# Patient Record
Sex: MALE | Race: WHITE | Employment: OTHER
[De-identification: names, ages, dates, MRNs, and addresses within clinical notes are randomized per-mention and may not be internally consistent; named-entity substitution may affect disease eponyms.]

---

## 2017-02-20 ENCOUNTER — OFFICE VISIT (OUTPATIENT)
Dept: NEUROLOGY | Facility: CLINIC | Age: 67
End: 2017-02-20

## 2017-02-20 VITALS
BODY MASS INDEX: 31.75 KG/M2 | SYSTOLIC BLOOD PRESSURE: 126 MMHG | HEIGHT: 72 IN | HEART RATE: 84 BPM | WEIGHT: 234.4 LBS | DIASTOLIC BLOOD PRESSURE: 84 MMHG

## 2017-02-20 DIAGNOSIS — G89.0 THALAMIC PAIN SYNDROME: Primary | ICD-10-CM

## 2017-02-20 PROCEDURE — 3017F COLORECTAL CA SCREEN DOC REV: CPT | Performed by: PSYCHIATRY & NEUROLOGY

## 2017-02-20 PROCEDURE — G8427 DOCREV CUR MEDS BY ELIG CLIN: HCPCS | Performed by: PSYCHIATRY & NEUROLOGY

## 2017-02-20 PROCEDURE — 4040F PNEUMOC VAC/ADMIN/RCVD: CPT | Performed by: PSYCHIATRY & NEUROLOGY

## 2017-02-20 PROCEDURE — 1123F ACP DISCUSS/DSCN MKR DOCD: CPT | Performed by: PSYCHIATRY & NEUROLOGY

## 2017-02-20 PROCEDURE — G8598 ASA/ANTIPLAT THER USED: HCPCS | Performed by: PSYCHIATRY & NEUROLOGY

## 2017-02-20 PROCEDURE — G8484 FLU IMMUNIZE NO ADMIN: HCPCS | Performed by: PSYCHIATRY & NEUROLOGY

## 2017-02-20 PROCEDURE — 1036F TOBACCO NON-USER: CPT | Performed by: PSYCHIATRY & NEUROLOGY

## 2017-02-20 PROCEDURE — 99214 OFFICE O/P EST MOD 30 MIN: CPT | Performed by: PSYCHIATRY & NEUROLOGY

## 2017-02-20 PROCEDURE — G8417 CALC BMI ABV UP PARAM F/U: HCPCS | Performed by: PSYCHIATRY & NEUROLOGY

## 2017-02-21 ENCOUNTER — OFFICE VISIT (OUTPATIENT)
Dept: INTERNAL MEDICINE | Facility: CLINIC | Age: 67
End: 2017-02-21

## 2017-02-21 VITALS
DIASTOLIC BLOOD PRESSURE: 84 MMHG | HEIGHT: 72 IN | WEIGHT: 230.4 LBS | RESPIRATION RATE: 12 BRPM | HEART RATE: 84 BPM | SYSTOLIC BLOOD PRESSURE: 128 MMHG | OXYGEN SATURATION: 96 % | BODY MASS INDEX: 31.21 KG/M2

## 2017-02-21 DIAGNOSIS — E78.00 HYPERCHOLESTEREMIA: ICD-10-CM

## 2017-02-21 DIAGNOSIS — I10 ESSENTIAL HYPERTENSION: ICD-10-CM

## 2017-02-21 DIAGNOSIS — E11.42 CONTROLLED TYPE 2 DIABETES MELLITUS WITH DIABETIC POLYNEUROPATHY, WITHOUT LONG-TERM CURRENT USE OF INSULIN (HCC): ICD-10-CM

## 2017-02-21 DIAGNOSIS — E11.42 DIABETIC POLYNEUROPATHY ASSOCIATED WITH TYPE 2 DIABETES MELLITUS (HCC): Primary | ICD-10-CM

## 2017-02-21 PROCEDURE — 1123F ACP DISCUSS/DSCN MKR DOCD: CPT | Performed by: INTERNAL MEDICINE

## 2017-02-21 PROCEDURE — 83036 HEMOGLOBIN GLYCOSYLATED A1C: CPT | Performed by: INTERNAL MEDICINE

## 2017-02-21 PROCEDURE — G8484 FLU IMMUNIZE NO ADMIN: HCPCS | Performed by: INTERNAL MEDICINE

## 2017-02-21 PROCEDURE — G8598 ASA/ANTIPLAT THER USED: HCPCS | Performed by: INTERNAL MEDICINE

## 2017-02-21 PROCEDURE — 4040F PNEUMOC VAC/ADMIN/RCVD: CPT | Performed by: INTERNAL MEDICINE

## 2017-02-21 PROCEDURE — 1036F TOBACCO NON-USER: CPT | Performed by: INTERNAL MEDICINE

## 2017-02-21 PROCEDURE — 99214 OFFICE O/P EST MOD 30 MIN: CPT | Performed by: INTERNAL MEDICINE

## 2017-02-21 PROCEDURE — G8427 DOCREV CUR MEDS BY ELIG CLIN: HCPCS | Performed by: INTERNAL MEDICINE

## 2017-02-21 PROCEDURE — 3044F HG A1C LEVEL LT 7.0%: CPT | Performed by: INTERNAL MEDICINE

## 2017-02-21 PROCEDURE — G8417 CALC BMI ABV UP PARAM F/U: HCPCS | Performed by: INTERNAL MEDICINE

## 2017-02-21 PROCEDURE — 3017F COLORECTAL CA SCREEN DOC REV: CPT | Performed by: INTERNAL MEDICINE

## 2017-02-21 RX ORDER — HYDROCODONE BITARTRATE AND ACETAMINOPHEN 5; 325 MG/1; MG/1
1 TABLET ORAL EVERY 8 HOURS
Qty: 90 TABLET | Refills: 0 | Status: SHIPPED | OUTPATIENT
Start: 2017-02-21 | End: 2017-05-23 | Stop reason: SDUPTHER

## 2017-02-21 RX ORDER — TADALAFIL 20 MG/1
20 TABLET ORAL DAILY
COMMUNITY
Start: 2016-08-19 | End: 2021-06-10 | Stop reason: CLARIF

## 2017-02-21 RX ORDER — HYDROCODONE BITARTRATE AND ACETAMINOPHEN 5; 325 MG/1; MG/1
5-325 TABLET ORAL EVERY 8 HOURS
COMMUNITY
Start: 2016-08-05 | End: 2017-02-21 | Stop reason: SDUPTHER

## 2017-02-21 ASSESSMENT — PATIENT HEALTH QUESTIONNAIRE - PHQ9
2. FEELING DOWN, DEPRESSED OR HOPELESS: 0
SUM OF ALL RESPONSES TO PHQ QUESTIONS 1-9: 0
1. LITTLE INTEREST OR PLEASURE IN DOING THINGS: 0
SUM OF ALL RESPONSES TO PHQ9 QUESTIONS 1 & 2: 0

## 2017-03-31 RX ORDER — LOSARTAN POTASSIUM 50 MG/1
TABLET ORAL
Qty: 90 TABLET | Refills: 3 | Status: SHIPPED | OUTPATIENT
Start: 2017-03-31 | End: 2018-01-16 | Stop reason: SDUPTHER

## 2017-03-31 RX ORDER — SIMVASTATIN 20 MG
TABLET ORAL
Qty: 90 TABLET | Refills: 3 | Status: SHIPPED | OUTPATIENT
Start: 2017-03-31 | End: 2018-01-16 | Stop reason: SDUPTHER

## 2017-04-11 RX ORDER — HYDROCODONE BITARTRATE AND ACETAMINOPHEN 5; 325 MG/1; MG/1
1 TABLET ORAL EVERY 8 HOURS PRN
Qty: 90 TABLET | Refills: 0 | Status: SHIPPED | OUTPATIENT
Start: 2017-04-11 | End: 2017-08-23 | Stop reason: SDUPTHER

## 2017-05-04 ENCOUNTER — HOSPITAL ENCOUNTER (OUTPATIENT)
Age: 67
Setting detail: SPECIMEN
Discharge: HOME | End: 2017-05-04
Payer: MEDICARE

## 2017-05-04 DIAGNOSIS — E11.42 DIABETIC POLYNEUROPATHY ASSOCIATED WITH TYPE 2 DIABETES MELLITUS (HCC): ICD-10-CM

## 2017-05-04 LAB
CHOLESTEROL/HDL RATIO: 4.2
CHOLESTEROL: 165 MG/DL
HDLC SERPL-MCNC: 39 MG/DL
LDL CHOLESTEROL: 94 MG/DL (ref 0–130)
TRIGL SERPL-MCNC: 162 MG/DL
VLDLC SERPL CALC-MCNC: ABNORMAL MG/DL (ref 1–30)

## 2017-05-15 RX ORDER — AMITRIPTYLINE HYDROCHLORIDE 75 MG/1
TABLET, FILM COATED ORAL
Qty: 90 TABLET | Refills: 3 | Status: SHIPPED | OUTPATIENT
Start: 2017-05-15 | End: 2018-05-14 | Stop reason: SDUPTHER

## 2017-05-23 ENCOUNTER — HOSPITAL ENCOUNTER (OUTPATIENT)
Age: 67
Setting detail: SPECIMEN
Discharge: HOME | End: 2017-05-23
Payer: MEDICARE

## 2017-05-23 ENCOUNTER — OFFICE VISIT (OUTPATIENT)
Dept: INTERNAL MEDICINE | Age: 67
End: 2017-05-23
Payer: MEDICARE

## 2017-05-23 VITALS
OXYGEN SATURATION: 97 % | SYSTOLIC BLOOD PRESSURE: 130 MMHG | WEIGHT: 230.8 LBS | HEIGHT: 72 IN | HEART RATE: 79 BPM | BODY MASS INDEX: 31.26 KG/M2 | DIASTOLIC BLOOD PRESSURE: 86 MMHG | RESPIRATION RATE: 12 BRPM

## 2017-05-23 DIAGNOSIS — M25.561 CHRONIC PAIN OF BOTH KNEES: ICD-10-CM

## 2017-05-23 DIAGNOSIS — I63.511 CEREBRAL INFARCTION DUE TO OCCLUSION OF RIGHT MIDDLE CEREBRAL ARTERY (HCC): ICD-10-CM

## 2017-05-23 DIAGNOSIS — E11.43 TYPE 2 DIABETES MELLITUS WITH DIABETIC AUTONOMIC NEUROPATHY, WITHOUT LONG-TERM CURRENT USE OF INSULIN (HCC): Primary | ICD-10-CM

## 2017-05-23 DIAGNOSIS — E11.43 TYPE 2 DIABETES MELLITUS WITH DIABETIC AUTONOMIC NEUROPATHY, WITHOUT LONG-TERM CURRENT USE OF INSULIN (HCC): ICD-10-CM

## 2017-05-23 DIAGNOSIS — G89.29 CHRONIC PAIN OF BOTH KNEES: ICD-10-CM

## 2017-05-23 DIAGNOSIS — M25.562 CHRONIC PAIN OF BOTH KNEES: ICD-10-CM

## 2017-05-23 DIAGNOSIS — I10 ESSENTIAL HYPERTENSION: ICD-10-CM

## 2017-05-23 LAB
CREATININE URINE: 75.4 MG/DL (ref 39–259)
MICROALBUMIN/CREAT 24H UR: <12 MG/L
MICROALBUMIN/CREAT UR-RTO: 16 MCG/MG CREAT

## 2017-05-23 PROCEDURE — 1036F TOBACCO NON-USER: CPT | Performed by: INTERNAL MEDICINE

## 2017-05-23 PROCEDURE — G8598 ASA/ANTIPLAT THER USED: HCPCS | Performed by: INTERNAL MEDICINE

## 2017-05-23 PROCEDURE — 1123F ACP DISCUSS/DSCN MKR DOCD: CPT | Performed by: INTERNAL MEDICINE

## 2017-05-23 PROCEDURE — 3017F COLORECTAL CA SCREEN DOC REV: CPT | Performed by: INTERNAL MEDICINE

## 2017-05-23 PROCEDURE — 3044F HG A1C LEVEL LT 7.0%: CPT | Performed by: INTERNAL MEDICINE

## 2017-05-23 PROCEDURE — G8427 DOCREV CUR MEDS BY ELIG CLIN: HCPCS | Performed by: INTERNAL MEDICINE

## 2017-05-23 PROCEDURE — G8417 CALC BMI ABV UP PARAM F/U: HCPCS | Performed by: INTERNAL MEDICINE

## 2017-05-23 PROCEDURE — 99214 OFFICE O/P EST MOD 30 MIN: CPT | Performed by: INTERNAL MEDICINE

## 2017-05-23 PROCEDURE — 4040F PNEUMOC VAC/ADMIN/RCVD: CPT | Performed by: INTERNAL MEDICINE

## 2017-05-23 PROCEDURE — 83036 HEMOGLOBIN GLYCOSYLATED A1C: CPT | Performed by: INTERNAL MEDICINE

## 2017-05-23 RX ORDER — HYDROCODONE BITARTRATE AND ACETAMINOPHEN 5; 325 MG/1; MG/1
1 TABLET ORAL EVERY 8 HOURS
Qty: 90 TABLET | Refills: 0 | Status: SHIPPED | OUTPATIENT
Start: 2017-05-23 | End: 2017-08-23 | Stop reason: SDUPTHER

## 2017-05-24 LAB — HBA1C MFR BLD: 7.1 %

## 2017-08-23 ENCOUNTER — OFFICE VISIT (OUTPATIENT)
Dept: INTERNAL MEDICINE | Age: 67
End: 2017-08-23
Payer: MEDICARE

## 2017-08-23 VITALS
DIASTOLIC BLOOD PRESSURE: 84 MMHG | WEIGHT: 243 LBS | BODY MASS INDEX: 32.96 KG/M2 | OXYGEN SATURATION: 93 % | HEART RATE: 89 BPM | SYSTOLIC BLOOD PRESSURE: 138 MMHG

## 2017-08-23 DIAGNOSIS — Z23 NEED FOR PROPHYLACTIC VACCINATION AGAINST STREPTOCOCCUS PNEUMONIAE (PNEUMOCOCCUS): ICD-10-CM

## 2017-08-23 DIAGNOSIS — I10 ESSENTIAL HYPERTENSION: ICD-10-CM

## 2017-08-23 DIAGNOSIS — E11.43 TYPE 2 DIABETES MELLITUS WITH DIABETIC AUTONOMIC NEUROPATHY, WITHOUT LONG-TERM CURRENT USE OF INSULIN (HCC): ICD-10-CM

## 2017-08-23 DIAGNOSIS — Z12.11 SCREENING FOR COLON CANCER: ICD-10-CM

## 2017-08-23 PROCEDURE — 1123F ACP DISCUSS/DSCN MKR DOCD: CPT | Performed by: INTERNAL MEDICINE

## 2017-08-23 PROCEDURE — 3046F HEMOGLOBIN A1C LEVEL >9.0%: CPT | Performed by: INTERNAL MEDICINE

## 2017-08-23 PROCEDURE — G8417 CALC BMI ABV UP PARAM F/U: HCPCS | Performed by: INTERNAL MEDICINE

## 2017-08-23 PROCEDURE — 90670 PCV13 VACCINE IM: CPT | Performed by: INTERNAL MEDICINE

## 2017-08-23 PROCEDURE — G8427 DOCREV CUR MEDS BY ELIG CLIN: HCPCS | Performed by: INTERNAL MEDICINE

## 2017-08-23 PROCEDURE — G8598 ASA/ANTIPLAT THER USED: HCPCS | Performed by: INTERNAL MEDICINE

## 2017-08-23 PROCEDURE — 4040F PNEUMOC VAC/ADMIN/RCVD: CPT | Performed by: INTERNAL MEDICINE

## 2017-08-23 PROCEDURE — G0009 ADMIN PNEUMOCOCCAL VACCINE: HCPCS | Performed by: INTERNAL MEDICINE

## 2017-08-23 PROCEDURE — 3017F COLORECTAL CA SCREEN DOC REV: CPT | Performed by: INTERNAL MEDICINE

## 2017-08-23 PROCEDURE — 1036F TOBACCO NON-USER: CPT | Performed by: INTERNAL MEDICINE

## 2017-08-23 PROCEDURE — 99214 OFFICE O/P EST MOD 30 MIN: CPT | Performed by: INTERNAL MEDICINE

## 2017-08-23 RX ORDER — HYDROCODONE BITARTRATE AND ACETAMINOPHEN 5; 325 MG/1; MG/1
1 TABLET ORAL EVERY 8 HOURS PRN
Qty: 90 TABLET | Refills: 0 | Status: SHIPPED | OUTPATIENT
Start: 2017-08-23 | End: 2017-10-23 | Stop reason: SDUPTHER

## 2017-08-23 ASSESSMENT — ENCOUNTER SYMPTOMS: GASTROINTESTINAL NEGATIVE: 1

## 2017-10-23 ENCOUNTER — OFFICE VISIT (OUTPATIENT)
Dept: INTERNAL MEDICINE | Age: 67
End: 2017-10-23
Payer: MEDICARE

## 2017-10-23 ENCOUNTER — HOSPITAL ENCOUNTER (OUTPATIENT)
Age: 67
Setting detail: SPECIMEN
Discharge: HOME | End: 2017-10-23
Payer: MEDICARE

## 2017-10-23 DIAGNOSIS — Z02.83 ENCOUNTER FOR DRUG SCREENING: Primary | ICD-10-CM

## 2017-10-23 DIAGNOSIS — Z02.83 ENCOUNTER FOR DRUG SCREENING: ICD-10-CM

## 2017-10-23 LAB
AMPHETAMINE SCREEN URINE: NEGATIVE
BARBITURATE SCREEN URINE: NEGATIVE
BENZODIAZEPINE SCREEN, URINE: NEGATIVE
BUPRENORPHINE URINE: NORMAL
CANNABINOID SCREEN URINE: NEGATIVE
COCAINE METABOLITE, URINE: NEGATIVE
MDMA URINE: NORMAL
METHADONE SCREEN, URINE: NEGATIVE
METHAMPHETAMINE, URINE: NORMAL
OPIATES, URINE: NEGATIVE
OXYCODONE SCREEN URINE: NEGATIVE
PHENCYCLIDINE, URINE: NEGATIVE
PROPOXYPHENE, URINE: NORMAL
TEST INFORMATION: NORMAL
TRICYCLIC ANTIDEPRESSANTS, UR: NORMAL

## 2017-10-23 PROCEDURE — 3017F COLORECTAL CA SCREEN DOC REV: CPT | Performed by: INTERNAL MEDICINE

## 2017-10-23 PROCEDURE — 4040F PNEUMOC VAC/ADMIN/RCVD: CPT | Performed by: INTERNAL MEDICINE

## 2017-10-23 PROCEDURE — G8417 CALC BMI ABV UP PARAM F/U: HCPCS | Performed by: INTERNAL MEDICINE

## 2017-10-23 PROCEDURE — G8428 CUR MEDS NOT DOCUMENT: HCPCS | Performed by: INTERNAL MEDICINE

## 2017-10-23 PROCEDURE — 99211 OFF/OP EST MAY X REQ PHY/QHP: CPT | Performed by: INTERNAL MEDICINE

## 2017-10-23 RX ORDER — HYDROCODONE BITARTRATE AND ACETAMINOPHEN 5; 325 MG/1; MG/1
1 TABLET ORAL EVERY 8 HOURS PRN
Qty: 90 TABLET | Refills: 0 | Status: SHIPPED | OUTPATIENT
Start: 2017-10-23 | End: 2017-12-04 | Stop reason: SDUPTHER

## 2017-12-04 ENCOUNTER — OFFICE VISIT (OUTPATIENT)
Dept: INTERNAL MEDICINE | Age: 67
End: 2017-12-04
Payer: MEDICARE

## 2017-12-04 VITALS
BODY MASS INDEX: 31.29 KG/M2 | DIASTOLIC BLOOD PRESSURE: 91 MMHG | SYSTOLIC BLOOD PRESSURE: 136 MMHG | HEIGHT: 72 IN | WEIGHT: 231 LBS | HEART RATE: 91 BPM | OXYGEN SATURATION: 94 %

## 2017-12-04 DIAGNOSIS — E11.43 TYPE 2 DIABETES MELLITUS WITH DIABETIC AUTONOMIC NEUROPATHY, WITHOUT LONG-TERM CURRENT USE OF INSULIN (HCC): Primary | ICD-10-CM

## 2017-12-04 DIAGNOSIS — I10 ESSENTIAL HYPERTENSION: ICD-10-CM

## 2017-12-04 DIAGNOSIS — E78.00 HYPERCHOLESTEREMIA: ICD-10-CM

## 2017-12-04 LAB — HBA1C MFR BLD: 7.3 %

## 2017-12-04 PROCEDURE — G8482 FLU IMMUNIZE ORDER/ADMIN: HCPCS | Performed by: INTERNAL MEDICINE

## 2017-12-04 PROCEDURE — 83036 HEMOGLOBIN GLYCOSYLATED A1C: CPT | Performed by: INTERNAL MEDICINE

## 2017-12-04 PROCEDURE — G8417 CALC BMI ABV UP PARAM F/U: HCPCS | Performed by: INTERNAL MEDICINE

## 2017-12-04 PROCEDURE — G8598 ASA/ANTIPLAT THER USED: HCPCS | Performed by: INTERNAL MEDICINE

## 2017-12-04 PROCEDURE — 99214 OFFICE O/P EST MOD 30 MIN: CPT | Performed by: INTERNAL MEDICINE

## 2017-12-04 PROCEDURE — 3017F COLORECTAL CA SCREEN DOC REV: CPT | Performed by: INTERNAL MEDICINE

## 2017-12-04 PROCEDURE — 1036F TOBACCO NON-USER: CPT | Performed by: INTERNAL MEDICINE

## 2017-12-04 PROCEDURE — G8427 DOCREV CUR MEDS BY ELIG CLIN: HCPCS | Performed by: INTERNAL MEDICINE

## 2017-12-04 PROCEDURE — 1123F ACP DISCUSS/DSCN MKR DOCD: CPT | Performed by: INTERNAL MEDICINE

## 2017-12-04 PROCEDURE — 4040F PNEUMOC VAC/ADMIN/RCVD: CPT | Performed by: INTERNAL MEDICINE

## 2017-12-04 PROCEDURE — 3045F PR MOST RECENT HEMOGLOBIN A1C LEVEL 7.0-9.0%: CPT | Performed by: INTERNAL MEDICINE

## 2017-12-04 RX ORDER — HYDROCODONE BITARTRATE AND ACETAMINOPHEN 5; 325 MG/1; MG/1
1 TABLET ORAL EVERY 8 HOURS PRN
Qty: 90 TABLET | Refills: 0 | Status: SHIPPED | OUTPATIENT
Start: 2017-12-04 | End: 2018-01-23 | Stop reason: SDUPTHER

## 2017-12-04 ASSESSMENT — ENCOUNTER SYMPTOMS
ALLERGIC/IMMUNOLOGIC NEGATIVE: 1
RESPIRATORY NEGATIVE: 1

## 2017-12-04 NOTE — PROGRESS NOTES
[facility] INTERNAL MEDICINE [place] [address] [address]  [address] [address]  Dept: [phone]  Dept Fax: [phone]    Tonja Zheng is a 75 y.o. male who presents today for   Chief Complaint   Patient presents with    3 Month Follow-Up     Patient will bring his FIT KIT at Next Visit    Diabetes     Patient does not check glucose levels at home    Hypertension    and follow up of chronic medical problems:   Patient Active Problem List   Diagnosis    Hypercholesteremia    Cerebral infarction ([address]    Neuropathic pain    Weakness of both legs    Essential hypertension    Type 2 diabetes mellitus with diabetic autonomic neuropathy, without long-term current use of insulin ([address]    Eustachian tube dysfunction    Branch retinal vein occlusion    Primary open angle glaucoma    Chronic pain of both knees    Cerebral infarction due to occlusion of right middle cerebral artery ([address]   . Past Medical History:   Diagnosis Date    Cataracts, bilateral     Headache(784.0)     Hearing loss     Hyperlipidemia     Kidney stones     Osteoarthritis     Tendonitis of shoulder, left     Tremor        Past Surgical History:   Procedure Laterality Date    CATARACT REMOVAL Right Aug 2015    CYSTOSCOPY      KIDNEY STONE SURGERY      LITHOTRIPSY      REFRACTIVE SURGERY Right 2014    THUMB AMPUTATION Left        Family History   Problem Relation Age of Onset    Heart Disease Mother     Cancer Father     Migraines Sister     Migraines Brother        Social History   Substance Use Topics    Smoking status: Never Smoker    Smokeless tobacco: Former User     Types: Chew      Comment: quit smokeless tobacco in late 80's    Alcohol use 0.0 oz/week      Comment: rarely      Current Outpatient Prescriptions   Medication Sig Dispense Refill    HYDROcodone-acetaminophen (NORCO) 5-325 MG per tablet Take 1 tablet by mouth every 8 hours as needed for Pain .  90 tablet 0    amitriptyline (ELAVIL) 75 MG tablet TAKE 1 TABLET EVERY NIGHT 90 tablet 3    simvastatin (ZOCOR) 20 MG tablet TAKE 1 TABLET EVERY DAY 90 tablet 3    losartan (COZAAR) 50 MG tablet TAKE 1 TABLET EVERY DAY 90 tablet 3    tadalafil (CIALIS) 20 MG tablet Take 20 mg by mouth daily      amLODIPine (NORVASC) 10 MG tablet TAKE 1 TABLET EVERY DAY 90 tablet 5    latanoprost (XALATAN) 0.005 % ophthalmic solution       aspirin 325 MG tablet Take 325 mg by mouth daily. No current facility-administered medications for this visit. No Known Allergies    Health Maintenance   Topic Date Due    Colon Cancer Screen FIT/FOBT  05/13/2017    Diabetic foot exam  05/23/2018 (Originally 2/19/2017)    Diabetic retinal exam  05/23/2018 (Originally 1/28/2017)    Zostavax vaccine  01/19/2019 (Originally 3/19/2009)    DTaP/Tdap/Td vaccine (1 - Tdap) 01/19/2019 (Originally [birth year]/19/1969)    Lipid screen  05/04/2018    Diabetic microalbuminuria test  05/23/2018    Diabetic hemoglobin A1C test  05/24/2018    Pneumococcal low/med risk (2 of 2 - PPSV23) 08/23/2018    Flu vaccine  Completed    Hepatitis C screen  Addressed     Visit Information    Have you changed or started any medications since your last visit including any over-the-counter medicines, vitamins, or herbal medicines? no   Are you having any side effects from any of your medications? -  no  Have you stopped taking any of your medications? Is so, why? -  no    Have you seen any other physician or provider since your last visit? Yes - Records Requested  Have you had any other diagnostic tests since your last visit? No  Have you been seen in the emergency room and/or had an admission to a hospital since we last saw you? No  Have you had your routine dental cleaning in the past 6 months? no    Have you activated your [employer]bl[employer] account? If not, what are your barriers?  Yes     Patient Care Team:  Varun Anderson MD as PCP - Claudeen Soulier, MD as PCP - S Cardiovascular: Negative. Allergic/Immunologic: Negative. Psychiatric/Behavioral: Negative. All other systems reviewed and are negative. Objective:     Physical Exam   Constitutional: He is oriented to person, place, and time. He appears well-developed and well-nourished. HENT:   Head: Normocephalic and atraumatic. Neck: Neck supple. Cardiovascular: Normal rate and regular rhythm. Pulmonary/Chest: Effort normal and breath sounds normal.   Abdominal: Soft. Musculoskeletal: He exhibits no edema. Neurological: He is alert and oriented to person, place, and time. Skin: Skin is warm and dry. Psychiatric: He has a normal mood and affect. His behavior is normal.   Vitals reviewed. BP (!) 136/91   Pulse 91   Ht 6' (1.829 m)   Wt 231 lb (104.8 kg)   SpO2 94%   BMI 31.33 kg/m²     Assessment:      1. Type 2 diabetes mellitus with diabetic autonomic neuropathy, without long-term current use of insulin ([facility])  POCT glycosylated hemoglobin (Hb A1C)   2. Essential hypertension     3. Hypercholesteremia         Plan:       1. Brenda Best received counseling on the following healthy behaviors: medication adherence    2. Prior labs and health maintenance reviewed. 3.  Discussed use, benefit, and side effects of prescribed medications. Barriers to medication compliance addressed. All his questions were answered. Pt voiced understanding. Brenda Best will continue current medications, diet and exercise. Orders Placed This Encounter   Medications    HYDROcodone-acetaminophen (NORCO) 5-325 MG per tablet     Sig: Take 1 tablet by mouth every 8 hours as needed for Pain . Dispense:  90 tablet     Refill:  0          Completed Refills               Requested Prescriptions     Signed Prescriptions Disp Refills    HYDROcodone-acetaminophen (NORCO) 5-325 MG per tablet 90 tablet 0     Sig: Take 1 tablet by mouth every 8 hours as needed for Pain . 4.  Patient given educational materials - see patient instructions    5. Was a self-tracking handout given in paper form or via [employer]? No  If yes, see orders or list here. Orders Placed This Encounter   Procedures    POCT glycosylated hemoglobin (Hb A1C)       Return in about 3 months (around 3/4/2018). Patient agreed with treatment plan.     Electronically signed by Jeanella Epley, MD on 12/4/2017 at 9:16 AM

## 2017-12-04 NOTE — PATIENT INSTRUCTIONS
take decongestants or anti-inflammatory medicine, such as ibuprofen. Some of these medicines can raise blood pressure. · Learn how to check your blood pressure at home. Lifestyle changes  · Stay at a healthy weight. This is especially important if you put on weight around the waist. Losing even 10 pounds can help you lower your blood pressure. · If your doctor recommends it, get more exercise. Walking is a good choice. Bit by bit, increase the amount you walk every day. Try for at least 30 minutes on most days of the week. You also may want to swim, bike, or do other activities. · Avoid or limit alcohol. Talk to your doctor about whether you can drink any alcohol. · Try to limit how much sodium you eat to less than 2,300 milligrams (mg) a day. Your doctor may ask you to try to eat less than 1,500 mg a day. · Eat plenty of fruits (such as bananas and oranges), vegetables, legumes, whole grains, and low-fat dairy products. · Lower the amount of saturated fat in your diet. Saturated fat is found in animal products such as milk, cheese, and meat. Limiting these foods may help you lose weight and also lower your risk for heart disease. · Do not smoke. Smoking increases your risk for heart attack and stroke. If you need help quitting, talk to your doctor about stop-smoking programs and medicines. These can increase your chances of quitting for good. When should you call for help? Call 911 anytime you think you may need emergency care. This may mean having symptoms that suggest that your blood pressure is causing a serious heart or blood vessel problem. Your blood pressure may be over 180/110. For example, call 911 if:  · You have symptoms of a heart attack. These may include:  ¨ Chest pain or pressure, or a strange feeling in the chest.  ¨ Sweating. ¨ Shortness of breath. ¨ Nausea or vomiting.   ¨ Pain, pressure, or a strange feeling in the back, neck, jaw, or upper belly or in one or both shoulders or arms.  ¨ Lightheadedness or sudden weakness. ¨ A fast or irregular heartbeat. · You have symptoms of a stroke. These may include:  ¨ Sudden numbness, tingling, weakness, or loss of movement in your face, arm, or leg, especially on only one side of your body. ¨ Sudden vision changes. ¨ Sudden trouble speaking. ¨ Sudden confusion or trouble understanding simple statements. ¨ Sudden problems with walking or balance. ¨ A sudden, severe headache that is different from past headaches. · You have severe back or belly pain. Do not wait until your blood pressure comes down on its own. Get help right away. Call your doctor now or seek immediate care if:  · Your blood pressure is much higher than normal (such as 180/110 or higher), but you don't have symptoms. · You think high blood pressure is causing symptoms, such as:  ¨ Severe headache. ¨ Blurry vision. Watch closely for changes in your health, and be sure to contact your doctor if:  · Your blood pressure measures 140/90 or higher at least 2 times. That means the top number is 140 or higher or the bottom number is 90 or higher, or both. · You think you may be having side effects from your blood pressure medicine. · Your blood pressure is usually normal, but it goes above normal at least 2 times. Where can you learn more? Go to https://Circalit.Draftster. org and sign in to your Pump! account. Enter S078 in the [facility] box to learn more about \"High Blood Pressure: Care Instructions. \"     If you do not have an account, please click on the \"Sign Up Now\" link. Current as of: August 8, 2016  Content Version: 11.3  © 1371-5455 [employer]. Care instructions adapted under license by [facility] ([facility]). If you have questions about a medical condition or this instruction, always ask your healthcare professional. [place] [address] any warranty or liability for your use of this information.

## 2018-01-16 RX ORDER — SIMVASTATIN 20 MG
TABLET ORAL
Qty: 90 TABLET | Refills: 3 | Status: SHIPPED | OUTPATIENT
Start: 2018-01-16 | End: 2019-01-25 | Stop reason: SDUPTHER

## 2018-01-16 RX ORDER — LOSARTAN POTASSIUM 50 MG/1
TABLET ORAL
Qty: 90 TABLET | Refills: 3 | Status: SHIPPED | OUTPATIENT
Start: 2018-01-16 | End: 2019-01-25 | Stop reason: SDUPTHER

## 2018-01-16 RX ORDER — AMLODIPINE BESYLATE 10 MG/1
TABLET ORAL
Qty: 90 TABLET | Refills: 5 | Status: SHIPPED | OUTPATIENT
Start: 2018-01-16 | End: 2019-01-25 | Stop reason: SDUPTHER

## 2018-01-23 DIAGNOSIS — M79.2 NEUROPATHIC PAIN: Primary | ICD-10-CM

## 2018-01-23 RX ORDER — HYDROCODONE BITARTRATE AND ACETAMINOPHEN 5; 325 MG/1; MG/1
1 TABLET ORAL EVERY 8 HOURS PRN
Qty: 90 TABLET | Refills: 0 | Status: SHIPPED | OUTPATIENT
Start: 2018-01-23 | End: 2018-06-14 | Stop reason: SDUPTHER

## 2018-03-08 ENCOUNTER — HOSPITAL ENCOUNTER (OUTPATIENT)
Age: 68
Discharge: HOME | End: 2018-03-08
Payer: MEDICARE

## 2018-03-08 ENCOUNTER — HOSPITAL ENCOUNTER (OUTPATIENT)
Dept: GENERAL RADIOLOGY | Age: 68
Discharge: HOME | End: 2018-03-10
Payer: MEDICARE

## 2018-03-08 ENCOUNTER — OFFICE VISIT (OUTPATIENT)
Dept: INTERNAL MEDICINE | Age: 68
End: 2018-03-08
Payer: MEDICARE

## 2018-03-08 ENCOUNTER — HOSPITAL ENCOUNTER (OUTPATIENT)
Age: 68
Discharge: HOME | End: 2018-03-10
Payer: MEDICARE

## 2018-03-08 VITALS
OXYGEN SATURATION: 95 % | DIASTOLIC BLOOD PRESSURE: 87 MMHG | HEIGHT: 72 IN | WEIGHT: 230.8 LBS | SYSTOLIC BLOOD PRESSURE: 128 MMHG | BODY MASS INDEX: 31.26 KG/M2 | HEART RATE: 92 BPM | TEMPERATURE: 98 F

## 2018-03-08 DIAGNOSIS — E11.43 DIABETIC AUTONOMIC NEUROPATHY ASSOCIATED WITH TYPE 2 DIABETES MELLITUS (HCC): ICD-10-CM

## 2018-03-08 DIAGNOSIS — J06.9 VIRAL UPPER RESPIRATORY TRACT INFECTION: ICD-10-CM

## 2018-03-08 DIAGNOSIS — I10 ESSENTIAL HYPERTENSION: Primary | ICD-10-CM

## 2018-03-08 DIAGNOSIS — Z00.00 WELL ADULT EXAM: ICD-10-CM

## 2018-03-08 PROBLEM — H53.40 GLAUCOMATOUS VISUAL FIELD DEFECT: Status: ACTIVE | Noted: 2017-06-27

## 2018-03-08 PROBLEM — H25.10 NUCLEAR SCLEROTIC CATARACT: Status: ACTIVE | Noted: 2017-06-27

## 2018-03-08 PROBLEM — H40.50X0 GLAUCOMATOUS VISUAL FIELD DEFECT: Status: ACTIVE | Noted: 2017-06-27

## 2018-03-08 LAB
ABSOLUTE EOS #: 0.25 K/UL (ref 0–0.44)
ABSOLUTE IMMATURE GRANULOCYTE: 0.03 K/UL (ref 0–0.3)
ABSOLUTE LYMPH #: 1.83 K/UL (ref 1.1–3.7)
ABSOLUTE MONO #: 0.63 K/UL (ref 0.1–1.2)
ANION GAP SERPL CALCULATED.3IONS-SCNC: 12 MMOL/L (ref 9–17)
BASOPHILS # BLD: 0 % (ref 0–2)
BASOPHILS ABSOLUTE: 0.03 K/UL (ref 0–0.2)
BUN BLDV-MCNC: 18 MG/DL (ref 8–23)
BUN/CREAT BLD: ABNORMAL (ref 9–20)
CALCIUM SERPL-MCNC: 9.2 MG/DL (ref 8.6–10.4)
CHLORIDE BLD-SCNC: 99 MMOL/L (ref 98–107)
CO2: 26 MMOL/L (ref 20–31)
CREAT SERPL-MCNC: 0.94 MG/DL (ref 0.7–1.2)
DIFFERENTIAL TYPE: ABNORMAL
EOSINOPHILS RELATIVE PERCENT: 3 % (ref 1–4)
GFR AFRICAN AMERICAN: >60 ML/MIN
GFR NON-AFRICAN AMERICAN: >60 ML/MIN
GFR SERPL CREATININE-BSD FRML MDRD: ABNORMAL ML/MIN/{1.73_M2}
GFR SERPL CREATININE-BSD FRML MDRD: ABNORMAL ML/MIN/{1.73_M2}
GLUCOSE BLD-MCNC: 181 MG/DL (ref 70–99)
HCT VFR BLD CALC: 43.7 % (ref 40.7–50.3)
HEMOGLOBIN: 14.2 G/DL (ref 13–17)
IMMATURE GRANULOCYTES: 0 %
LYMPHOCYTES # BLD: 22 % (ref 24–43)
MCH RBC QN AUTO: 30 PG (ref 25.2–33.5)
MCHC RBC AUTO-ENTMCNC: 32.5 G/DL (ref 28.4–34.8)
MCV RBC AUTO: 92.2 FL (ref 82.6–102.9)
MONOCYTES # BLD: 8 % (ref 3–12)
NRBC AUTOMATED: 0 PER 100 WBC
PDW BLD-RTO: 13.1 % (ref 11.8–14.4)
PLATELET # BLD: 237 K/UL (ref 138–453)
PLATELET ESTIMATE: ABNORMAL
PMV BLD AUTO: 10.1 FL (ref 8.1–13.5)
POTASSIUM SERPL-SCNC: 4.2 MMOL/L (ref 3.7–5.3)
RBC # BLD: 4.74 M/UL (ref 4.21–5.77)
RBC # BLD: ABNORMAL 10*6/UL
SEG NEUTROPHILS: 67 % (ref 36–65)
SEGMENTED NEUTROPHILS ABSOLUTE COUNT: 5.58 K/UL (ref 1.5–8.1)
SODIUM BLD-SCNC: 137 MMOL/L (ref 135–144)
WBC # BLD: 8.4 K/UL (ref 3.5–11.3)
WBC # BLD: ABNORMAL 10*3/UL

## 2018-03-08 PROCEDURE — 85025 COMPLETE CBC W/AUTO DIFF WBC: CPT

## 2018-03-08 PROCEDURE — 1123F ACP DISCUSS/DSCN MKR DOCD: CPT | Performed by: INTERNAL MEDICINE

## 2018-03-08 PROCEDURE — 4040F PNEUMOC VAC/ADMIN/RCVD: CPT | Performed by: INTERNAL MEDICINE

## 2018-03-08 PROCEDURE — 3017F COLORECTAL CA SCREEN DOC REV: CPT | Performed by: INTERNAL MEDICINE

## 2018-03-08 PROCEDURE — G8417 CALC BMI ABV UP PARAM F/U: HCPCS | Performed by: INTERNAL MEDICINE

## 2018-03-08 PROCEDURE — 80048 BASIC METABOLIC PNL TOTAL CA: CPT

## 2018-03-08 PROCEDURE — 36415 COLL VENOUS BLD VENIPUNCTURE: CPT

## 2018-03-08 PROCEDURE — 71046 X-RAY EXAM CHEST 2 VIEWS: CPT

## 2018-03-08 PROCEDURE — 3046F HEMOGLOBIN A1C LEVEL >9.0%: CPT | Performed by: INTERNAL MEDICINE

## 2018-03-08 PROCEDURE — 99214 OFFICE O/P EST MOD 30 MIN: CPT | Performed by: INTERNAL MEDICINE

## 2018-03-08 PROCEDURE — G8598 ASA/ANTIPLAT THER USED: HCPCS | Performed by: INTERNAL MEDICINE

## 2018-03-08 PROCEDURE — G8482 FLU IMMUNIZE ORDER/ADMIN: HCPCS | Performed by: INTERNAL MEDICINE

## 2018-03-08 PROCEDURE — G8427 DOCREV CUR MEDS BY ELIG CLIN: HCPCS | Performed by: INTERNAL MEDICINE

## 2018-03-08 PROCEDURE — 1036F TOBACCO NON-USER: CPT | Performed by: INTERNAL MEDICINE

## 2018-03-08 ASSESSMENT — ENCOUNTER SYMPTOMS
SORE THROAT: 1
EYES NEGATIVE: 1
SINUS PRESSURE: 1
GASTROINTESTINAL NEGATIVE: 1
ALLERGIC/IMMUNOLOGIC NEGATIVE: 1
SINUS PAIN: 1
COUGH: 1

## 2018-03-08 ASSESSMENT — PATIENT HEALTH QUESTIONNAIRE - PHQ9
2. FEELING DOWN, DEPRESSED OR HOPELESS: 0
SUM OF ALL RESPONSES TO PHQ9 QUESTIONS 1 & 2: 0
SUM OF ALL RESPONSES TO PHQ QUESTIONS 1-9: 0
1. LITTLE INTEREST OR PLEASURE IN DOING THINGS: 0

## 2018-03-08 NOTE — PROGRESS NOTES
05/04/2018    Diabetic microalbuminuria test  05/23/2018    Pneumococcal low/med risk (2 of 2 - PPSV23) 08/23/2018    A1C test (Diabetic or Prediabetic)  12/04/2018    Flu vaccine  Completed    Hepatitis C screen  Addressed       Controlled Substances Monitoring:      HPI:     Diabetes   He presents for his follow-up diabetic visit. He has type 2 diabetes mellitus. His disease course has been stable. There are no hypoglycemic associated symptoms. There are no diabetic associated symptoms. Diabetic complications include peripheral neuropathy. Risk factors for coronary artery disease include diabetes mellitus, dyslipidemia and hypertension. Current diabetic treatment includes diet. He is compliant with treatment all of the time. An ACE inhibitor/angiotensin II receptor blocker is being taken. Hypertension   This is a chronic problem. The current episode started more than 1 year ago. The problem is unchanged. The problem is controlled. There are no associated agents to hypertension. Risk factors for coronary artery disease include diabetes mellitus, dyslipidemia and male gender. Past treatments include calcium channel blockers and angiotensin blockers. The current treatment provides moderate (he has been checking his BP for this week-- running mildly elevated) improvement. There are no compliance problems. ROS:     Review of Systems   Constitutional: Negative. HENT: Positive for sinus pain, sinus pressure, sneezing and sore throat. Eyes: Negative. Respiratory: Positive for cough. Cardiovascular: Negative. Gastrointestinal: Negative. Endocrine: Negative. Genitourinary: Negative. Musculoskeletal: Negative. Skin: Negative. Allergic/Immunologic: Negative. Neurological: Negative. Hematological: Negative. Psychiatric/Behavioral: Positive for sleep disturbance. All other systems reviewed and are negative.       Objective:     Physical Exam   Constitutional: He is oriented

## 2018-05-14 RX ORDER — AMITRIPTYLINE HYDROCHLORIDE 75 MG/1
TABLET, FILM COATED ORAL
Qty: 90 TABLET | Refills: 3 | Status: SHIPPED | OUTPATIENT
Start: 2018-05-14 | End: 2019-01-25 | Stop reason: SDUPTHER

## 2018-05-30 DIAGNOSIS — I63.511 CEREBRAL INFARCTION DUE TO OCCLUSION OF RIGHT MIDDLE CEREBRAL ARTERY (HCC): ICD-10-CM

## 2018-05-30 DIAGNOSIS — E11.43 TYPE 2 DIABETES MELLITUS WITH DIABETIC AUTONOMIC NEUROPATHY, WITHOUT LONG-TERM CURRENT USE OF INSULIN (HCC): ICD-10-CM

## 2018-05-30 DIAGNOSIS — R29.898 WEAKNESS OF BOTH LEGS: ICD-10-CM

## 2018-06-14 ENCOUNTER — OFFICE VISIT (OUTPATIENT)
Dept: INTERNAL MEDICINE | Age: 68
End: 2018-06-14
Payer: MEDICARE

## 2018-06-14 VITALS
HEIGHT: 72 IN | DIASTOLIC BLOOD PRESSURE: 87 MMHG | WEIGHT: 234 LBS | OXYGEN SATURATION: 96 % | BODY MASS INDEX: 31.69 KG/M2 | SYSTOLIC BLOOD PRESSURE: 137 MMHG | HEART RATE: 90 BPM

## 2018-06-14 DIAGNOSIS — M79.2 NEUROPATHIC PAIN: ICD-10-CM

## 2018-06-14 DIAGNOSIS — Z12.11 SCREENING FOR COLON CANCER: ICD-10-CM

## 2018-06-14 DIAGNOSIS — Z13.220 SCREENING FOR HYPERLIPIDEMIA: ICD-10-CM

## 2018-06-14 DIAGNOSIS — E11.43 TYPE 2 DIABETES MELLITUS WITH DIABETIC AUTONOMIC NEUROPATHY, WITHOUT LONG-TERM CURRENT USE OF INSULIN (HCC): Primary | ICD-10-CM

## 2018-06-14 LAB — HBA1C MFR BLD: 8.2 %

## 2018-06-14 PROCEDURE — 1036F TOBACCO NON-USER: CPT | Performed by: INTERNAL MEDICINE

## 2018-06-14 PROCEDURE — 99214 OFFICE O/P EST MOD 30 MIN: CPT | Performed by: INTERNAL MEDICINE

## 2018-06-14 PROCEDURE — G8427 DOCREV CUR MEDS BY ELIG CLIN: HCPCS | Performed by: INTERNAL MEDICINE

## 2018-06-14 PROCEDURE — 4040F PNEUMOC VAC/ADMIN/RCVD: CPT | Performed by: INTERNAL MEDICINE

## 2018-06-14 PROCEDURE — 3045F PR MOST RECENT HEMOGLOBIN A1C LEVEL 7.0-9.0%: CPT | Performed by: INTERNAL MEDICINE

## 2018-06-14 PROCEDURE — G8598 ASA/ANTIPLAT THER USED: HCPCS | Performed by: INTERNAL MEDICINE

## 2018-06-14 PROCEDURE — G8417 CALC BMI ABV UP PARAM F/U: HCPCS | Performed by: INTERNAL MEDICINE

## 2018-06-14 PROCEDURE — 83036 HEMOGLOBIN GLYCOSYLATED A1C: CPT | Performed by: INTERNAL MEDICINE

## 2018-06-14 PROCEDURE — 1123F ACP DISCUSS/DSCN MKR DOCD: CPT | Performed by: INTERNAL MEDICINE

## 2018-06-14 PROCEDURE — 3017F COLORECTAL CA SCREEN DOC REV: CPT | Performed by: INTERNAL MEDICINE

## 2018-06-14 PROCEDURE — 2022F DILAT RTA XM EVC RTNOPTHY: CPT | Performed by: INTERNAL MEDICINE

## 2018-06-14 RX ORDER — HYDROCODONE BITARTRATE AND ACETAMINOPHEN 5; 325 MG/1; MG/1
1 TABLET ORAL EVERY 8 HOURS PRN
Qty: 90 TABLET | Refills: 0 | Status: SHIPPED | OUTPATIENT
Start: 2018-06-14 | End: 2018-07-23 | Stop reason: SDUPTHER

## 2018-06-14 ASSESSMENT — ENCOUNTER SYMPTOMS
GASTROINTESTINAL NEGATIVE: 1
RESPIRATORY NEGATIVE: 1

## 2018-07-23 DIAGNOSIS — M79.2 NEUROPATHIC PAIN: ICD-10-CM

## 2018-07-23 RX ORDER — HYDROCODONE BITARTRATE AND ACETAMINOPHEN 5; 325 MG/1; MG/1
1 TABLET ORAL EVERY 8 HOURS PRN
Qty: 90 TABLET | Refills: 0 | Status: SHIPPED | OUTPATIENT
Start: 2018-07-23 | End: 2018-09-14 | Stop reason: SDUPTHER

## 2018-09-14 ENCOUNTER — OFFICE VISIT (OUTPATIENT)
Dept: INTERNAL MEDICINE | Age: 68
End: 2018-09-14
Payer: MEDICARE

## 2018-09-14 ENCOUNTER — HOSPITAL ENCOUNTER (OUTPATIENT)
Age: 68
Setting detail: SPECIMEN
Discharge: HOME | End: 2018-09-14
Payer: MEDICARE

## 2018-09-14 VITALS
WEIGHT: 235 LBS | HEART RATE: 74 BPM | HEIGHT: 72 IN | RESPIRATION RATE: 12 BRPM | DIASTOLIC BLOOD PRESSURE: 83 MMHG | OXYGEN SATURATION: 98 % | BODY MASS INDEX: 31.83 KG/M2 | SYSTOLIC BLOOD PRESSURE: 135 MMHG

## 2018-09-14 DIAGNOSIS — Z13.220 SCREENING FOR HYPERLIPIDEMIA: ICD-10-CM

## 2018-09-14 DIAGNOSIS — R31.9 HEMATURIA, UNSPECIFIED TYPE: ICD-10-CM

## 2018-09-14 DIAGNOSIS — I10 ESSENTIAL HYPERTENSION: ICD-10-CM

## 2018-09-14 DIAGNOSIS — M79.2 NEUROPATHIC PAIN: ICD-10-CM

## 2018-09-14 DIAGNOSIS — N20.0 KIDNEY STONE: Primary | ICD-10-CM

## 2018-09-14 DIAGNOSIS — E11.43 TYPE 2 DIABETES MELLITUS WITH DIABETIC AUTONOMIC NEUROPATHY, WITHOUT LONG-TERM CURRENT USE OF INSULIN (HCC): ICD-10-CM

## 2018-09-14 DIAGNOSIS — Z12.11 SCREENING FOR COLON CANCER: ICD-10-CM

## 2018-09-14 DIAGNOSIS — E11.43 TYPE 2 DIABETES MELLITUS WITH DIABETIC AUTONOMIC NEUROPATHY, WITHOUT LONG-TERM CURRENT USE OF INSULIN (HCC): Primary | ICD-10-CM

## 2018-09-14 LAB
BILIRUBIN, POC: ABNORMAL
BLOOD URINE, POC: ABNORMAL
CLARITY, POC: CLEAR
COLOR, POC: YELLOW
CREATININE URINE: 100.6 MG/DL (ref 39–259)
GLUCOSE URINE, POC: 250
HBA1C MFR BLD: 8 %
KETONES, POC: ABNORMAL
LEUKOCYTE EST, POC: ABNORMAL
MICROALBUMIN/CREAT 24H UR: 20 MG/L
MICROALBUMIN/CREAT UR-RTO: 20 MCG/MG CREAT
NITRITE, POC: ABNORMAL
PH, POC: 6
PROTEIN, POC: ABNORMAL
SPECIFIC GRAVITY, POC: 1.01
UROBILINOGEN, POC: 0.2

## 2018-09-14 PROCEDURE — 4040F PNEUMOC VAC/ADMIN/RCVD: CPT | Performed by: INTERNAL MEDICINE

## 2018-09-14 PROCEDURE — G8427 DOCREV CUR MEDS BY ELIG CLIN: HCPCS | Performed by: INTERNAL MEDICINE

## 2018-09-14 PROCEDURE — G8598 ASA/ANTIPLAT THER USED: HCPCS | Performed by: INTERNAL MEDICINE

## 2018-09-14 PROCEDURE — 1101F PT FALLS ASSESS-DOCD LE1/YR: CPT | Performed by: INTERNAL MEDICINE

## 2018-09-14 PROCEDURE — 3045F PR MOST RECENT HEMOGLOBIN A1C LEVEL 7.0-9.0%: CPT | Performed by: INTERNAL MEDICINE

## 2018-09-14 PROCEDURE — 99214 OFFICE O/P EST MOD 30 MIN: CPT | Performed by: INTERNAL MEDICINE

## 2018-09-14 PROCEDURE — 1036F TOBACCO NON-USER: CPT | Performed by: INTERNAL MEDICINE

## 2018-09-14 PROCEDURE — 3017F COLORECTAL CA SCREEN DOC REV: CPT | Performed by: INTERNAL MEDICINE

## 2018-09-14 PROCEDURE — G8417 CALC BMI ABV UP PARAM F/U: HCPCS | Performed by: INTERNAL MEDICINE

## 2018-09-14 PROCEDURE — 83036 HEMOGLOBIN GLYCOSYLATED A1C: CPT | Performed by: INTERNAL MEDICINE

## 2018-09-14 PROCEDURE — 1123F ACP DISCUSS/DSCN MKR DOCD: CPT | Performed by: INTERNAL MEDICINE

## 2018-09-14 PROCEDURE — 81003 URINALYSIS AUTO W/O SCOPE: CPT | Performed by: INTERNAL MEDICINE

## 2018-09-14 PROCEDURE — 2022F DILAT RTA XM EVC RTNOPTHY: CPT | Performed by: INTERNAL MEDICINE

## 2018-09-14 RX ORDER — HYDROCODONE BITARTRATE AND ACETAMINOPHEN 5; 325 MG/1; MG/1
1 TABLET ORAL EVERY 8 HOURS PRN
Qty: 90 TABLET | Refills: 0 | Status: SHIPPED | OUTPATIENT
Start: 2018-09-14 | End: 2019-09-14

## 2018-09-14 ASSESSMENT — ENCOUNTER SYMPTOMS: BACK PAIN: 1

## 2018-09-14 NOTE — PROGRESS NOTES
and/or had an admission to a hospital since we last saw you? No  Have you had your routine dental cleaning in the past 6 months? no    Have you activated your [employer] account? If not, what are your barriers?  No:      Patient Care Team:  Vince Rankin MD as PCP - Kaur Gillespie MD as PCP - S Attributed Provider    Medical History Review  Past Medical, Family, and Social History reviewed and does not contribute to the patient presenting condition    Health Maintenance   Topic Date Due    Colon Cancer Screen FIT/FOBT  05/13/2017    Lipid screen  05/04/2018    Diabetic microalbuminuria test  05/23/2018    Diabetic retinal exam  12/14/2018 (Originally 1/28/2017)    DTaP/Tdap/Td vaccine (1 - Tdap) 01/19/2019 (Originally 3/19/1968)    Flu vaccine (1) 09/14/2019 (Originally 9/1/2018)    Pneumococcal low/med risk (2 of 2 - PPSV23) 09/14/2019 (Originally 8/23/2018)    Shingles Vaccine (1 of 2 - 2 Dose Series) 09/14/2019 (Originally 3/19/1999)    Potassium monitoring  03/08/2019    Creatinine monitoring  03/08/2019    Diabetic foot exam  06/14/2019    A1C test (Diabetic or Prediabetic)  06/14/2019    Hepatitis C screen  Addressed

## 2018-09-17 ENCOUNTER — HOSPITAL ENCOUNTER (OUTPATIENT)
Age: 68
Discharge: HOME | End: 2018-09-19
Payer: MEDICARE

## 2018-09-17 ENCOUNTER — HOSPITAL ENCOUNTER (OUTPATIENT)
Dept: GENERAL RADIOLOGY | Age: 68
Discharge: HOME | End: 2018-09-19
Payer: MEDICARE

## 2018-09-17 DIAGNOSIS — N20.0 KIDNEY STONE: ICD-10-CM

## 2018-09-17 PROCEDURE — 74019 RADEX ABDOMEN 2 VIEWS: CPT

## 2018-09-18 ENCOUNTER — HOSPITAL ENCOUNTER (OUTPATIENT)
Age: 68
Setting detail: SPECIMEN
Discharge: HOME | End: 2018-09-18
Payer: MEDICARE

## 2018-09-20 LAB — DERMATOLOGY PATHOLOGY REPORT: NORMAL

## 2018-10-23 ENCOUNTER — HOSPITAL ENCOUNTER (OUTPATIENT)
Age: 68
Setting detail: SPECIMEN
Discharge: HOME | End: 2018-10-23
Payer: MEDICARE

## 2018-10-25 LAB — DERMATOLOGY PATHOLOGY REPORT: NORMAL

## 2018-12-14 ENCOUNTER — OFFICE VISIT (OUTPATIENT)
Dept: PRIMARY CARE CLINIC | Age: 68
End: 2018-12-14
Payer: MEDICARE

## 2018-12-14 VITALS
DIASTOLIC BLOOD PRESSURE: 86 MMHG | BODY MASS INDEX: 29.8 KG/M2 | HEIGHT: 72 IN | SYSTOLIC BLOOD PRESSURE: 131 MMHG | HEART RATE: 88 BPM | WEIGHT: 220 LBS

## 2018-12-14 DIAGNOSIS — E78.00 HYPERCHOLESTEREMIA: ICD-10-CM

## 2018-12-14 DIAGNOSIS — Z89.012: ICD-10-CM

## 2018-12-14 DIAGNOSIS — I10 ESSENTIAL HYPERTENSION: ICD-10-CM

## 2018-12-14 DIAGNOSIS — E11.43 TYPE 2 DIABETES MELLITUS WITH DIABETIC AUTONOMIC NEUROPATHY, WITHOUT LONG-TERM CURRENT USE OF INSULIN (HCC): Primary | ICD-10-CM

## 2018-12-14 DIAGNOSIS — E66.3 OVERWEIGHT (BMI 25.0-29.9): ICD-10-CM

## 2018-12-14 LAB — HBA1C MFR BLD: 8.2 %

## 2018-12-14 PROCEDURE — 1123F ACP DISCUSS/DSCN MKR DOCD: CPT | Performed by: PHYSICIAN ASSISTANT

## 2018-12-14 PROCEDURE — G8598 ASA/ANTIPLAT THER USED: HCPCS | Performed by: PHYSICIAN ASSISTANT

## 2018-12-14 PROCEDURE — 4040F PNEUMOC VAC/ADMIN/RCVD: CPT | Performed by: PHYSICIAN ASSISTANT

## 2018-12-14 PROCEDURE — 3017F COLORECTAL CA SCREEN DOC REV: CPT | Performed by: PHYSICIAN ASSISTANT

## 2018-12-14 PROCEDURE — G8417 CALC BMI ABV UP PARAM F/U: HCPCS | Performed by: PHYSICIAN ASSISTANT

## 2018-12-14 PROCEDURE — 83036 HEMOGLOBIN GLYCOSYLATED A1C: CPT | Performed by: PHYSICIAN ASSISTANT

## 2018-12-14 PROCEDURE — 2022F DILAT RTA XM EVC RTNOPTHY: CPT | Performed by: PHYSICIAN ASSISTANT

## 2018-12-14 PROCEDURE — 1101F PT FALLS ASSESS-DOCD LE1/YR: CPT | Performed by: PHYSICIAN ASSISTANT

## 2018-12-14 PROCEDURE — G8427 DOCREV CUR MEDS BY ELIG CLIN: HCPCS | Performed by: PHYSICIAN ASSISTANT

## 2018-12-14 PROCEDURE — 99214 OFFICE O/P EST MOD 30 MIN: CPT | Performed by: PHYSICIAN ASSISTANT

## 2018-12-14 PROCEDURE — 1036F TOBACCO NON-USER: CPT | Performed by: PHYSICIAN ASSISTANT

## 2018-12-14 PROCEDURE — G8482 FLU IMMUNIZE ORDER/ADMIN: HCPCS | Performed by: PHYSICIAN ASSISTANT

## 2018-12-14 PROCEDURE — 3045F PR MOST RECENT HEMOGLOBIN A1C LEVEL 7.0-9.0%: CPT | Performed by: PHYSICIAN ASSISTANT

## 2018-12-14 RX ORDER — GLIPIZIDE 10 MG/1
10 TABLET ORAL
Qty: 90 TABLET | Refills: 0 | Status: SHIPPED | OUTPATIENT
Start: 2018-12-14 | End: 2019-01-25 | Stop reason: SDUPTHER

## 2018-12-14 RX ORDER — TIMOLOL MALEATE 5 MG/ML
SOLUTION/ DROPS OPHTHALMIC
Refills: 0 | COMMUNITY
Start: 2018-11-05

## 2018-12-14 NOTE — PROGRESS NOTES
DAY 90 tablet 5    tadalafil (CIALIS) 20 MG tablet Take 20 mg by mouth daily      latanoprost (XALATAN) 0.005 % ophthalmic solution       aspirin 325 MG tablet Take 325 mg by mouth daily. No current facility-administered medications for this visit. Social History   Substance Use Topics    Smoking status: Never Smoker    Smokeless tobacco: Former User     Types: Chew      Comment: quit smokeless tobacco in late [de-identified]    Alcohol use 0.0 oz/week      Comment: rarely       Family History   Problem Relation Age of Onset    Heart Disease Mother     Cancer Father    [facility] Migraines Sister     Migraines Brother         REVIEW OFSYSTEMS:  Review of Systems   Constitutional: Negative for chills and fever. HENT: Negative for congestion. Respiratory: Negative for cough and shortness of breath. Cardiovascular: Negative for chest pain. Gastrointestinal: Negative for constipation, diarrhea, nausea and vomiting. Genitourinary: Negative for dysuria, frequency and urgency. Musculoskeletal: Positive for myalgias. Negative for back pain and neck pain. Neurological: Positive for tingling and numbness. Negative for headaches. PHYSICAL EXAM:  Vitals:    12/14/18 0847   BP: 131/86   Site: Left Upper Arm   Position: Sitting   Cuff Size: Large Adult   Pulse: 88   Weight: 220 lb (99.8 kg)   Height: 6' (1.829 m)     BP Readings from Last 3 Encounters:   12/14/18 131/86   09/14/18 135/83   06/14/18 137/87        Physical Exam   Constitutional: He is oriented to person, place, and time. Vital signs are normal. He appears well-developed and well-nourished. He is cooperative. HENT:   Head: Normocephalic and atraumatic. Right Ear: External ear normal.   Left Ear: External ear normal.   Nose: Nose normal.   Eyes: Pupils are equal, round, and reactive to light. Conjunctivae and lids are normal.   Cardiovascular: Normal rate, regular rhythm and normal heart sounds.     Pulmonary/Chest: Effort normal and breath

## 2018-12-16 ASSESSMENT — ENCOUNTER SYMPTOMS
BACK PAIN: 0
SHORTNESS OF BREATH: 0
VOMITING: 0
NAUSEA: 0
COUGH: 0
CONSTIPATION: 0
DIARRHEA: 0

## 2019-01-23 ENCOUNTER — HOSPITAL ENCOUNTER (OUTPATIENT)
Age: 69
Setting detail: SPECIMEN
Discharge: HOME | End: 2019-01-23
Payer: MEDICARE

## 2019-01-23 DIAGNOSIS — I10 ESSENTIAL HYPERTENSION: ICD-10-CM

## 2019-01-23 DIAGNOSIS — E78.00 HYPERCHOLESTEREMIA: ICD-10-CM

## 2019-01-23 DIAGNOSIS — E11.43 TYPE 2 DIABETES MELLITUS WITH DIABETIC AUTONOMIC NEUROPATHY, WITHOUT LONG-TERM CURRENT USE OF INSULIN (HCC): ICD-10-CM

## 2019-01-23 LAB
ABSOLUTE EOS #: 0.34 K/UL (ref 0–0.44)
ABSOLUTE IMMATURE GRANULOCYTE: <0.03 K/UL (ref 0–0.3)
ABSOLUTE LYMPH #: 2.45 K/UL (ref 1.1–3.7)
ABSOLUTE MONO #: 0.69 K/UL (ref 0.1–1.2)
ALBUMIN SERPL-MCNC: 4.2 G/DL (ref 3.5–5.2)
ALBUMIN/GLOBULIN RATIO: 1.4 (ref 1–2.5)
ALP BLD-CCNC: 66 U/L (ref 40–129)
ALT SERPL-CCNC: 13 U/L (ref 5–41)
ANION GAP SERPL CALCULATED.3IONS-SCNC: 15 MMOL/L (ref 9–17)
AST SERPL-CCNC: 15 U/L
BASOPHILS # BLD: 0 % (ref 0–2)
BASOPHILS ABSOLUTE: <0.03 K/UL (ref 0–0.2)
BILIRUB SERPL-MCNC: 0.35 MG/DL (ref 0.3–1.2)
BUN BLDV-MCNC: 14 MG/DL (ref 8–23)
BUN/CREAT BLD: ABNORMAL (ref 9–20)
CALCIUM SERPL-MCNC: 9.1 MG/DL (ref 8.6–10.4)
CHLORIDE BLD-SCNC: 100 MMOL/L (ref 98–107)
CHOLESTEROL/HDL RATIO: 4.9
CHOLESTEROL: 181 MG/DL
CO2: 27 MMOL/L (ref 20–31)
CREAT SERPL-MCNC: 0.67 MG/DL (ref 0.7–1.2)
CREATININE URINE: 62 MG/DL (ref 39–259)
DIFFERENTIAL TYPE: NORMAL
EOSINOPHILS RELATIVE PERCENT: 4 % (ref 1–4)
GFR AFRICAN AMERICAN: >60 ML/MIN
GFR NON-AFRICAN AMERICAN: >60 ML/MIN
GFR SERPL CREATININE-BSD FRML MDRD: ABNORMAL ML/MIN/{1.73_M2}
GFR SERPL CREATININE-BSD FRML MDRD: ABNORMAL ML/MIN/{1.73_M2}
GLUCOSE BLD-MCNC: 130 MG/DL (ref 70–99)
HCT VFR BLD CALC: 45.6 % (ref 40.7–50.3)
HDLC SERPL-MCNC: 37 MG/DL
HEMOGLOBIN: 14.9 G/DL (ref 13–17)
IMMATURE GRANULOCYTES: 0 %
LDL CHOLESTEROL: 107 MG/DL (ref 0–130)
LYMPHOCYTES # BLD: 29 % (ref 24–43)
MCH RBC QN AUTO: 30.8 PG (ref 25.2–33.5)
MCHC RBC AUTO-ENTMCNC: 32.7 G/DL (ref 28.4–34.8)
MCV RBC AUTO: 94.4 FL (ref 82.6–102.9)
MICROALBUMIN/CREAT 24H UR: <12 MG/L
MICROALBUMIN/CREAT UR-RTO: NORMAL MCG/MG CREAT
MONOCYTES # BLD: 8 % (ref 3–12)
NRBC AUTOMATED: 0 PER 100 WBC
PDW BLD-RTO: 13.3 % (ref 11.8–14.4)
PLATELET # BLD: 224 K/UL (ref 138–453)
PLATELET ESTIMATE: NORMAL
PMV BLD AUTO: 10.4 FL (ref 8.1–13.5)
POTASSIUM SERPL-SCNC: 4.3 MMOL/L (ref 3.7–5.3)
RBC # BLD: 4.83 M/UL (ref 4.21–5.77)
RBC # BLD: NORMAL 10*6/UL
SEG NEUTROPHILS: 59 % (ref 36–65)
SEGMENTED NEUTROPHILS ABSOLUTE COUNT: 4.84 K/UL (ref 1.5–8.1)
SODIUM BLD-SCNC: 142 MMOL/L (ref 135–144)
TOTAL PROTEIN: 7.2 G/DL (ref 6.4–8.3)
TRIGL SERPL-MCNC: 183 MG/DL
VLDLC SERPL CALC-MCNC: ABNORMAL MG/DL (ref 1–30)
WBC # BLD: 8.4 K/UL (ref 3.5–11.3)
WBC # BLD: NORMAL 10*3/UL

## 2019-01-25 ENCOUNTER — OFFICE VISIT (OUTPATIENT)
Dept: PRIMARY CARE CLINIC | Age: 69
End: 2019-01-25
Payer: MEDICARE

## 2019-01-25 VITALS
HEART RATE: 85 BPM | RESPIRATION RATE: 20 BRPM | SYSTOLIC BLOOD PRESSURE: 128 MMHG | WEIGHT: 237 LBS | BODY MASS INDEX: 32.1 KG/M2 | HEIGHT: 72 IN | OXYGEN SATURATION: 98 % | DIASTOLIC BLOOD PRESSURE: 85 MMHG | TEMPERATURE: 97.9 F

## 2019-01-25 DIAGNOSIS — I10 ESSENTIAL HYPERTENSION: ICD-10-CM

## 2019-01-25 DIAGNOSIS — Z23 NEED FOR PNEUMOCOCCAL VACCINE: ICD-10-CM

## 2019-01-25 DIAGNOSIS — Z76.0 MEDICATION REFILL: ICD-10-CM

## 2019-01-25 DIAGNOSIS — E78.00 HYPERCHOLESTEREMIA: ICD-10-CM

## 2019-01-25 DIAGNOSIS — E11.43 TYPE 2 DIABETES MELLITUS WITH DIABETIC AUTONOMIC NEUROPATHY, WITHOUT LONG-TERM CURRENT USE OF INSULIN (HCC): Primary | ICD-10-CM

## 2019-01-25 DIAGNOSIS — E66.09 CLASS 1 OBESITY DUE TO EXCESS CALORIES WITHOUT SERIOUS COMORBIDITY WITH BODY MASS INDEX (BMI) OF 32.0 TO 32.9 IN ADULT: ICD-10-CM

## 2019-01-25 DIAGNOSIS — Z89.012 S/P AMPUTATION OF THUMB, LEFT: ICD-10-CM

## 2019-01-25 LAB — HBA1C MFR BLD: 7.5 %

## 2019-01-25 PROCEDURE — 1036F TOBACCO NON-USER: CPT | Performed by: PHYSICIAN ASSISTANT

## 2019-01-25 PROCEDURE — G0009 ADMIN PNEUMOCOCCAL VACCINE: HCPCS | Performed by: PHYSICIAN ASSISTANT

## 2019-01-25 PROCEDURE — 4040F PNEUMOC VAC/ADMIN/RCVD: CPT | Performed by: PHYSICIAN ASSISTANT

## 2019-01-25 PROCEDURE — G8598 ASA/ANTIPLAT THER USED: HCPCS | Performed by: PHYSICIAN ASSISTANT

## 2019-01-25 PROCEDURE — 2022F DILAT RTA XM EVC RTNOPTHY: CPT | Performed by: PHYSICIAN ASSISTANT

## 2019-01-25 PROCEDURE — 90732 PPSV23 VACC 2 YRS+ SUBQ/IM: CPT | Performed by: PHYSICIAN ASSISTANT

## 2019-01-25 PROCEDURE — 99214 OFFICE O/P EST MOD 30 MIN: CPT | Performed by: PHYSICIAN ASSISTANT

## 2019-01-25 PROCEDURE — G8427 DOCREV CUR MEDS BY ELIG CLIN: HCPCS | Performed by: PHYSICIAN ASSISTANT

## 2019-01-25 PROCEDURE — 83036 HEMOGLOBIN GLYCOSYLATED A1C: CPT | Performed by: PHYSICIAN ASSISTANT

## 2019-01-25 PROCEDURE — 3017F COLORECTAL CA SCREEN DOC REV: CPT | Performed by: PHYSICIAN ASSISTANT

## 2019-01-25 PROCEDURE — 3045F PR MOST RECENT HEMOGLOBIN A1C LEVEL 7.0-9.0%: CPT | Performed by: PHYSICIAN ASSISTANT

## 2019-01-25 PROCEDURE — G8417 CALC BMI ABV UP PARAM F/U: HCPCS | Performed by: PHYSICIAN ASSISTANT

## 2019-01-25 PROCEDURE — 1101F PT FALLS ASSESS-DOCD LE1/YR: CPT | Performed by: PHYSICIAN ASSISTANT

## 2019-01-25 PROCEDURE — G8482 FLU IMMUNIZE ORDER/ADMIN: HCPCS | Performed by: PHYSICIAN ASSISTANT

## 2019-01-25 PROCEDURE — 1123F ACP DISCUSS/DSCN MKR DOCD: CPT | Performed by: PHYSICIAN ASSISTANT

## 2019-01-25 RX ORDER — SIMVASTATIN 20 MG
20 TABLET ORAL NIGHTLY
Qty: 90 TABLET | Refills: 1 | Status: SHIPPED | OUTPATIENT
Start: 2019-01-25 | End: 2019-09-10 | Stop reason: SDUPTHER

## 2019-01-25 RX ORDER — GLIPIZIDE 10 MG/1
10 TABLET ORAL
Qty: 180 TABLET | Refills: 1 | Status: SHIPPED | OUTPATIENT
Start: 2019-01-25 | End: 2019-07-18

## 2019-01-25 RX ORDER — GLIPIZIDE 10 MG/1
10 TABLET ORAL
Qty: 20 TABLET | Refills: 0 | Status: SHIPPED | OUTPATIENT
Start: 2019-01-25 | End: 2019-09-10

## 2019-01-25 RX ORDER — AMITRIPTYLINE HYDROCHLORIDE 75 MG/1
75 TABLET, FILM COATED ORAL NIGHTLY
Qty: 90 TABLET | Refills: 1 | Status: SHIPPED | OUTPATIENT
Start: 2019-01-25 | End: 2019-07-22 | Stop reason: SDUPTHER

## 2019-01-25 RX ORDER — AMLODIPINE BESYLATE 10 MG/1
10 TABLET ORAL DAILY
Qty: 90 TABLET | Refills: 1 | Status: SHIPPED | OUTPATIENT
Start: 2019-01-25 | End: 2019-08-21 | Stop reason: SDUPTHER

## 2019-01-25 RX ORDER — GLUCOSAMINE HCL/CHONDROITIN SU 500-400 MG
CAPSULE ORAL
Qty: 100 STRIP | Refills: 5 | Status: SHIPPED | OUTPATIENT
Start: 2019-01-25 | End: 2019-02-05 | Stop reason: SDUPTHER

## 2019-01-25 RX ORDER — LOSARTAN POTASSIUM 50 MG/1
50 TABLET ORAL DAILY
Qty: 90 TABLET | Refills: 1 | Status: SHIPPED | OUTPATIENT
Start: 2019-01-25 | End: 2019-04-10 | Stop reason: SDUPTHER

## 2019-01-29 PROBLEM — E66.811 CLASS 1 OBESITY DUE TO EXCESS CALORIES WITHOUT SERIOUS COMORBIDITY WITH BODY MASS INDEX (BMI) OF 32.0 TO 32.9 IN ADULT: Status: ACTIVE | Noted: 2019-01-29

## 2019-01-29 PROBLEM — E66.09 CLASS 1 OBESITY DUE TO EXCESS CALORIES WITHOUT SERIOUS COMORBIDITY WITH BODY MASS INDEX (BMI) OF 32.0 TO 32.9 IN ADULT: Status: ACTIVE | Noted: 2019-01-29

## 2019-01-29 ASSESSMENT — ENCOUNTER SYMPTOMS
COUGH: 0
DIARRHEA: 0
CONSTIPATION: 0
WHEEZING: 0
NAUSEA: 0
BACK PAIN: 0
SHORTNESS OF BREATH: 0
VOMITING: 0

## 2019-02-05 DIAGNOSIS — E11.43 TYPE 2 DIABETES MELLITUS WITH DIABETIC AUTONOMIC NEUROPATHY, WITHOUT LONG-TERM CURRENT USE OF INSULIN (HCC): ICD-10-CM

## 2019-02-05 RX ORDER — GLUCOSAMINE HCL/CHONDROITIN SU 500-400 MG
CAPSULE ORAL
Qty: 100 STRIP | Refills: 5 | Status: SHIPPED | OUTPATIENT
Start: 2019-02-05 | End: 2020-08-04 | Stop reason: SDUPTHER

## 2019-04-10 DIAGNOSIS — E11.43 TYPE 2 DIABETES MELLITUS WITH DIABETIC AUTONOMIC NEUROPATHY, WITHOUT LONG-TERM CURRENT USE OF INSULIN (HCC): ICD-10-CM

## 2019-04-10 DIAGNOSIS — I10 ESSENTIAL HYPERTENSION: ICD-10-CM

## 2019-04-11 NOTE — TELEPHONE ENCOUNTER
Last visit: 01/25/2019  Last Med refill: 03/08/2019  Does patient have enough medication for 72 hours: Yes    Next Visit Date:  Future Appointments   Date Time Provider Lev Huerta   5/16/2019  8:40 AM Lexus Williamson PA-C [address] [facility] Maintenance   Topic Date Due    Colon Cancer Screen FIT/FOBT  05/13/2017    DTaP/Tdap/Td vaccine (1 - Tdap) 09/01/2019 (Originally 3/19/1968)    Shingles Vaccine (1 of 2) 09/01/2019 (Originally 3/19/1999)    Diabetic foot exam  06/14/2019    Diabetic microalbuminuria test  01/23/2020    Lipid screen  01/23/2020    Potassium monitoring  01/23/2020    Creatinine monitoring  01/23/2020    A1C test (Diabetic or Prediabetic)  01/25/2020    Diabetic retinal exam  01/29/2020    Flu vaccine  Completed    Pneumococcal 65+ years Vaccine  Completed    Hepatitis C screen  Addressed       Hemoglobin A1C (%)   Date Value   01/25/2019 7.5   12/14/2018 8.2   09/14/2018 8.0             ( goal A1C is < 7)   Microalb/Crt.  Ratio (mcg/mg creat)   Date Value   01/23/2019 CANNOT BE CALCULATED     LDL Cholesterol (mg/dL)   Date Value   01/23/2019 107   05/04/2017 94       (goal LDL is <100)   AST (U/L)   Date Value   01/23/2019 15     ALT (U/L)   Date Value   01/23/2019 13     BUN (mg/dL)   Date Value   01/23/2019 14     BP Readings from Last 3 Encounters:   01/25/19 128/85   12/14/18 131/86   09/14/18 135/83          (goal 120/80)    All Future Testing planned in [facility]  Lab Frequency Next Occurrence   Point-Of-Care-Test Occult Blood (Set of 3 cards to send home with patient) Once 78/89/8979   Basic Metabolic Panel Once 70/49/9170   POCT FECAL IMMUNOCHEMICAL TEST (FIT) Once 06/05/2019   Lipid, Fasting Once 02/22/2019               Patient Active Problem List:     Hypercholesteremia     Cerebral infarction ([address] [facility] [address]     Neuropathic pain     Weakness of both legs     Essential hypertension     Type 2 diabetes mellitus with diabetic autonomic neuropathy, without

## 2019-04-12 RX ORDER — LOSARTAN POTASSIUM 50 MG/1
50 TABLET ORAL DAILY
Qty: 90 TABLET | Refills: 0 | Status: SHIPPED | OUTPATIENT
Start: 2019-04-12 | End: 2019-07-16 | Stop reason: SDUPTHER

## 2019-07-16 DIAGNOSIS — I10 ESSENTIAL HYPERTENSION: ICD-10-CM

## 2019-07-16 DIAGNOSIS — E11.43 TYPE 2 DIABETES MELLITUS WITH DIABETIC AUTONOMIC NEUROPATHY, WITHOUT LONG-TERM CURRENT USE OF INSULIN (HCC): ICD-10-CM

## 2019-07-16 NOTE — TELEPHONE ENCOUNTER
Health Maintenance   Topic Date Due    Annual Wellness Visit (AWV)  03/19/2012    Colon Cancer Screen FIT/FOBT  05/13/2017    Diabetic foot exam  06/14/2019    DTaP/Tdap/Td vaccine (1 - Tdap) 09/01/2019 (Originally 3/19/1968)    Shingles Vaccine (1 of 2) 09/01/2019 (Originally 3/19/1999)    Flu vaccine (1) 09/01/2019    Diabetic microalbuminuria test  01/23/2020    Lipid screen  01/23/2020    Potassium monitoring  01/23/2020    Creatinine monitoring  01/23/2020    A1C test (Diabetic or Prediabetic)  01/25/2020    Diabetic retinal exam  01/29/2020    Pneumococcal 65+ years Vaccine  Completed    Hepatitis C screen  Addressed             (applicable per patient's age: Cancer Screenings, Depression Screening, Fall Risk Screening, Immunizations)    Hemoglobin A1C (%)   Date Value   01/25/2019 7.5   12/14/2018 8.2   09/14/2018 8.0     Microalb/Crt. Ratio (mcg/mg creat)   Date Value   01/23/2019 CANNOT BE CALCULATED     LDL Cholesterol (mg/dL)   Date Value   01/23/2019 107     AST (U/L)   Date Value   01/23/2019 15     ALT (U/L)   Date Value   01/23/2019 13     BUN (mg/dL)   Date Value   01/23/2019 14      (goal A1C is < 7)   (goal LDL is <100) need 30-50% reduction from baseline     BP Readings from Last 3 Encounters:   01/25/19 128/85   12/14/18 131/86   09/14/18 135/83    (goal /80)      All Future Testing planned in [facility]:  Lab Frequency Next Occurrence   Lipid, Fasting Once 02/22/2019       Next Visit Date:  No future appointments.          Patient Active Problem List:     Hypercholesteremia     Cerebral infarction ([facility] [address]     Neuropathic pain     Weakness of both legs     Essential hypertension     Type 2 diabetes mellitus with diabetic autonomic neuropathy, without long-term current use of insulin (HCC)     Eustachian tube dysfunction     Branch retinal vein occlusion     Primary open angle glaucoma     Chronic pain of both knees     Cerebral infarction due to occlusion of right middle cerebral artery (HCC)     Glaucomatous visual field defect     Nuclear sclerotic cataract     Class 1 obesity due to excess calories without serious comorbidity with body mass index (BMI) of 32.0 to 32.9 in adult

## 2019-07-18 RX ORDER — LOSARTAN POTASSIUM 50 MG/1
50 TABLET ORAL DAILY
Qty: 90 TABLET | Refills: 0 | Status: SHIPPED | OUTPATIENT
Start: 2019-07-18 | End: 2019-09-10 | Stop reason: SDUPTHER

## 2019-07-18 RX ORDER — GLIPIZIDE 10 MG/1
10 TABLET ORAL
Qty: 180 TABLET | Refills: 0 | Status: SHIPPED | OUTPATIENT
Start: 2019-07-18 | End: 2019-09-10 | Stop reason: SDUPTHER

## 2019-09-10 ENCOUNTER — OFFICE VISIT (OUTPATIENT)
Dept: PRIMARY CARE CLINIC | Age: 69
End: 2019-09-10
Payer: MEDICARE

## 2019-09-10 VITALS
DIASTOLIC BLOOD PRESSURE: 84 MMHG | SYSTOLIC BLOOD PRESSURE: 129 MMHG | BODY MASS INDEX: 32.69 KG/M2 | TEMPERATURE: 97.2 F | HEART RATE: 77 BPM | WEIGHT: 241 LBS

## 2019-09-10 DIAGNOSIS — E11.43 TYPE 2 DIABETES MELLITUS WITH DIABETIC AUTONOMIC NEUROPATHY, WITHOUT LONG-TERM CURRENT USE OF INSULIN (HCC): ICD-10-CM

## 2019-09-10 DIAGNOSIS — Z76.0 MEDICATION REFILL: ICD-10-CM

## 2019-09-10 DIAGNOSIS — E78.00 HYPERCHOLESTEREMIA: ICD-10-CM

## 2019-09-10 DIAGNOSIS — Z89.9 HISTORY OF AMPUTATION: ICD-10-CM

## 2019-09-10 DIAGNOSIS — E66.09 CLASS 1 OBESITY DUE TO EXCESS CALORIES WITHOUT SERIOUS COMORBIDITY WITH BODY MASS INDEX (BMI) OF 32.0 TO 32.9 IN ADULT: ICD-10-CM

## 2019-09-10 DIAGNOSIS — Z12.5 PROSTATE CANCER SCREENING: ICD-10-CM

## 2019-09-10 DIAGNOSIS — M25.562 CHRONIC PAIN OF LEFT KNEE: Primary | ICD-10-CM

## 2019-09-10 DIAGNOSIS — G89.29 CHRONIC PAIN OF LEFT KNEE: Primary | ICD-10-CM

## 2019-09-10 DIAGNOSIS — I10 ESSENTIAL HYPERTENSION: ICD-10-CM

## 2019-09-10 PROCEDURE — 3045F PR MOST RECENT HEMOGLOBIN A1C LEVEL 7.0-9.0%: CPT | Performed by: PHYSICIAN ASSISTANT

## 2019-09-10 PROCEDURE — 2022F DILAT RTA XM EVC RTNOPTHY: CPT | Performed by: PHYSICIAN ASSISTANT

## 2019-09-10 PROCEDURE — G8598 ASA/ANTIPLAT THER USED: HCPCS | Performed by: PHYSICIAN ASSISTANT

## 2019-09-10 PROCEDURE — G8417 CALC BMI ABV UP PARAM F/U: HCPCS | Performed by: PHYSICIAN ASSISTANT

## 2019-09-10 PROCEDURE — 3017F COLORECTAL CA SCREEN DOC REV: CPT | Performed by: PHYSICIAN ASSISTANT

## 2019-09-10 PROCEDURE — 99214 OFFICE O/P EST MOD 30 MIN: CPT | Performed by: PHYSICIAN ASSISTANT

## 2019-09-10 PROCEDURE — 1036F TOBACCO NON-USER: CPT | Performed by: PHYSICIAN ASSISTANT

## 2019-09-10 PROCEDURE — 1123F ACP DISCUSS/DSCN MKR DOCD: CPT | Performed by: PHYSICIAN ASSISTANT

## 2019-09-10 PROCEDURE — G8427 DOCREV CUR MEDS BY ELIG CLIN: HCPCS | Performed by: PHYSICIAN ASSISTANT

## 2019-09-10 PROCEDURE — 4040F PNEUMOC VAC/ADMIN/RCVD: CPT | Performed by: PHYSICIAN ASSISTANT

## 2019-09-10 RX ORDER — LOSARTAN POTASSIUM 50 MG/1
50 TABLET ORAL DAILY
Qty: 90 TABLET | Refills: 1 | Status: SHIPPED | OUTPATIENT
Start: 2019-09-10 | End: 2020-03-02 | Stop reason: SDUPTHER

## 2019-09-10 RX ORDER — AMITRIPTYLINE HYDROCHLORIDE 75 MG/1
75 TABLET, FILM COATED ORAL NIGHTLY
Qty: 90 TABLET | Refills: 1 | Status: SHIPPED | OUTPATIENT
Start: 2019-09-10 | End: 2020-03-02 | Stop reason: SDUPTHER

## 2019-09-10 RX ORDER — SIMVASTATIN 20 MG
20 TABLET ORAL NIGHTLY
Qty: 90 TABLET | Refills: 1 | Status: SHIPPED | OUTPATIENT
Start: 2019-09-10 | End: 2020-03-02 | Stop reason: SDUPTHER

## 2019-09-10 RX ORDER — GLIPIZIDE 10 MG/1
10 TABLET ORAL
Qty: 180 TABLET | Refills: 0 | Status: SHIPPED | OUTPATIENT
Start: 2019-09-10 | End: 2020-01-10 | Stop reason: SDUPTHER

## 2019-09-10 ASSESSMENT — PATIENT HEALTH QUESTIONNAIRE - PHQ9
SUM OF ALL RESPONSES TO PHQ9 QUESTIONS 1 & 2: 0
SUM OF ALL RESPONSES TO PHQ QUESTIONS 1-9: 0
SUM OF ALL RESPONSES TO PHQ QUESTIONS 1-9: 0
2. FEELING DOWN, DEPRESSED OR HOPELESS: 0
1. LITTLE INTEREST OR PLEASURE IN DOING THINGS: 0

## 2019-09-10 ASSESSMENT — ENCOUNTER SYMPTOMS: COUGH: 1

## 2019-10-03 ASSESSMENT — ENCOUNTER SYMPTOMS
VOMITING: 0
NAUSEA: 0
WHEEZING: 0
BACK PAIN: 0
SHORTNESS OF BREATH: 0
DIARRHEA: 0
CONSTIPATION: 0

## 2019-10-07 ENCOUNTER — HOSPITAL ENCOUNTER (OUTPATIENT)
Age: 69
Setting detail: SPECIMEN
Discharge: HOME | End: 2019-10-07
Payer: MEDICARE

## 2019-10-07 DIAGNOSIS — E78.00 HYPERCHOLESTEREMIA: ICD-10-CM

## 2019-10-07 DIAGNOSIS — E11.43 TYPE 2 DIABETES MELLITUS WITH DIABETIC AUTONOMIC NEUROPATHY, WITHOUT LONG-TERM CURRENT USE OF INSULIN (HCC): ICD-10-CM

## 2019-10-07 DIAGNOSIS — I10 ESSENTIAL HYPERTENSION: ICD-10-CM

## 2019-10-07 DIAGNOSIS — Z12.5 PROSTATE CANCER SCREENING: ICD-10-CM

## 2019-10-07 LAB
ALBUMIN SERPL-MCNC: 4.1 G/DL (ref 3.5–5.2)
ALBUMIN/GLOBULIN RATIO: 1.3 (ref 1–2.5)
ALP BLD-CCNC: 65 U/L (ref 40–129)
ALT SERPL-CCNC: 14 U/L (ref 5–41)
ANION GAP SERPL CALCULATED.3IONS-SCNC: 16 MMOL/L (ref 9–17)
AST SERPL-CCNC: 16 U/L
BILIRUB SERPL-MCNC: 0.33 MG/DL (ref 0.3–1.2)
BUN BLDV-MCNC: 11 MG/DL (ref 8–23)
BUN/CREAT BLD: ABNORMAL (ref 9–20)
CALCIUM SERPL-MCNC: 9.2 MG/DL (ref 8.6–10.4)
CHLORIDE BLD-SCNC: 103 MMOL/L (ref 98–107)
CHOLESTEROL/HDL RATIO: 4.6
CHOLESTEROL: 164 MG/DL
CO2: 26 MMOL/L (ref 20–31)
CREAT SERPL-MCNC: 0.55 MG/DL (ref 0.7–1.2)
ESTIMATED AVERAGE GLUCOSE: 151 MG/DL
GFR AFRICAN AMERICAN: >60 ML/MIN
GFR NON-AFRICAN AMERICAN: >60 ML/MIN
GFR SERPL CREATININE-BSD FRML MDRD: ABNORMAL ML/MIN/{1.73_M2}
GFR SERPL CREATININE-BSD FRML MDRD: ABNORMAL ML/MIN/{1.73_M2}
GLUCOSE BLD-MCNC: 145 MG/DL (ref 70–99)
HBA1C MFR BLD: 6.9 % (ref 4–6)
HCT VFR BLD CALC: 45.9 % (ref 40.7–50.3)
HDLC SERPL-MCNC: 36 MG/DL
HEMOGLOBIN: 15 G/DL (ref 13–17)
LDL CHOLESTEROL: 100 MG/DL (ref 0–130)
MCH RBC QN AUTO: 30.9 PG (ref 25.2–33.5)
MCHC RBC AUTO-ENTMCNC: 32.7 G/DL (ref 28.4–34.8)
MCV RBC AUTO: 94.6 FL (ref 82.6–102.9)
NRBC AUTOMATED: 0 PER 100 WBC
PDW BLD-RTO: 13.2 % (ref 11.8–14.4)
PLATELET # BLD: 230 K/UL (ref 138–453)
PMV BLD AUTO: 10.6 FL (ref 8.1–13.5)
POTASSIUM SERPL-SCNC: 4.1 MMOL/L (ref 3.7–5.3)
PROSTATE SPECIFIC ANTIGEN: 4.5 UG/L
RBC # BLD: 4.85 M/UL (ref 4.21–5.77)
SODIUM BLD-SCNC: 145 MMOL/L (ref 135–144)
TOTAL PROTEIN: 7.2 G/DL (ref 6.4–8.3)
TRIGL SERPL-MCNC: 141 MG/DL
VLDLC SERPL CALC-MCNC: ABNORMAL MG/DL (ref 1–30)
WBC # BLD: 7.8 K/UL (ref 3.5–11.3)

## 2019-10-08 ENCOUNTER — TELEPHONE (OUTPATIENT)
Dept: PRIMARY CARE CLINIC | Age: 69
End: 2019-10-08

## 2019-10-08 DIAGNOSIS — M25.562 CHRONIC PAIN OF LEFT KNEE: Primary | ICD-10-CM

## 2019-10-08 DIAGNOSIS — G89.29 CHRONIC PAIN OF LEFT KNEE: Primary | ICD-10-CM

## 2019-10-08 RX ORDER — HYDROCODONE BITARTRATE AND ACETAMINOPHEN 5; 325 MG/1; MG/1
1 TABLET ORAL EVERY 6 HOURS PRN
COMMUNITY
End: 2019-10-08 | Stop reason: SDUPTHER

## 2019-10-09 RX ORDER — HYDROCODONE BITARTRATE AND ACETAMINOPHEN 5; 325 MG/1; MG/1
1 TABLET ORAL 2 TIMES DAILY PRN
Qty: 60 TABLET | Refills: 0 | Status: SHIPPED | OUTPATIENT
Start: 2019-10-09 | End: 2021-05-10 | Stop reason: SDUPTHER

## 2019-10-14 ENCOUNTER — TELEPHONE (OUTPATIENT)
Dept: PRIMARY CARE CLINIC | Age: 69
End: 2019-10-14

## 2019-10-14 DIAGNOSIS — R97.20 ELEVATED PSA: Primary | ICD-10-CM

## 2019-11-14 ENCOUNTER — HOSPITAL ENCOUNTER (OUTPATIENT)
Age: 69
Setting detail: SPECIMEN
Discharge: HOME | End: 2019-11-14
Payer: MEDICARE

## 2019-11-14 DIAGNOSIS — R97.20 ELEVATED PSA: ICD-10-CM

## 2019-11-14 LAB — PROSTATE SPECIFIC ANTIGEN: 5.56 UG/L

## 2019-11-18 ENCOUNTER — TELEPHONE (OUTPATIENT)
Dept: PRIMARY CARE CLINIC | Age: 69
End: 2019-11-18

## 2019-11-19 ENCOUNTER — HOSPITAL ENCOUNTER (OUTPATIENT)
Dept: GENERAL RADIOLOGY | Facility: CLINIC | Age: 69
Discharge: HOME | End: 2019-11-21
Payer: MEDICARE

## 2019-11-19 ENCOUNTER — HOSPITAL ENCOUNTER (OUTPATIENT)
Facility: CLINIC | Age: 69
Discharge: HOME | End: 2019-11-21
Payer: MEDICARE

## 2019-11-19 DIAGNOSIS — M25.519 ARTHRALGIA OF SHOULDER, UNSPECIFIED LATERALITY: ICD-10-CM

## 2019-11-19 PROCEDURE — 73030 X-RAY EXAM OF SHOULDER: CPT

## 2020-01-10 ENCOUNTER — OFFICE VISIT (OUTPATIENT)
Dept: PRIMARY CARE CLINIC | Age: 70
End: 2020-01-10
Payer: MEDICARE

## 2020-01-10 VITALS
BODY MASS INDEX: 32.52 KG/M2 | HEART RATE: 92 BPM | TEMPERATURE: 98.4 F | SYSTOLIC BLOOD PRESSURE: 116 MMHG | DIASTOLIC BLOOD PRESSURE: 81 MMHG | WEIGHT: 239.8 LBS

## 2020-01-10 PROCEDURE — 3017F COLORECTAL CA SCREEN DOC REV: CPT | Performed by: PHYSICIAN ASSISTANT

## 2020-01-10 PROCEDURE — 3046F HEMOGLOBIN A1C LEVEL >9.0%: CPT | Performed by: PHYSICIAN ASSISTANT

## 2020-01-10 PROCEDURE — 2022F DILAT RTA XM EVC RTNOPTHY: CPT | Performed by: PHYSICIAN ASSISTANT

## 2020-01-10 PROCEDURE — G8427 DOCREV CUR MEDS BY ELIG CLIN: HCPCS | Performed by: PHYSICIAN ASSISTANT

## 2020-01-10 PROCEDURE — 99214 OFFICE O/P EST MOD 30 MIN: CPT | Performed by: PHYSICIAN ASSISTANT

## 2020-01-10 PROCEDURE — G8482 FLU IMMUNIZE ORDER/ADMIN: HCPCS | Performed by: PHYSICIAN ASSISTANT

## 2020-01-10 PROCEDURE — 1036F TOBACCO NON-USER: CPT | Performed by: PHYSICIAN ASSISTANT

## 2020-01-10 PROCEDURE — 4040F PNEUMOC VAC/ADMIN/RCVD: CPT | Performed by: PHYSICIAN ASSISTANT

## 2020-01-10 PROCEDURE — 1123F ACP DISCUSS/DSCN MKR DOCD: CPT | Performed by: PHYSICIAN ASSISTANT

## 2020-01-10 PROCEDURE — G8417 CALC BMI ABV UP PARAM F/U: HCPCS | Performed by: PHYSICIAN ASSISTANT

## 2020-01-10 RX ORDER — GLIPIZIDE 10 MG/1
10 TABLET ORAL
Qty: 60 TABLET | Refills: 0 | Status: SHIPPED | OUTPATIENT
Start: 2020-01-10 | End: 2020-03-02 | Stop reason: SDUPTHER

## 2020-01-10 ASSESSMENT — PATIENT HEALTH QUESTIONNAIRE - PHQ9
SUM OF ALL RESPONSES TO PHQ9 QUESTIONS 1 & 2: 0
1. LITTLE INTEREST OR PLEASURE IN DOING THINGS: 0
SUM OF ALL RESPONSES TO PHQ QUESTIONS 1-9: 0
2. FEELING DOWN, DEPRESSED OR HOPELESS: 0
SUM OF ALL RESPONSES TO PHQ QUESTIONS 1-9: 0

## 2020-01-10 NOTE — PROGRESS NOTES
[address] PRIMARY CARE  [facility]  [address] [facility] [address]  Dept: [phone]  Dept Fax: [phone]    Office Progress/Follow Up Note  Date of patient's visit: 1/10/2020  Patient's Name:  Candelario Guillermo YOB: 1950            Patient Care Team:  Meaghan Seth PA-C as PCP - General (Physician Assistant)  Meaghan Seth PA-C as PCP - [facility] Empaneled Provider  ================================================================    REASON FOR VISIT/CHIEF COMPLAINT:  Diabetes and Discuss Labs    HISTORY OF PRESENTING ILLNESS:  History was obtained from: patient, spouse. Candelario Guillermo is a 78 y.o. is here for follow-up for elevated PSA, diabetes. Patient states he is compliant with medications. Patient wife is present. Patient previous PSA was elevated, rechecked and was still elevated, patient refused any further evaluation and referral to urology. Discussed elevated PSA, risk for prostate cancer in depth with patient, voiced understanding, will have patient sign refusal documentation and scanned into chart. Patient voiced he is seen by pain management for chronic knee pain, patient completed ADELE will obtain records. Patient states blood sugar this AM was 114. Discussed diabetes, blood sugar readings in depth with patient, informed patient labs will be ordered to evaluate diabetes and kidney function, patient requesting diabetic medication refill. Patient blood pressures controlled in office. Patient weight is stable. Discussed weight loss in depth with patient, encourage patient make changes in diet. Labs reviewed. Health maintenance reviewed. Medications reviewed, refilled Glipizide 10 mg.     HPI    Patient Active Problem List   Diagnosis    Hypercholesteremia    Cerebral infarction ([facility] [address]    Neuropathic pain    Weakness of both legs    Essential hypertension    Type 2 diabetes mellitus with

## 2020-01-10 NOTE — PROGRESS NOTES
Visit Information    Have you changed or started any medications since your last visit including any over-the-counter medicines, vitamins, or herbal medicines? no   Are you having any side effects from any of your medications? -  no  Have you stopped taking any of your medications? Is so, why? -  no    Have you seen any other physician or provider since your last visit? No  Have you had any other diagnostic tests since your last visit? No  Have you been seen in the emergency room and/or had an admission to a hospital since we last saw you? No  Have you had your routine dental cleaning in the past 6 months? no    Have you activated your P[employer] account? If not, what are your barriers?  Yes     Patient Care Team:  Kate Josue PA-C as PCP - General (Physician Assistant)  Kate Josue PA-C as PCP - [facility]    Medical History Review  Past Medical, Family, and Social History reviewed and does not contribute to the patient presenting condition    Health Maintenance   Topic Date Due    DTaP/Tdap/Td vaccine (1 - Tdap) 03/19/1960    Colon Cancer Screen FIT/FOBT  05/13/2017    Diabetic foot exam  06/14/2019    Annual Wellness Visit (AWV)  06/18/2019    Flu vaccine (1) 09/01/2019    Diabetic microalbuminuria test  01/23/2020    Shingles Vaccine (1 of 2) 12/10/2020 (Originally 3/19/1999)    Diabetic retinal exam  01/29/2020    A1C test (Diabetic or Prediabetic)  10/07/2020    Lipid screen  10/07/2020    Potassium monitoring  10/07/2020    Creatinine monitoring  10/07/2020    Pneumococcal 65+ years Vaccine  Completed    Hepatitis C screen  Addressed

## 2020-01-12 PROBLEM — R97.20 ELEVATED PSA: Status: ACTIVE | Noted: 2020-01-12

## 2020-01-12 ASSESSMENT — ENCOUNTER SYMPTOMS
WHEEZING: 0
VOMITING: 0
COUGH: 0
CONSTIPATION: 0
SHORTNESS OF BREATH: 0
BACK PAIN: 0
NAUSEA: 0
DIARRHEA: 0

## 2020-05-11 ENCOUNTER — HOSPITAL ENCOUNTER (OUTPATIENT)
Age: 70
Setting detail: SPECIMEN
Discharge: HOME | End: 2020-05-11
Payer: MEDICARE

## 2020-05-11 LAB
CREATININE URINE: 98.2 MG/DL (ref 39–259)
ESTIMATED AVERAGE GLUCOSE: 137 MG/DL
HBA1C MFR BLD: 6.4 % (ref 4–6)
MICROALBUMIN/CREAT 24H UR: 17 MG/L
MICROALBUMIN/CREAT UR-RTO: 17 MCG/MG CREAT

## 2020-07-08 ENCOUNTER — OFFICE VISIT (OUTPATIENT)
Dept: PRIMARY CARE CLINIC | Age: 70
End: 2020-07-08
Payer: MEDICARE

## 2020-07-08 VITALS
HEIGHT: 72 IN | WEIGHT: 226 LBS | TEMPERATURE: 97.2 F | SYSTOLIC BLOOD PRESSURE: 136 MMHG | BODY MASS INDEX: 30.61 KG/M2 | DIASTOLIC BLOOD PRESSURE: 89 MMHG | HEART RATE: 96 BPM

## 2020-07-08 LAB — HBA1C MFR BLD: 6.1 %

## 2020-07-08 PROCEDURE — 3044F HG A1C LEVEL LT 7.0%: CPT | Performed by: PHYSICIAN ASSISTANT

## 2020-07-08 PROCEDURE — G0438 PPPS, INITIAL VISIT: HCPCS | Performed by: PHYSICIAN ASSISTANT

## 2020-07-08 PROCEDURE — 4040F PNEUMOC VAC/ADMIN/RCVD: CPT | Performed by: PHYSICIAN ASSISTANT

## 2020-07-08 PROCEDURE — 83036 HEMOGLOBIN GLYCOSYLATED A1C: CPT | Performed by: PHYSICIAN ASSISTANT

## 2020-07-08 PROCEDURE — 3017F COLORECTAL CA SCREEN DOC REV: CPT | Performed by: PHYSICIAN ASSISTANT

## 2020-07-08 PROCEDURE — 1123F ACP DISCUSS/DSCN MKR DOCD: CPT | Performed by: PHYSICIAN ASSISTANT

## 2020-07-08 ASSESSMENT — LIFESTYLE VARIABLES
HOW OFTEN DURING THE LAST YEAR HAVE YOU FOUND THAT YOU WERE NOT ABLE TO STOP DRINKING ONCE YOU HAD STARTED: 0
AUDIT TOTAL SCORE: 1
HOW OFTEN DURING THE LAST YEAR HAVE YOU FAILED TO DO WHAT WAS NORMALLY EXPECTED FROM YOU BECAUSE OF DRINKING: 0
HOW OFTEN DURING THE LAST YEAR HAVE YOU NEEDED AN ALCOHOLIC DRINK FIRST THING IN THE MORNING TO GET YOURSELF GOING AFTER A NIGHT OF HEAVY DRINKING: 0
HOW MANY STANDARD DRINKS CONTAINING ALCOHOL DO YOU HAVE ON A TYPICAL DAY: 0
HOW OFTEN DO YOU HAVE SIX OR MORE DRINKS ON ONE OCCASION: 0
HOW OFTEN DO YOU HAVE A DRINK CONTAINING ALCOHOL: 1
AUDIT-C TOTAL SCORE: 1
HOW OFTEN DURING THE LAST YEAR HAVE YOU HAD A FEELING OF GUILT OR REMORSE AFTER DRINKING: 0
HOW OFTEN DURING THE LAST YEAR HAVE YOU BEEN UNABLE TO REMEMBER WHAT HAPPENED THE NIGHT BEFORE BECAUSE YOU HAD BEEN DRINKING: 0
HAS A RELATIVE, FRIEND, DOCTOR, OR ANOTHER HEALTH PROFESSIONAL EXPRESSED CONCERN ABOUT YOUR DRINKING OR SUGGESTED YOU CUT DOWN: 0
HAVE YOU OR SOMEONE ELSE BEEN INJURED AS A RESULT OF YOUR DRINKING: 0

## 2020-07-08 ASSESSMENT — PATIENT HEALTH QUESTIONNAIRE - PHQ9
SUM OF ALL RESPONSES TO PHQ QUESTIONS 1-9: 0
SUM OF ALL RESPONSES TO PHQ QUESTIONS 1-9: 0

## 2020-07-08 NOTE — PROGRESS NOTES
Kidney stones     Osteoarthritis     Tendonitis of shoulder, left     Tremor        Past Surgical History:   Procedure Laterality Date    CATARACT REMOVAL Right Aug 2015    CYSTOSCOPY      KIDNEY STONE SURGERY      LITHOTRIPSY      REFRACTIVE SURGERY Right 2014    THUMB AMPUTATION Left          Family History   Problem Relation Age of Onset    Heart Disease Mother     Cancer Father     Migraines Sister     Migraines Brother        CareTeam (Including outside providers/suppliers regularly involved in providing care):   Patient Care Team:  Bryant Ramos PA-C as PCP - General (Physician Assistant)  Bryant Ramos PA-C as PCP - [facility] Empaneled Provider    Wt Readings from Last 3 Encounters:   07/08/20 226 lb (102.5 kg)   01/10/20 239 lb 12.8 oz (108.8 kg)   09/10/19 241 lb (109.3 kg)     Vitals:    07/08/20 0945 07/08/20 0957   BP: (!) 149/86 136/89   Pulse: 96    Temp: 97.2 °F (36.2 °C)    Weight: 226 lb (102.5 kg)    Height: 6' (1.829 m)      Body mass index is 30.65 kg/m². Based upon direct observation of the patient, evaluation of cognition reveals remote memory intact, recent memory impaired. General Appearance: alert and oriented to person, place and time, well-developed and well-nourished, in no acute distress  Skin: warm and dry, no rash or erythema  Head: normocephalic and atraumatic  Eyes: extraocular eye movements intact and conjunctivae normal  Pulmonary/Chest: clear to auscultation bilaterally- no wheezes, rales or rhonchi, normal air movement, no respiratory distress  Cardiovascular: normal rate, normal S1 and S2 and no gallops  Abdomen: soft, non-tender and non-distended  Extremities: no cyanosis and no clubbing  Musculoskeletal: normal range of motion, no joint swelling, deformity or tenderness  Neurologic: gait and coordination normal and speech normal    Patient's complete Health Risk Assessment and screening values have been reviewed and are found in Flowsheets.  The following problems were reviewed today and where indicated follow up appointments were made and/or referrals ordered. Patient A1c in office 6.1, decreased from 6.5 since 5/2020. Discussed A1c, diabetes in depth with patient, instructed patient to decrease glipizide to 5 mg twice daily, patient voiced understanding. Health maintenance reviewed, patient states had diabetic eye exam completed, informed patient will obtain records. Positive Risk Factor Screenings with Interventions:     Health Habits/Nutrition:  Health Habits/Nutrition  Do you exercise for at least 20 minutes 2-3 times per week?: Yes  Have you lost any weight without trying in the past 3 months?: (!) Yes  Do you eat fewer than 2 meals per day?: No  Have you seen a dentist within the past year?: Yes  Body mass index is 30.65 kg/m².   Health Habits/Nutrition Interventions:  · Inadequate physical activity:  patient is not ready to increase his/her physical activity level at this time, Due to decrease appetite    Hearing/Vision:  No exam data present  Hearing/Vision  Do you or your family notice any trouble with your hearing?: (!) Yes  Do you have difficulty driving, watching TV, or doing any of your daily activities because of your eyesight?: No  Have you had an eye exam within the past year?: Yes  Hearing/Vision Interventions:  · Hearing concerns:  patient declines any further evaluation/treatment for hearing issues    Safety:  Safety  Do you have working smoke detectors?: Yes  Have all throw rugs been removed or fastened?: (!) No  Do you have non-slip mats or surfaces in all bathtubs/showers?: Yes  Do all of your stairways have a railing or banister?: Yes  Are your doorways, halls and stairs free of clutter?: Yes  Do you always fasten your seatbelt when you are in a car?: Yes  Safety Interventions:  · Home safety tips provided    Personalized Preventive Plan   Current Health Maintenance Status  Immunization History   Administered Date(s) Administered    Influenza Virus Vaccine 11/14/2014, 11/05/2015, 11/01/2017    Influenza, Quadv, IM, PF (6 mo and older Fluzone, Flulaval, Fluarix, and 3 yrs and older Afluria) 11/14/2016    Influenza, Triv, inactivated, subunit, adjuvanted, IM (Fluad 65 yrs and older) 10/19/2018, 10/14/2019    Pneumococcal Conjugate 13-valent ([id number]) 08/23/2017    Pneumococcal Polysaccharide ([id number]) 01/25/2019        Health Maintenance   Topic Date Due    Colon Cancer Screen FIT/FOBT  05/13/2017    Annual Wellness Visit (AWV)  06/18/2019    Diabetic retinal exam  01/29/2020    Shingles Vaccine (1 of 2) 12/10/2020 (Originally 3/19/1999)    DTaP/Tdap/Td vaccine (1 - Tdap) 01/10/2021 (Originally 3/19/1968)    Flu vaccine (1) 09/01/2020    Lipid screen  10/07/2020    Potassium monitoring  10/07/2020    Creatinine monitoring  10/07/2020    PSA counseling  11/14/2020    Diabetic foot exam  01/10/2021    Diabetic microalbuminuria test  05/11/2021    A1C test (Diabetic or Prediabetic)  07/08/2021    Pneumococcal 65+ years Vaccine  Completed    Hepatitis C screen  Addressed    Hepatitis A vaccine  Aged Out    Hib vaccine  Aged Out    Meningococcal (ACWY) vaccine  Aged Out     Recommendations for [employer] Due: see orders and patient instructions/AVS.  . Recommended screening schedule for the next 5-10 years is provided to the patient in written form: see Patient Julia Olivera was seen today for medicare awv. Diagnoses and all orders for this visit:    Routine general medical examination at a health care facility    Type 2 diabetes mellitus with diabetic autonomic neuropathy, without long-term current use of insulin (HCC)  -     POCT glycosylated hemoglobin (Hb A1C)    Encounter for screening for malignant neoplasm of colon  -     Cologuard; Future         Obesity Counseling: Assessed behavioral health risks and factors affecting choice of behavior.  Suggested weight control approaches, including dietary changes behavioral modification and follow up plan. Provided educational and support documentation.   Time spent (minutes): 8 mins

## 2020-07-08 NOTE — PATIENT INSTRUCTIONS
· Decrease glipizide to 5 mg twice daily, break tablet in half, contact PCP office if blood sugars are still under <100s  Personalized Preventive Plan for Katarzyna Charles - 7/8/2020  Medicare offers a range of preventive health benefits. Some of the tests and screenings are paid in full while other may be subject to a deductible, co-insurance, and/or copay. Some of these benefits include a comprehensive review of your medical history including lifestyle, illnesses that may run in your family, and various assessments and screenings as appropriate. After reviewing your medical record and screening and assessments performed today your provider may have ordered immunizations, labs, imaging, and/or referrals for you. A list of these orders (if applicable) as well as your Preventive Care list are included within your After Visit Summary for your review. Other Preventive Recommendations:    A preventive eye exam performed by an eye specialist is recommended every 1-2 years to screen for glaucoma; cataracts, macular degeneration, and other eye disorders. A preventive dental visit is recommended every 6 months. Try to get at least 150 minutes of exercise per week or 10,000 steps per day on a pedometer . Order or download the FREE \"Exercise & Physical Activity: Your Everyday Guide\" from [employer]. Call 6-514.776.4091 or search [employer] online. You need 3741-7070 mg of calcium and 3643-1273 IU of vitamin D per day. It is possible to meet your calcium requirement with diet alone, but a vitamin D supplement is usually necessary to meet this goal.  When exposed to the sun, use a sunscreen that protects against both UVA and UVB radiation with an SPF of 30 or greater. Reapply every 2 to 3 hours or after sweating, drying off with a towel, or swimming. Always wear a seat belt when traveling in a car. Always wear a helmet when riding a bicycle or motorcycle.

## 2020-07-15 ENCOUNTER — TELEPHONE (OUTPATIENT)
Dept: PRIMARY CARE CLINIC | Age: 70
End: 2020-07-15

## 2020-08-04 RX ORDER — GLUCOSAMINE HCL/CHONDROITIN SU 500-400 MG
CAPSULE ORAL
Qty: 100 STRIP | Refills: 5 | Status: SHIPPED | OUTPATIENT
Start: 2020-08-04

## 2020-08-28 NOTE — TELEPHONE ENCOUNTER
Health Maintenance   Topic Date Due    Colon Cancer Screen FIT/FOBT  05/13/2017    Diabetic retinal exam  01/29/2020    Shingles Vaccine (1 of 2) 12/10/2020 (Originally 3/19/1999)    DTaP/Tdap/Td vaccine (1 - Tdap) 01/10/2021 (Originally 3/19/1968)    Flu vaccine (1) 09/01/2020    Lipid screen  10/07/2020    Potassium monitoring  10/07/2020    Creatinine monitoring  10/07/2020    PSA counseling  11/14/2020    Diabetic foot exam  01/10/2021    Diabetic microalbuminuria test  05/11/2021    A1C test (Diabetic or Prediabetic)  07/08/2021    Annual Wellness Visit (AWV)  07/09/2021    Pneumococcal 65+ years Vaccine  Completed    Hepatitis C screen  Addressed    Hepatitis A vaccine  Aged Out    Hib vaccine  Aged Out    Meningococcal (ACWY) vaccine  Aged Out             (applicable per patient's age: Cancer Screenings, Depression Screening, Fall Risk Screening, Immunizations)    Hemoglobin A1C (%)   Date Value   07/08/2020 6.1   05/11/2020 6.4 (H)   10/07/2019 6.9 (H)     Microalb/Crt.  Ratio (mcg/mg creat)   Date Value   05/11/2020 17 (H)     LDL Cholesterol (mg/dL)   Date Value   10/07/2019 100     AST (U/L)   Date Value   10/07/2019 16     ALT (U/L)   Date Value   10/07/2019 14     BUN (mg/dL)   Date Value   10/07/2019 11      (goal A1C is < 7)   (goal LDL is <100) need 30-50% reduction from baseline     BP Readings from Last 3 Encounters:   07/08/20 136/89   01/10/20 116/81   09/10/19 129/84    (goal /80)      All Future Testing planned in [facility]:  Lab Frequency Next Occurrence   Cologuard Once 10/17/2020       Next Visit Date:  Future Appointments   Date Time Provider Lev Huerta   12/9/2020 10:00 AM Susan Butcher PA-C [address]            Patient Active Problem List:     Hypercholesteremia     Cerebral infarction [facility])     Neuropathic pain     Weakness of both legs     Essential hypertension     Type 2 diabetes mellitus with diabetic autonomic neuropathy, without long-term current use of insulin (HCC)     Eustachian tube dysfunction     Branch retinal vein occlusion     Primary open angle glaucoma     Chronic pain of both knees     Cerebral infarction due to occlusion of right middle cerebral artery (HCC)     Glaucomatous visual field defect     Nuclear sclerotic cataract     Class 1 obesity due to excess calories without serious comorbidity with body mass index (BMI) of 32.0 to 32.9 in adult     Elevated PSA

## 2020-08-30 RX ORDER — SIMVASTATIN 20 MG
TABLET ORAL
Qty: 90 TABLET | Refills: 1 | Status: SHIPPED | OUTPATIENT
Start: 2020-08-30 | End: 2021-05-10 | Stop reason: SDUPTHER

## 2020-08-30 RX ORDER — LOSARTAN POTASSIUM 50 MG/1
TABLET ORAL
Qty: 90 TABLET | Refills: 1 | Status: SHIPPED | OUTPATIENT
Start: 2020-08-30 | End: 2021-05-10 | Stop reason: SDUPTHER

## 2020-08-30 RX ORDER — AMLODIPINE BESYLATE 10 MG/1
TABLET ORAL
Qty: 90 TABLET | Refills: 1 | Status: SHIPPED | OUTPATIENT
Start: 2020-08-30 | End: 2021-02-13

## 2020-11-18 RX ORDER — AMITRIPTYLINE HYDROCHLORIDE 75 MG/1
75 TABLET, FILM COATED ORAL NIGHTLY
Qty: 90 TABLET | Refills: 0 | Status: SHIPPED | OUTPATIENT
Start: 2020-11-18 | End: 2020-11-20 | Stop reason: SDUPTHER

## 2020-11-18 RX ORDER — GLIPIZIDE 10 MG/1
10 TABLET ORAL
Qty: 60 TABLET | Refills: 2 | Status: SHIPPED | OUTPATIENT
Start: 2020-11-18 | End: 2020-11-20 | Stop reason: SDUPTHER

## 2020-11-20 ENCOUNTER — TELEPHONE (OUTPATIENT)
Dept: PRIMARY CARE CLINIC | Age: 70
End: 2020-11-20

## 2020-11-20 DIAGNOSIS — Z86.73 S/P STROKE DUE TO CEREBROVASCULAR DISEASE: Primary | ICD-10-CM

## 2020-11-20 DIAGNOSIS — E11.43 TYPE 2 DIABETES MELLITUS WITH DIABETIC AUTONOMIC NEUROPATHY, WITHOUT LONG-TERM CURRENT USE OF INSULIN (HCC): ICD-10-CM

## 2020-11-20 DIAGNOSIS — Z76.0 MEDICATION REFILL: ICD-10-CM

## 2020-11-20 RX ORDER — GLIPIZIDE 10 MG/1
10 TABLET ORAL
Qty: 180 TABLET | Refills: 0 | Status: SHIPPED | OUTPATIENT
Start: 2020-11-20 | End: 2020-12-09 | Stop reason: SDUPTHER

## 2020-11-20 RX ORDER — AMITRIPTYLINE HYDROCHLORIDE 75 MG/1
75 TABLET, FILM COATED ORAL NIGHTLY
Qty: 90 TABLET | Refills: 0 | Status: SHIPPED | OUTPATIENT
Start: 2020-11-20 | End: 2021-03-08

## 2020-12-09 ENCOUNTER — OFFICE VISIT (OUTPATIENT)
Dept: PRIMARY CARE CLINIC | Age: 70
End: 2020-12-09
Payer: MEDICARE

## 2020-12-09 VITALS
HEART RATE: 83 BPM | BODY MASS INDEX: 31.41 KG/M2 | DIASTOLIC BLOOD PRESSURE: 67 MMHG | SYSTOLIC BLOOD PRESSURE: 121 MMHG | TEMPERATURE: 98.1 F | OXYGEN SATURATION: 95 % | WEIGHT: 231.6 LBS

## 2020-12-09 LAB — HBA1C MFR BLD: 6 %

## 2020-12-09 PROCEDURE — 1036F TOBACCO NON-USER: CPT | Performed by: PHYSICIAN ASSISTANT

## 2020-12-09 PROCEDURE — 4040F PNEUMOC VAC/ADMIN/RCVD: CPT | Performed by: PHYSICIAN ASSISTANT

## 2020-12-09 PROCEDURE — G8427 DOCREV CUR MEDS BY ELIG CLIN: HCPCS | Performed by: PHYSICIAN ASSISTANT

## 2020-12-09 PROCEDURE — 3017F COLORECTAL CA SCREEN DOC REV: CPT | Performed by: PHYSICIAN ASSISTANT

## 2020-12-09 PROCEDURE — 1123F ACP DISCUSS/DSCN MKR DOCD: CPT | Performed by: PHYSICIAN ASSISTANT

## 2020-12-09 PROCEDURE — G8484 FLU IMMUNIZE NO ADMIN: HCPCS | Performed by: PHYSICIAN ASSISTANT

## 2020-12-09 PROCEDURE — 99214 OFFICE O/P EST MOD 30 MIN: CPT | Performed by: PHYSICIAN ASSISTANT

## 2020-12-09 PROCEDURE — G8417 CALC BMI ABV UP PARAM F/U: HCPCS | Performed by: PHYSICIAN ASSISTANT

## 2020-12-09 PROCEDURE — 83036 HEMOGLOBIN GLYCOSYLATED A1C: CPT | Performed by: PHYSICIAN ASSISTANT

## 2020-12-09 RX ORDER — FLUTICASONE PROPIONATE 50 MCG
1 SPRAY, SUSPENSION (ML) NASAL DAILY
Qty: 1 BOTTLE | Refills: 0 | Status: SHIPPED | OUTPATIENT
Start: 2020-12-09 | End: 2021-08-19

## 2020-12-09 RX ORDER — GLIPIZIDE 10 MG/1
10 TABLET ORAL
Qty: 180 TABLET | Refills: 0 | Status: SHIPPED | OUTPATIENT
Start: 2020-12-09 | End: 2021-05-10 | Stop reason: SDUPTHER

## 2020-12-09 RX ORDER — CETIRIZINE HYDROCHLORIDE 10 MG/1
10 TABLET ORAL DAILY
Qty: 10 TABLET | Refills: 0 | COMMUNITY
Start: 2020-12-09 | End: 2020-12-19

## 2020-12-09 NOTE — PROGRESS NOTES
Visit Information    Have you changed or started any medications since your last visit including any over-the-counter medicines, vitamins, or herbal medicines? no   Are you having any side effects from any of your medications? -  no  Have you stopped taking any of your medications? Is so, why? -  no    Have you seen any other physician or provider since your last visit? No  Have you had any other diagnostic tests since your last visit? No  Have you been seen in the emergency room and/or had an admission to a hospital since we last saw you? No  Have you had your routine dental cleaning in the past 6 months? no    Have you activated your [employer] account? If not, what are your barriers?  Yes     Patient Care Team:  Ray Porras PA-C as PCP - General (Physician Assistant)  Ray Porras PA-C as PCP - [facility] Tracey Izquierdo Provider    Medical History Review  Past Medical, Family, and Social History reviewed and does not contribute to the patient presenting condition    Health Maintenance   Topic Date Due    Colon Cancer Screen FIT/FOBT  05/13/2017    Diabetic retinal exam  01/29/2020    Flu vaccine (1) 09/01/2020    Lipid screen  10/07/2020    Potassium monitoring  10/07/2020    Creatinine monitoring  10/07/2020    PSA counseling  11/14/2020    Shingles Vaccine (1 of 2) 12/10/2020 (Originally 3/19/1999)    DTaP/Tdap/Td vaccine (1 - Tdap) 01/10/2021 (Originally 3/19/1968)    Diabetic foot exam  01/10/2021    Diabetic microalbuminuria test  05/11/2021    A1C test (Diabetic or Prediabetic)  07/08/2021    Annual Wellness Visit (AWV)  07/09/2021    Pneumococcal 65+ years Vaccine  Completed    Hepatitis C screen  Addressed    Hepatitis A vaccine  Aged Out    Hib vaccine  Aged Out    Meningococcal (ACWY) vaccine  Aged Out

## 2020-12-09 NOTE — PROGRESS NOTES
[facility]  [address] [address]  Dept: [phone]  Dept Fax: [phone]    Office Progress/Follow Up Note    Date of patient's visit: 12/9/2020    Patient's Name:  Joaquin Gordillo YOB: 1950            Patient Care Team:  Ld Huber PA-C as PCP - General (Physician Assistant)  Ld Huber PA-C as PCP - [facility] Empaneled Provider  Alf Rodriges MD as Consulting Physician (Pain Management)  Juan Nieto MD as Consulting Physician (Ophthalmology)  ================================================================    REASON FOR VISIT/CHIEF COMPLAINT:  Follow-up (4M, DM), Ear Problem (popping feeling on and off onset 1M ago), and Health Maintenance (had Flu shot at [facility] 10/2020)    HISTORY OF PRESENTING ILLNESS:  History was obtained from: patient. Joaquin Gordillo is a 69 y.o. is here for follow-up for prediabetes, complaining of ear fullness. Patient states he is compliant with medications. Ear Fullness    There is pain in the left ear. This is a new problem. The current episode started 1 to 4 weeks ago. The problem occurs every few hours. The problem has been unchanged. There has been no fever. The pain is at a severity of 0/10. The patient is experiencing no pain. Pertinent negatives include no coughing, diarrhea, headaches, neck pain or vomiting. Treatments tried: OTC antihistamine. The treatment provided mild relief. Patient states ear fullness only \"in left ear\". Patient states \"taking OTC allergy medication daily\" with mild relief. Discussed ear fullness, medications in depth with patient, informed patient he will be provided samples of Zyrtec, informed patient he will be prescribed nasal spray to start using, instructed patient to let PCP office know if sample antihistamine works better and will prescribe, patient voiced understanding. Patient A1c in office is 6, decreased from 6.1. Discussed A1c reading, prediabetes in depth with patient, courage patient to continue to monitor sugar and carb intake to better control blood sugars, patient voiced understanding, patient requested medication refill. Patient is seen by pain management for chronic knee pain, informed patient will obtain records. Patient blood pressures controlled in office. Patient has gained 5 pounds. Discussed weight loss in depth with patient, encourage patient make changes in diet and the importance of physical activity by exercising multiple times weekly. Labs reviewed, informed patient labs will be ordered and to have completed before follow-up appointment, patient voiced understanding. Health maintenance reviewed, patient states \"diabetic eye exam done in 7/2020\" by Dr. Nkechi Marin, informed patient will obtain records, patient states \"never received Cologuard\" when it was ordered, informed patient will reorder. Medication reviewed, prescribed Flonase 50 mcg nasal spray applying 1 spray to each nostril for 10 days, refill glipizide 10 mg.     HPI    Patient Active Problem List   Diagnosis    Hypercholesteremia    Cerebral infarction ([address]    Neuropathic pain    Weakness of both legs    Essential hypertension    Type 2 diabetes mellitus with diabetic autonomic neuropathy, without long-term current use of insulin (HCC)    Eustachian tube dysfunction    Branch retinal vein occlusion    Primary open angle glaucoma    Chronic pain of both knees    Cerebral infarction due to occlusion of right middle cerebral artery ([address]    Glaucomatous visual field defect    Nuclear sclerotic cataract    Class 1 obesity due to excess calories without serious comorbidity with body mass index (BMI) of 32.0 to 32.9 in adult    Elevated PSA       Health Maintenance Due   Topic Date Due    Shingles Vaccine (1 of 2) 03/19/1999    Colon Cancer Screen FIT/FOBT  05/13/2017  Diabetic retinal exam  01/29/2020    Lipid screen  10/07/2020    Potassium monitoring  10/07/2020    Creatinine monitoring  10/07/2020    PSA counseling  11/14/2020       No Known Allergies      Current Outpatient Medications   Medication Sig Dispense Refill    glipiZIDE (GLUCOTROL) 10 MG tablet Take 1 tablet by mouth 2 times daily (before meals) 180 tablet 0    fluticasone (FLONASE) 50 MCG/ACT nasal spray 1 spray by Each Nostril route daily for 10 days 1 Bottle 0    cetirizine (ZYRTEC) 10 MG tablet Take 1 tablet by mouth daily for 10 days 10 tablet 0    amitriptyline (ELAVIL) 75 MG tablet Take 1 tablet by mouth nightly 90 tablet 0    amLODIPine (NORVASC) 10 MG tablet TAKE 1 TABLET DAILY 90 tablet 1    losartan (COZAAR) 50 MG tablet TAKE 1 TABLET DAILY 90 tablet 1    simvastatin (ZOCOR) 20 MG tablet TAKE 1 TABLET NIGHTLY 90 tablet 1    blood glucose monitor strips Provide insurance covered test strips compatible with glucometer, test 1 times a day 100 strip 5    Blood Glucose Monitoring Suppl (D-CARE GLUCOMETER) w/Device KIT Provide insurance covered glucometer, check blood sugars every morning 1 kit 0    timolol (TIMOPTIC) 0.5 % ophthalmic solution instill 1 drop into right eye twice a day  0    tadalafil (CIALIS) 20 MG tablet Take 20 mg by mouth daily      latanoprost (XALATAN) 0.005 % ophthalmic solution        No current facility-administered medications for this visit. Social History     Tobacco Use    Smoking status: Never Smoker    Smokeless tobacco: Former User     Types: Chew    Tobacco comment: quit smokeless tobacco in late 80's   Substance Use Topics    Alcohol use:  Yes     Alcohol/week: 0.0 standard drinks     Comment: rarely    Drug use: No       Family History   Problem Relation Age of Onset    Heart Disease Mother     Cancer Father     Migraines Sister     Migraines Brother         REVIEW OFSYSTEMS:  Review of Systems Constitutional: Negative for chills and fever. HENT: Negative for congestion. Respiratory: Negative for cough, shortness of breath and wheezing. Cardiovascular: Negative for chest pain. Gastrointestinal: Negative for constipation, diarrhea, nausea and vomiting. Genitourinary: Positive for frequency. Negative for dysuria and urgency. Musculoskeletal: Negative for back pain, myalgias and neck pain. Neurological: Negative for headaches. PHYSICAL EXAM:  Vitals:    12/09/20 0939   BP: 121/67   Site: Left Upper Arm   Position: Sitting   Cuff Size: Large Adult   Pulse: 83   Temp: 98.1 °F (36.7 °C)   TempSrc: Temporal   SpO2: 95%   Weight: 231 lb 9.6 oz (105.1 kg)     BP Readings from Last 3 Encounters:   12/09/20 121/67   07/08/20 136/89   01/10/20 116/81        Physical Exam  Vitals signs reviewed. Constitutional:       Appearance: Normal appearance. He is well-developed and well-groomed. He is obese. HENT:      Head: Normocephalic and atraumatic. Right Ear: Hearing, tympanic membrane, ear canal and external ear normal. No tenderness. No middle ear effusion. There is no impacted cerumen. Left Ear: Hearing, tympanic membrane, ear canal and external ear normal. No tenderness. No middle ear effusion. There is no impacted cerumen. Nose: Nose normal.   Eyes:      General: Lids are normal.      Conjunctiva/sclera: Conjunctivae normal.      Pupils: Pupils are equal, round, and reactive to light. Cardiovascular:      Rate and Rhythm: Normal rate and regular rhythm. Heart sounds: Normal heart sounds. Pulmonary:      Effort: Pulmonary effort is normal.      Breath sounds: Normal breath sounds. Abdominal:      Palpations: Abdomen is soft. There is no mass. Tenderness: There is no abdominal tenderness. Musculoskeletal:         General: No tenderness. Lymphadenopathy:      Head:      Right side of head: No submandibular, preauricular or posterior auricular adenopathy. Left side of head: No submandibular, preauricular or posterior auricular adenopathy. Cervical: No cervical adenopathy. Skin:     General: Skin is warm and dry. Neurological:      Mental Status: He is alert and oriented to person, place, and time. Psychiatric:         Behavior: Behavior is cooperative. DIAGNOSTIC FINDINGS:  CBC:  Lab Results   Component Value Date    WBC 7.8 10/07/2019    HGB 15.0 10/07/2019     10/07/2019       BMP:    Lab Results   Component Value Date     10/07/2019    K 4.1 10/07/2019     10/07/2019    CO2 26 10/07/2019    BUN 11 10/07/2019    CREATININE 0.55 10/07/2019    GLUCOSE 145 10/07/2019       HEMOGLOBIN A1C:   Lab Results   Component Value Date    LABA1C 6.0 12/09/2020       FASTING LIPID PANEL:  Lab Results   Component Value Date    CHOL 164 10/07/2019    HDL 36 (L) 10/07/2019    TRIG 141 10/07/2019       ASSESSMENT AND PLAN:  Audra Sandoval was seen today for follow-up, ear problem and health maintenance. Diagnoses and all orders for this visit:    Sensation of fullness in left ear  -     fluticasone (FLONASE) 50 MCG/ACT nasal spray; 1 spray by Each Nostril route daily for 10 days  -     cetirizine (ZYRTEC) 10 MG tablet; Take 1 tablet by mouth daily for 10 days    Prediabetes  -     POCT glycosylated hemoglobin (Hb A1C)  -     glipiZIDE (GLUCOTROL) 10 MG tablet; Take 1 tablet by mouth 2 times daily (before meals)  -     Comprehensive Metabolic Panel; Future  -     Lipid Panel; Future    Chronic pain of both knees    Class 1 obesity due to excess calories without serious comorbidity with body mass index (BMI) of 31.0 to 31.9 in adult    Elevated PSA  -     PSA, Diagnostic; Future    Colon cancer screening  -     Cologuard; Future      FOLLOW UP AND INSTRUCTIONS:  Return in about 5 months (around 5/9/2021) for Prediabetes.     · Audra Sandoval received counseling on the following healthy behaviors:nutrition and exercise · Discussed use, benefit, and side effects of prescribed medications. Barriers to medication compliance addressed. All patient questions answered. Pt voiced understanding. · Patient given educational materials - see patient instructions    Electronically signed by Trini Napier PA-C on 12/9/20 at 10:07 AM EST    This note is created with the assistance of a speech-recognition program. While intending to generate a document that actually reflects the content of the visit, the document can still have some mistakes which may not have been identified and corrected by editing.

## 2020-12-09 NOTE — PATIENT INSTRUCTIONS
· Get labs done in next few weeks  · Contact PCP office to update on ear fullness symptoms after finishing sample of Zyrtec

## 2020-12-12 ENCOUNTER — TELEPHONE (OUTPATIENT)
Dept: PRIMARY CARE CLINIC | Age: 70
End: 2020-12-12

## 2020-12-12 ASSESSMENT — ENCOUNTER SYMPTOMS
BACK PAIN: 0
SHORTNESS OF BREATH: 0
COUGH: 0
CONSTIPATION: 0
WHEEZING: 0
VOMITING: 0
NAUSEA: 0
DIARRHEA: 0

## 2020-12-12 NOTE — TELEPHONE ENCOUNTER
Will request records from pain management  Patient stated he had diabetic eye exam completed, will obtain records

## 2021-03-08 DIAGNOSIS — Z86.73 S/P STROKE DUE TO CEREBROVASCULAR DISEASE: ICD-10-CM

## 2021-03-08 DIAGNOSIS — Z76.0 MEDICATION REFILL: ICD-10-CM

## 2021-03-08 RX ORDER — AMITRIPTYLINE HYDROCHLORIDE 75 MG/1
TABLET, FILM COATED ORAL
Qty: 90 TABLET | Refills: 0 | Status: SHIPPED | OUTPATIENT
Start: 2021-03-08 | End: 2021-05-10 | Stop reason: SDUPTHER

## 2021-03-08 NOTE — TELEPHONE ENCOUNTER
Health Maintenance   Topic Date Due    COVID-19 Vaccine (1 of 2) Never done    DTaP/Tdap/Td vaccine (1 - Tdap) Never done    Shingles Vaccine (1 of 2) Never done    Colon Cancer Screen FIT/FOBT  05/13/2017    Diabetic retinal exam  01/29/2020    Lipid screen  10/07/2020    Potassium monitoring  10/07/2020    Creatinine monitoring  10/07/2020    PSA counseling  11/14/2020    Diabetic foot exam  01/10/2021    Diabetic microalbuminuria test  05/11/2021    Annual Wellness Visit (AWV)  07/09/2021    A1C test (Diabetic or Prediabetic)  12/09/2021    Flu vaccine  Completed    Pneumococcal 65+ years Vaccine  Completed    Hepatitis C screen  Addressed    Hepatitis A vaccine  Aged Out    Hib vaccine  Aged Out    Meningococcal (ACWY) vaccine  Aged Out             (applicable per patient's age: Cancer Screenings, Depression Screening, Fall Risk Screening, Immunizations)    Hemoglobin A1C (%)   Date Value   12/09/2020 6.0   07/08/2020 6.1   05/11/2020 6.4 (H)     Microalb/Crt.  Ratio (mcg/mg creat)   Date Value   05/11/2020 17 (H)     LDL Cholesterol (mg/dL)   Date Value   10/07/2019 100     AST (U/L)   Date Value   10/07/2019 16     ALT (U/L)   Date Value   10/07/2019 14     BUN (mg/dL)   Date Value   10/07/2019 11      (goal A1C is < 7)   (goal LDL is <100) need 30-50% reduction from baseline     BP Readings from Last 3 Encounters:   12/09/20 121/67   07/08/20 136/89   01/10/20 116/81    (goal /80)      All Future Testing planned in [facility]:  Lab Frequency Next Occurrence   Comprehensive Metabolic Panel Once 07/92/2886   Lipid Panel Once 03/17/2021   Cologuard Once 12/10/2020   PSA, Diagnostic Once 06/09/2021       Next Visit Date:  Future Appointments   Date Time Provider Lev Huerta   5/10/2021  9:40 AM Kim Escalante PA-C [address]            Patient Active Problem List:     Hypercholesteremia     Cerebral infarction ([address]     Neuropathic pain     Weakness of both legs Essential hypertension     Type 2 diabetes mellitus with diabetic autonomic neuropathy, without long-term current use of insulin (HCC)     Eustachian tube dysfunction     Branch retinal vein occlusion     Primary open angle glaucoma     Chronic pain of both knees     Cerebral infarction due to occlusion of right middle cerebral artery (HCC)     Glaucomatous visual field defect     Nuclear sclerotic cataract     Class 1 obesity due to excess calories without serious comorbidity with body mass index (BMI) of 32.0 to 32.9 in adult     Elevated PSA

## 2021-05-06 ENCOUNTER — HOSPITAL ENCOUNTER (OUTPATIENT)
Age: 71
Setting detail: SPECIMEN
Discharge: HOME | End: 2021-05-06
Payer: MEDICARE

## 2021-05-06 DIAGNOSIS — R73.03 PREDIABETES: ICD-10-CM

## 2021-05-06 LAB
ALBUMIN SERPL-MCNC: 4.1 G/DL (ref 3.5–5.2)
ALBUMIN/GLOBULIN RATIO: 1.4 (ref 1–2.5)
ALP BLD-CCNC: 63 U/L (ref 40–129)
ALT SERPL-CCNC: 10 U/L (ref 5–41)
ANION GAP SERPL CALCULATED.3IONS-SCNC: 10 MMOL/L (ref 9–17)
AST SERPL-CCNC: 18 U/L
BILIRUB SERPL-MCNC: 0.39 MG/DL (ref 0.3–1.2)
BUN BLDV-MCNC: 14 MG/DL (ref 8–23)
BUN/CREAT BLD: ABNORMAL (ref 9–20)
CALCIUM SERPL-MCNC: 9.1 MG/DL (ref 8.6–10.4)
CHLORIDE BLD-SCNC: 103 MMOL/L (ref 98–107)
CHOLESTEROL/HDL RATIO: 4.3
CHOLESTEROL: 158 MG/DL
CO2: 28 MMOL/L (ref 20–31)
CREAT SERPL-MCNC: 0.55 MG/DL (ref 0.7–1.2)
GFR AFRICAN AMERICAN: >60 ML/MIN
GFR NON-AFRICAN AMERICAN: >60 ML/MIN
GFR SERPL CREATININE-BSD FRML MDRD: ABNORMAL ML/MIN/{1.73_M2}
GFR SERPL CREATININE-BSD FRML MDRD: ABNORMAL ML/MIN/{1.73_M2}
GLUCOSE BLD-MCNC: 124 MG/DL (ref 70–99)
HDLC SERPL-MCNC: 37 MG/DL
LDL CHOLESTEROL: 90 MG/DL (ref 0–130)
POTASSIUM SERPL-SCNC: 4.1 MMOL/L (ref 3.7–5.3)
SODIUM BLD-SCNC: 141 MMOL/L (ref 135–144)
TOTAL PROTEIN: 7.1 G/DL (ref 6.4–8.3)
TRIGL SERPL-MCNC: 156 MG/DL
VLDLC SERPL CALC-MCNC: ABNORMAL MG/DL (ref 1–30)

## 2021-05-10 ENCOUNTER — OFFICE VISIT (OUTPATIENT)
Dept: PRIMARY CARE CLINIC | Age: 71
End: 2021-05-10
Payer: MEDICARE

## 2021-05-10 VITALS
SYSTOLIC BLOOD PRESSURE: 114 MMHG | TEMPERATURE: 98 F | DIASTOLIC BLOOD PRESSURE: 76 MMHG | WEIGHT: 232 LBS | OXYGEN SATURATION: 98 % | BODY MASS INDEX: 31.46 KG/M2 | HEART RATE: 95 BPM

## 2021-05-10 DIAGNOSIS — R73.03 PREDIABETES: ICD-10-CM

## 2021-05-10 DIAGNOSIS — M25.562 CHRONIC PAIN OF LEFT KNEE: ICD-10-CM

## 2021-05-10 DIAGNOSIS — G89.29 CHRONIC PAIN OF BOTH KNEES: Primary | ICD-10-CM

## 2021-05-10 DIAGNOSIS — G89.29 CHRONIC PAIN OF LEFT KNEE: ICD-10-CM

## 2021-05-10 DIAGNOSIS — M25.561 CHRONIC PAIN OF BOTH KNEES: Primary | ICD-10-CM

## 2021-05-10 DIAGNOSIS — I10 ESSENTIAL HYPERTENSION: ICD-10-CM

## 2021-05-10 DIAGNOSIS — E11.43 TYPE 2 DIABETES MELLITUS WITH DIABETIC AUTONOMIC NEUROPATHY, WITHOUT LONG-TERM CURRENT USE OF INSULIN (HCC): ICD-10-CM

## 2021-05-10 DIAGNOSIS — Z86.73 S/P STROKE DUE TO CEREBROVASCULAR DISEASE: ICD-10-CM

## 2021-05-10 DIAGNOSIS — M25.562 CHRONIC PAIN OF BOTH KNEES: Primary | ICD-10-CM

## 2021-05-10 DIAGNOSIS — Z76.0 MEDICATION REFILL: ICD-10-CM

## 2021-05-10 PROCEDURE — 1123F ACP DISCUSS/DSCN MKR DOCD: CPT | Performed by: INTERNAL MEDICINE

## 2021-05-10 PROCEDURE — 3046F HEMOGLOBIN A1C LEVEL >9.0%: CPT | Performed by: INTERNAL MEDICINE

## 2021-05-10 PROCEDURE — G8417 CALC BMI ABV UP PARAM F/U: HCPCS | Performed by: INTERNAL MEDICINE

## 2021-05-10 PROCEDURE — 2022F DILAT RTA XM EVC RTNOPTHY: CPT | Performed by: INTERNAL MEDICINE

## 2021-05-10 PROCEDURE — G8427 DOCREV CUR MEDS BY ELIG CLIN: HCPCS | Performed by: INTERNAL MEDICINE

## 2021-05-10 PROCEDURE — 1036F TOBACCO NON-USER: CPT | Performed by: INTERNAL MEDICINE

## 2021-05-10 PROCEDURE — 4040F PNEUMOC VAC/ADMIN/RCVD: CPT | Performed by: INTERNAL MEDICINE

## 2021-05-10 PROCEDURE — 3017F COLORECTAL CA SCREEN DOC REV: CPT | Performed by: INTERNAL MEDICINE

## 2021-05-10 PROCEDURE — 99213 OFFICE O/P EST LOW 20 MIN: CPT | Performed by: INTERNAL MEDICINE

## 2021-05-10 RX ORDER — FLUTICASONE PROPIONATE 50 MCG
1 SPRAY, SUSPENSION (ML) NASAL
COMMUNITY
Start: 2020-12-09 | End: 2021-05-10

## 2021-05-10 RX ORDER — HYDROCODONE BITARTRATE AND ACETAMINOPHEN 5; 325 MG/1; MG/1
1 TABLET ORAL 2 TIMES DAILY PRN
Qty: 60 TABLET | Refills: 0 | Status: SHIPPED | OUTPATIENT
Start: 2021-05-10 | End: 2021-05-10

## 2021-05-10 RX ORDER — ASPIRIN 325 MG
325 TABLET ORAL DAILY
COMMUNITY
End: 2022-02-09

## 2021-05-10 RX ORDER — LATANOPROST 50 UG/ML
1 SOLUTION/ DROPS OPHTHALMIC NIGHTLY
COMMUNITY
Start: 2021-03-08 | End: 2021-05-10

## 2021-05-10 RX ORDER — TIMOLOL MALEATE 5 MG/ML
SOLUTION/ DROPS OPHTHALMIC
COMMUNITY
Start: 2021-03-08 | End: 2021-05-10

## 2021-05-10 ASSESSMENT — PATIENT HEALTH QUESTIONNAIRE - PHQ9
1. LITTLE INTEREST OR PLEASURE IN DOING THINGS: 0
SUM OF ALL RESPONSES TO PHQ QUESTIONS 1-9: 0
SUM OF ALL RESPONSES TO PHQ QUESTIONS 1-9: 0

## 2021-05-10 NOTE — PROGRESS NOTES
Visit Information    Have you changed or started any medications since your last visit including any over-the-counter medicines, vitamins, or herbal medicines? no   Are you having any side effects from any of your medications? -  no  Have you stopped taking any of your medications? Is so, why? -  no    Have you seen any other physician or provider since your last visit? No  Have you had any other diagnostic tests since your last visit? No  Have you been seen in the emergency room and/or had an admission to a hospital since we last saw you? No  Have you had your routine dental cleaning in the past 6 months? no    Have you activated your [employer] account? If not, what are your barriers?  Yes     Patient Care Team:  Simone Guadarrama PA-C as PCP - General (Physician Assistant)  Simone Guadarrama PA-C as PCP - [facility]  Samantha Disla MD as Consulting Physician (Pain Management)  Jeanna Higuera MD as Consulting Physician (Ophthalmology)    Medical History Review  Past Medical, Family, and Social History reviewed and does not contribute to the patient presenting condition    Health Maintenance   Topic Date Due    DTaP/Tdap/Td vaccine (1 - Tdap) Never done    Shingles Vaccine (1 of 2) Never done    Colon Cancer Screen FIT/FOBT  05/13/2017    Diabetic retinal exam  01/29/2020    PSA counseling  11/14/2020    Diabetic foot exam  01/10/2021    COVID-19 Vaccine (2 - Moderna 2-dose series) 04/27/2021    Diabetic microalbuminuria test  05/11/2021    Annual Wellness Visit (AWV)  07/09/2021    A1C test (Diabetic or Prediabetic)  12/09/2021    Lipid screen  05/06/2022    Potassium monitoring  05/06/2022    Creatinine monitoring  05/06/2022    Flu vaccine  Completed    Pneumococcal 65+ years Vaccine  Completed    Hepatitis C screen  Addressed    Hepatitis A vaccine  Aged Out    Hib vaccine  Aged Out    Meningococcal (ACWY) vaccine  Aged Out

## 2021-05-10 NOTE — PROGRESS NOTES
[address] [facility]  [address] [address]  Dept: [phone]  Dept Fax: [phone]    Dora Pradhan is a 66 y.o. male who presents to the urgent care today for his medicalconditions/complaints as noted below. Dora Pradhan is c/o of Follow-up and Other ( wants pain meds)      HPI:     Diabetes  He presents for his follow-up diabetic visit. He has type 2 diabetes mellitus. His disease course has been stable. There are no hypoglycemic associated symptoms. There are no diabetic associated symptoms. Symptoms are stable. Diabetic complications include a CVA and peripheral neuropathy. Risk factors for coronary artery disease include diabetes mellitus, hypertension and dyslipidemia. Current diabetic treatment includes oral agent (monotherapy). He is compliant with treatment all of the time. There is no change in his home blood glucose trend. An ACE inhibitor/angiotensin II receptor blocker is being taken. Hypertension  This is a chronic problem. The current episode started more than 1 year ago. The problem is unchanged. The problem is controlled. Risk factors for coronary artery disease include diabetes mellitus and dyslipidemia. Past treatments include angiotensin blockers and calcium channel blockers. There are no compliance problems. Hypertensive end-organ damage includes CVA. Knee Pain   The incident occurred more than 1 week ago. There was no injury mechanism. The pain is present in the left knee and right knee. The quality of the pain is described as aching. The symptoms are aggravated by movement and weight bearing. He has tried immobilization and NSAIDs (has received collagen injections but not helping) for the symptoms. The treatment provided no relief.        Past Medical History:   Diagnosis Date    Cataracts, bilateral     Headache(784.0)     Hearing loss     Hyperlipidemia     Kidney stones Hepatitis C screen  Addressed    Hepatitis A vaccine  Aged Out    Hib vaccine  Aged Out    Meningococcal (ACWY) vaccine  Aged Out       Subjective:      Review of Systems    Objective:     Physical Exam  Vitals signs reviewed. Constitutional:       Appearance: Normal appearance. HENT:      Head: Normocephalic and atraumatic. Mouth/Throat:      Mouth: Mucous membranes are moist.      Pharynx: Oropharynx is clear. Cardiovascular:      Rate and Rhythm: Normal rate and regular rhythm. Pulmonary:      Effort: Pulmonary effort is normal.      Breath sounds: Normal breath sounds. Abdominal:      General: Abdomen is flat. Musculoskeletal:      Right lower leg: No edema. Skin:     General: Skin is warm and dry. Neurological:      General: No focal deficit present. Mental Status: He is alert and oriented to person, place, and time. Psychiatric:         Mood and Affect: Mood normal.         Behavior: Behavior normal.       BP (!) 151/89 (Site: Left Upper Arm, Position: Sitting, Cuff Size: Medium Adult)   Pulse 95   Temp 98 °F (36.7 °C) (Temporal)   Wt 232 lb (105.2 kg)   SpO2 98%   BMI 31.46 kg/m²       Assessment:       Diagnosis Orders   1. Chronic pain of both knees     2. Type 2 diabetes mellitus with diabetic autonomic neuropathy, without long-term current use of insulin ([address]     3. Essential hypertension     4. Medication refill     5. S/P stroke due to cerebrovascular disease     6. Prediabetes     7. Chronic pain of left knee         Plan:      No follow-ups on file. No orders of the defined types were placed in this encounter. No orders of the defined types were placed in this encounter. Patient given educational materials - see patient instructions. Discussed use, benefit, and side effects of prescribed medications. All patientquestions answered. Pt voiced understanding.     Electronically signed by Tawny De Jesus MD on 5/10/2021at 9:58 AM

## 2021-05-11 RX ORDER — GLIPIZIDE 10 MG/1
10 TABLET ORAL
Qty: 180 TABLET | Refills: 1 | Status: SHIPPED | OUTPATIENT
Start: 2021-05-11 | End: 2021-11-08 | Stop reason: SDUPTHER

## 2021-05-11 RX ORDER — SIMVASTATIN 20 MG
TABLET ORAL
Qty: 90 TABLET | Refills: 1 | Status: SHIPPED | OUTPATIENT
Start: 2021-05-11 | End: 2021-09-27

## 2021-05-11 RX ORDER — LOSARTAN POTASSIUM 50 MG/1
TABLET ORAL
Qty: 90 TABLET | Refills: 1 | Status: SHIPPED | OUTPATIENT
Start: 2021-05-11 | End: 2021-09-27

## 2021-05-11 RX ORDER — AMLODIPINE BESYLATE 10 MG/1
TABLET ORAL
Qty: 90 TABLET | Refills: 1 | Status: ON HOLD | OUTPATIENT
Start: 2021-05-11 | End: 2021-06-24 | Stop reason: HOSPADM

## 2021-05-11 RX ORDER — HYDROCODONE BITARTRATE AND ACETAMINOPHEN 5; 325 MG/1; MG/1
1 TABLET ORAL 2 TIMES DAILY PRN
Qty: 60 TABLET | Refills: 0 | Status: SHIPPED | OUTPATIENT
Start: 2021-05-11 | End: 2021-11-08 | Stop reason: SDUPTHER

## 2021-05-11 RX ORDER — AMITRIPTYLINE HYDROCHLORIDE 75 MG/1
TABLET, FILM COATED ORAL
Qty: 90 TABLET | Refills: 1 | Status: SHIPPED | OUTPATIENT
Start: 2021-05-11 | End: 2021-11-08 | Stop reason: SDUPTHER

## 2021-05-19 DIAGNOSIS — M25.561 PAIN IN BOTH KNEES, UNSPECIFIED CHRONICITY: Primary | ICD-10-CM

## 2021-05-19 DIAGNOSIS — M25.562 PAIN IN BOTH KNEES, UNSPECIFIED CHRONICITY: Primary | ICD-10-CM

## 2021-05-20 ENCOUNTER — TELEPHONE (OUTPATIENT)
Dept: PRIMARY CARE CLINIC | Age: 71
End: 2021-05-20

## 2021-05-20 ENCOUNTER — OFFICE VISIT (OUTPATIENT)
Dept: ORTHOPEDIC SURGERY | Age: 71
End: 2021-05-20
Payer: MEDICARE

## 2021-05-20 VITALS
HEIGHT: 72 IN | RESPIRATION RATE: 13 BRPM | DIASTOLIC BLOOD PRESSURE: 83 MMHG | BODY MASS INDEX: 31.69 KG/M2 | HEART RATE: 81 BPM | WEIGHT: 234 LBS | SYSTOLIC BLOOD PRESSURE: 141 MMHG

## 2021-05-20 DIAGNOSIS — M17.0 PRIMARY OSTEOARTHRITIS OF BOTH KNEES: Primary | ICD-10-CM

## 2021-05-20 PROCEDURE — 4040F PNEUMOC VAC/ADMIN/RCVD: CPT | Performed by: ORTHOPAEDIC SURGERY

## 2021-05-20 PROCEDURE — 3017F COLORECTAL CA SCREEN DOC REV: CPT | Performed by: ORTHOPAEDIC SURGERY

## 2021-05-20 PROCEDURE — G8417 CALC BMI ABV UP PARAM F/U: HCPCS | Performed by: ORTHOPAEDIC SURGERY

## 2021-05-20 PROCEDURE — 1036F TOBACCO NON-USER: CPT | Performed by: ORTHOPAEDIC SURGERY

## 2021-05-20 PROCEDURE — 1123F ACP DISCUSS/DSCN MKR DOCD: CPT | Performed by: ORTHOPAEDIC SURGERY

## 2021-05-20 PROCEDURE — 99203 OFFICE O/P NEW LOW 30 MIN: CPT | Performed by: ORTHOPAEDIC SURGERY

## 2021-05-20 PROCEDURE — G8427 DOCREV CUR MEDS BY ELIG CLIN: HCPCS | Performed by: ORTHOPAEDIC SURGERY

## 2021-05-20 ASSESSMENT — ENCOUNTER SYMPTOMS
DIARRHEA: 0
COUGH: 0
VOMITING: 0
CONSTIPATION: 0
ABDOMINAL DISTENTION: 0
SHORTNESS OF BREATH: 0
APNEA: 0
NAUSEA: 0
ABDOMINAL PAIN: 0
COLOR CHANGE: 0
CHEST TIGHTNESS: 0

## 2021-05-20 NOTE — PROGRESS NOTES
[facility]  [address]  [address] [id number]  Dept: [phone]  Dept Fax: [phone]                             Bilateral Knee - Established in the system new to provider     Subjective:     Chief Complaint   Patient presents with    Knee Pain     Bilateral Knee Pain     HPI:     Rani Davis presents today with bilateral knee pain. The pain has been present for 7-8 years. The patient recalls no specific injury or trauma to the knees. The patient has tried Mikana, Aleve, cane for ambulatory assistance, physical therapy, a couple of rounds of radiofrequency ablations, cortisone injections, and lubrication injections with no improvement. The pain is now described as Achy and Sharp. There is pain with weight bearing. The knees have not swelled. There is painful popping and clicking. The knees have caught or locked up. The knees have given out. It is stiff upon arising from sitting. It is painful to go up and down stairs and sit for a prolonged time. The patient has had a cortisone injection. The patient has tried a lubrication injection. The patient has tried physical therapy. The patient has not had surgery. The patient is ready for a knee replacement surgery. The patient is a Diabetic. The patient had a stroke 8 years ago. He has a fear of falling because of his knees. ROS:   Review of Systems   Constitutional: Positive for activity change. Negative for appetite change, fatigue and fever. Respiratory: Negative for apnea, cough, chest tightness and shortness of breath. Cardiovascular: Negative for chest pain, palpitations and leg swelling. Gastrointestinal: Negative for abdominal distention, abdominal pain, constipation, diarrhea, nausea and vomiting. Genitourinary: Negative for difficulty urinating, dysuria and hematuria. Musculoskeletal: Positive for arthralgias and gait problem.  Negative for joint swelling and myalgias. Skin: Negative for color change and rash. Neurological: Negative for dizziness, weakness, numbness and headaches. Psychiatric/Behavioral: Positive for sleep disturbance. Past Medical History:    Past Medical History:   Diagnosis Date    Cataracts, bilateral     Headache(784.0)     Hearing loss     Hyperlipidemia     Kidney stones     Osteoarthritis     Tendonitis of shoulder, left     Tremor        Past Surgical History:    Past Surgical History:   Procedure Laterality Date    CATARACT REMOVAL Right Aug 2015    CYSTOSCOPY      KIDNEY STONE SURGERY      LITHOTRIPSY      REFRACTIVE SURGERY Right 2014    THUMB AMPUTATION Left        CurrentMedications:   Current Outpatient Medications   Medication Sig Dispense Refill    amitriptyline (ELAVIL) 75 MG tablet TAKE 1 TABLET NIGHTLY 90 tablet 1    amLODIPine (NORVASC) 10 MG tablet TAKE 1 TABLET DAILY 90 tablet 1    glipiZIDE (GLUCOTROL) 10 MG tablet Take 1 tablet by mouth 2 times daily (before meals) 180 tablet 1    losartan (COZAAR) 50 MG tablet TAKE 1 TABLET DAILY 90 tablet 1    simvastatin (ZOCOR) 20 MG tablet TAKE 1 TABLET NIGHTLY 90 tablet 1    HYDROcodone-acetaminophen (NORCO) 5-325 MG per tablet Take 1 tablet by mouth 2 times daily as needed for Pain for up to 30 days.  60 tablet 0    aspirin 325 MG tablet       fluticasone (FLONASE) 50 MCG/ACT nasal spray 1 spray by Each Nostril route daily for 10 days (Patient not taking: Reported on 5/10/2021) 1 Bottle 0    blood glucose monitor strips Provide insurance covered test strips compatible with glucometer, test 1 times a day 100 strip 5    Blood Glucose Monitoring Suppl (D-CARE GLUCOMETER) w/Device KIT Provide insurance covered glucometer, check blood sugars every morning 1 kit 0    timolol (TIMOPTIC) 0.5 % ophthalmic solution instill 1 drop into right eye twice a day  0    tadalafil (CIALIS) 20 MG tablet Take 20 mg by mouth daily      latanoprost (XALATAN) 0.005 % ophthalmic solution        No current facility-administered medications for this visit. Allergies:    Patient has no known allergies. Social History:   Social History     Socioeconomic History    Marital status:      Spouse name: None    Number of children: None    Years of education: None    Highest education level: None   Occupational History    None   Tobacco Use    Smoking status: Never Smoker    Smokeless tobacco: Former User     Types: Chew    Tobacco comment: quit smokeless tobacco in late 80's   Substance and Sexual Activity    Alcohol use: Yes     Alcohol/week: 0.0 standard drinks     Comment: rarely    Drug use: No    Sexual activity: Yes   Other Topics Concern    None   Social History Narrative    None     Social Determinants of Health     Financial Resource Strain: Low Risk     Difficulty of Paying Living Expenses: Not hard at all   Food Insecurity: No Food Insecurity    Worried About Running Out of Food in the Last Year: Never true    Catalina of Food in the Last Year: Never true   Transportation Needs: No Transportation Needs    Lack of Transportation (Medical): No    Lack of Transportation (Non-Medical):  No   Physical Activity:     Days of Exercise per Week:     Minutes of Exercise per Session:    Stress:     Feeling of Stress :    Social Connections:     Frequency of Communication with Friends and Family:     Frequency of Social Gatherings with Friends and Family:     Attends Jewish Services:     Active Member of Clubs or Organizations:     Attends Club or Organization Meetings:     Marital Status:    Intimate Partner Violence:     Fear of Current or Ex-Partner:     Emotionally Abused:     Physically Abused:     Sexually Abused:        Family History:  Family History   Problem Relation Age of Onset    Heart Disease Mother     Cancer Father     Migraines Sister     Migraines Brother        Vitals:   BP (!) 141/83   Pulse 81   Resp 13   Ht 6' (1.829 m)   Wt 234 lb (106.1 kg)   BMI 31.74 kg/m²  Body mass index is 31.74 kg/m². Physical Examination:     Orthopedics:    GENERAL: Alert and oriented X3 in no acute distress. SKIN: Intact without lesions or ulcerations. NEURO: Intact to sensory and motor testing. VASC: Capillary refill is less than 3 seconds. KNEE EXAM    LOCATION: Bilateral Knee  GEN: Alert and oriented X 3, in no acute distress. GAIT: The patient's gait was observed while entering the exam room and was noted to be antalgic. The extremity is in varus alignment. SKIN: Intact without rashes, lesions, or ulcerations. No obvious deformity or swelling. NEURO: The patient responds to light touch throughout bilateral LE. Patellar and Achilles reflexes are 2/4. VASC: The bilateral LE is neurovascularly intact with 2/4 DP and 2/4 PT pulses. Brisk capillary refill. ROM: 2/124 with the right knee and 2/120 degrees with the left knee. There is no effusion. MUSC: Good quad tone. LIGAMENT: Lachman's test is Negative with Good endpoint. Anterior drawer Negative. Posterior drawer Negative. There is No varus instability at 0 degrees and No varus instability at 30 degrees. There is No valgus instability at 0 degrees and No valgus instability at 30 degrees. PALP: There is medial joint line pain. Assessment:     1. Primary osteoarthritis of both knees      Procedures:    Procedure: no  Radiology:   X-rays taken today were reviewed with the patient. KNEE X-RAY    4 views of the bilateral knees including AP, bilateral tunnel, and lateral in the upright position, and skyline views reveal varus alignment with no fracture or dislocation. Kellgren grade IV changes of osteoarthritis (joint space narrowing, osteophyte, subchondral sclerosis, bony deformity/cyst) of the medial compartment(s). No osseous loose bodies. No bony erosion or periosteal reaction. No soft tissue masses. Impression: Severe osteoarthritis of bilateral knees.     Plan: Treatment : I reviewed the X-ray with the patient. We discussed the etiologies and natural histories of primary osteoarthritis of both knees. We discussed the various treatment alternatives including anti-inflammatory medications, physical therapy, injections, further imaging studies and as a last result surgery. During today's visit, I explained to the patient that since he has tried cortisone injections, lubrication injection, radiofrequency ablations, and NSAIDs with no good pain relief anymore then the only thing that is going to fix his knees is with knee replacements. I told the patient that he will need a neurologist clearance since he had a stroke in the past, and to see if it is okay for him to stop his Aspirin a week ahead of time. He will also need a PCP clearance along with a possible cardiac stress test. I explained to the patient that I prefer to do one knee at a time with patient's in their 76s. He can get the other knee replaced 6-8 weeks after the first surgery. The patient has elected in proceeding with a right total knee replacement surgery. The risks/benefits/alternatives and potential complications have been discussed with the patient. We also had a long discussion regarding the procedure itself and expectation of the recovery. All questions and concerns with addressed. Patient was instructed to call our office with any question or concerns prior to the procedure occurs. I spent 15 minutes face to face time with the patient during today's encounter. A knee replacement booklet was given to the patient. A physical therapy prescription was not given. Patient should return to the clinic one week before surgery for his pre-operative discussion to follow up with  Carlton Gurrola PA-C. The patient will call the office immediately with any problems. No orders of the defined types were placed in this encounter.       Orders Placed This Encounter   Procedures   Ceasar Perez MD, Neurology, [facility]     Referral Priority:   Routine     Referral Type:   Eval and Treat     Referral Reason:   Specialty Services Required     Referred to Provider:   Isabella Carpio MD     Requested Specialty:   Neurology     Number of Visits Requested:   1       I, Isreal Castillo MS, AT, ATC, am scribing for and in the presence of Yvonne Morris D.O.. 5/20/2021  11:37 AM        Electronically signed by Isreal Castillo ATC, on 5/20/2021 at 11:37 AM             I, Yvonne Morris DO, have personally seen this patient and I have reviewed the CC, PMH, FHX and Social History as provided by other clinical staff. I reassessed the HPI and ROS as scribed by Isreal Castillo ATC in my presence and it is both accurate and complete. Thereafter, I personally performed the PE, reviewed the imaging and established the DX and POC. I agree with the documentation provided by the . I have reviewed all documentation in its entirety prior to providing my signature indicating agreement. Any areas of disagreement are noted on the chart.     Electronically signed by Jeannine iSbley DO on 5/23/2021 at 7:50 PM

## 2021-06-02 ENCOUNTER — TELEPHONE (OUTPATIENT)
Dept: NEUROLOGY | Age: 71
End: 2021-06-02

## 2021-06-02 ENCOUNTER — OFFICE VISIT (OUTPATIENT)
Dept: NEUROLOGY | Age: 71
End: 2021-06-02
Payer: MEDICARE

## 2021-06-02 VITALS
HEART RATE: 81 BPM | DIASTOLIC BLOOD PRESSURE: 93 MMHG | SYSTOLIC BLOOD PRESSURE: 144 MMHG | HEIGHT: 73 IN | TEMPERATURE: 97.4 F | BODY MASS INDEX: 31.28 KG/M2 | WEIGHT: 236 LBS

## 2021-06-02 DIAGNOSIS — I63.89 OTHER CEREBRAL INFARCTION (HCC): ICD-10-CM

## 2021-06-02 DIAGNOSIS — I63.9 CEREBRAL INFARCTION, UNSPECIFIED MECHANISM (HCC): Primary | ICD-10-CM

## 2021-06-02 PROCEDURE — 4040F PNEUMOC VAC/ADMIN/RCVD: CPT | Performed by: PSYCHIATRY & NEUROLOGY

## 2021-06-02 PROCEDURE — G8427 DOCREV CUR MEDS BY ELIG CLIN: HCPCS | Performed by: PSYCHIATRY & NEUROLOGY

## 2021-06-02 PROCEDURE — 1123F ACP DISCUSS/DSCN MKR DOCD: CPT | Performed by: PSYCHIATRY & NEUROLOGY

## 2021-06-02 PROCEDURE — G8417 CALC BMI ABV UP PARAM F/U: HCPCS | Performed by: PSYCHIATRY & NEUROLOGY

## 2021-06-02 PROCEDURE — 99204 OFFICE O/P NEW MOD 45 MIN: CPT | Performed by: PSYCHIATRY & NEUROLOGY

## 2021-06-02 PROCEDURE — 1036F TOBACCO NON-USER: CPT | Performed by: PSYCHIATRY & NEUROLOGY

## 2021-06-02 PROCEDURE — 3017F COLORECTAL CA SCREEN DOC REV: CPT | Performed by: PSYCHIATRY & NEUROLOGY

## 2021-06-02 ASSESSMENT — ENCOUNTER SYMPTOMS
GASTROINTESTINAL NEGATIVE: 1
EYES NEGATIVE: 1
RESPIRATORY NEGATIVE: 1
ALLERGIC/IMMUNOLOGIC NEGATIVE: 1

## 2021-06-02 NOTE — PROGRESS NOTES
Active Problem he has history of right thalamic infarction with left sided dysesthesias having seen previously Dr Tiffanie Salvador last in January 2016 . The condition is he is to have total right knee replacement on June 23 needing surgical clearance being on aspirin 325 mg po qd . His stroke was in 2013 with left side numbness with stinging pain in left arm and leg placed on elavil 75 mg po qhs  . He still reports intermittent numbness in upper outer arm and left thigh about twice per week lasting about one hour . There is no weakness , bulbar or visual complains . He has been going to pain management for arthritic knees right knee more than left on norco as needed . There is postural instability on his feet do to arthritic knees . There is no heart disturbance . He has tried coming off elavil having insomnia off this medication . Significant medications zocor 20 mg po qd , aspirin 325 mg po qd, elavil 75 mg poqhs  . Testing MRI of Head with chronic right thalamc infarction along with chronic bilateral basal ganglia infarcts with nonspecific areas of subcortical microhemorrhages , March 2014 . Carotid US left ICA with mild plaquing. Right ICA normal,  March 2014  . Cardiac 2 D echo normal LVF EF 58 % . Grade 1 diastolic dysfunction .  Mild MR and TR , June 2013      Past Medical History:   Diagnosis Date    Cataracts, bilateral     Headache(784.0)     Hearing loss     Hyperlipidemia     Kidney stones     Osteoarthritis     Tendonitis of shoulder, left     Tremor        Past Surgical History:   Procedure Laterality Date    CATARACT REMOVAL Right Aug 2015    CYSTOSCOPY      KIDNEY STONE SURGERY      LITHOTRIPSY      REFRACTIVE SURGERY Right 2014    THUMB AMPUTATION Left        Family History   Problem Relation Age of Onset    Heart Disease Mother     Cancer Father     Migraines Sister     Migraines Brother        Social History     Socioeconomic History    Marital status:      Spouse name: None  Number of children: None    Years of education: None    Highest education level: None   Occupational History    None   Tobacco Use    Smoking status: Never Smoker    Smokeless tobacco: Former User     Types: Chew    Tobacco comment: quit smokeless tobacco in late 80's   Vaping Use    Vaping Use: Never used   Substance and Sexual Activity    Alcohol use: Yes     Alcohol/week: 0.0 standard drinks     Comment: rarely    Drug use: No    Sexual activity: Yes   Other Topics Concern    None   Social History Narrative    None     Social Determinants of Health     Financial Resource Strain: Low Risk     Difficulty of Paying Living Expenses: Not hard at all   Food Insecurity: No Food Insecurity    Worried About Running Out of Food in the Last Year: Never true    Catalina of Food in the Last Year: Never true   Transportation Needs: No Transportation Needs    Lack of Transportation (Medical): No    Lack of Transportation (Non-Medical):  No   Physical Activity:     Days of Exercise per Week:     Minutes of Exercise per Session:    Stress:     Feeling of Stress :    Social Connections:     Frequency of Communication with Friends and Family:     Frequency of Social Gatherings with Friends and Family:     Attends Zoroastrian Services:     Active Member of Clubs or Organizations:     Attends Club or Organization Meetings:     Marital Status:    Intimate Partner Violence:     Fear of Current or Ex-Partner:     Emotionally Abused:     Physically Abused:     Sexually Abused:        Current Outpatient Medications   Medication Sig Dispense Refill    amitriptyline (ELAVIL) 75 MG tablet TAKE 1 TABLET NIGHTLY 90 tablet 1    amLODIPine (NORVASC) 10 MG tablet TAKE 1 TABLET DAILY 90 tablet 1    glipiZIDE (GLUCOTROL) 10 MG tablet Take 1 tablet by mouth 2 times daily (before meals) 180 tablet 1    losartan (COZAAR) 50 MG tablet TAKE 1 TABLET DAILY 90 tablet 1    simvastatin (ZOCOR) 20 MG tablet TAKE 1 TABLET NIGHTLY 90 tablet 1    HYDROcodone-acetaminophen (NORCO) 5-325 MG per tablet Take 1 tablet by mouth 2 times daily as needed for Pain for up to 30 days. 60 tablet 0    aspirin 325 MG tablet       blood glucose monitor strips Provide insurance covered test strips compatible with glucometer, test 1 times a day 100 strip 5    Blood Glucose Monitoring Suppl ([employer] GLUCOMETER) w/Device KIT Provide insurance covered glucometer, check blood sugars every morning 1 kit 0    timolol (TIMOPTIC) 0.5 % ophthalmic solution instill 1 drop into right eye twice a day  0    tadalafil (CIALIS) 20 MG tablet Take 20 mg by mouth daily      latanoprost (XALATAN) 0.005 % ophthalmic solution       fluticasone (FLONASE) 50 MCG/ACT nasal spray 1 spray by Each Nostril route daily for 10 days (Patient not taking: Reported on 5/10/2021) 1 Bottle 0     No current facility-administered medications for this visit. No Known Allergies      Review of Systems     Vitals:    06/02/21 1125   BP: (!) 144/93   Pulse:    Temp:      weight: 236 lb (107 kg)      Review of Systems   Constitutional: Negative. HENT: Negative. Eyes: Negative. Respiratory: Negative. Cardiovascular: Negative. Gastrointestinal: Negative. Endocrine: Negative. Genitourinary: Negative. Musculoskeletal: Positive for arthralgias. Skin: Negative. Allergic/Immunologic: Negative. Neurological: Negative. Hematological: Negative. Psychiatric/Behavioral: Negative. Neurological Examination  Constitutional .General exam well groomed   Head/Ears /Nose/Throat: external ear . Normal exam  Neck and thyroid . Normal size. No bruits  Respiratory . Breathsounds clear bilaterally  Cardiovascular:  Auscultation of heart with regular rate and rhythm  Musculoskeletal. Muscle bulk and tone normal                                                           Muscle strength 5/5 strength throughout No dysmetria or dysdiadokinesis  No tremor   Normal fine motor  Gait mild imbalance  Orientation Alert and oriented x 3   Attention and concentration normal  Short term memory normal  Language process and speech normal . No aphasia   Cranial nerve 2 normal acuety and visual fields  Cranial nerve 3, 4 and 6 . Extraocular muscles are intact . Pupils are equal and reactive   Cranial nerve 5 . Normal strength of masseter and temporalis . Intact corneal reflex. Normal facial sensation  Cranial nerve 7 normal exam   Cranial nerve 8. Grossly intact hearing   Cranial nerve 9 and 10. Symmetric palate elevation   Cranial nerve 11 , 5 out of 5 strength   Cranial Nerve 12 midline tongue . No atrophy  Sensation . Normal proprioception . Intact Vibration . Normal pinprick and light touch   Deep Tendon Reflexes normal  Plantar response flexor bilaterally      ASSESSMENT/PLAN      Diagnosis Orders   1. Cerebral infarction, unspecified mechanism ([address]  VL Carotid Scan Bilateral   2.  Other cerebral infarction ([address]   VL Carotid Scan Bilateral   He is to undergo carotid US and if no sigificant stenosis cleared for knee replacement to come off aspirin one week before surgery     Orders Placed This Encounter   Procedures    VL Carotid Scan Bilateral     Standing Status:   Future     Standing Expiration Date:   6/2/2022

## 2021-06-10 ENCOUNTER — HOSPITAL ENCOUNTER (OUTPATIENT)
Dept: GENERAL RADIOLOGY | Age: 71
Discharge: HOME | End: 2021-06-12
Payer: MEDICARE

## 2021-06-10 ENCOUNTER — HOSPITAL ENCOUNTER (OUTPATIENT)
Dept: PREADMISSION TESTING | Age: 71
Discharge: HOME | End: 2021-06-14
Payer: MEDICARE

## 2021-06-10 VITALS
SYSTOLIC BLOOD PRESSURE: 145 MMHG | DIASTOLIC BLOOD PRESSURE: 87 MMHG | OXYGEN SATURATION: 96 % | WEIGHT: 231.9 LBS | HEIGHT: 72 IN | RESPIRATION RATE: 16 BRPM | BODY MASS INDEX: 31.41 KG/M2 | HEART RATE: 94 BPM

## 2021-06-10 LAB
-: NORMAL
ABO/RH: NORMAL
AMORPHOUS: NORMAL
ANION GAP SERPL CALCULATED.3IONS-SCNC: 11 MMOL/L (ref 9–17)
ANTIBODY SCREEN: NEGATIVE
ARM BAND NUMBER: NORMAL
BACTERIA: NORMAL
BILIRUBIN URINE: NEGATIVE
BUN BLDV-MCNC: 12 MG/DL (ref 8–23)
BUN/CREAT BLD: 19 (ref 9–20)
CALCIUM SERPL-MCNC: 9 MG/DL (ref 8.6–10.4)
CASTS UA: NORMAL /LPF
CHLORIDE BLD-SCNC: 103 MMOL/L (ref 98–107)
CO2: 24 MMOL/L (ref 20–31)
COLOR: YELLOW
COMMENT UA: ABNORMAL
CREAT SERPL-MCNC: 0.62 MG/DL (ref 0.7–1.2)
CRYSTALS, UA: NORMAL /HPF
EPITHELIAL CELLS UA: NORMAL /HPF (ref 0–5)
EXPIRATION DATE: NORMAL
GFR AFRICAN AMERICAN: >60 ML/MIN
GFR NON-AFRICAN AMERICAN: >60 ML/MIN
GFR SERPL CREATININE-BSD FRML MDRD: ABNORMAL ML/MIN/{1.73_M2}
GFR SERPL CREATININE-BSD FRML MDRD: ABNORMAL ML/MIN/{1.73_M2}
GLUCOSE BLD-MCNC: 177 MG/DL (ref 70–99)
GLUCOSE URINE: NEGATIVE
HCT VFR BLD CALC: 44 % (ref 40.7–50.3)
HEMOGLOBIN: 14.6 G/DL (ref 13–17)
INR BLD: 1.1
KETONES, URINE: NEGATIVE
LEUKOCYTE ESTERASE, URINE: NEGATIVE
MCH RBC QN AUTO: 31.4 PG (ref 25.2–33.5)
MCHC RBC AUTO-ENTMCNC: 33.2 G/DL (ref 28.4–34.8)
MCV RBC AUTO: 94.6 FL (ref 82.6–102.9)
MUCUS: NORMAL
NITRITE, URINE: NEGATIVE
NRBC AUTOMATED: 0 PER 100 WBC
OTHER OBSERVATIONS UA: NORMAL
PARTIAL THROMBOPLASTIN TIME: 27.8 SEC (ref 23.9–33.8)
PDW BLD-RTO: 13.2 % (ref 11.8–14.4)
PH UA: 6.5 (ref 5–8)
PLATELET # BLD: 202 K/UL (ref 138–453)
PMV BLD AUTO: 9.9 FL (ref 8.1–13.5)
POTASSIUM SERPL-SCNC: 3.8 MMOL/L (ref 3.7–5.3)
PROTEIN UA: NEGATIVE
PROTHROMBIN TIME: 14.2 SEC (ref 11.5–14.2)
RBC # BLD: 4.65 M/UL (ref 4.21–5.77)
RBC UA: NORMAL /HPF (ref 0–2)
RENAL EPITHELIAL, UA: NORMAL /HPF
SODIUM BLD-SCNC: 138 MMOL/L (ref 135–144)
SPECIFIC GRAVITY UA: 1.02 (ref 1–1.03)
TRICHOMONAS: NORMAL
TURBIDITY: CLEAR
URINE HGB: ABNORMAL
UROBILINOGEN, URINE: NORMAL
WBC # BLD: 6.8 K/UL (ref 3.5–11.3)
WBC UA: NORMAL /HPF (ref 0–5)
YEAST: NORMAL

## 2021-06-10 PROCEDURE — 87186 SC STD MICRODIL/AGAR DIL: CPT

## 2021-06-10 PROCEDURE — 93005 ELECTROCARDIOGRAM TRACING: CPT | Performed by: ORTHOPAEDIC SURGERY

## 2021-06-10 PROCEDURE — 85610 PROTHROMBIN TIME: CPT

## 2021-06-10 PROCEDURE — 83036 HEMOGLOBIN GLYCOSYLATED A1C: CPT

## 2021-06-10 PROCEDURE — 71046 X-RAY EXAM CHEST 2 VIEWS: CPT

## 2021-06-10 PROCEDURE — 85730 THROMBOPLASTIN TIME PARTIAL: CPT

## 2021-06-10 PROCEDURE — 81001 URINALYSIS AUTO W/SCOPE: CPT

## 2021-06-10 PROCEDURE — 87088 URINE BACTERIA CULTURE: CPT

## 2021-06-10 PROCEDURE — 77073 BONE LENGTH STUDIES: CPT

## 2021-06-10 PROCEDURE — 87641 MR-STAPH DNA AMP PROBE: CPT

## 2021-06-10 PROCEDURE — 86901 BLOOD TYPING SEROLOGIC RH(D): CPT

## 2021-06-10 PROCEDURE — 86900 BLOOD TYPING SEROLOGIC ABO: CPT

## 2021-06-10 PROCEDURE — 84134 ASSAY OF PREALBUMIN: CPT

## 2021-06-10 PROCEDURE — 36415 COLL VENOUS BLD VENIPUNCTURE: CPT

## 2021-06-10 PROCEDURE — 87086 URINE CULTURE/COLONY COUNT: CPT

## 2021-06-10 PROCEDURE — 80048 BASIC METABOLIC PNL TOTAL CA: CPT

## 2021-06-10 PROCEDURE — 86850 RBC ANTIBODY SCREEN: CPT

## 2021-06-10 PROCEDURE — 85027 COMPLETE CBC AUTOMATED: CPT

## 2021-06-10 RX ORDER — CELECOXIB 200 MG/1
200 CAPSULE ORAL ONCE
Status: CANCELLED | OUTPATIENT
Start: 2021-06-23

## 2021-06-10 RX ORDER — ATROPA BELLADONNA, EUPHRASIA STRICTA AND MERCURIC CHLORIDE 6; 6; 6 [HP_X]/10ML; [HP_X]/10ML; [HP_X]/10ML
2 SOLUTION/ DROPS OPHTHALMIC 2 TIMES DAILY
COMMUNITY

## 2021-06-10 RX ORDER — COVID-19 ANTIGEN TEST
2 KIT MISCELLANEOUS DAILY PRN
COMMUNITY
End: 2021-09-02

## 2021-06-10 RX ORDER — ACETAMINOPHEN 500 MG
1000 TABLET ORAL ONCE
Status: CANCELLED | OUTPATIENT
Start: 2021-06-23

## 2021-06-10 RX ORDER — GABAPENTIN 300 MG/1
300 CAPSULE ORAL ONCE
Status: CANCELLED | OUTPATIENT
Start: 2021-06-23

## 2021-06-10 ASSESSMENT — PAIN SCALES - GENERAL: PAINLEVEL_OUTOF10: 8

## 2021-06-10 ASSESSMENT — PROMIS GLOBAL HEALTH SCALE
IN GENERAL, WOULD YOU SAY YOUR HEALTH IS...[ON A SCALE OF 1 (POOR) TO 5 (EXCELLENT)]: 3
IN GENERAL, HOW WOULD YOU RATE YOUR SATISFACTION WITH YOUR SOCIAL ACTIVITIES AND RELATIONSHIPS [ON A SCALE OF 1 (POOR) TO 5 (EXCELLENT)]?: 3
WHO IS THE PERSON COMPLETING THE PROMIS V1.1 SURVEY?: 0
IN GENERAL, HOW WOULD YOU RATE YOUR MENTAL HEALTH, INCLUDING YOUR MOOD AND YOUR ABILITY TO THINK [ON A SCALE OF 1 (POOR) TO 5 (EXCELLENT)]?: 3
IN THE PAST 7 DAYS, HOW WOULD YOU RATE YOUR PAIN ON AVERAGE [ON A SCALE FROM 0 (NO PAIN) TO 10 (WORST IMAGINABLE PAIN)]?: 8
IN THE PAST 7 DAYS, HOW WOULD YOU RATE YOUR FATIGUE ON AVERAGE [ON A SCALE FROM 1 (NONE) TO 5 (VERY SEVERE)]?: 3
IN GENERAL, HOW WOULD YOU RATE YOUR PHYSICAL HEALTH [ON A SCALE OF 1 (POOR) TO 5 (EXCELLENT)]?: 3
SUM OF RESPONSES TO QUESTIONS 2, 4, 5, & 10: 9
TO WHAT EXTENT ARE YOU ABLE TO CARRY OUT YOUR EVERYDAY PHYSICAL ACTIVITIES SUCH AS WALKING, CLIMBING STAIRS, CARRYING GROCERIES, OR MOVING A CHAIR [ON A SCALE OF 1 (NOT AT ALL) TO 5 (COMPLETELY)]?: 2
IN GENERAL, PLEASE RATE HOW WELL YOU CARRY OUT YOUR USUAL SOCIAL ACTIVITIES (INCLUDES ACTIVITIES AT HOME, AT WORK, AND IN YOUR COMMUNITY, AND RESPONSIBILITIES AS A PARENT, CHILD, SPOUSE, EMPLOYEE, FRIEND, ETC) [ON A SCALE OF 1 (POOR) TO 5 (EXCELLENT)]?: 3
SUM OF RESPONSES TO QUESTIONS 3, 6, 7, & 8: 16
HOW IS THE PROMIS V1.1 BEING ADMINISTERED?: 0
IN GENERAL, WOULD YOU SAY YOUR QUALITY OF LIFE IS...[ON A SCALE OF 1 (POOR) TO 5 (EXCELLENT)]: 2
IN THE PAST 7 DAYS, HOW OFTEN HAVE YOU BEEN BOTHERED BY EMOTIONAL PROBLEMS, SUCH AS FEELING ANXIOUS, DEPRESSED, OR IRRITABLE [ON A SCALE FROM 1 (NEVER) TO 5 (ALWAYS)]?: 1

## 2021-06-10 ASSESSMENT — ENCOUNTER SYMPTOMS
CHEST TIGHTNESS: 0
APNEA: 0
COLOR CHANGE: 0
ABDOMINAL DISTENTION: 0
SHORTNESS OF BREATH: 0
VOMITING: 0
ABDOMINAL PAIN: 0
CONSTIPATION: 0
NAUSEA: 0
DIARRHEA: 0
COUGH: 0

## 2021-06-10 ASSESSMENT — PAIN DESCRIPTION - LOCATION: LOCATION: KNEE

## 2021-06-10 ASSESSMENT — KOOS JR
STRAIGHTENING KNEE FULLY: 2
STANDING UPRIGHT: 3
GOING UP OR DOWN STAIRS: 3
HOW SEVERE IS YOUR KNEE STIFFNESS AFTER FIRST WAKING IN MORNING: 1
RISING FROM SITTING: 2
TWISING OR PIVOTING ON KNEE: 4
BENDING TO THE FLOOR TO PICK UP OBJECT: 3

## 2021-06-10 ASSESSMENT — PAIN DESCRIPTION - PAIN TYPE: TYPE: CHRONIC PAIN

## 2021-06-10 ASSESSMENT — PAIN - FUNCTIONAL ASSESSMENT: PAIN_FUNCTIONAL_ASSESSMENT: PREVENTS OR INTERFERES WITH ALL ACTIVE AND SOME PASSIVE ACTIVITIES

## 2021-06-10 ASSESSMENT — PAIN DESCRIPTION - ORIENTATION: ORIENTATION: RIGHT;LEFT

## 2021-06-10 ASSESSMENT — PAIN DESCRIPTION - DESCRIPTORS: DESCRIPTORS: SHARP

## 2021-06-10 NOTE — PROGRESS NOTES
Patient stated he had been thinking about it and would really like to have his left knee done before the right knee. I called Ariana (surgery scheduler for Dr Brittney Leggett) to let her know, she said that she would talk to Dr Brittney Leggett but to go ahead and have the left knee x-rayed today and if he doesn't want to change the surgery site the patient would have to come back to get the right leg xray done. She asked that the patient be informed of that possibility and that she would let him know what Dr Brittney Leggett says. Patient was informed and is OK with coming back if he needs to have the right knee xray done instead.

## 2021-06-10 NOTE — PRE-PROCEDURE INSTRUCTIONS
[facility] ON Wednesday, 6/23/21 at 8:00 AM    Once you enter the hospital lobby take the elevators to the second floor. Check-In is at the surgery registration desk    One person age 25 or older may remain in the waiting room while you are in surgery. Continue to take your home medications as you normally do up to and including the night before surgery with the exception of any blood thinning medications. Please stop any blood thinning medications as directed by your surgeon or prescribing physician. Failure to stop certain medications may interfere with your scheduled surgery. These may include:  Aspirin, Warfarin (Coumadin), Clopidogrel (Plavix), Ibuprofen (Motrin, Advil), Naproxen (Aleve), Meloxicam (Mobic), Celecoxib (Celebrex), Eliquis, Pradaxa, Xarelto, Effient, Fish Oil, Herbal supplements. Stop Aleve 7 days prior to surgery. Stop Aspirin as instructed by your doctor. If you are diabetic, do not take any of your diabetic medications by mouth the morning of surgery. If you are taking insulin contact the doctor that manages your diabetes for instructions about any changes to your insulin dosages the day before surgery. Do not inject insulin or other injectable diabetic medications the morning of surgery unless otherwise instructed by the doctor who manages your diabetes. Please take the following medication(s) the day of surgery with a small sip of water:  Amlodipine      PREPARING FOR YOUR SURGERY:     Before surgery, you can play an important role in your own health. Because skin is not sterile, we need to be sure that your skin is as free of germs as possible before surgery by carefully washing before surgery. Preparing or prepping skin before surgery can reduce the risk of a surgical site infection.   Do not shave the area of your body where your surgery will be performed unless you received specific permission from your physician.     You will need to shower at home the night before surgery and the morning of surgery with a special soap called chlorhexidine gluconate (CHG*). *Not to be used by people allergic to Chlorhexidine Gluconate (CHG). Following these instructions will help you be sure that your skin is clean before surgery. Instructions on cleaning your skin before surgery: The night before your surgery:      You will need to shower with warm water (not hot) and the CHG soap.  Use a clean wash cloth and a clean towel. Have clean clothes available to put on after the shower.   First wash your hair with regular shampoo. Rinse your hair and body thoroughly to remove the shampoo.  Wash your face and genital area (private parts) with your regular soap or water only. Thoroughly rinse your body with warm water from the neck down.  Turn water off to prevent rinsing the soap off too soon.  With a clean wet washcloth and half of the CHG soap in the bottle, lather your entire body from the neck down. Do not use CHG soap near your eyes or ears to avoid injury to those areas.  Wash thoroughly, paying special attention to the area where your surgery will be performed.  Wash your body gently for five (5) minutes. Avoid scrubbing your skin too hard.  Turn the water back on and rinse your body thoroughly.  Pat yourself dry with a clean, soft towel. Do not apply lotion, cream or powder.  Dress with clean freshly washed clothes. The morning of surgery:     Repeat shower following steps above - using remaining half of CHG soap in bottle. Patient Instructions:    [facility] If you are having any type of anesthesia you are to have nothing to eat or drink after midnight the night before your surgery. This includes gum, hard candy, mints, water or smoking or chewing tobacco.  The only exception to this is a small sip of water to take with any morning dose of heart, blood pressure, or seizure medications.   No alcoholic beverages for 24 hours prior to surgery.  Brush your teeth but do not swallow water.  Bring your eye glasses and case with you. No contacts are to be worn the day of surgery. You also may bring your hearing aids. Most surgical procedures involving anesthesia will require that you remove your dentures prior to surgery.  If you are on C-PAP or Bi-PAP at home and plan on staying in the hospital overnight for your surgery please bring the machine with you. · Do not wear any jewelry or body piercings day of surgery. Also, NO lotion, perfume or deodorant to be used the day of surgery. No nail polish on the operative extremity (arm/leg surgeries)    · If you are staying overnight with us, please bring a small bag of necessary personal items.  Please wear loose, comfortable clothing. If you are potentially going to have a cast or brace bring clothing that will fit over them.  In case of illness - If you have cold or flu like symptoms (high fever, runny nose, sore throat, cough, etc.) rash, nausea, vomiting, loose stools, and/or recent contact with someone who has a contagious disease (chicken pox, measles, etc.) Please call your doctor before coming to the hospital.         Day of Surgery/Procedure:    As a patient at [facility] you can expect quality medical and nursing care that is centered on your individual needs. Our goal is to make your surgical experience as comfortable as possible    . Transportation After Your Surgery/Procedure: You will need a friend or family member to drive you home after your procedure. Your  must be 25years of age or older. Discharge instructions will be reviewed with family/friend by phone. A taxi cab or any other form of public transportation is not acceptable.       Someone must remain with you for the first 24 hours after your surgery if you receive anesthesia or medication. If you do not have someone to stay with you, your procedure may be cancelled.       If you have any other questions regarding your procedure or the day of surgery, please call [phone]      _________________________  ____________________________  Signature (Patient)    Signature (Provider)            Date

## 2021-06-10 NOTE — DISCHARGE INSTR - COC
Mackenzie-bushra Fisher) 2017, 2020    Pneumococcal Polysaccharide ([id number]) 2019       Active Problems:  Patient Active Problem List   Diagnosis Code    Hypercholesteremia E78.00    Cerebral infarction ([facility] I63.9    Neuropathic pain M79.2    Weakness of both legs R29.898    Essential hypertension I10    Type 2 diabetes mellitus with diabetic autonomic neuropathy, without long-term current use of insulin (HCC) E11.43    Eustachian tube dysfunction H69.80    Branch retinal vein occlusion O94.1185    Primary open angle glaucoma H40.1190    Chronic pain of both knees M25.561, M25.562, G89.29    Cerebral infarction due to occlusion of right middle cerebral artery (HCC) I63.511    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    Nuclear sclerotic cataract H25.10    Class 1 obesity due to excess calories without serious comorbidity with body mass index (BMI) of 32.0 to 32.9 in adult E66.09, Z68.32    Elevated PSA R97.20       Isolation/Infection:   Isolation          No Isolation        Patient Infection Status     None to display          Nurse Assessment:  Last Vital Signs: BP (!) 145/87   Pulse 94   Resp 16   Ht 6' (1.829 m)   Wt 231 lb 14.4 oz (105.2 kg)   SpO2 96%   BMI 31.45 kg/m²     Last documented pain score (0-10 scale): Pain Level: 8  Last Weight:   Wt Readings from Last 1 Encounters:   06/10/21 231 lb 14.4 oz (105.2 kg)     Mental Status:  {IP PT MENTAL STATUS:95058}    IV Access:  { DAMON IV ACCESS:413862619}    Nursing Mobility/ADLs:  Walking   {CHP DME XXV}  Transfer  {CHP DME IXCQ:977380580}  Bathing  {CHP DME ITIK:758716396}  Dressing  {CHP DME TEJP:625071739}  Toileting  {CHP DME FCUM:454575862}  Feeding  {CHP DME FSDK:843220598}  Med Admin  {CHP DME ADZC:989443418}  Med Delivery   { DAMON MED Delivery:177763935}    Wound Care Documentation and Therapy:        Elimination:  Continence:   · Bowel: {YES / CL:78115}  · Bladder: {YES / RM:56131}  Urinary Catheter: {Urinary Catheter:209156790}   Colostomy/Ileostomy/Ileal Conduit: {YES / RX:09657}       Date of Last BM: ***  No intake or output data in the 24 hours ending 06/10/21 1235  No intake/output data recorded.     Safety Concerns:     [address] JOSE Safety Concerns:517541014}    Impairments/Disabilities:      [address] JOSE Impairments/Disabilities:430530011}    Nutrition Therapy:  Current Nutrition Therapy:   [address] Mclean [facility] Diet List:951135062}    Routes of Feeding: {CHP DME Other Feedings:706621448}  Liquids: {Slp liquid thickness:61747}  Daily Fluid Restriction: {CHP DME Yes amt example:148166776}  Last Modified Barium Swallow with Video (Video Swallowing Test): {Done Not Done CGWW:882480560}    Treatments at the Time of Hospital Discharge:   Respiratory Treatments: ***  Oxygen Therapy:  {Therapy; copd oxygen:49299}  Ventilator:    {[facility] Vent XGIQ:398491313}    Rehab Therapies: {THERAPEUTIC INTERVENTION:3541713580}  Weight Bearing Status/Restrictions: [address] Mclean  Weight Bearin}  Other Medical Equipment (for information only, NOT a DME order):  {EQUIPMENT:469079719}  Other Treatments: ***    Patient's personal belongings (please select all that are sent with patient):  {[facility] DME Belongings:526087168}    RN SIGNATURE:  {Esignature:588871618}    CASE MANAGEMENT/SOCIAL WORK SECTION    Inpatient Status Date: ***    Readmission Risk Assessment Score:  Readmission Risk              Risk of Unplanned Readmission:  0           Discharging to Facility/ Agency   · Name:   · Address:  · Phone:  · Fax:    Dialysis Facility (if applicable)   · Name:  · Address:  · Dialysis Schedule:  · Phone:  · Fax:    / signature: {Esignature:476365993}    PHYSICIAN SECTION    Prognosis: {Prognosis:2179492347}    Condition at Discharge: [address] Mclean Patient Condition:972337187}    Rehab Potential (if transferring to Rehab): {Prognosis:0624083851}    Recommended Labs or Other Treatments After Discharge: ***    Physician Certification: I certify the above information and transfer of Ronda Arreola  is necessary for the continuing treatment of the diagnosis listed and that he requires {Admit to Appropriate Level of Care:70844} for {GREATER/LESS:792714850} 30 days.      Update Admission H&P: {CHP DME Changes in MINUX:564987598}    PHYSICIAN SIGNATURE:  {Esignature:777900780}

## 2021-06-10 NOTE — H&P
History and Physical Service   [facility] [place] - INPATIENT    HISTORY AND PHYSICAL EXAMINATION            Date of Evaluation: 6/10/2021  Patient name:  Lacy Wills  MRN:   [id number]  YOB: 1950  PCP:    Johnny Desai MD    History Obtained From:     Patient    History of Present Illness: This is Lacy Wills a 66 y.o. male who presents for a pre-admission testing appointment for an upcoming (left) knee total arthroplasty by Dr. James Ayers scheduled on 6/23/21 at 1000 due to Bilateral Knee osteoarthritis, states was scheduled for right knee total arthroplasty first but requesting at PAT today to do Left first due to left leg weakness s/p stroke history 10 years ago, Dr James Ayers and office  notified. The patient's chief complaint is 8/10 pain which has progressively worsened over the past 2 years. Bilateral knee pain is aggravated by movement and is minimally relieved with rest and sitting. Prior treatment includes gel injections, pain management knee injection. Denies recent falls and injuries. Patient is hard of hearing does not wear hearing aids. Patient is diabetic last HGBA1C 6.4. Takes aspirin 325 mg daily. States history of glaucoma, states impaired vision to right eyes. Sleep apnea questionnaire  1) Do you snore loudly? yes  2) Do you often feel tired, fatigued, or sleepy? no  3) Has anyone observed you stop breathing or choking/gasping during your sleep? Yes occasionally  4) Do you have hypertension? yes  5) BMI >35 kg/m2? yes  6) Age > 48? yes  7) Pt is a male? yes    Functional Capacity:   1) Pt  Is able to walk 2 city blocks on level ground without SOB. 2) Pt is able to climb 2 flights of stairs without SOB. 3) Pt unsure if able to walk up a hill for 1-2 city blocks without SOB.     Past Medical History:     Past Medical History:   Diagnosis Date    Cataracts, bilateral     only left eye, had surgery on right    Cerebral artery occlusion with cerebral infarction [facility])     weakness left leg, sees Dr Buffy Braun Diabetes mellitus [facility])     type II, managed by Dr Marysol Marina (PCP)  range , checks daily    Eye disorder     history of a \"stroke\" in right eye    Glaucoma     right eye    Headache(784.0)     no current issues (6/10/21)    Hearing loss     Heartburn     occasionally, takes OTC acid reducer as needed    Hyperlipidemia     Hypertension     Kidney stones     Osteoarthritis     Snores     Tendonitis of shoulder, left     Tremor     no current problems (6/10/21)        Past Surgical History:     Past Surgical History:   Procedure Laterality Date    CATARACT REMOVAL Right 08/2015    right eye    CYSTOSCOPY      HAND SURGERY Left     thumb    KIDNEY STONE SURGERY      LITHOTRIPSY      REFRACTIVE SURGERY Right 2014    THUMB AMPUTATION      TONSILLECTOMY          Medications Prior to Admission:     Prior to Admission medications    Medication Sig Start Date End Date Taking? Authorizing Provider   Naproxen Sodium (ALEVE) 220 MG CAPS Take 2 capsules by mouth daily as needed for Pain   Yes Historical Provider, MD   Homeopathic Products ([address] [place] [address]) SOLN Apply 2 drops to eye 2 times daily Left eye   Yes Historical Provider, MD   amitriptyline (ELAVIL) 75 MG tablet TAKE 1 TABLET NIGHTLY 5/11/21  Yes Tawny De Jesus MD   amLODIPine (NORVASC) 10 MG tablet TAKE 1 TABLET DAILY 5/11/21  Yes Tawny De Jesus MD   glipiZIDE (GLUCOTROL) 10 MG tablet Take 1 tablet by mouth 2 times daily (before meals)  Patient taking differently: Take 10 mg by mouth daily  5/11/21  Yes Tawny De Jesus MD   losartan (COZAAR) 50 MG tablet TAKE 1 TABLET DAILY 5/11/21  Yes Tawny De Jesus MD   simvastatin (ZOCOR) 20 MG tablet TAKE 1 TABLET NIGHTLY 5/11/21  Yes Tawny De Jesus MD   HYDROcodone-acetaminophen (NORCO) 5-325 MG per tablet Take 1 tablet by mouth 2 times daily as needed for Pain for up to 30 days.  5/11/21 6/10/21 Yes Tawny De Jesus MD   aspirin 325 MG tablet Take 325 mg by mouth daily    Yes Historical Provider, MD   timolol (TIMOPTIC) 0.5 % ophthalmic solution instill 1 drop into right eye twice a day 11/5/18  Yes Historical Provider, MD   latanoprost (XALATAN) 0.005 % ophthalmic solution Place 1 drop into the right eye nightly  11/13/14  Yes Historical Provider, MD   fluticasone (FLONASE) 50 MCG/ACT nasal spray 1 spray by Each Nostril route daily for 10 days  Patient not taking: Reported on 5/10/2021 12/9/20 12/19/20  Daphne Gonzales PA-C   blood glucose monitor strips Provide insurance covered test strips compatible with glucometer, test 1 times a day 8/4/20   Daphne Gonzales PA-C   Blood Glucose Monitoring Suppl (D-CARE GLUCOMETER) w/Device KIT Provide insurance covered glucometer, check blood sugars every morning 2/5/19 6/2/21  Daphne Gonzales PA-C        Allergies:     Patient has no known allergies. Social History:     Tobacco:    reports that he has never smoked. He has quit using smokeless tobacco.  His smokeless tobacco use included chew. Alcohol:      reports current alcohol use. Drug Use:  reports no history of drug use. Family History:     Family History   Problem Relation Age of Onset    Heart Disease Mother     Cancer Father     Migraines Sister     Migraines Brother        Review of Systems:     Positive and Negative as described in HPI. CONSTITUTIONAL: Negative for fevers, chills, sweats, fatigue, and weight loss. HEENT:  Negative for glasses, hearing changes, rhinorrhea, and throat pain. RESPIRATORY:  Negative for shortness of breath, cough, congestion, and wheezing. CARDIOVASCULAR:  Negative for chest pain, blood clot, irregular heartbeat, and palpitations. GASTROINTESTINAL:  Negative for reflux, nausea, vomiting, diarrhea, constipation, change in bowel habits, and abdominal pain. GENITOURINARY:  Negative for difficulty of urination, burning with urination, and frequency.    INTEGUMENT:  Negative for rash, skin lesions, and easy bruising. Instructed pt to call   as soon as possible if a rash or wound develops prior to surgery. Pt voiced understanding. HEMATOLOGIC/LYMPHATIC:  Negative for swelling/edema. ALLERGIC/IMMUNOLOGIC:  Negative for urticaria and itching. ENDOCRINE:  Negative for increase in thirst, increase in urination, and heat or cold intolerance. MUSCULOSKELETAL: See HPI. NEUROLOGICAL:  Negative for headaches, dizziness, lightheadedness, numbness, and tingling extremities. BEHAVIOR/PSYCH:  Negative for depression and anxiety. Physical Exam:   BP (!) 145/87   Pulse 94   Resp 16   Ht 6' (1.829 m)   Wt 231 lb 14.4 oz (105.2 kg)   SpO2 96%   BMI 31.45 kg/m²   No LMP for male patient. No obstetric history on file. No results for input(s): POCGLU in the last 72 hours. General Appearance:  Alert, well appearing, and in no acute distress. Mental status:  Oriented to person, place, and time. Head:  Normocephalic and atraumatic. Eye:  No icterus, redness, pupils equal and reactive, extraocular eye movements intact, and conjunctiva clear. Ear:  Hearing grossly intact. Nose:  No drainage noted. Mouth:  Mucous membranes moist.  Neck: Supple and no carotid bruits noted. Lungs: Bilateral equal air entry, clear to auscultation, no wheezing, rales or rhonchi, and normal effort. Cardiovascular: Normal rate, regular rhythm, no murmur, gallop, or rub. Abdomen:  Soft, non-tender, non-distended, and active bowel sounds. Neurologic:  Normal speech and cranial nerves II through XII grossly intact. Strength 5/5 bilaterally. Skin: No gross lesions, rashes, bruising, or bleeding on exposed skin area. Extremities:  Posterior tibial pulses 2+ bilaterally. No pedal edema. No calf tenderness with palpation. Psych:  Normal affect.      Investigations:      Laboratory Testing:  Recent Results (from the past 24 hour(s))   CBC    Collection Time: 06/10/21  9:54 AM   Result Value Ref Range    WBC 6.8 3.5 - 11.3 k/uL    RBC 4.65 4.21 - 5.77 m/uL    Hemoglobin 14.6 13.0 - 17.0 g/dL    Hematocrit 44.0 40.7 - 50.3 %    MCV 94.6 82.6 - 102.9 fL    MCH 31.4 25.2 - 33.5 pg    MCHC 33.2 28.4 - 34.8 g/dL    RDW 13.2 11.8 - 14.4 %    Platelets 773 245 - 602 k/uL    MPV 9.9 8.1 - 13.5 fL    NRBC Automated 0.0 0.0 per 100 WBC   Basic Metabolic Panel    Collection Time: 06/10/21  9:54 AM   Result Value Ref Range    Glucose 177 (H) 70 - 99 mg/dL    BUN 12 8 - 23 mg/dL    CREATININE 0.62 (L) 0.70 - 1.20 mg/dL    Bun/Cre Ratio 19 9 - 20    Calcium 9.0 8.6 - 10.4 mg/dL    Sodium 138 135 - 144 mmol/L    Potassium 3.8 3.7 - 5.3 mmol/L    Chloride 103 98 - 107 mmol/L    CO2 24 20 - 31 mmol/L    Anion Gap 11 9 - 17 mmol/L    GFR Non-African American >60 >60 mL/min    GFR African American >60 >60 mL/min    GFR Comment          GFR Staging NOT REPORTED    APTT    Collection Time: 06/10/21  9:54 AM   Result Value Ref Range    PTT 27.8 23.9 - 33.8 sec   Protime-INR    Collection Time: 06/10/21  9:54 AM   Result Value Ref Range    Protime 14.2 11.5 - 14.2 sec    INR 1.1        Recent Labs     06/10/21  0954   HGB 14.6   HCT 44.0   WBC 6.8   MCV 94.6      K 3.8      CO2 24   BUN 12   CREATININE 0.62*   GLUCOSE 177*   INR 1.1   PROTIME 14.2   APTT 27.8       No results for input(s): COVID19 in the last 720 hours. Imaging/Diagnostics:      XR KNEE LEFT (1-2 VIEWS)    Result Date: 5/21/2021  KNEE X-RAY   4 views of the bilateral knees including AP, bilateral tunnel, and lateral in the upright position, and skyline views reveal varus alignment with no fracture or dislocation. Kellgren grade IV changes of osteoarthritis (joint space narrowing, osteophyte, subchondral sclerosis, bony deformity/cyst) of the medial compartment(s). No osseous loose bodies. No bony erosion or periosteal reaction. No soft tissue masses.    Impression: Severe osteoarthritis of bilateral knees.     XR KNEE RIGHT (MIN 4 VIEWS)    Result Date: 5/21/2021  KNEE X-RAY   4 views of the bilateral knees including AP, bilateral tunnel, and lateral in the upright position, and skyline views reveal varus alignment with no fracture or dislocation. Kellgren grade IV changes of osteoarthritis (joint space narrowing, osteophyte, subchondral sclerosis, bony deformity/cyst) of the medial compartment(s). No osseous loose bodies. No bony erosion or periosteal reaction. No soft tissue masses.    Impression: Severe osteoarthritis of bilateral knees. EKG:See Epic. Diagnosis:      1. Bilateral Knee Osteoarthroplasty    Plans:     1.   Knee total arthroplasty (Requesting Left)      Lawson Eisenmenger, APRN - CNP  6/10/2021  10:35 AM

## 2021-06-10 NOTE — PROGRESS NOTES
[address] [facility]TS [facility]  [address] [place] [address]  Dept: [phone]  Dept Fax: [phone]          Left Knee - Follow Up/ Pre-operative discussion     Subjective:     Chief Complaint   Patient presents with    Follow-up     Decision making for L Knee dos 6/23/21  L TKA     HPI:     Guerrero Johnson presents today with bilateral knee pain. The pain has been present for 7-8 years. The patient recalls no specific injury or trauma to the knees. The patient has tried [place], Aleve, cane for ambulatory assistance, physical therapy, a couple of rounds of radiofrequency ablations, cortisone injections, and lubrication injections with no improvement. The pain is now described as Achy and Sharp. There is pain with weight bearing. The knees have not swelled. There is painful popping and clicking. The knees have caught or locked up. The knees have given out. It is stiff upon arising from sitting. It is painful to go up and down stairs and sit for a prolonged time. The patient has had a cortisone injection. The patient has tried a lubrication injection. The patient has tried physical therapy. The patient has not had surgery. The patient is ready for a knee replacement surgery. The patient is a Diabetic. The patient had a stroke 8 years ago. He has a fear of falling because of his knees. Patient had originally scheduled for a right total knee arthroplasty and has decided to switch to a left total knee arthroplasty. He would like to have this right knee replacement done approximately 6 weeks after the left. The patient is here for his pre-operative discussion regarding his Left total knee replacement surgery on 06/23/2021. ROS:   Review of Systems   Constitutional: Positive for activity change. Negative for appetite change, fatigue and fever. Respiratory: Negative for apnea, cough, chest tightness and shortness of breath. Cardiovascular: Negative for chest pain, palpitations and leg swelling. Gastrointestinal: Negative for abdominal distention, abdominal pain, constipation, diarrhea, nausea and vomiting. Genitourinary: Negative for difficulty urinating, dysuria and hematuria. Musculoskeletal: Positive for arthralgias and gait problem. Negative for joint swelling and myalgias. Skin: Negative for color change and rash. Neurological: Negative for dizziness, weakness, numbness and headaches. Psychiatric/Behavioral: Positive for sleep disturbance.        Past Medical History:    Past Medical History:   Diagnosis Date    Cataracts, bilateral     only left eye, had surgery on right    Cerebral artery occlusion with cerebral infarction ([address]     weakness left leg, sees Dr Jaki Kahn Diabetes mellitus [facility])     type II, managed by Dr Leanne Mauricio (PCP)  range , checks daily    Eye disorder     history of a \"stroke\" in right eye    Glaucoma     right eye    Headache(784.0)     no current issues (6/10/21)    Hearing loss     Heartburn     occasionally, takes OTC acid reducer as needed    Hyperlipidemia     Hypertension     Kidney stones     Osteoarthritis     Snores     Tendonitis of shoulder, left     Tremor     no current problems (6/10/21)       Past Surgical History:    Past Surgical History:   Procedure Laterality Date    CATARACT REMOVAL Right 08/2015    right eye    CYSTOSCOPY      HAND SURGERY Left     thumb    KIDNEY STONE SURGERY      LITHOTRIPSY      REFRACTIVE SURGERY Right 2014    THUMB AMPUTATION      TONSILLECTOMY         CurrentMedications:   Current Outpatient Medications   Medication Sig Dispense Refill    Naproxen Sodium (ALEVE) 220 MG CAPS Take 2 capsules by mouth daily as needed for Pain      Homeopathic Products (SIMILASAN DRY EYE RELIEF) SOLN Apply 2 drops to eye 2 times daily Left eye      amitriptyline (ELAVIL) 75 MG tablet TAKE 1 TABLET NIGHTLY 90 tablet 1    amLODIPine (NORVASC) 10 Insecurity    Worried About Running Out of Food in the Last Year: Never true    Catalina of Food in the Last Year: Never true   Transportation Needs: No Transportation Needs    Lack of Transportation (Medical): No    Lack of Transportation (Non-Medical): No   Physical Activity:     Days of Exercise per Week:     Minutes of Exercise per Session:    Stress:     Feeling of Stress :    Social Connections:     Frequency of Communication with Friends and Family:     Frequency of Social Gatherings with Friends and Family:     Attends Episcopal Services:     Active Member of Clubs or Organizations:     Attends Club or Organization Meetings:     Marital Status:    Intimate Partner Violence:     Fear of Current or Ex-Partner:     Emotionally Abused:     Physically Abused:     Sexually Abused:        Family History:  Family History   Problem Relation Age of Onset    Heart Disease Mother     Cancer Father     Migraines Sister     Migraines Brother        Vitals:   /78   Pulse 90   Resp 12   Ht 6' (1.829 m)   Wt 230 lb (104.3 kg)   BMI 31.19 kg/m²  Body mass index is 31.19 kg/m². Physical Examination:     Orthopedics:    GENERAL: Alert and oriented X3 in no acute distress. SKIN: Intact without lesions or ulcerations. NEURO: Intact to sensory and motor testing. VASC: Capillary refill is less than 3 seconds. KNEE EXAM    LOCATION: Left Knee  GEN: Alert and oriented X 3, in no acute distress. GAIT: The patient's gait was observed while entering the exam room and was noted to be antalgic. The extremity is in varus alignment. SKIN: Intact without rashes, lesions, or ulcerations. No obvious deformity or swelling. NEURO: The patient responds to light touch throughout right LE. Patellar and Achilles reflexes are 2/4. VASC: The right LE is neurovascularly intact with 2/4 DP and 2/4 PT pulses. Brisk capillary refill. ROM: 5/125 degrees. There is no effusion.   MUSC: decreased quad tone  LIGAMENT: Lachman's test is Negative with Good endpoint. Anterior drawer Negative. Posterior drawer Negative. There is No varus instability at 0 degrees and No varus instability at 30 degrees. There is No valgus instability at 0 degrees and No valgus instability at 30 degrees. PALP: There is medial joint line pain. Assessment:     1. Primary osteoarthritis of left knee      Procedures:    Procedure: no  Radiology:   XR CHEST (2 VW)    Result Date: 6/10/2021  Stable exam since March 8, 2018 without acute cardiopulmonary process. XR LEG LENGTH EVALUATION    Result Date: 6/10/2021  Total left limb length 97.6. Other measurements as above. Plan:   Surgical Plan: We discussed the natural etiologies and histories of Left knee osteoarthritis. We also discussed the various alternatives of medical treatment including physical therapy, injections, anti-inflammatory drugs and as a last resort surgery. I have reviewed labs and imaging. The patient has received clearances from PCP and neurologist.  We are still waiting on clearances from cardiology. He has an appointment with [facility] cardiology tomorrow to establish care. He understands that if the cardiologist feels he needs additional testing and we can get it done prior to surgery then we will have to change his surgery date. Patient has a urinary tract infection that is being treated by the family doctor. During today's face to face encounter, we discussed the details of undergoing a left total knee arthroplasty surgery. I explained to the patient that he should not eat anything after midnight the night before surgery. He should wash his body with the Hibiclens soap that he was given to reduce the risks of infection. I also instructed the patient not to shave at all this week because we do not want any nicks to develop on the skin for it will increase the risk of infection.  The patient will need the following durable medical equipment: a front wheeled walker, types were placed in this encounter. José Luis Lopez PA-C, have personally seen this patient, reviewed the chart including history, and imaging if done. I personally  performed the physical exam and obtained any needed additional history. I placed orders, performed or supervised procedures and developed the treatment plan.     Electronically signed by Fatmata Piña PA-C, on 6/14/2021 at 3:10 PM      Electronically signed by Fatmata Piña PA-C, on 6/14/2021 at 3:10 PM

## 2021-06-11 ENCOUNTER — HOSPITAL ENCOUNTER (OUTPATIENT)
Dept: VASCULAR LAB | Age: 71
Discharge: HOME | End: 2021-06-11
Payer: MEDICARE

## 2021-06-11 ENCOUNTER — ANESTHESIA EVENT (OUTPATIENT)
Dept: OPERATING ROOM | Age: 71
End: 2021-06-11
Payer: MEDICARE

## 2021-06-11 DIAGNOSIS — I63.9 CEREBRAL INFARCTION, UNSPECIFIED MECHANISM (HCC): ICD-10-CM

## 2021-06-11 DIAGNOSIS — I63.89 OTHER CEREBRAL INFARCTION (HCC): Primary | ICD-10-CM

## 2021-06-11 LAB
EKG ATRIAL RATE: 77 BPM
EKG P AXIS: 9 DEGREES
EKG P-R INTERVAL: 184 MS
EKG Q-T INTERVAL: 434 MS
EKG QRS DURATION: 146 MS
EKG QTC CALCULATION (BAZETT): 491 MS
EKG R AXIS: -74 DEGREES
EKG T AXIS: 65 DEGREES
EKG VENTRICULAR RATE: 77 BPM
ESTIMATED AVERAGE GLUCOSE: 137 MG/DL
HBA1C MFR BLD: 6.4 % (ref 4–6)
MRSA, DNA, NASAL: NORMAL
PREALBUMIN: 27.7 MG/DL (ref 20–40)
SPECIMEN DESCRIPTION: NORMAL

## 2021-06-11 PROCEDURE — 93880 EXTRACRANIAL BILAT STUDY: CPT

## 2021-06-12 LAB
CULTURE: ABNORMAL
Lab: ABNORMAL
SPECIMEN DESCRIPTION: ABNORMAL

## 2021-06-14 ENCOUNTER — OFFICE VISIT (OUTPATIENT)
Dept: ORTHOPEDIC SURGERY | Age: 71
End: 2021-06-14
Payer: MEDICARE

## 2021-06-14 ENCOUNTER — TELEPHONE (OUTPATIENT)
Dept: PRIMARY CARE CLINIC | Age: 71
End: 2021-06-14

## 2021-06-14 VITALS
WEIGHT: 230 LBS | HEIGHT: 72 IN | HEART RATE: 90 BPM | RESPIRATION RATE: 12 BRPM | DIASTOLIC BLOOD PRESSURE: 78 MMHG | BODY MASS INDEX: 31.15 KG/M2 | SYSTOLIC BLOOD PRESSURE: 120 MMHG

## 2021-06-14 DIAGNOSIS — N30.00 ACUTE CYSTITIS WITHOUT HEMATURIA: Primary | ICD-10-CM

## 2021-06-14 DIAGNOSIS — M17.12 PRIMARY OSTEOARTHRITIS OF LEFT KNEE: Primary | ICD-10-CM

## 2021-06-14 PROCEDURE — 3017F COLORECTAL CA SCREEN DOC REV: CPT | Performed by: PHYSICIAN ASSISTANT

## 2021-06-14 PROCEDURE — G8417 CALC BMI ABV UP PARAM F/U: HCPCS | Performed by: PHYSICIAN ASSISTANT

## 2021-06-14 PROCEDURE — 4040F PNEUMOC VAC/ADMIN/RCVD: CPT | Performed by: PHYSICIAN ASSISTANT

## 2021-06-14 PROCEDURE — 99214 OFFICE O/P EST MOD 30 MIN: CPT | Performed by: PHYSICIAN ASSISTANT

## 2021-06-14 PROCEDURE — G8427 DOCREV CUR MEDS BY ELIG CLIN: HCPCS | Performed by: PHYSICIAN ASSISTANT

## 2021-06-14 PROCEDURE — 1036F TOBACCO NON-USER: CPT | Performed by: PHYSICIAN ASSISTANT

## 2021-06-14 PROCEDURE — 1123F ACP DISCUSS/DSCN MKR DOCD: CPT | Performed by: PHYSICIAN ASSISTANT

## 2021-06-14 RX ORDER — NITROFURANTOIN 25; 75 MG/1; MG/1
100 CAPSULE ORAL 2 TIMES DAILY
Qty: 20 CAPSULE | Refills: 0 | Status: SHIPPED | OUTPATIENT
Start: 2021-06-14 | End: 2021-06-24

## 2021-06-14 NOTE — PROGRESS NOTES
[facility] Joint Replacement Pre-surgical Assessment    Scheduled Surgery Date: 06/23/2021  Surgery Time: 1000    Surgeon: FARRAH  Procedure: right Total Knee    Primary Insurance Coverage MEDICARE PART A&B, 2-MEDICAL MUT[employer] CHOICE  EMPLOYEE  Pre-op class attended YES 06/10/2021. PCP: Jairo Claudio MD  Clearance received by PCP: Yes    Anticipated Discharge Plan: home  Agency (if applicable): NONE    Significant PMH:   Surgical History    Procedure Laterality Date Comment Source   CATARACT REMOVAL Right 08/2015 right eye    CYSTOSCOPY       HAND SURGERY Left  thumb    KIDNEY STONE SURGERY       LITHOTRIPSY       REFRACTIVE SURGERY Right 2014     THUMB AMPUTATION       TONSILLECTOMY       ED Notes    ED Notes    Medical History    Diagnosis Date Comment Source   Cataracts, bilateral  only left eye, had surgery on right    Cerebral artery occlusion with cerebral infarction ([facility]  weakness left leg, sees Dr Sathya Gallego    Diabetes mellitus [facility])  type II, managed by Dr Tara Lewis (PCP) Masha Valentin , checks daily    Eye disorder  history of a \"stroke\" in right eye    Glaucoma  right eye    Headache(784.0)  no current issues (6/10/21)    Hearing loss      Heartburn  occasionally, takes OTC acid reducer as needed    Hyperlipidemia      Hypertension      Kidney stones      Osteoarthritis      Snores      Tendonitis of shoulder, left      Tremor  no current problems (6/10/21)             Smoking history: none    Alcohol history: Never drinks    Concerns prior to surgery: PT HAS A UTI WITH ECOLI 10-50K. LM WITH GORDON  AT FARRAH'S OFFICE. UNSURE IF PT NEEDS A FWW.     Electronically signed by: Raza Martin RN on 6/14/2021 at 1:11 PM

## 2021-06-14 NOTE — TELEPHONE ENCOUNTER
Patient wife called stated patient had labs and urine done for pre-testing for surgery. Surgeons office called them and told her he has a uti and to call pcp for antibiotic. Patient saw Dr Amado Diss 5/10/21. Please advise      Health Maintenance   Topic Date Due    DTaP/Tdap/Td vaccine (1 - Tdap) Never done    Shingles Vaccine (1 of 2) Never done    Colon Cancer Screen FIT/FOBT  05/13/2017    Diabetic retinal exam  01/29/2020    PSA counseling  11/14/2020    Diabetic foot exam  01/10/2021    Diabetic microalbuminuria test  05/11/2021    Annual Wellness Visit (AWV)  07/09/2021    Lipid screen  05/06/2022    A1C test (Diabetic or Prediabetic)  06/10/2022    Potassium monitoring  06/10/2022    Creatinine monitoring  06/10/2022    Flu vaccine  Completed    Pneumococcal 65+ years Vaccine  Completed    COVID-19 Vaccine  Completed    Hepatitis C screen  Addressed    Hepatitis A vaccine  Aged Out    Hib vaccine  Aged Out    Meningococcal (ACWY) vaccine  Aged Out             (applicable per patient's age: Cancer Screenings, Depression Screening, Fall Risk Screening, Immunizations)    Hemoglobin A1C (%)   Date Value   06/10/2021 6.4 (H)   12/09/2020 6.0   07/08/2020 6.1     Microalb/Crt.  Ratio (mcg/mg creat)   Date Value   05/11/2020 17 (H)     LDL Cholesterol (mg/dL)   Date Value   05/06/2021 90     AST (U/L)   Date Value   05/06/2021 18     ALT (U/L)   Date Value   05/06/2021 10     BUN (mg/dL)   Date Value   06/10/2021 12      (goal A1C is < 7)   (goal LDL is <100) need 30-50% reduction from baseline     BP Readings from Last 3 Encounters:   06/14/21 120/78   06/10/21 (!) 145/87   06/02/21 (!) 144/93    (goal /80)      All Future Testing planned in [facility]:  Lab Frequency Next Occurrence   PSA, Diagnostic Once 06/09/2021       Next Visit Date:  Future Appointments   Date Time Provider Lev Huerta   6/30/2021  1:15 PM Pat Vallecillo PA-C Sports Med [facility]   9/2/2021 10:40 AM Lito Tucker Drew Phillips MD Neuro Spec [facility]            Patient Active Problem List:     Hypercholesteremia     Cerebral infarction [facility])     Neuropathic pain     Weakness of both legs     Essential hypertension     Type 2 diabetes mellitus with diabetic autonomic neuropathy, without long-term current use of insulin ([facility])     Eustachian tube dysfunction     Branch retinal vein occlusion     Primary open angle glaucoma     Chronic pain of both knees     Cerebral infarction due to occlusion of right middle cerebral artery (HCC)     Glaucomatous visual field defect     Nuclear sclerotic cataract     Class 1 obesity due to excess calories without serious comorbidity with body mass index (BMI) of 32.0 to 32.9 in adult     Elevated PSA

## 2021-06-23 ENCOUNTER — APPOINTMENT (OUTPATIENT)
Dept: GENERAL RADIOLOGY | Age: 71
End: 2021-06-23
Attending: ORTHOPAEDIC SURGERY
Payer: MEDICARE

## 2021-06-23 ENCOUNTER — HOSPITAL ENCOUNTER (OUTPATIENT)
Age: 71
Discharge: NURSING, REHAB OR LONG-TERM CARE | End: 2021-06-24
Attending: ORTHOPAEDIC SURGERY | Admitting: ORTHOPAEDIC SURGERY
Payer: MEDICARE

## 2021-06-23 ENCOUNTER — ANESTHESIA (OUTPATIENT)
Dept: OPERATING ROOM | Age: 71
End: 2021-06-23
Payer: MEDICARE

## 2021-06-23 VITALS — TEMPERATURE: 97.9 F | OXYGEN SATURATION: 93 % | DIASTOLIC BLOOD PRESSURE: 91 MMHG | SYSTOLIC BLOOD PRESSURE: 152 MMHG

## 2021-06-23 DIAGNOSIS — Z96.652 S/P TOTAL KNEE ARTHROPLASTY, LEFT: Primary | ICD-10-CM

## 2021-06-23 DIAGNOSIS — I10 ESSENTIAL HYPERTENSION: ICD-10-CM

## 2021-06-23 LAB
GLUCOSE BLD-MCNC: 130 MG/DL (ref 75–110)
GLUCOSE BLD-MCNC: 165 MG/DL (ref 75–110)
GLUCOSE BLD-MCNC: 214 MG/DL (ref 75–110)
HCT VFR BLD CALC: 41.2 % (ref 40.7–50.3)
HEMOGLOBIN: 12.9 G/DL (ref 13–17)
LACTIC ACID, SEPSIS WHOLE BLOOD: ABNORMAL MMOL/L (ref 0.5–1.9)
LACTIC ACID, SEPSIS: 3.9 MMOL/L (ref 0.5–1.9)
MCH RBC QN AUTO: 30.8 PG (ref 25.2–33.5)
MCHC RBC AUTO-ENTMCNC: 31.3 G/DL (ref 28.4–34.8)
MCV RBC AUTO: 98.3 FL (ref 82.6–102.9)
NRBC AUTOMATED: 0 PER 100 WBC
PDW BLD-RTO: 13.2 % (ref 11.8–14.4)
PLATELET # BLD: 182 K/UL (ref 138–453)
PMV BLD AUTO: 10.2 FL (ref 8.1–13.5)
RBC # BLD: 4.19 M/UL (ref 4.21–5.77)
WBC # BLD: 12.5 K/UL (ref 3.5–11.3)

## 2021-06-23 PROCEDURE — 6360000002 HC RX W HCPCS: Performed by: ANESTHESIOLOGY

## 2021-06-23 PROCEDURE — 36415 COLL VENOUS BLD VENIPUNCTURE: CPT

## 2021-06-23 PROCEDURE — C9290 INJ, BUPIVACAINE LIPOSOME: HCPCS | Performed by: ANESTHESIOLOGY

## 2021-06-23 PROCEDURE — 7100000000 HC PACU RECOVERY - FIRST 15 MIN: Performed by: ORTHOPAEDIC SURGERY

## 2021-06-23 PROCEDURE — 82947 ASSAY GLUCOSE BLOOD QUANT: CPT

## 2021-06-23 PROCEDURE — 3600000015 HC SURGERY LEVEL 5 ADDTL 15MIN: Performed by: ORTHOPAEDIC SURGERY

## 2021-06-23 PROCEDURE — 7100000001 HC PACU RECOVERY - ADDTL 15 MIN: Performed by: ORTHOPAEDIC SURGERY

## 2021-06-23 PROCEDURE — C1713 ANCHOR/SCREW BN/BN,TIS/BN: HCPCS | Performed by: ORTHOPAEDIC SURGERY

## 2021-06-23 PROCEDURE — 64447 NJX AA&/STRD FEMORAL NRV IMG: CPT | Performed by: ANESTHESIOLOGY

## 2021-06-23 PROCEDURE — 99222 1ST HOSP IP/OBS MODERATE 55: CPT | Performed by: STUDENT IN AN ORGANIZED HEALTH CARE EDUCATION/TRAINING PROGRAM

## 2021-06-23 PROCEDURE — 6370000000 HC RX 637 (ALT 250 FOR IP): Performed by: STUDENT IN AN ORGANIZED HEALTH CARE EDUCATION/TRAINING PROGRAM

## 2021-06-23 PROCEDURE — 87040 BLOOD CULTURE FOR BACTERIA: CPT

## 2021-06-23 PROCEDURE — 6360000002 HC RX W HCPCS: Performed by: STUDENT IN AN ORGANIZED HEALTH CARE EDUCATION/TRAINING PROGRAM

## 2021-06-23 PROCEDURE — 97163 PT EVAL HIGH COMPLEX 45 MIN: CPT

## 2021-06-23 PROCEDURE — 3600000005 HC SURGERY LEVEL 5 BASE: Performed by: ORTHOPAEDIC SURGERY

## 2021-06-23 PROCEDURE — 3700000001 HC ADD 15 MINUTES (ANESTHESIA): Performed by: ORTHOPAEDIC SURGERY

## 2021-06-23 PROCEDURE — 2580000003 HC RX 258: Performed by: ANESTHESIOLOGY

## 2021-06-23 PROCEDURE — 6370000000 HC RX 637 (ALT 250 FOR IP): Performed by: ANESTHESIOLOGY

## 2021-06-23 PROCEDURE — 73560 X-RAY EXAM OF KNEE 1 OR 2: CPT

## 2021-06-23 PROCEDURE — 2500000003 HC RX 250 WO HCPCS: Performed by: ANESTHESIOLOGY

## 2021-06-23 PROCEDURE — 85027 COMPLETE CBC AUTOMATED: CPT

## 2021-06-23 PROCEDURE — 2580000003 HC RX 258: Performed by: STUDENT IN AN ORGANIZED HEALTH CARE EDUCATION/TRAINING PROGRAM

## 2021-06-23 PROCEDURE — 27447 TOTAL KNEE ARTHROPLASTY: CPT | Performed by: ORTHOPAEDIC SURGERY

## 2021-06-23 PROCEDURE — 97530 THERAPEUTIC ACTIVITIES: CPT

## 2021-06-23 PROCEDURE — 2580000003 HC RX 258

## 2021-06-23 PROCEDURE — 6360000002 HC RX W HCPCS: Performed by: ORTHOPAEDIC SURGERY

## 2021-06-23 PROCEDURE — 6360000002 HC RX W HCPCS

## 2021-06-23 PROCEDURE — 2500000003 HC RX 250 WO HCPCS

## 2021-06-23 PROCEDURE — 6370000000 HC RX 637 (ALT 250 FOR IP): Performed by: NURSE PRACTITIONER

## 2021-06-23 PROCEDURE — 2709999900 HC NON-CHARGEABLE SUPPLY: Performed by: ORTHOPAEDIC SURGERY

## 2021-06-23 PROCEDURE — 83605 ASSAY OF LACTIC ACID: CPT

## 2021-06-23 PROCEDURE — 2720000010 HC SURG SUPPLY STERILE: Performed by: ORTHOPAEDIC SURGERY

## 2021-06-23 PROCEDURE — 3700000000 HC ANESTHESIA ATTENDED CARE: Performed by: ORTHOPAEDIC SURGERY

## 2021-06-23 PROCEDURE — C1776 JOINT DEVICE (IMPLANTABLE): HCPCS | Performed by: ORTHOPAEDIC SURGERY

## 2021-06-23 PROCEDURE — 97110 THERAPEUTIC EXERCISES: CPT

## 2021-06-23 DEVICE — CEMENT BNE 40GM HI VISC RADPQ FOR REV SURG: Type: IMPLANTABLE DEVICE | Site: KNEE | Status: FUNCTIONAL

## 2021-06-23 DEVICE — COMPONENT PAT DIA32MM THK8.5MM STD KNEE VIVACIT-E CEM: Type: IMPLANTABLE DEVICE | Site: KNEE | Status: FUNCTIONAL

## 2021-06-23 DEVICE — IMPL KNEE PSN FEM CR CMT CCR STD SZ8 L: Type: IMPLANTABLE DEVICE | Site: KNEE | Status: FUNCTIONAL

## 2021-06-23 DEVICE — SCREW BNE L25MM DIA2.5MM KNEE FULL THRD HEX FEM PERSONA: Type: IMPLANTABLE DEVICE | Site: KNEE | Status: FUNCTIONAL

## 2021-06-23 DEVICE — UPCHARGE KNEE VITAMIN E LINER ZIMMER BIOMET: Type: IMPLANTABLE DEVICE | Site: KNEE | Status: FUNCTIONAL

## 2021-06-23 DEVICE — PSN TIB STM 5 DEG SZ F L: Type: IMPLANTABLE DEVICE | Site: KNEE | Status: FUNCTIONAL

## 2021-06-23 DEVICE — PSN MC VE ASF L 10MM 8-11 EF: Type: IMPLANTABLE DEVICE | Site: KNEE | Status: FUNCTIONAL

## 2021-06-23 DEVICE — UPCHARGE KNEE VITAMIN E PATELLA ZIMMER BIOMET: Type: IMPLANTABLE DEVICE | Site: KNEE | Status: FUNCTIONAL

## 2021-06-23 RX ORDER — OXYCODONE HYDROCHLORIDE AND ACETAMINOPHEN 5; 325 MG/1; MG/1
1 TABLET ORAL EVERY 4 HOURS PRN
Qty: 42 TABLET | Refills: 0 | Status: SHIPPED | OUTPATIENT
Start: 2021-06-23 | End: 2021-06-30

## 2021-06-23 RX ORDER — ATROPA BELLADONNA, EUPHRASIA STRICTA AND MERCURIC CHLORIDE 6; 6; 6 [HP_X]/10ML; [HP_X]/10ML; [HP_X]/10ML
2 SOLUTION/ DROPS OPHTHALMIC 2 TIMES DAILY
Status: DISCONTINUED | OUTPATIENT
Start: 2021-06-23 | End: 2021-06-24 | Stop reason: HOSPADM

## 2021-06-23 RX ORDER — SODIUM CHLORIDE, SODIUM LACTATE, POTASSIUM CHLORIDE, CALCIUM CHLORIDE 600; 310; 30; 20 MG/100ML; MG/100ML; MG/100ML; MG/100ML
INJECTION, SOLUTION INTRAVENOUS CONTINUOUS
Status: DISCONTINUED | OUTPATIENT
Start: 2021-06-24 | End: 2021-06-23

## 2021-06-23 RX ORDER — BUPIVACAINE HYDROCHLORIDE 2.5 MG/ML
INJECTION, SOLUTION EPIDURAL; INFILTRATION; INTRACAUDAL PRN
Status: DISCONTINUED | OUTPATIENT
Start: 2021-06-23 | End: 2021-06-23 | Stop reason: SDUPTHER

## 2021-06-23 RX ORDER — AMITRIPTYLINE HYDROCHLORIDE 75 MG/1
75 TABLET, FILM COATED ORAL NIGHTLY
Status: DISCONTINUED | OUTPATIENT
Start: 2021-06-23 | End: 2021-06-24 | Stop reason: HOSPADM

## 2021-06-23 RX ORDER — FENTANYL CITRATE 50 UG/ML
INJECTION, SOLUTION INTRAMUSCULAR; INTRAVENOUS PRN
Status: DISCONTINUED | OUTPATIENT
Start: 2021-06-23 | End: 2021-06-23 | Stop reason: SDUPTHER

## 2021-06-23 RX ORDER — VANCOMYCIN HYDROCHLORIDE 1 G/20ML
INJECTION, POWDER, LYOPHILIZED, FOR SOLUTION INTRAVENOUS PRN
Status: DISCONTINUED | OUTPATIENT
Start: 2021-06-23 | End: 2021-06-23 | Stop reason: ALTCHOICE

## 2021-06-23 RX ORDER — FENTANYL CITRATE 50 UG/ML
25 INJECTION, SOLUTION INTRAMUSCULAR; INTRAVENOUS EVERY 5 MIN PRN
Status: DISCONTINUED | OUTPATIENT
Start: 2021-06-23 | End: 2021-06-23 | Stop reason: HOSPADM

## 2021-06-23 RX ORDER — HYDROMORPHONE HYDROCHLORIDE 1 MG/ML
0.5 INJECTION, SOLUTION INTRAMUSCULAR; INTRAVENOUS; SUBCUTANEOUS EVERY 5 MIN PRN
Status: DISCONTINUED | OUTPATIENT
Start: 2021-06-23 | End: 2021-06-23 | Stop reason: HOSPADM

## 2021-06-23 RX ORDER — DEXTROSE MONOHYDRATE 25 G/50ML
12.5 INJECTION, SOLUTION INTRAVENOUS PRN
Status: DISCONTINUED | OUTPATIENT
Start: 2021-06-23 | End: 2021-06-24 | Stop reason: HOSPADM

## 2021-06-23 RX ORDER — ONDANSETRON 2 MG/ML
4 INJECTION INTRAMUSCULAR; INTRAVENOUS
Status: DISCONTINUED | OUTPATIENT
Start: 2021-06-23 | End: 2021-06-23 | Stop reason: HOSPADM

## 2021-06-23 RX ORDER — PHENYLEPHRINE HCL IN 0.9% NACL 1 MG/10 ML
SYRINGE (ML) INTRAVENOUS PRN
Status: DISCONTINUED | OUTPATIENT
Start: 2021-06-23 | End: 2021-06-23 | Stop reason: SDUPTHER

## 2021-06-23 RX ORDER — GLIPIZIDE 10 MG/1
10 TABLET ORAL DAILY
Status: DISCONTINUED | OUTPATIENT
Start: 2021-06-23 | End: 2021-06-24 | Stop reason: HOSPADM

## 2021-06-23 RX ORDER — ONDANSETRON 2 MG/ML
4 INJECTION INTRAMUSCULAR; INTRAVENOUS EVERY 6 HOURS PRN
Status: DISCONTINUED | OUTPATIENT
Start: 2021-06-23 | End: 2021-06-24 | Stop reason: HOSPADM

## 2021-06-23 RX ORDER — NICOTINE POLACRILEX 4 MG
15 LOZENGE BUCCAL PRN
Status: DISCONTINUED | OUTPATIENT
Start: 2021-06-23 | End: 2021-06-23

## 2021-06-23 RX ORDER — PROMETHAZINE HYDROCHLORIDE 25 MG/ML
6.25 INJECTION, SOLUTION INTRAMUSCULAR; INTRAVENOUS
Status: DISCONTINUED | OUTPATIENT
Start: 2021-06-23 | End: 2021-06-23 | Stop reason: HOSPADM

## 2021-06-23 RX ORDER — DEXAMETHASONE SODIUM PHOSPHATE 10 MG/ML
INJECTION, SOLUTION INTRAMUSCULAR; INTRAVENOUS PRN
Status: DISCONTINUED | OUTPATIENT
Start: 2021-06-23 | End: 2021-06-23 | Stop reason: SDUPTHER

## 2021-06-23 RX ORDER — ACETAMINOPHEN 325 MG/1
650 TABLET ORAL EVERY 6 HOURS
Status: DISCONTINUED | OUTPATIENT
Start: 2021-06-23 | End: 2021-06-24 | Stop reason: HOSPADM

## 2021-06-23 RX ORDER — LIDOCAINE HYDROCHLORIDE 10 MG/ML
1 INJECTION, SOLUTION EPIDURAL; INFILTRATION; INTRACAUDAL; PERINEURAL
Status: DISCONTINUED | OUTPATIENT
Start: 2021-06-24 | End: 2021-06-23 | Stop reason: HOSPADM

## 2021-06-23 RX ORDER — OXYCODONE HYDROCHLORIDE AND ACETAMINOPHEN 5; 325 MG/1; MG/1
2 TABLET ORAL PRN
Status: DISCONTINUED | OUTPATIENT
Start: 2021-06-23 | End: 2021-06-23 | Stop reason: HOSPADM

## 2021-06-23 RX ORDER — LOSARTAN POTASSIUM 50 MG/1
50 TABLET ORAL DAILY
Status: DISCONTINUED | OUTPATIENT
Start: 2021-06-24 | End: 2021-06-24 | Stop reason: HOSPADM

## 2021-06-23 RX ORDER — NICOTINE POLACRILEX 4 MG
15 LOZENGE BUCCAL PRN
Status: DISCONTINUED | OUTPATIENT
Start: 2021-06-23 | End: 2021-06-24 | Stop reason: HOSPADM

## 2021-06-23 RX ORDER — TRANEXAMIC ACID 100 MG/ML
INJECTION, SOLUTION INTRAVENOUS PRN
Status: DISCONTINUED | OUTPATIENT
Start: 2021-06-23 | End: 2021-06-23 | Stop reason: SDUPTHER

## 2021-06-23 RX ORDER — SODIUM CHLORIDE 9 MG/ML
25 INJECTION, SOLUTION INTRAVENOUS PRN
Status: DISCONTINUED | OUTPATIENT
Start: 2021-06-23 | End: 2021-06-24 | Stop reason: HOSPADM

## 2021-06-23 RX ORDER — SEVOFLURANE 250 ML/250ML
LIQUID RESPIRATORY (INHALATION)
Status: DISPENSED
Start: 2021-06-23 | End: 2021-06-24

## 2021-06-23 RX ORDER — SODIUM CHLORIDE 9 MG/ML
INJECTION, SOLUTION INTRAVENOUS CONTINUOUS
Status: DISCONTINUED | OUTPATIENT
Start: 2021-06-23 | End: 2021-06-24

## 2021-06-23 RX ORDER — DEXTROSE MONOHYDRATE 50 MG/ML
100 INJECTION, SOLUTION INTRAVENOUS PRN
Status: DISCONTINUED | OUTPATIENT
Start: 2021-06-23 | End: 2021-06-24 | Stop reason: HOSPADM

## 2021-06-23 RX ORDER — LATANOPROST 50 UG/ML
1 SOLUTION/ DROPS OPHTHALMIC NIGHTLY
Status: DISCONTINUED | OUTPATIENT
Start: 2021-06-23 | End: 2021-06-24 | Stop reason: HOSPADM

## 2021-06-23 RX ORDER — NITROFURANTOIN 25; 75 MG/1; MG/1
100 CAPSULE ORAL 2 TIMES DAILY
Status: DISCONTINUED | OUTPATIENT
Start: 2021-06-23 | End: 2021-06-24 | Stop reason: HOSPADM

## 2021-06-23 RX ORDER — EPHEDRINE SULFATE/0.9% NACL/PF 50 MG/5 ML
SYRINGE (ML) INTRAVENOUS PRN
Status: DISCONTINUED | OUTPATIENT
Start: 2021-06-23 | End: 2021-06-23 | Stop reason: SDUPTHER

## 2021-06-23 RX ORDER — MORPHINE SULFATE 2 MG/ML
2 INJECTION, SOLUTION INTRAMUSCULAR; INTRAVENOUS
Status: DISCONTINUED | OUTPATIENT
Start: 2021-06-23 | End: 2021-06-24 | Stop reason: HOSPADM

## 2021-06-23 RX ORDER — HYDRALAZINE HYDROCHLORIDE 20 MG/ML
5 INJECTION INTRAMUSCULAR; INTRAVENOUS EVERY 10 MIN PRN
Status: DISCONTINUED | OUTPATIENT
Start: 2021-06-23 | End: 2021-06-23 | Stop reason: HOSPADM

## 2021-06-23 RX ORDER — NEOSTIGMINE METHYLSULFATE 5 MG/5 ML
SYRINGE (ML) INTRAVENOUS PRN
Status: DISCONTINUED | OUTPATIENT
Start: 2021-06-23 | End: 2021-06-23 | Stop reason: SDUPTHER

## 2021-06-23 RX ORDER — OXYCODONE HYDROCHLORIDE 5 MG/1
5 TABLET ORAL EVERY 4 HOURS PRN
Status: DISCONTINUED | OUTPATIENT
Start: 2021-06-23 | End: 2021-06-24 | Stop reason: HOSPADM

## 2021-06-23 RX ORDER — HEPARIN SODIUM 10000 [USP'U]/ML
INJECTION, SOLUTION INTRAVENOUS; SUBCUTANEOUS
Status: DISPENSED
Start: 2021-06-23 | End: 2021-06-23

## 2021-06-23 RX ORDER — OXYCODONE HYDROCHLORIDE AND ACETAMINOPHEN 5; 325 MG/1; MG/1
1 TABLET ORAL PRN
Status: DISCONTINUED | OUTPATIENT
Start: 2021-06-23 | End: 2021-06-23 | Stop reason: HOSPADM

## 2021-06-23 RX ORDER — DEXTROSE MONOHYDRATE 25 G/50ML
12.5 INJECTION, SOLUTION INTRAVENOUS PRN
Status: DISCONTINUED | OUTPATIENT
Start: 2021-06-23 | End: 2021-06-23

## 2021-06-23 RX ORDER — LIDOCAINE HYDROCHLORIDE 10 MG/ML
INJECTION, SOLUTION INFILTRATION; PERINEURAL PRN
Status: DISCONTINUED | OUTPATIENT
Start: 2021-06-23 | End: 2021-06-23 | Stop reason: SDUPTHER

## 2021-06-23 RX ORDER — MORPHINE SULFATE 4 MG/ML
4 INJECTION, SOLUTION INTRAMUSCULAR; INTRAVENOUS
Status: DISCONTINUED | OUTPATIENT
Start: 2021-06-23 | End: 2021-06-24 | Stop reason: HOSPADM

## 2021-06-23 RX ORDER — HEPARIN SODIUM 10000 [USP'U]/ML
INJECTION, SOLUTION INTRAVENOUS; SUBCUTANEOUS PRN
Status: DISCONTINUED | OUTPATIENT
Start: 2021-06-23 | End: 2021-06-23 | Stop reason: ALTCHOICE

## 2021-06-23 RX ORDER — LABETALOL HYDROCHLORIDE 5 MG/ML
5 INJECTION, SOLUTION INTRAVENOUS EVERY 10 MIN PRN
Status: DISCONTINUED | OUTPATIENT
Start: 2021-06-23 | End: 2021-06-23 | Stop reason: HOSPADM

## 2021-06-23 RX ORDER — AMLODIPINE BESYLATE 10 MG/1
10 TABLET ORAL DAILY
Status: DISCONTINUED | OUTPATIENT
Start: 2021-06-24 | End: 2021-06-24

## 2021-06-23 RX ORDER — SODIUM CHLORIDE 0.9 % (FLUSH) 0.9 %
10 SYRINGE (ML) INJECTION EVERY 12 HOURS SCHEDULED
Status: DISCONTINUED | OUTPATIENT
Start: 2021-06-23 | End: 2021-06-24 | Stop reason: HOSPADM

## 2021-06-23 RX ORDER — VANCOMYCIN HYDROCHLORIDE 1 G/20ML
INJECTION, POWDER, LYOPHILIZED, FOR SOLUTION INTRAVENOUS
Status: DISPENSED
Start: 2021-06-23 | End: 2021-06-23

## 2021-06-23 RX ORDER — GLYCOPYRROLATE 1 MG/5 ML
SYRINGE (ML) INTRAVENOUS PRN
Status: DISCONTINUED | OUTPATIENT
Start: 2021-06-23 | End: 2021-06-23 | Stop reason: SDUPTHER

## 2021-06-23 RX ORDER — ROCURONIUM BROMIDE 10 MG/ML
INJECTION, SOLUTION INTRAVENOUS PRN
Status: DISCONTINUED | OUTPATIENT
Start: 2021-06-23 | End: 2021-06-23 | Stop reason: SDUPTHER

## 2021-06-23 RX ORDER — SODIUM CHLORIDE 0.9 % (FLUSH) 0.9 %
10 SYRINGE (ML) INJECTION PRN
Status: DISCONTINUED | OUTPATIENT
Start: 2021-06-23 | End: 2021-06-24 | Stop reason: HOSPADM

## 2021-06-23 RX ORDER — KETAMINE HCL IN NACL, ISO-OSM 100MG/10ML
SYRINGE (ML) INJECTION PRN
Status: DISCONTINUED | OUTPATIENT
Start: 2021-06-23 | End: 2021-06-23 | Stop reason: SDUPTHER

## 2021-06-23 RX ORDER — VASOPRESSIN 20 U/ML
INJECTION PARENTERAL
Status: DISPENSED
Start: 2021-06-23 | End: 2021-06-24

## 2021-06-23 RX ORDER — OXYCODONE HYDROCHLORIDE 5 MG/1
10 TABLET ORAL EVERY 4 HOURS PRN
Status: DISCONTINUED | OUTPATIENT
Start: 2021-06-23 | End: 2021-06-24 | Stop reason: HOSPADM

## 2021-06-23 RX ORDER — SIMVASTATIN 20 MG
20 TABLET ORAL NIGHTLY
Status: DISCONTINUED | OUTPATIENT
Start: 2021-06-23 | End: 2021-06-24 | Stop reason: HOSPADM

## 2021-06-23 RX ORDER — GABAPENTIN 300 MG/1
300 CAPSULE ORAL ONCE
Status: COMPLETED | OUTPATIENT
Start: 2021-06-23 | End: 2021-06-23

## 2021-06-23 RX ORDER — CELECOXIB 200 MG/1
200 CAPSULE ORAL ONCE
Status: COMPLETED | OUTPATIENT
Start: 2021-06-23 | End: 2021-06-23

## 2021-06-23 RX ORDER — ACETAMINOPHEN 500 MG
1000 TABLET ORAL ONCE
Status: COMPLETED | OUTPATIENT
Start: 2021-06-23 | End: 2021-06-23

## 2021-06-23 RX ORDER — LIDOCAINE HYDROCHLORIDE 20 MG/ML
INJECTION, SOLUTION EPIDURAL; INFILTRATION; INTRACAUDAL; PERINEURAL PRN
Status: DISCONTINUED | OUTPATIENT
Start: 2021-06-23 | End: 2021-06-23 | Stop reason: SDUPTHER

## 2021-06-23 RX ORDER — DEXTROSE MONOHYDRATE 50 MG/ML
100 INJECTION, SOLUTION INTRAVENOUS PRN
Status: DISCONTINUED | OUTPATIENT
Start: 2021-06-23 | End: 2021-06-23

## 2021-06-23 RX ORDER — PROPOFOL 10 MG/ML
INJECTION, EMULSION INTRAVENOUS PRN
Status: DISCONTINUED | OUTPATIENT
Start: 2021-06-23 | End: 2021-06-23 | Stop reason: SDUPTHER

## 2021-06-23 RX ORDER — MIDAZOLAM HYDROCHLORIDE 1 MG/ML
2 INJECTION INTRAMUSCULAR; INTRAVENOUS ONCE
Status: COMPLETED | OUTPATIENT
Start: 2021-06-23 | End: 2021-06-23

## 2021-06-23 RX ORDER — ONDANSETRON 2 MG/ML
INJECTION INTRAMUSCULAR; INTRAVENOUS PRN
Status: DISCONTINUED | OUTPATIENT
Start: 2021-06-23 | End: 2021-06-23 | Stop reason: SDUPTHER

## 2021-06-23 RX ORDER — ONDANSETRON 4 MG/1
4 TABLET, ORALLY DISINTEGRATING ORAL EVERY 8 HOURS PRN
Status: DISCONTINUED | OUTPATIENT
Start: 2021-06-23 | End: 2021-06-24 | Stop reason: HOSPADM

## 2021-06-23 RX ORDER — TRANEXAMIC ACID 100 MG/ML
INJECTION, SOLUTION INTRAVENOUS
Status: COMPLETED
Start: 2021-06-23 | End: 2021-06-23

## 2021-06-23 RX ADMIN — OXYCODONE HYDROCHLORIDE 10 MG: 5 TABLET ORAL at 17:25

## 2021-06-23 RX ADMIN — ACETAMINOPHEN 1000 MG: 500 TABLET ORAL at 08:33

## 2021-06-23 RX ADMIN — Medication 100 MCG: at 12:46

## 2021-06-23 RX ADMIN — Medication 25 MCG: at 14:18

## 2021-06-23 RX ADMIN — ACETAMINOPHEN 650 MG: 325 TABLET ORAL at 22:33

## 2021-06-23 RX ADMIN — Medication 10 MG: at 12:41

## 2021-06-23 RX ADMIN — Medication 0.1 MG: at 12:35

## 2021-06-23 RX ADMIN — CELECOXIB 200 MG: 200 CAPSULE ORAL at 08:33

## 2021-06-23 RX ADMIN — Medication 25 MCG: at 14:05

## 2021-06-23 RX ADMIN — Medication 5 MG: at 12:34

## 2021-06-23 RX ADMIN — Medication 10 MG: at 13:30

## 2021-06-23 RX ADMIN — APIXABAN 2.5 MG: 2.5 TABLET, FILM COATED ORAL at 22:35

## 2021-06-23 RX ADMIN — TRANEXAMIC ACID 1000 MG: 100 INJECTION, SOLUTION INTRAVENOUS at 14:20

## 2021-06-23 RX ADMIN — MIDAZOLAM 1 MG: 1 INJECTION INTRAMUSCULAR; INTRAVENOUS at 09:19

## 2021-06-23 RX ADMIN — Medication 0.1 MG: at 12:40

## 2021-06-23 RX ADMIN — Medication 50 MCG: at 15:18

## 2021-06-23 RX ADMIN — Medication 50 MCG: at 12:20

## 2021-06-23 RX ADMIN — GABAPENTIN 300 MG: 300 CAPSULE ORAL at 08:33

## 2021-06-23 RX ADMIN — ROCURONIUM BROMIDE 10 MG: 10 INJECTION, SOLUTION INTRAVENOUS at 13:40

## 2021-06-23 RX ADMIN — HYDROMORPHONE HYDROCHLORIDE 0.5 MG: 1 INJECTION, SOLUTION INTRAMUSCULAR; INTRAVENOUS; SUBCUTANEOUS at 15:58

## 2021-06-23 RX ADMIN — ROCURONIUM BROMIDE 20 MG: 10 INJECTION, SOLUTION INTRAVENOUS at 13:30

## 2021-06-23 RX ADMIN — ACETAMINOPHEN 650 MG: 325 TABLET ORAL at 17:26

## 2021-06-23 RX ADMIN — BUPIVACAINE HYDROCHLORIDE 20 ML: 2.5 INJECTION, SOLUTION EPIDURAL; INFILTRATION; INTRACAUDAL; PERINEURAL at 09:22

## 2021-06-23 RX ADMIN — Medication 10 MG: at 12:38

## 2021-06-23 RX ADMIN — ATROPA BELLADONNA, EUPHRASIA STRICTA AND MERCURIC CHLORIDE 2 DROP: 6; 6; 6 SOLUTION/ DROPS OPHTHALMIC at 20:55

## 2021-06-23 RX ADMIN — Medication 10 MG: at 13:00

## 2021-06-23 RX ADMIN — BUPIVACAINE 10 ML: 13.3 INJECTION, SUSPENSION, LIPOSOMAL INFILTRATION at 09:22

## 2021-06-23 RX ADMIN — SODIUM CHLORIDE: 9 INJECTION, SOLUTION INTRAVENOUS at 19:23

## 2021-06-23 RX ADMIN — GLIPIZIDE 10 MG: 10 TABLET ORAL at 20:55

## 2021-06-23 RX ADMIN — HYDROMORPHONE HYDROCHLORIDE 0.5 MG: 1 INJECTION, SOLUTION INTRAMUSCULAR; INTRAVENOUS; SUBCUTANEOUS at 15:39

## 2021-06-23 RX ADMIN — Medication 100 MCG: at 12:38

## 2021-06-23 RX ADMIN — LIDOCAINE HYDROCHLORIDE 80 MG: 20 INJECTION, SOLUTION EPIDURAL; INFILTRATION; INTRACAUDAL; PERINEURAL at 12:20

## 2021-06-23 RX ADMIN — OXYCODONE HYDROCHLORIDE 10 MG: 5 TABLET ORAL at 22:34

## 2021-06-23 RX ADMIN — PHENYLEPHRINE HYDROCHLORIDE 50 MCG/MIN: 10 INJECTION INTRAVENOUS at 12:40

## 2021-06-23 RX ADMIN — SODIUM CHLORIDE, PRESERVATIVE FREE 10 ML: 5 INJECTION INTRAVENOUS at 20:54

## 2021-06-23 RX ADMIN — Medication 50 MCG: at 12:32

## 2021-06-23 RX ADMIN — Medication 0.1 MG: at 12:38

## 2021-06-23 RX ADMIN — CEFAZOLIN 2000 MG: 10 INJECTION, POWDER, FOR SOLUTION INTRAVENOUS at 12:30

## 2021-06-23 RX ADMIN — INSULIN LISPRO 2 UNITS: 100 INJECTION, SOLUTION INTRAVENOUS; SUBCUTANEOUS at 20:56

## 2021-06-23 RX ADMIN — SODIUM CHLORIDE, POTASSIUM CHLORIDE, SODIUM LACTATE AND CALCIUM CHLORIDE: 600; 310; 30; 20 INJECTION, SOLUTION INTRAVENOUS at 08:28

## 2021-06-23 RX ADMIN — ONDANSETRON 4 MG: 2 INJECTION, SOLUTION INTRAMUSCULAR; INTRAVENOUS at 12:52

## 2021-06-23 RX ADMIN — SODIUM CHLORIDE, POTASSIUM CHLORIDE, SODIUM LACTATE AND CALCIUM CHLORIDE: 600; 310; 30; 20 INJECTION, SOLUTION INTRAVENOUS at 12:09

## 2021-06-23 RX ADMIN — BISACODYL 5 MG: 5 TABLET, COATED ORAL at 19:22

## 2021-06-23 RX ADMIN — Medication 10 MG: at 12:58

## 2021-06-23 RX ADMIN — NITROFURANTOIN 100 MG: 25; 75 CAPSULE ORAL at 20:55

## 2021-06-23 RX ADMIN — LATANOPROST 1 DROP: 50 SOLUTION/ DROPS OPHTHALMIC at 20:55

## 2021-06-23 RX ADMIN — AMITRIPTYLINE HYDROCHLORIDE 75 MG: 75 TABLET, FILM COATED ORAL at 20:55

## 2021-06-23 RX ADMIN — Medication 100 MCG: at 12:35

## 2021-06-23 RX ADMIN — TRANEXAMIC ACID 1000 MG: 100 INJECTION, SOLUTION INTRAVENOUS at 12:45

## 2021-06-23 RX ADMIN — DEXAMETHASONE SODIUM PHOSPHATE 10 MG: 10 INJECTION INTRAMUSCULAR; INTRAVENOUS at 12:52

## 2021-06-23 RX ADMIN — Medication 20 MG: at 20:55

## 2021-06-23 RX ADMIN — Medication 10 MG: at 13:20

## 2021-06-23 RX ADMIN — PROPOFOL 110 MG: 10 INJECTION, EMULSION INTRAVENOUS at 12:20

## 2021-06-23 RX ADMIN — Medication 100 MCG: at 12:42

## 2021-06-23 RX ADMIN — Medication 2 MG: at 14:34

## 2021-06-23 RX ADMIN — Medication 5 MG: at 12:32

## 2021-06-23 RX ADMIN — Medication 0.4 MG: at 14:34

## 2021-06-23 RX ADMIN — LIDOCAINE HYDROCHLORIDE 3 ML: 10 INJECTION, SOLUTION INFILTRATION; PERINEURAL at 09:22

## 2021-06-23 RX ADMIN — ROCURONIUM BROMIDE 50 MG: 10 INJECTION, SOLUTION INTRAVENOUS at 12:20

## 2021-06-23 RX ADMIN — CEFAZOLIN SODIUM 2000 MG: 10 INJECTION, POWDER, FOR SOLUTION INTRAVENOUS at 20:54

## 2021-06-23 RX ADMIN — Medication 10 MG: at 13:02

## 2021-06-23 RX ADMIN — Medication 0.2 MG: at 12:43

## 2021-06-23 RX ADMIN — Medication 50 MCG: at 12:33

## 2021-06-23 ASSESSMENT — PULMONARY FUNCTION TESTS
PIF_VALUE: 19
PIF_VALUE: 22
PIF_VALUE: 1
PIF_VALUE: 16
PIF_VALUE: 21
PIF_VALUE: 21
PIF_VALUE: 22
PIF_VALUE: 20
PIF_VALUE: 23
PIF_VALUE: 21
PIF_VALUE: 23
PIF_VALUE: 0
PIF_VALUE: 21
PIF_VALUE: 1
PIF_VALUE: 19
PIF_VALUE: 22
PIF_VALUE: 22
PIF_VALUE: 19
PIF_VALUE: 1
PIF_VALUE: 17
PIF_VALUE: 2
PIF_VALUE: 21
PIF_VALUE: 1
PIF_VALUE: 22
PIF_VALUE: 4
PIF_VALUE: 21
PIF_VALUE: 22
PIF_VALUE: 23
PIF_VALUE: 22
PIF_VALUE: 22
PIF_VALUE: 21
PIF_VALUE: 3
PIF_VALUE: 22
PIF_VALUE: 21
PIF_VALUE: 20
PIF_VALUE: 22
PIF_VALUE: 22
PIF_VALUE: 21
PIF_VALUE: 1
PIF_VALUE: 16
PIF_VALUE: 10
PIF_VALUE: 18
PIF_VALUE: 20
PIF_VALUE: 20
PIF_VALUE: 21
PIF_VALUE: 22
PIF_VALUE: 19
PIF_VALUE: 17
PIF_VALUE: 61
PIF_VALUE: 3
PIF_VALUE: 2
PIF_VALUE: 20
PIF_VALUE: 3
PIF_VALUE: 1
PIF_VALUE: 22
PIF_VALUE: 22
PIF_VALUE: 18
PIF_VALUE: 22
PIF_VALUE: 21
PIF_VALUE: 20
PIF_VALUE: 22
PIF_VALUE: 17
PIF_VALUE: 23
PIF_VALUE: 23
PIF_VALUE: 19
PIF_VALUE: 22
PIF_VALUE: 18
PIF_VALUE: 22
PIF_VALUE: 21
PIF_VALUE: 22
PIF_VALUE: 1
PIF_VALUE: 17
PIF_VALUE: 22
PIF_VALUE: 19
PIF_VALUE: 23
PIF_VALUE: 19
PIF_VALUE: 22
PIF_VALUE: 22
PIF_VALUE: 1
PIF_VALUE: 17
PIF_VALUE: 21
PIF_VALUE: 5
PIF_VALUE: 22
PIF_VALUE: 19
PIF_VALUE: 22
PIF_VALUE: 17
PIF_VALUE: 3
PIF_VALUE: 16
PIF_VALUE: 20
PIF_VALUE: 20
PIF_VALUE: 1
PIF_VALUE: 1
PIF_VALUE: 21
PIF_VALUE: 1
PIF_VALUE: 22
PIF_VALUE: 22
PIF_VALUE: 21
PIF_VALUE: 22
PIF_VALUE: 21
PIF_VALUE: 20
PIF_VALUE: 20
PIF_VALUE: 17
PIF_VALUE: 21
PIF_VALUE: 5
PIF_VALUE: 21
PIF_VALUE: 22
PIF_VALUE: 28
PIF_VALUE: 22
PIF_VALUE: 28
PIF_VALUE: 21
PIF_VALUE: 22
PIF_VALUE: 23
PIF_VALUE: 20
PIF_VALUE: 3
PIF_VALUE: 2
PIF_VALUE: 22
PIF_VALUE: 22
PIF_VALUE: 19
PIF_VALUE: 21
PIF_VALUE: 21
PIF_VALUE: 22
PIF_VALUE: 22
PIF_VALUE: 1
PIF_VALUE: 22
PIF_VALUE: 16
PIF_VALUE: 22
PIF_VALUE: 17
PIF_VALUE: 19
PIF_VALUE: 3
PIF_VALUE: 17
PIF_VALUE: 21
PIF_VALUE: 22
PIF_VALUE: 1
PIF_VALUE: 16
PIF_VALUE: 21
PIF_VALUE: 22
PIF_VALUE: 1
PIF_VALUE: 23
PIF_VALUE: 21
PIF_VALUE: 20
PIF_VALUE: 22
PIF_VALUE: 2
PIF_VALUE: 17
PIF_VALUE: 16
PIF_VALUE: 21
PIF_VALUE: 22
PIF_VALUE: 22
PIF_VALUE: 2
PIF_VALUE: 22
PIF_VALUE: 21
PIF_VALUE: 22
PIF_VALUE: 3
PIF_VALUE: 22
PIF_VALUE: 17
PIF_VALUE: 1
PIF_VALUE: 21
PIF_VALUE: 22
PIF_VALUE: 19
PIF_VALUE: 19
PIF_VALUE: 3
PIF_VALUE: 26
PIF_VALUE: 21
PIF_VALUE: 22
PIF_VALUE: 21
PIF_VALUE: 17
PIF_VALUE: 1
PIF_VALUE: 22
PIF_VALUE: 20
PIF_VALUE: 21
PIF_VALUE: 22
PIF_VALUE: 21
PIF_VALUE: 18
PIF_VALUE: 16
PIF_VALUE: 19
PIF_VALUE: 19
PIF_VALUE: 22
PIF_VALUE: 22
PIF_VALUE: 16
PIF_VALUE: 20
PIF_VALUE: 21
PIF_VALUE: 16
PIF_VALUE: 21
PIF_VALUE: 17

## 2021-06-23 ASSESSMENT — PAIN SCALES - GENERAL
PAINLEVEL_OUTOF10: 6
PAINLEVEL_OUTOF10: 7
PAINLEVEL_OUTOF10: 2
PAINLEVEL_OUTOF10: 7
PAINLEVEL_OUTOF10: 10
PAINLEVEL_OUTOF10: 9
PAINLEVEL_OUTOF10: 7
PAINLEVEL_OUTOF10: 8

## 2021-06-23 ASSESSMENT — ENCOUNTER SYMPTOMS
EYES NEGATIVE: 1
RESPIRATORY NEGATIVE: 1
ALLERGIC/IMMUNOLOGIC NEGATIVE: 1
GASTROINTESTINAL NEGATIVE: 1

## 2021-06-23 ASSESSMENT — PAIN DESCRIPTION - DESCRIPTORS
DESCRIPTORS: ACHING
DESCRIPTORS: ACHING
DESCRIPTORS: ACHING;SORE
DESCRIPTORS: ACHING;SORE
DESCRIPTORS: ACHING

## 2021-06-23 ASSESSMENT — PAIN DESCRIPTION - ONSET
ONSET: ON-GOING

## 2021-06-23 ASSESSMENT — PAIN DESCRIPTION - FREQUENCY
FREQUENCY: CONTINUOUS

## 2021-06-23 ASSESSMENT — PAIN DESCRIPTION - ORIENTATION
ORIENTATION: LEFT

## 2021-06-23 ASSESSMENT — PAIN DESCRIPTION - PROGRESSION
CLINICAL_PROGRESSION: NOT CHANGED
CLINICAL_PROGRESSION: GRADUALLY IMPROVING
CLINICAL_PROGRESSION: NOT CHANGED

## 2021-06-23 ASSESSMENT — PAIN DESCRIPTION - LOCATION
LOCATION: KNEE

## 2021-06-23 ASSESSMENT — PAIN - FUNCTIONAL ASSESSMENT
PAIN_FUNCTIONAL_ASSESSMENT: PREVENTS OR INTERFERES SOME ACTIVE ACTIVITIES AND ADLS
PAIN_FUNCTIONAL_ASSESSMENT: PREVENTS OR INTERFERES SOME ACTIVE ACTIVITIES AND ADLS
PAIN_FUNCTIONAL_ASSESSMENT: 0-10

## 2021-06-23 ASSESSMENT — PAIN DESCRIPTION - PAIN TYPE
TYPE: SURGICAL PAIN

## 2021-06-23 NOTE — ANESTHESIA PROCEDURE NOTES
Peripheral Block    Patient location during procedure: pre-op  Start time: 6/23/2021 9:15 AM  End time: 6/23/2021 9:25 AM  Staffing  Performed: anesthesiologist   Anesthesiologist: Gladys Wilburn DO  Preanesthetic Checklist  Completed: patient identified, IV checked, site marked, risks and benefits discussed, surgical consent, monitors and equipment checked, pre-op evaluation, timeout performed, anesthesia consent given, oxygen available and patient being monitored  Peripheral Block  Patient position: supine  Prep: ChloraPrep  Patient monitoring: cardiac monitor, continuous pulse ox, frequent blood pressure checks and IV access  Block type: Femoral  Laterality: left  Injection technique: single-shot  Guidance: ultrasound guided  Local infiltration: lidocaine  Infiltration strength: 1 %  Dose: 3 mL  Adductor canal  Provider prep: mask and sterile gloves  Local infiltration: lidocaine  Needle  Needle type: combined needle/nerve stimulator   Needle gauge: 21 G  Needle length: 10 cm  Needle localization: ultrasound guidance  Assessment  Injection assessment: negative aspiration for heme, no paresthesia on injection and local visualized surrounding nerve on ultrasound  Paresthesia pain: none  Slow fractionated injection: yes  Hemodynamics: stable  Additional Notes  Left adductor canal single shot  20ml 0.25% bupivacaine + 10ml 1.3% exparel           Reason for block: post-op pain management and at surgeon's request

## 2021-06-23 NOTE — CONSULTS
[facility]  Office: [address], DO, Guy Taylor, DO, Doug Salvador, DO, Laura Mei Blood, DO, Shai Mooney MD, Faith Montelongo MD, Vonnie Seth MD, Larry Perez MD, Mauro Yates MD, Danny Ryan MD, Tanisha Bolanos MD, Hailey Clark MD, Stanislaw Carter, DO, Florence Taylor MD, Madai Chambers, DO, Melisa Cash MD,  Dianne Starr, DO, Arlin Bay MD, Lebron Andrade MD, Stepan Dunham MD, Rena Martinez MD, Katerine Duran [facility], [address], [facility], Kourtney Gaona, CNP, Porter Duncan, CNS, Santana Reid, CNP, Gerilyn Cockayne, CNP, Steve Pardo, CNP, Rosita Macdonald, CNP, Camillo Schaumann, CNP, Layo Everett PA-C, Pal Mooney, [facility], Nandini Loud, CNP, Sg Felix, CNP, William Tovar, CNP, Christie Somers, CNP, Nicholas Saez, CNP, Gladies Kocher, [facility]         [address] / HISTORY AND PHYSICAL EXAMINATION            Date:   6/23/2021  Patient name:  Laverne Light  Date of admission:  6/23/2021  7:47 AM  MRN:   [id number]  Account:  [de-identified]  YOB: 1950  PCP:    Thomas Merritt MD  Room:   2016/2016-02  Code Status:    Full Code    Physician Requesting Consult: Luis Antonio Caicedo DO    Reason for Consult:  Medical Management    Chief Complaint:     No chief complaint on file. elective left knee arthroplasty    History Obtained From:     patient, electronic medical record    History of Present Illness:     Patient is a 71-year-old pleasant, cooperative male who is postop left knee arthroplasty today. He has a medical history that includes DM, HTN, old CVA with numbness/tingling in left foot and hand, [place] and HLD. No residual gait disturbance or weakness. He states he checks his blood sugar daily. He is compliant with his home medications. He is independent in his ADLs. He uses a cane for ambulation secondary to pain in bilateral knees. He is a non-smoker.  Denies use of alcohol or recreational drugs. He did receive cardiac clearance for elective surgery today with EF noted at 50 to 55%. He denies any chest pain, shortness of breath, nausea or vomiting. He has had no change in bowel or bladder. Past Medical History:     Past Medical History:   Diagnosis Date    Cataracts, bilateral     only left eye, had surgery on right    Cerebral artery occlusion with cerebral infarction (HCC)     weakness left leg, sees Dr Mei Estimable Diabetes mellitus ([facility] [address]     type II, managed by Dr Lizette Del Castillo (PCP)  range , checks daily    Eye disorder     history of a \"stroke\" in right eye    Glaucoma     right eye    Headache(784.0)     no current issues (6/10/21)    Hearing loss     Heartburn     occasionally, takes OTC acid reducer as needed    Hyperlipidemia     Hypertension     Kidney stones     Osteoarthritis     Snores     Tendonitis of shoulder, left     Tremor     no current problems (6/10/21)        Past Surgical History:     Past Surgical History:   Procedure Laterality Date    CATARACT REMOVAL Right 08/2015    right eye    CYSTOSCOPY      EYE SURGERY      HAND SURGERY Left     thumb    JOINT REPLACEMENT      KIDNEY STONE SURGERY      LITHOTRIPSY      REFRACTIVE SURGERY Right 2014    THUMB AMPUTATION      TONSILLECTOMY      TOTAL KNEE ARTHROPLASTY Left 6/23/2021    LEFT  KNEE TOTAL ARTHROPLASTY - Katja Haste performed by Jacqueline Donnelly DO at [address] [place] [address]        Medications Prior to Admission:     Prior to Admission medications    Medication Sig Start Date End Date Taking? Authorizing Provider   apixaban (ELIQUIS) 2.5 MG TABS tablet Take 1 tablet by mouth 2 times daily for 14 days 6/23/21 7/7/21 Yes Guilherme Mercedes DO   oxyCODONE-acetaminophen (PERCOCET) 5-325 MG per tablet Take 1 tablet by mouth every 4 hours as needed for Pain for up to 7 days. Intended supply: 7 days.  Take lowest dose possible to manage pain 6/23/21 6/30/21 Yes Guilherme Mercedes DO nitrofurantoin, macrocrystal-monohydrate, (MACROBID) 100 MG capsule Take 1 capsule by mouth 2 times daily for 10 days 6/14/21 6/24/21 Yes Holly De León PA-C   amitriptyline (ELAVIL) 75 MG tablet TAKE 1 TABLET NIGHTLY 5/11/21  Yes Lucius Stanley MD   amLODIPine (NORVASC) 10 MG tablet TAKE 1 TABLET DAILY 5/11/21  Yes Lucius Stanley MD   glipiZIDE (GLUCOTROL) 10 MG tablet Take 1 tablet by mouth 2 times daily (before meals)  Patient taking differently: Take 10 mg by mouth daily  5/11/21  Yes Lucius Stanley MD   losartan (COZAAR) 50 MG tablet TAKE 1 TABLET DAILY 5/11/21  Yes Lucius Stanley MD   simvastatin (ZOCOR) 20 MG tablet TAKE 1 TABLET NIGHTLY 5/11/21  Yes Lucius Stanley MD   timolol (TIMOPTIC) 0.5 % ophthalmic solution instill 1 drop into right eye twice a day 11/5/18  Yes Historical Provider, MD   latanoprost (XALATAN) 0.005 % ophthalmic solution Place 1 drop into the right eye nightly  11/13/14  Yes Historical Provider, MD   Naproxen Sodium (ALEVE) 220 MG CAPS Take 2 capsules by mouth daily as needed for Pain    Historical Provider, MD   Homeopathic Products [facility] DRY EYE RELIEF) SOLN Apply 2 drops to eye 2 times daily Left eye    Historical Provider, MD   aspirin 325 MG tablet Take 325 mg by mouth daily     Historical Provider, MD   fluticasone (FLONASE) 50 MCG/ACT nasal spray 1 spray by Each Nostril route daily for 10 days  Patient not taking: Reported on 5/10/2021 12/9/20 12/19/20  Denzel Holter, PA-C   blood glucose monitor strips Provide insurance covered test strips compatible with glucometer, test 1 times a day 8/4/20   Denzel Holter, PA-C   Blood Glucose Monitoring Suppl (D-CARE GLUCOMETER) w/Device KIT Provide insurance covered glucometer, check blood sugars every morning 2/5/19 6/2/21  Denzel Holter, PA-C        Allergies:     Patient has no known allergies. Social History:     Tobacco:    reports that he has never smoked.  He has quit using smokeless tobacco.  His smokeless tobacco use included chew. Alcohol:      reports current alcohol use. Drug Use:  reports no history of drug use. Family History:     Family History   Problem Relation Age of Onset    Heart Disease Mother     Cancer Father     Migraines Sister     Migraines Brother        Review of Systems:     Positive and Negative as described in HPI. Review of Systems   Constitutional: Positive for activity change. Negative for fatigue and fever. HENT: Negative. Eyes: Negative. Respiratory: Negative. Cardiovascular: Negative. Gastrointestinal: Negative. Endocrine: Negative. Genitourinary: Negative. Musculoskeletal: Positive for arthralgias. Skin: Negative. Allergic/Immunologic: Negative. Hematological: Negative. Psychiatric/Behavioral: Negative. Physical Exam:     /77   Pulse 94   Temp 97.9 °F (36.6 °C) (Oral)   Resp 16   Ht 6' (1.829 m)   Wt 229 lb 3.2 oz (104 kg)   SpO2 (!) 87%   BMI 31.09 kg/m²   Temp (24hrs), Av.3 °F (36.3 °C), Min:96.2 °F (35.7 °C), Max:98.1 °F (36.7 °C)    Recent Labs     21  0823 21  1528 21  1837   POCGLU 130* 165* 214*       Intake/Output Summary (Last 24 hours) at 2021 1927  Last data filed at 2021 1613  Gross per 24 hour   Intake 947 ml   Output 700 ml   Net 247 ml       Physical Exam  Constitutional:       General: He is awake. He is not in acute distress. Appearance: Normal appearance. He is overweight. He is not ill-appearing or toxic-appearing. Eyes:      Pupils: Pupils are equal, round, and reactive to light. Cardiovascular:      Rate and Rhythm: Normal rate and regular rhythm. Pulses: Normal pulses. Heart sounds: Normal heart sounds, S1 normal and S2 normal.   Pulmonary:      Effort: Pulmonary effort is normal.      Breath sounds: Normal breath sounds. Abdominal:      General: Abdomen is protuberant. Bowel sounds are decreased. There is no distension.       Palpations: Abdomen is soft. Tenderness: There is no abdominal tenderness. Musculoskeletal:      Cervical back: Full passive range of motion without pain and normal range of motion. Right lower leg: No edema. Left lower leg: No edema. Comments: Intact ace wrap with ice therapy to left knee. Foot cool but mobile. No overt edema noted. Lymphadenopathy:      Comments: No lymphadenopathy   Skin:     General: Skin is warm and dry. Capillary Refill: Capillary refill takes less than 2 seconds. Neurological:      General: No focal deficit present. Mental Status: He is alert and oriented to person, place, and time. Sensory: Sensation is intact. Comments: Hand grasps/foot pushes strong and equal bilaterally. Psychiatric:         Attention and Perception: Attention normal.         Mood and Affect: Mood and affect normal.         Behavior: Behavior is cooperative. Investigations:      Laboratory Testing:  Recent Results (from the past 24 hour(s))   POC Glucose Fingerstick    Collection Time: 06/23/21  8:23 AM   Result Value Ref Range    POC Glucose 130 (H) 75 - 110 mg/dL   POC Glucose Fingerstick    Collection Time: 06/23/21  3:28 PM   Result Value Ref Range    POC Glucose 165 (H) 75 - 110 mg/dL   POC Glucose Fingerstick    Collection Time: 06/23/21  6:37 PM   Result Value Ref Range    POC Glucose 214 (H) 75 - 110 mg/dL       Imaging/Diagonstics:  XR KNEE LEFT (1-2 VIEWS)    Result Date: 6/23/2021  Total knee arthropasty in satisfactory alignment. Assessment :      Hospital Problems         Last Modified POA    Essential hypertension 6/23/2021 Yes    Type 2 diabetes mellitus with diabetic autonomic neuropathy, without long-term current use of insulin ([address] 6/23/2021 Yes    S/P total knee arthroplasty, left 6/23/2021 Yes          Plan:     1. Postop care per primary service. Antibiotic coverage per primary team.  2. Manage DM: Accu-Cheks AC at bedtime.   Sliding scale insulin coverage. Hypoglycemia treatment as needed. Transition to home p.o. medications when able (glipizide) Carb control diet. 3. Manage HTN: Continue Norvasc and Cozaar. Vital signs per unit. 4. PT/OT eval and treat. 5. Pain management and antiemetics as needed. 6. DVT prophylaxis: per primary, plan to Start Eliquis twice daily on POD day 1  7. GI prophylaxis: Pepcid 20 mg twice daily. 8. BMP/CBC in a.m. Trend kidney function, hemoglobin and WBC. Replete electrolytes as needed. 9. Case management for home-going needs. Consultations:   IP CONSULT TO HOME CARE NEEDS  IP CONSULT TO SOCIAL WORK  IP CONSULT TO INTERNAL MEDICINE      AMERICA Parks NP  6/23/2021  7:27 PM     Attending Supervising Physician's [facility] [address] Statement  I performed a history and physical examination on the patient and discussed the management with the nurse practitioner. I reviewed and agree with the findings and plan as documented in her note .     Electronically signed by Kilo Alvarez MD on 6/24/21 at 10:47 AM EDT        Copy sent to Dr. Kathy Thomas MD

## 2021-06-23 NOTE — PROGRESS NOTES
Patient admitted from PACU in good condition. Vital signs and admission assessment completed. Patient made comfortable and oriented to room. Blood glucose was 214 and no sliding scale was ordered. Dr. Mancil Severance notified.

## 2021-06-23 NOTE — ANESTHESIA PRE PROCEDURE
Department of Anesthesiology  Preprocedure Note       Name:  Virginie Crawford   Age:  66 y.o.  :  1950                                          MRN:  [id number]         Date:  2021      Surgeon: Prema Rowley):  Samir Meza DO    Procedure: Procedure(s):  LEFT  KNEE TOTAL ARTHROPLASTY - Rai Goldsmith    Medications prior to admission:   Prior to Admission medications    Medication Sig Start Date End Date Taking?  Authorizing Provider   nitrofurantoin, macrocrystal-monohydrate, (MACROBID) 100 MG capsule Take 1 capsule by mouth 2 times daily for 10 days 21 Yes Tessie Torres PA-C   amitriptyline (ELAVIL) 75 MG tablet TAKE 1 TABLET NIGHTLY 21  Yes Solomon Ortiz MD   amLODIPine (NORVASC) 10 MG tablet TAKE 1 TABLET DAILY 21  Yes Solomon Ortiz MD   glipiZIDE (GLUCOTROL) 10 MG tablet Take 1 tablet by mouth 2 times daily (before meals)  Patient taking differently: Take 10 mg by mouth daily  21  Yes Solomon Ortiz MD   losartan (COZAAR) 50 MG tablet TAKE 1 TABLET DAILY 21  Yes Solomon Ortiz MD   simvastatin (ZOCOR) 20 MG tablet TAKE 1 TABLET NIGHTLY 21  Yes Solomon Ortiz MD   timolol (TIMOPTIC) 0.5 % ophthalmic solution instill 1 drop into right eye twice a day 18  Yes Historical Provider, MD   latanoprost (XALATAN) 0.005 % ophthalmic solution Place 1 drop into the right eye nightly  14  Yes Historical Provider, MD   Naproxen Sodium (ALEVE) 220 MG CAPS Take 2 capsules by mouth daily as needed for Pain    Historical Provider, MD   Homeopathic Products [facility] DRY EYE RELIEF) SOLN Apply 2 drops to eye 2 times daily Left eye    Historical Provider, MD   aspirin 325 MG tablet Take 325 mg by mouth daily     Historical Provider, MD   fluticasone (FLONASE) 50 MCG/ACT nasal spray 1 spray by Each Nostril route daily for 10 days  Patient not taking: Reported on 5/10/2021 12/9/20 12/19/20  Bill Nunez PA-C   blood glucose monitor strips Provide insurance covered test strips compatible with glucometer, test 1 times a day 8/4/20   Surendra Nair PA-C   Blood Glucose Monitoring Suppl (D-CARE GLUCOMETER) w/Device KIT Provide insurance covered glucometer, check blood sugars every morning 2/5/19 6/2/21  Surendra Nair PA-C       Current medications:    Current Facility-Administered Medications   Medication Dose Route Frequency Provider Last Rate Last Surinder Dials ON 6/24/2021] lactated ringers infusion   Intravenous Continuous Carlee Baker MD 50 mL/hr at 06/23/21 0828 Restarted at 06/23/21 0913    [START ON 6/24/2021] lidocaine PF 1 % injection 1 mL  1 mL Intradermal Once PRN Carlee Baker MD        ceFAZolin (ANCEF) 2000 mg in dextrose 5 % 50 mL IVPB  2,000 mg Intravenous Once Aline St DO        tranexamic acid (CYKLOKAPRON) 1000 MG/10ML injection             heparin (porcine) 86599 UNIT/ML injection             vancomycin (VANCOCIN) 1 g injection             bupivacaine liposome (EXPAREL) 1.3 % injection 133 mg  10 mL Subcutaneous Once Chris Quiroz DO           Allergies:  No Known Allergies    Problem List:    Patient Active Problem List   Diagnosis Code    Hypercholesteremia E78.00    Cerebral infarction ([facility] [address] I63.9    Neuropathic pain M79.2    Weakness of both legs R29.898    Essential hypertension I10    Type 2 diabetes mellitus with diabetic autonomic neuropathy, without long-term current use of insulin ([facility]) E11.43    Eustachian tube dysfunction H69.80    Branch retinal vein occlusion G71.9736    Primary open angle glaucoma H40.1190    Chronic pain of both knees M25.561, M25.562, G89.29    Cerebral infarction due to occlusion of right middle cerebral artery (HCC) I63.511    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    Nuclear sclerotic cataract H25.10    Class 1 obesity due to excess calories without serious comorbidity with body mass index (BMI) of 32.0 to 32.9 in adult E66.09, Z68.32    Elevated PSA R97.20 Past Medical History:        Diagnosis Date    Cataracts, bilateral     only left eye, had surgery on right    Cerebral artery occlusion with cerebral infarction (HCC)     weakness left leg, sees Dr Viola Joel Diabetes mellitus [facility])     type II, managed by Dr Dakota Camarillo (PCP)  range , checks daily    Eye disorder     history of a \"stroke\" in right eye    Glaucoma     right eye    Headache(784.0)     no current issues (6/10/21)    Hearing loss     Heartburn     occasionally, takes OTC acid reducer as needed    Hyperlipidemia     Hypertension     Kidney stones     Osteoarthritis     Snores     Tendonitis of shoulder, left     Tremor     no current problems (6/10/21)       Past Surgical History:        Procedure Laterality Date    CATARACT REMOVAL Right 08/2015    right eye    CYSTOSCOPY      HAND SURGERY Left     thumb    KIDNEY STONE SURGERY      LITHOTRIPSY      REFRACTIVE SURGERY Right 2014    THUMB AMPUTATION      TONSILLECTOMY         Social History:    Social History     Tobacco Use    Smoking status: Never Smoker    Smokeless tobacco: Former User     Types: Chew    Tobacco comment: quit smokeless tobacco in late 80's   Substance Use Topics    Alcohol use:  Yes     Alcohol/week: 0.0 standard drinks     Comment: rarely                                Counseling given: Not Answered  Comment: quit smokeless tobacco in late 80's      Vital Signs (Current):   Vitals:    06/23/21 0840 06/23/21 0918 06/23/21 0923 06/23/21 0927   BP: (!) 140/85 133/79 130/72 128/84   Pulse: 82 82 78 76   Resp: 14 16 18 12   Temp:       TempSrc:       SpO2: 98% 98% 98% 97%   Weight:       Height:                                                  BP Readings from Last 3 Encounters:   06/23/21 128/84   06/10/21 (!) 145/87   06/14/21 120/78       NPO Status: Time of last liquid consumption: 2000                        Time of last solid consumption: 2000                        Date of last liquid consumption: 06/22/21                        Date of last solid food consumption: 06/22/21    BMI:   Wt Readings from Last 3 Encounters:   06/23/21 229 lb 3.2 oz (104 kg)   06/10/21 231 lb 14.4 oz (105.2 kg)   06/14/21 230 lb (104.3 kg)     Body mass index is 31.09 kg/m².     CBC:   Lab Results   Component Value Date    WBC 6.8 06/10/2021    RBC 4.65 06/10/2021    HGB 14.6 06/10/2021    HCT 44.0 06/10/2021    MCV 94.6 06/10/2021    RDW 13.2 06/10/2021     06/10/2021       CMP:   Lab Results   Component Value Date     06/10/2021    K 3.8 06/10/2021     06/10/2021    CO2 24 06/10/2021    BUN 12 06/10/2021    CREATININE 0.62 06/10/2021    GFRAA >60 06/10/2021    LABGLOM >60 06/10/2021    GLUCOSE 177 06/10/2021    PROT 7.1 05/06/2021    CALCIUM 9.0 06/10/2021    BILITOT 0.39 05/06/2021    ALKPHOS 63 05/06/2021    AST 18 05/06/2021    ALT 10 05/06/2021       POC Tests:   Recent Labs     06/23/21  0823   POCGLU 130*       Coags:   Lab Results   Component Value Date    PROTIME 14.2 06/10/2021    INR 1.1 06/10/2021    APTT 27.8 06/10/2021       HCG (If Applicable): No results found for: PREGTESTUR, PREGSERUM, HCG, HCGQUANT     ABGs: No results found for: PHART, PO2ART, SEV3YJE, THZ4JWO, BEART, M6ZRXIZO     Type & Screen (If Applicable):  No results found for: LABABO, LABRH    Drug/Infectious Status (If Applicable):  No results found for: HIV, HEPCAB    COVID-19 Screening (If Applicable): No results found for: COVID19        Anesthesia Evaluation  Patient summary reviewed and Nursing notes reviewed no history of anesthetic complications:   Airway: Mallampati: II  TM distance: >3 FB   Neck ROM: full  Mouth opening: > = 3 FB Dental: normal exam         Pulmonary:normal exam        (-) COPD and asthma                           Cardiovascular:  Exercise tolerance: no interval change,   (+) hypertension:,       ECG reviewed    Rate: normal  Echocardiogram reviewed  Stress test reviewed  Cleared by cardiology Neuro/Psych:   (+) CVA: residual symptoms, headaches:,              ROS comment: L side weakness GI/Hepatic/Renal:        (-) GERD       Endo/Other:    (+) Diabetes, . Abdominal:           Vascular:                                        Anesthesia Plan      general     ASA 3       Induction: intravenous. MIPS: Postoperative opioids intended and Prophylactic antiemetics administered. Anesthetic plan and risks discussed with patient. Plan discussed with CRNA.     Attending anesthesiologist reviewed and agrees with Pre Eval content              Irene Chamorro DO   6/23/2021

## 2021-06-23 NOTE — PLAN OF CARE
request assistance with transfers  Outcome: Ongoing  Goal: STG - patient uses gait belt during all transfers  Outcome: Ongoing     Problem: Skin Integrity:  Goal: Will show no infection signs and symptoms  Description: Will show no infection signs and symptoms  Outcome: Ongoing  Goal: Absence of new skin breakdown  Description: Absence of new skin breakdown  Outcome: Ongoing     Problem: Pain:  Goal: Pain level will decrease  Description: Pain level will decrease  Outcome: Ongoing  Goal: Control of acute pain  Description: Control of acute pain  Outcome: Ongoing  Goal: Control of chronic pain  Description: Control of chronic pain  Outcome: Ongoing

## 2021-06-23 NOTE — BRIEF OP NOTE
Brief Postoperative Note      Patient: Renaldo Zhang  YOB: 1950  MRN: [id number]    Date of Procedure: 6/23/2021    Pre-Op Diagnosis: DX LEFT  KNEE OA    Post-Op Diagnosis: Left knee osteoarthritis        Procedure(s):  LEFT  KNEE TOTAL ARTHROPLASTY - Norman Lara    Surgeon(s):  John Wilkes DO    Assistant:  Resident: Elidia Aguilar DO; Grady Black DO    Anesthesia: General    Estimated Blood Loss (mL): 200 mL     Cell Saver Return: 125 mL     Fluids: 800 cc     Tourniquet Time: 39 minutes     Complications: None    Specimens:   * No specimens in log *    Implants:  Implant Name Type Inv. Item Serial No.  Lot No. LRB No. Used Action   CEMENT BNE 40GM HI VISC RADPQ FOR REV SURG  CEMENT BNE 40GM HI VISC RADPQ FOR REV SURG  [facility] [facility] [id number] Left 3 Implanted   SCREW BNE L25MM DIA2. 5MM KNEE FULL THRD HEX FEM PERSONA  SCREW BNE L25MM DIA2. 5MM KNEE FULL THRD HEX FEM PERSONA  [facility] [facility] [id number] Left 1 Implanted   PSN TIB STM 5 DEG SZ F L  PSN TIB STM 5 DEG SZ F L  LISA Teja TechnologiesET [facility] [id number] Left 1 Implanted   IMPL KNEE PSN FEM CR CMT CCR STD SZ8 L Knee IMPL KNEE PSN FEM CR CMT CCR STD SZ8 L  Anibal Pare [facility] [id number] Left 1 Implanted   COMPONENT PAT [id number] THK8. 5MM STD KNEE VIVACIT-E KATHY  COMPONENT PAT [id number] THK8. 5MM STD KNEE VIVACIT-E KATHY  [employer] [id number] Left 1 Implanted   PSN MC VE ASF L 10MM 8-11 EF  PSN MC VE ASF L 10MM 8-11 EF  [employer]ISA [facility] [facility] [id number] Left 1 Implanted   UPCHARGE KNEE VITAMIN E PATELLA LISA BIOMET Knee UPCHARGE KNEE VITAMIN E PATELLA LISA BIOMET  [employer]ISA [employer]-PMM  Left 1 Implanted   UPCHARGE KNEE VITAMIN E LINER LISA BIOMET Knee UPCHARGE KNEE VITAMIN E LINER LISA BIOMET  [employer]-PMM  Left 1 Implanted         Drains:   Urethral Catheter Double-lumen;Non-latex;Straight-tip 16 fr (Active)       Findings: Left knee osteoarthritis. See op note for complete details.      Electronically signed by Jay Diaz DO on 6/23/2021 at 2:50 PM

## 2021-06-23 NOTE — H&P
Interval H&P Note    Pt Name: Addison Bamberger  MRN: [id number]  YOB: 1950  Date of evaluation: 6/23/2021      [x] I have reviewed in Epic the H&P by Dolores Galdamez CNP dated 6/10/2021 attached below which meets the criteria for an Interval History and Physical note. [x] I have examined  Addison Bamberger  There are no changes to the patient who is scheduled for a left knee total arthroplasty by Dr. Courtney Lund for left knee osteoarthritis. The patient denies new health changes, fever, chills, wheezing, cough, increased SOB, chest pain, open sores or wounds. Hx diabetes. . Last Naproxen 6/13/2021 and ASA 325mg 6/13/2021. Patient had appointment with Dr. Jennie Penn for cardiac clearance on 6/15/2021 due to abnormal EKG (bifasiscular block). He had nuclear stress test which showed \"no ischemia (reversible defect). Inferior moderate transmural infarction (fixed defect). Reduced LV systolic function, EF 01%. Inferior wall hypokinesis. \" 2D echo showed \"overall preserved LV systolic function with EF 50-55%. Reduced LV diastolic compliance. Mild aortic insufficiency. Mild mitral regurgitation. Mild tricuspid regurgitation. \" (Refer to reports in short chart) Cardiac clearance sent on 6/22/2021 which states \"Patient is at moderate cardiac risk, but acceptable, for the planned surgical procedure. This patient may proceed accordingly. \" Dr. Joshua Blanchard notified and evaluated. Denies chest pain, shortness of breath, dizziness, palpitations. Vital signs: /70   Pulse 81   Temp 96.2 °F (35.7 °C) (Temporal)   Resp 20   Ht 6' (1.829 m)   Wt 229 lb 3.2 oz (104 kg)   SpO2 96%   BMI 31.09 kg/m²     Allergies:  Patient has no known allergies. Medications:    Prior to Admission medications    Medication Sig Start Date End Date Taking?  Authorizing Provider   nitrofurantoin, macrocrystal-monohydrate, (MACROBID) 100 MG capsule Take 1 capsule by mouth 2 times daily for 10 days 6/14/21 6/24/21 Yes Isabella Jorge Karthik Farias PA-C   amitriptyline (ELAVIL) 75 MG tablet TAKE 1 TABLET NIGHTLY 5/11/21  Yes Adonay Sheppard MD   glipiZIDE (GLUCOTROL) 10 MG tablet Take 1 tablet by mouth 2 times daily (before meals)  Patient taking differently: Take 10 mg by mouth daily  5/11/21  Yes Adonay Sheppard MD   simvastatin (ZOCOR) 20 MG tablet TAKE 1 TABLET NIGHTLY 5/11/21  Yes Adonay Sheppard MD   timolol (TIMOPTIC) 0.5 % ophthalmic solution instill 1 drop into right eye twice a day 11/5/18  Yes Historical Provider, MD   latanoprost (XALATAN) 0.005 % ophthalmic solution Place 1 drop into the right eye nightly  11/13/14  Yes Historical Provider, MD   Naproxen Sodium (ALEVE) 220 MG CAPS Take 2 capsules by mouth daily as needed for Pain    Historical Provider, MD   Homeopathic Products [facility] DRY EYE RELIEF) SOLN Apply 2 drops to eye 2 times daily Left eye    Historical Provider, MD   amLODIPine (NORVASC) 10 MG tablet TAKE 1 TABLET DAILY 5/11/21   Adonay Sheppard MD   losartan (COZAAR) 50 MG tablet TAKE 1 TABLET DAILY 5/11/21   Adonay Sheppard MD   aspirin 325 MG tablet Take 325 mg by mouth daily     Historical Provider, MD   fluticasone (FLONASE) 50 MCG/ACT nasal spray 1 spray by Each Nostril route daily for 10 days  Patient not taking: Reported on 5/10/2021 12/9/20 12/19/20  Ck Watkins PA-C   blood glucose monitor strips Provide insurance covered test strips compatible with glucometer, test 1 times a day 8/4/20   Ck Watkins PA-C   Blood Glucose Monitoring Suppl (D-CARE GLUCOMETER) w/Device KIT Provide insurance covered glucometer, check blood sugars every morning 2/5/19 6/2/21  Ck Watkins PA-C         This is a 66 y.o. obese male who is pleasant, cooperative, alert and oriented x3, in no acute distress. Heart: Heart sounds are normal.  HR 81 regular rate and rhythm without murmur, gallop or rub.    Lungs: Normal respiratory effort with equal expansion, good air exchange, unlabored and clear to auscultation without wheezes or rales bilaterally   Abdomen: soft, rounded, nontender, nondistended with bowel sounds active. Extremities: Bilateral lower extremity strength equal 5/5. Pulses palpable. No pedal edema or calf tenderness. Labs:  Recent Labs     06/10/21  0954   HGB 14.6   HCT 44.0   WBC 6.8   MCV 94.6         K 3.8      CO2 24   BUN 12   CREATININE 0.62*   GLUCOSE 177*   INR 1.1   PROTIME 14.2   APTT 27.8       No results for input(s): COVID19 in the last 720 hours. AMERICA Leyva CNP  Electronically signed 6/23/2021 at 8:24 AM    AMERICA Boogie CNP   Nurse Practitioner   General Surgery   H&P      Signed   Date of Service:  6/10/2021  9:30 AM         Related encounter: Pre-Admission Testing Visit from 6/10/2021 in [email or web address]] [place] [facility] [place]     Show:Clear all  [x]Manual[x]Template[]Copied    Added by:  AMERICA Vazquez CNP    []Zachariah for details  History and Physical Service   [address] [place] [address]            Date of Evaluation:     6/10/2021  Patient name:              Miriam Marie  MRN:                           [id number]  YOB: 1950  PCP:                            Wendy Sousa MD     History Obtained From:      Patient     History of Present Illness: This is Miriam Marie a 66 y.o. male who presents for a pre-admission testing appointment for an upcoming (left) knee total arthroplasty by Dr. Vera Boo scheduled on 6/23/21 at 1000 due to Bilateral Knee osteoarthritis, states was scheduled for right knee total arthroplasty first but requesting at PAT today to do Left first due to left leg weakness s/p stroke history 10 years ago, Dr Vera Boo and office  notified. The patient's chief complaint is 8/10 pain which has progressively worsened over the past 2 years.   Bilateral knee pain is aggravated by movement and is minimally relieved with rest and sitting. Prior treatment includes gel injections, pain management knee injection. Denies recent falls and injuries. Patient is hard of hearing does not wear hearing aids. Patient is diabetic last HGBA1C 6.4. Takes aspirin 325 mg daily. States history of glaucoma, states impaired vision to right eyes. Sleep apnea questionnaire  1) Do you snore loudly? yes  2) Do you often feel tired, fatigued, or sleepy? no  3) Has anyone observed you stop breathing or choking/gasping during your sleep? Yes occasionally  4) Do you have hypertension? yes  5) BMI >35 kg/m2? yes  6) Age > 48? yes  7) Pt is a male? yes     Functional Capacity:              1) Pt  Is able to walk 2 city blocks on level ground without SOB. 2) Pt is able to climb 2 flights of stairs without SOB. 3) Pt unsure if able to walk up a hill for 1-2 city blocks without SOB.      Past Medical History:      Past Medical History        Past Medical History:   Diagnosis Date    Cataracts, bilateral       only left eye, had surgery on right    Cerebral artery occlusion with cerebral infarction ([facility] [address]       weakness left leg, sees Dr Boyle Marker Diabetes mellitus ([facility] [address]       type II, managed by Dr Zuleyma Vasquez (PCP)  range , checks daily    Eye disorder       history of a \"stroke\" in right eye    Glaucoma       right eye    Headache(784.0)       no current issues (6/10/21)    Hearing loss      Heartburn       occasionally, takes OTC acid reducer as needed    Hyperlipidemia      Hypertension      Kidney stones      Osteoarthritis      Snores      Tendonitis of shoulder, left      Tremor       no current problems (6/10/21)            Past Surgical History:      Past Surgical History         Past Surgical History:   Procedure Laterality Date    CATARACT REMOVAL Right 08/2015     right eye    CYSTOSCOPY        HAND SURGERY Left       thumb    KIDNEY STONE SURGERY        LITHOTRIPSY        REFRACTIVE SURGERY Right 2014    THUMB AMPUTATION        TONSILLECTOMY                Medications Prior to Admission:      Home Medications           Prior to Admission medications    Medication Sig Start Date End Date Taking? Authorizing Provider   Naproxen Sodium (ALEVE) 220 MG CAPS Take 2 capsules by mouth daily as needed for Pain     Yes Historical Provider, MD   Homeopathic Products ([address] [place] [address]) SOLN Apply 2 drops to eye 2 times daily Left eye     Yes Historical Provider, MD   amitriptyline (ELAVIL) 75 MG tablet TAKE 1 TABLET NIGHTLY 5/11/21   Yes Jim Barton MD   amLODIPine (NORVASC) 10 MG tablet TAKE 1 TABLET DAILY 5/11/21   Yes Jim Barton MD   glipiZIDE (GLUCOTROL) 10 MG tablet Take 1 tablet by mouth 2 times daily (before meals)  Patient taking differently: Take 10 mg by mouth daily  5/11/21   Yes Jim Barton MD   losartan (COZAAR) 50 MG tablet TAKE 1 TABLET DAILY 5/11/21   Yes Jim Barton MD   simvastatin (ZOCOR) 20 MG tablet TAKE 1 TABLET NIGHTLY 5/11/21   Yes Jim Barton MD   HYDROcodone-acetaminophen (NORCO) 5-325 MG per tablet Take 1 tablet by mouth 2 times daily as needed for Pain for up to 30 days.  5/11/21 6/10/21 Yes Jim Barton MD   aspirin 325 MG tablet Take 325 mg by mouth daily      Yes Historical Provider, MD   timolol (TIMOPTIC) 0.5 % ophthalmic solution instill 1 drop into right eye twice a day 11/5/18   Yes Historical Provider, MD   latanoprost (XALATAN) 0.005 % ophthalmic solution Place 1 drop into the right eye nightly  11/13/14   Yes Historical Provider, MD   fluticasone (FLONASE) 50 MCG/ACT nasal spray 1 spray by Each Nostril route daily for 10 days  Patient not taking: Reported on 5/10/2021 12/9/20 12/19/20   Dieter Barrett PA-C   blood glucose monitor strips Provide insurance covered test strips compatible with glucometer, test 1 times a day 8/4/20     Dieter Barrett PA-C   Blood Glucose Monitoring Suppl (D-CARE GLUCOMETER) w/Device KIT Provide insurance covered glucometer, check blood sugars every morning 2/5/19 6/2/21   Tana Sanderson PA-C            Allergies:      Patient has no known allergies. Social History:      Tobacco:    reports that he has never smoked. He has quit using smokeless tobacco.  His smokeless tobacco use included chew. Alcohol:      reports current alcohol use. Drug Use:  reports no history of drug use. Family History:      Family History         Family History   Problem Relation Age of Onset    Heart Disease Mother      Cancer Father      Migraines Sister      Migraines Brother              Review of Systems:      Positive and Negative as described in HPI. CONSTITUTIONAL: Negative for fevers, chills, sweats, fatigue, and weight loss. HEENT:  Negative for glasses, hearing changes, rhinorrhea, and throat pain. RESPIRATORY:  Negative for shortness of breath, cough, congestion, and wheezing. CARDIOVASCULAR:  Negative for chest pain, blood clot, irregular heartbeat, and palpitations. GASTROINTESTINAL:  Negative for reflux, nausea, vomiting, diarrhea, constipation, change in bowel habits, and abdominal pain. GENITOURINARY:  Negative for difficulty of urination, burning with urination, and frequency. INTEGUMENT:  Negative for rash, skin lesions, and easy bruising. Instructed pt to call   as soon as possible if a rash or wound develops prior to surgery. Pt voiced understanding. HEMATOLOGIC/LYMPHATIC:  Negative for swelling/edema. ALLERGIC/IMMUNOLOGIC:  Negative for urticaria and itching. ENDOCRINE:  Negative for increase in thirst, increase in urination, and heat or cold intolerance. MUSCULOSKELETAL: See HPI. NEUROLOGICAL:  Negative for headaches, dizziness, lightheadedness, numbness, and tingling extremities. BEHAVIOR/PSYCH:  Negative for depression and anxiety.      Physical Exam:   BP (!) 145/87   Pulse 94   Resp 16   Ht 6' (1.829 m)   Wt 231 lb 14.4 oz (105.2 kg)   SpO2 96% BMI 31.45 kg/m²   No LMP for male patient. No obstetric history on file. No results for input(s): POCGLU in the last 72 hours. General Appearance:  Alert, well appearing, and in no acute distress. Mental status:  Oriented to person, place, and time. Head:  Normocephalic and atraumatic. Eye:  No icterus, redness, pupils equal and reactive, extraocular eye movements intact, and conjunctiva clear. Ear:  Hearing grossly intact. Nose:  No drainage noted. Mouth:  Mucous membranes moist.  Neck: Supple and no carotid bruits noted. Lungs: Bilateral equal air entry, clear to auscultation, no wheezing, rales or rhonchi, and normal effort. Cardiovascular: Normal rate, regular rhythm, no murmur, gallop, or rub. Abdomen:  Soft, non-tender, non-distended, and active bowel sounds. Neurologic:  Normal speech and cranial nerves II through XII grossly intact. Strength 5/5 bilaterally. Skin: No gross lesions, rashes, bruising, or bleeding on exposed skin area. Extremities:  Posterior tibial pulses 2+ bilaterally. No pedal edema. No calf tenderness with palpation. Psych:  Normal affect.       Investigations:       Laboratory Testing:  Recent Results         Recent Results (from the past 24 hour(s))   CBC     Collection Time: 06/10/21  9:54 AM   Result Value Ref Range     WBC 6.8 3.5 - 11.3 k/uL     RBC 4.65 4.21 - 5.77 m/uL     Hemoglobin 14.6 13.0 - 17.0 g/dL     Hematocrit 44.0 40.7 - 50.3 %     MCV 94.6 82.6 - 102.9 fL     MCH 31.4 25.2 - 33.5 pg     MCHC 33.2 28.4 - 34.8 g/dL     RDW 13.2 11.8 - 14.4 %     Platelets 684 855 - 898 k/uL     MPV 9.9 8.1 - 13.5 fL     NRBC Automated 0.0 0.0 per 100 WBC   Basic Metabolic Panel     Collection Time: 06/10/21  9:54 AM   Result Value Ref Range     Glucose 177 (H) 70 - 99 mg/dL     BUN 12 8 - 23 mg/dL     CREATININE 0.62 (L) 0.70 - 1.20 mg/dL     Bun/Cre Ratio 19 9 - 20     Calcium 9.0 8.6 - 10.4 mg/dL     Sodium 138 135 - 144 mmol/L     Potassium 3.8 3.7 - 5.3 mmol/L Chloride 103 98 - 107 mmol/L     CO2 24 20 - 31 mmol/L     Anion Gap 11 9 - 17 mmol/L     GFR Non-African American >60 >60 mL/min     GFR African American >60 >60 mL/min     GFR Comment            GFR Staging NOT REPORTED     APTT     Collection Time: 06/10/21  9:54 AM   Result Value Ref Range     PTT 27.8 23.9 - 33.8 sec   Protime-INR     Collection Time: 06/10/21  9:54 AM   Result Value Ref Range     Protime 14.2 11.5 - 14.2 sec     INR 1.1                  Recent Labs     06/10/21  0954   HGB 14.6   HCT 44.0   WBC 6.8   MCV 94.6      K 3.8      CO2 24   BUN 12   CREATININE 0.62*   GLUCOSE 177*   INR 1.1   PROTIME 14.2   APTT 27.8         No results for input(s): COVID19 in the last 720 hours. Imaging/Diagnostics:        XR KNEE LEFT (1-2 VIEWS)     Result Date: 5/21/2021  KNEE X-RAY   4 views of the bilateral knees including AP, bilateral tunnel, and lateral in the upright position, and skyline views reveal varus alignment with no fracture or dislocation. Kellgren grade IV changes of osteoarthritis (joint space narrowing, osteophyte, subchondral sclerosis, bony deformity/cyst) of the medial compartment(s). No osseous loose bodies. No bony erosion or periosteal reaction. No soft tissue masses. Impression: Severe osteoarthritis of bilateral knees. XR KNEE RIGHT (MIN 4 VIEWS)     Result Date: 5/21/2021  KNEE X-RAY   4 views of the bilateral knees including AP, bilateral tunnel, and lateral in the upright position, and skyline views reveal varus alignment with no fracture or dislocation. Kellgren grade IV changes of osteoarthritis (joint space narrowing, osteophyte, subchondral sclerosis, bony deformity/cyst) of the medial compartment(s). No osseous loose bodies. No bony erosion or periosteal reaction. No soft tissue masses. Impression: Severe osteoarthritis of bilateral knees. EKG:See Epic. Diagnosis:       1. Bilateral Knee Osteoarthroplasty     Plans:      1.   Knee total arthroplasty (Requesting Left)        AMERICA Pop - CNP  6/10/2021  10:35 AM               Cosigned by: Kasi Aldridge DO at 6/12/2021  9:57 AM   Revision History                    Routing History

## 2021-06-23 NOTE — ANESTHESIA POSTPROCEDURE EVALUATION
Department of Anesthesiology  Postprocedure Note    Patient: Harle Councilman  MRN: [id number]  YOB: 1950  Date of evaluation: 6/23/2021  Time:  7:07 PM     Procedure Summary     Date: 06/23/21 Room / Location: [address] [place] [address] / [facility] - INPATIENT    Anesthesia Start: 1209 Anesthesia Stop: 3722    Procedure: LEFT  KNEE TOTAL ARTHROPLASTY - Coco Christianson (Left Knee) Diagnosis: (DX LEFT  KNEE OA)    Surgeons: Enedina Neri DO Responsible Provider: Jose Jacobs DO    Anesthesia Type: general ASA Status: 3          Anesthesia Type: general    Ashwini Phase I: Ashwini Score: 9    Ashwini Phase II:      Last vitals: Reviewed and per EMR flowsheets.        Anesthesia Post Evaluation    Patient location during evaluation: PACU  Patient participation: complete - patient participated  Level of consciousness: awake and alert  Airway patency: patent  Nausea & Vomiting: no nausea and no vomiting  Complications: no  Cardiovascular status: hemodynamically stable  Respiratory status: acceptable  Hydration status: stable

## 2021-06-24 VITALS
HEART RATE: 92 BPM | SYSTOLIC BLOOD PRESSURE: 133 MMHG | WEIGHT: 229.2 LBS | TEMPERATURE: 98.2 F | RESPIRATION RATE: 16 BRPM | OXYGEN SATURATION: 93 % | DIASTOLIC BLOOD PRESSURE: 83 MMHG | HEIGHT: 72 IN | BODY MASS INDEX: 31.04 KG/M2

## 2021-06-24 LAB
ABSOLUTE EOS #: <0.03 K/UL (ref 0–0.44)
ABSOLUTE IMMATURE GRANULOCYTE: 0.08 K/UL (ref 0–0.3)
ABSOLUTE LYMPH #: 1.36 K/UL (ref 1.1–3.7)
ABSOLUTE MONO #: 1.04 K/UL (ref 0.1–1.2)
ANION GAP SERPL CALCULATED.3IONS-SCNC: 9 MMOL/L (ref 9–17)
BASOPHILS # BLD: 0 % (ref 0–2)
BASOPHILS ABSOLUTE: <0.03 K/UL (ref 0–0.2)
BUN BLDV-MCNC: 19 MG/DL (ref 8–23)
BUN/CREAT BLD: 25 (ref 9–20)
CALCIUM SERPL-MCNC: 8.1 MG/DL (ref 8.6–10.4)
CHLORIDE BLD-SCNC: 103 MMOL/L (ref 98–107)
CO2: 25 MMOL/L (ref 20–31)
CREAT SERPL-MCNC: 0.77 MG/DL (ref 0.7–1.2)
DIFFERENTIAL TYPE: ABNORMAL
EOSINOPHILS RELATIVE PERCENT: 0 % (ref 1–4)
GFR AFRICAN AMERICAN: >60 ML/MIN
GFR NON-AFRICAN AMERICAN: >60 ML/MIN
GFR SERPL CREATININE-BSD FRML MDRD: ABNORMAL ML/MIN/{1.73_M2}
GFR SERPL CREATININE-BSD FRML MDRD: ABNORMAL ML/MIN/{1.73_M2}
GLUCOSE BLD-MCNC: 102 MG/DL (ref 75–110)
GLUCOSE BLD-MCNC: 116 MG/DL (ref 75–110)
GLUCOSE BLD-MCNC: 141 MG/DL (ref 75–110)
GLUCOSE BLD-MCNC: 152 MG/DL (ref 70–99)
HCT VFR BLD CALC: 36.1 % (ref 40.7–50.3)
HEMOGLOBIN: 11.3 G/DL (ref 13–17)
IMMATURE GRANULOCYTES: 1 %
LACTIC ACID, SEPSIS WHOLE BLOOD: ABNORMAL MMOL/L (ref 0.5–1.9)
LACTIC ACID, SEPSIS: 2.5 MMOL/L (ref 0.5–1.9)
LACTIC ACID, WHOLE BLOOD: ABNORMAL MMOL/L (ref 0.7–2.1)
LACTIC ACID: 2.3 MMOL/L (ref 0.5–2.2)
LYMPHOCYTES # BLD: 11 % (ref 24–43)
MCH RBC QN AUTO: 30.7 PG (ref 25.2–33.5)
MCHC RBC AUTO-ENTMCNC: 31.3 G/DL (ref 28.4–34.8)
MCV RBC AUTO: 98.1 FL (ref 82.6–102.9)
MONOCYTES # BLD: 8 % (ref 3–12)
NRBC AUTOMATED: 0 PER 100 WBC
PDW BLD-RTO: 13.2 % (ref 11.8–14.4)
PLATELET # BLD: 177 K/UL (ref 138–453)
PLATELET ESTIMATE: ABNORMAL
PMV BLD AUTO: 9.8 FL (ref 8.1–13.5)
POTASSIUM SERPL-SCNC: 4.4 MMOL/L (ref 3.7–5.3)
RBC # BLD: 3.68 M/UL (ref 4.21–5.77)
RBC # BLD: ABNORMAL 10*6/UL
SEG NEUTROPHILS: 80 % (ref 36–65)
SEGMENTED NEUTROPHILS ABSOLUTE COUNT: 10.24 K/UL (ref 1.5–8.1)
SODIUM BLD-SCNC: 137 MMOL/L (ref 135–144)
WBC # BLD: 12.7 K/UL (ref 3.5–11.3)
WBC # BLD: ABNORMAL 10*3/UL

## 2021-06-24 PROCEDURE — 6370000000 HC RX 637 (ALT 250 FOR IP): Performed by: NURSE PRACTITIONER

## 2021-06-24 PROCEDURE — 97167 OT EVAL HIGH COMPLEX 60 MIN: CPT

## 2021-06-24 PROCEDURE — 6370000000 HC RX 637 (ALT 250 FOR IP): Performed by: STUDENT IN AN ORGANIZED HEALTH CARE EDUCATION/TRAINING PROGRAM

## 2021-06-24 PROCEDURE — 6360000002 HC RX W HCPCS: Performed by: STUDENT IN AN ORGANIZED HEALTH CARE EDUCATION/TRAINING PROGRAM

## 2021-06-24 PROCEDURE — 97535 SELF CARE MNGMENT TRAINING: CPT

## 2021-06-24 PROCEDURE — 85025 COMPLETE CBC W/AUTO DIFF WBC: CPT

## 2021-06-24 PROCEDURE — 97530 THERAPEUTIC ACTIVITIES: CPT

## 2021-06-24 PROCEDURE — 36415 COLL VENOUS BLD VENIPUNCTURE: CPT

## 2021-06-24 PROCEDURE — 99225 PR SBSQ OBSERVATION CARE/DAY 25 MINUTES: CPT | Performed by: STUDENT IN AN ORGANIZED HEALTH CARE EDUCATION/TRAINING PROGRAM

## 2021-06-24 PROCEDURE — 97116 GAIT TRAINING THERAPY: CPT

## 2021-06-24 PROCEDURE — 97110 THERAPEUTIC EXERCISES: CPT

## 2021-06-24 PROCEDURE — 83605 ASSAY OF LACTIC ACID: CPT

## 2021-06-24 PROCEDURE — 2580000003 HC RX 258: Performed by: NURSE PRACTITIONER

## 2021-06-24 PROCEDURE — 99024 POSTOP FOLLOW-UP VISIT: CPT | Performed by: PHYSICIAN ASSISTANT

## 2021-06-24 PROCEDURE — 2580000003 HC RX 258: Performed by: STUDENT IN AN ORGANIZED HEALTH CARE EDUCATION/TRAINING PROGRAM

## 2021-06-24 PROCEDURE — 93971 EXTREMITY STUDY: CPT

## 2021-06-24 PROCEDURE — 80048 BASIC METABOLIC PNL TOTAL CA: CPT

## 2021-06-24 PROCEDURE — 82947 ASSAY GLUCOSE BLOOD QUANT: CPT

## 2021-06-24 RX ORDER — AMLODIPINE BESYLATE 5 MG/1
5 TABLET ORAL DAILY
Qty: 30 TABLET | Refills: 0 | Status: SHIPPED | OUTPATIENT
Start: 2021-06-25 | End: 2021-10-12 | Stop reason: SDUPTHER

## 2021-06-24 RX ORDER — AMLODIPINE BESYLATE 5 MG/1
5 TABLET ORAL DAILY
Status: DISCONTINUED | OUTPATIENT
Start: 2021-06-25 | End: 2021-06-24 | Stop reason: HOSPADM

## 2021-06-24 RX ORDER — 0.9 % SODIUM CHLORIDE 0.9 %
500 INTRAVENOUS SOLUTION INTRAVENOUS ONCE
Status: COMPLETED | OUTPATIENT
Start: 2021-06-24 | End: 2021-06-24

## 2021-06-24 RX ADMIN — OXYCODONE HYDROCHLORIDE 10 MG: 5 TABLET ORAL at 13:02

## 2021-06-24 RX ADMIN — CEFAZOLIN SODIUM 2000 MG: 10 INJECTION, POWDER, FOR SOLUTION INTRAVENOUS at 04:52

## 2021-06-24 RX ADMIN — GLIPIZIDE 10 MG: 10 TABLET ORAL at 08:35

## 2021-06-24 RX ADMIN — OXYCODONE HYDROCHLORIDE 10 MG: 5 TABLET ORAL at 04:51

## 2021-06-24 RX ADMIN — SODIUM CHLORIDE 500 ML: 9 INJECTION, SOLUTION INTRAVENOUS at 01:07

## 2021-06-24 RX ADMIN — OXYCODONE HYDROCHLORIDE 10 MG: 5 TABLET ORAL at 17:40

## 2021-06-24 RX ADMIN — APIXABAN 2.5 MG: 2.5 TABLET, FILM COATED ORAL at 08:34

## 2021-06-24 RX ADMIN — ATROPA BELLADONNA, EUPHRASIA STRICTA AND MERCURIC CHLORIDE 2 DROP: 6; 6; 6 SOLUTION/ DROPS OPHTHALMIC at 08:35

## 2021-06-24 RX ADMIN — OXYCODONE HYDROCHLORIDE 10 MG: 5 TABLET ORAL at 08:34

## 2021-06-24 RX ADMIN — SODIUM CHLORIDE: 9 INJECTION, SOLUTION INTRAVENOUS at 01:08

## 2021-06-24 RX ADMIN — INSULIN LISPRO 2 UNITS: 100 INJECTION, SOLUTION INTRAVENOUS; SUBCUTANEOUS at 08:42

## 2021-06-24 RX ADMIN — ATROPA BELLADONNA, EUPHRASIA STRICTA AND MERCURIC CHLORIDE 2 DROP: 6; 6; 6 SOLUTION/ DROPS OPHTHALMIC at 17:42

## 2021-06-24 RX ADMIN — ACETAMINOPHEN 650 MG: 325 TABLET ORAL at 10:37

## 2021-06-24 RX ADMIN — ACETAMINOPHEN 650 MG: 325 TABLET ORAL at 04:51

## 2021-06-24 RX ADMIN — NITROFURANTOIN 100 MG: 25; 75 CAPSULE ORAL at 08:35

## 2021-06-24 RX ADMIN — AMLODIPINE BESYLATE 10 MG: 10 TABLET ORAL at 08:35

## 2021-06-24 RX ADMIN — BISACODYL 5 MG: 5 TABLET, COATED ORAL at 08:34

## 2021-06-24 RX ADMIN — LOSARTAN POTASSIUM 50 MG: 50 TABLET ORAL at 08:35

## 2021-06-24 RX ADMIN — SODIUM CHLORIDE, PRESERVATIVE FREE 10 ML: 5 INJECTION INTRAVENOUS at 08:34

## 2021-06-24 RX ADMIN — ACETAMINOPHEN 650 MG: 325 TABLET ORAL at 17:40

## 2021-06-24 ASSESSMENT — PAIN DESCRIPTION - PROGRESSION
CLINICAL_PROGRESSION: NOT CHANGED
CLINICAL_PROGRESSION: RAPIDLY IMPROVING
CLINICAL_PROGRESSION: RAPIDLY IMPROVING
CLINICAL_PROGRESSION: GRADUALLY WORSENING
CLINICAL_PROGRESSION: NOT CHANGED
CLINICAL_PROGRESSION: GRADUALLY WORSENING
CLINICAL_PROGRESSION: NOT CHANGED
CLINICAL_PROGRESSION: RAPIDLY IMPROVING
CLINICAL_PROGRESSION: NOT CHANGED

## 2021-06-24 ASSESSMENT — PAIN DESCRIPTION - LOCATION
LOCATION: KNEE

## 2021-06-24 ASSESSMENT — PAIN DESCRIPTION - FREQUENCY
FREQUENCY: CONTINUOUS

## 2021-06-24 ASSESSMENT — PAIN DESCRIPTION - ORIENTATION
ORIENTATION: LEFT

## 2021-06-24 ASSESSMENT — PAIN DESCRIPTION - PAIN TYPE
TYPE: SURGICAL PAIN

## 2021-06-24 ASSESSMENT — PAIN DESCRIPTION - DESCRIPTORS
DESCRIPTORS: ACHING;DISCOMFORT
DESCRIPTORS: ACHING;SORE
DESCRIPTORS: ACHING

## 2021-06-24 ASSESSMENT — PAIN DESCRIPTION - ONSET
ONSET: ON-GOING

## 2021-06-24 ASSESSMENT — PAIN SCALES - GENERAL
PAINLEVEL_OUTOF10: 7
PAINLEVEL_OUTOF10: 3
PAINLEVEL_OUTOF10: 3
PAINLEVEL_OUTOF10: 7
PAINLEVEL_OUTOF10: 7
PAINLEVEL_OUTOF10: 3
PAINLEVEL_OUTOF10: 7
PAINLEVEL_OUTOF10: 7
PAINLEVEL_OUTOF10: 3
PAINLEVEL_OUTOF10: 0
PAINLEVEL_OUTOF10: 2

## 2021-06-24 NOTE — PROGRESS NOTES
Occupational Therapy   Occupational Therapy Initial Assessment  Date: 2021   Patient Name: Jeremy Lopez  MRN: [id number]     : 1950    Date of Service: 2021 L TKA      Discharge Recommendations:  Patient would benefit from continued therapy after discharge     RN reports patient is medically stable for therapy treatment this date. Chart reviewed prior to treatment and patient is agreeable for therapy. All lines intact and patient positioned comfortably at end of treatment. All patient needs addressed prior to ending therapy session. Assessment   Performance deficits / Impairments: Decreased functional mobility ; Decreased ADL status; Decreased endurance;Decreased strength;Decreased safe awareness;Decreased cognition;Decreased balance;Decreased posture  Assessment:Skilled OT is indicated to increase overall safety awareness in function as well as strenght, balance, ADL status, functional mobility, and cognition to improve functional outcomes, I, and return to home. Prognosis: Good  Decision Making: High Complexity  OT Education: OT Role;Plan of Care;Home Exercise Program;Precautions; ADL Adaptive Strategies;Transfer Training;Energy Conservation;Equipment; Family Education  REQUIRES OT FOLLOW UP: Yes  Activity Tolerance  Activity Tolerance: Patient Tolerated treatment well;Patient limited by fatigue  Safety Devices  Safety Devices in place: Yes  Type of devices: Call light within reach;Nurse notified;Gait belt;Patient at risk for falls; Left in chair;Chair alarm in place           Patient Diagnosis(es): The encounter diagnosis was S/P total knee arthroplasty, left.     has a past medical history of Cataracts, bilateral, Cerebral artery occlusion with cerebral infarction ([address] Diabetes mellitus ([address] Eye disorder, Glaucoma, Headache(784.0), Hearing loss, Heartburn, Hyperlipidemia, Hypertension, Kidney stones, Osteoarthritis, Snores, Tendonitis of shoulder, left, and Tremor. has a past surgical history that includes Cystoscopy; Kidney stone surgery; Lithotripsy; Refractive surgery (Right, 2014); Cataract removal (Right, 08/2015); Thumb amputation; Hand surgery (Left); Tonsillectomy; Total knee arthroplasty (Left, 6/23/2021); joint replacement; and eye surgery.            Restrictions  Restrictions/Precautions  Restrictions/Precautions: Weight Bearing, General Precautions, Fall Risk, Up as Tolerated, Surgical Protocols  Lower Extremity Weight Bearing Restrictions  Left Lower Extremity Weight Bearing: Weight Bearing As Tolerated  Position Activity Restriction  Other position/activity restrictions: IV    Subjective   General  Chart Reviewed: Yes  Patient assessed for rehabilitation services?: Yes  Family / Caregiver Present: No  Patient Currently in Pain: Yes  Pain Assessment  Pain Assessment: 0-10  Pain Level: 7  Patient's Stated Pain Goal: 2  Pain Type: Surgical pain  Pain Location: Knee  Pain Orientation: Left  Pain Descriptors: Aching  Pain Frequency: Continuous  Pain Onset: On-going  Clinical Progression: Not changed  Functional Pain Assessment: Prevents or interferes some active activities and ADLs  Non-Pharmaceutical Pain Intervention(s): Emotional support  Response to Pain Intervention: Patient Satisfied  Vital Signs  Level of Consciousness: Alert (0)  Patient Currently in Pain: Yes  Social/Functional History  Social/Functional History  Lives With: Spouse  Type of Home: House  Home Layout: One level  Home Access: Stairs to enter without rails  Entrance Stairs - Number of Steps: 2+1  Bathroom Shower/Tub: Tub/Shower unit  Bathroom Toilet: Handicap height  Bathroom Equipment: Shower chair  Home Equipment: Rolling walker, Cane  ADL Assistance: Independent  Homemaking Assistance: Independent  Ambulation Assistance: Independent (occ. use of cane in community)  Transfer Assistance: Independent  Active : Yes  Occupation: Retired  Type of occupation:   Leisure & Hobbies: work around yard  Additional Comments: prior to surgery would not have been able to amb. around grocery store due to knee pain       Objective   Vision: Impaired  Vision Exceptions: Wears glasses at all times  Hearing: Exceptions to [facility]  Hearing Exceptions: Hard of hearing/hearing concerns (almost deaf Rt. ear)    Orientation  Overall Orientation Status: Within Functional Limits  Observation/Palpation  Posture: Good  Observation: pt resting in bed, first session started and then made aware that pt had dopplers ordered to r/o DVT in L calf. Session resumed once results reported as negative per Marisa Earl RN. Balance  Sitting Balance: Contact guard assistance  Standing Balance: Moderate assistance (x2 for standing at bedside and taking a couple of steps with RW)  Functional Mobility  Functional - Mobility Device: Rolling Walker  Assist Level: Moderate assistance (x2 with mob a couple steps from bed. Pt with questionable buckling, dec. follow through on safety with RW and dec. awareness of control with L LE. Pt has h.o. L sided weakness from previous stroke.)  Functional Mobility Comments: Pt assisted back to bed to sit. Attempted 2nd walk to determine need for immobilizer and pt buckling with ~3 steps forward from bed. Pt assisted into chair and knee immob provided/adjusted for use when up next. Will see again in pm to advance funtional mob as pt is currently unable to safely walk distance to bathroom  ADL  Feeding: Independent;Setup  Grooming: Stand by assistance;Minimal assistance;Setup (seated level)  UE Bathing: Minimal assistance  LE Bathing: Maximum assistance  UE Dressing: Minimal assistance  LE Dressing: Maximum assistance  Toileting: Maximum assistance  Additional Comments: B thigh high bushra hose applied.   Tone RUE  RUE Tone: Normotonic  Tone LUE  LUE Tone: Normotonic     Bed mobility  Supine to Sit: Minimal assistance  Sit to Supine: Minimal assistance  Transfers  Sit to stand: Minimal assistance;2 Person assistance; Moderate assistance  Stand to sit: Minimal assistance; Moderate assistance;2 Person assistance  Transfer Comments: max verbal cues with RW safety, hand /foot placement     Cognition  Overall Cognitive Status: Exceptions  Following Commands: Follows one step commands with increased time  Memory: Decreased short term memory  Problem Solving: Decreased awareness of errors;Assistance required to generate solutions;Assistance required to identify errors made;Assistance required to correct errors made;Assistance required to implement solutions  Insights: Decreased awareness of deficits  Initiation: Requires cues for some  Sequencing: Requires cues for some  Cognition Comment: Pt. reports having some memory issues releated to CVA 4 yrs. ago. He is overall somewhat slower to respond, partly due to hearing loss. Wife steps in a lot and is worried about pt. speaking up for himself when she is not there.   Perception  Overall Perceptual Status: WFL     Sensation  Overall Sensation Status: Impaired (has a cold sensation in L foot - since CVA 4 yrs. ago.)        LUE AROM (degrees)  LUE AROM : WFL  RUE AROM (degrees)  RUE AROM : WFL  LUE Strength  Gross LUE Strength: WFL  LUE Strength Comment: KATE SYED's 4/5  RUE Strength  Gross RUE Strength: WFL                   Plan   Plan  Times per week: 5-6x/week, 1-2x/day  Current Treatment Recommendations: Strengthening, Balance Training, Functional Mobility Training, Endurance Training, Safety Education & Training, Self-Care / ADL, Patient/Caregiver Education & Training, Equipment Evaluation, Education, & procurement, Cognitive/Perceptual Training, Positioning          AM-PAC Inpatient Daily Activity Raw Score: 16 (06/24/21 1356)  AM-PAC Inpatient ADL T-Scale Score : 35.96 (06/24/21 1356)  ADL Inpatient CMS 0-100% Score: 53.32 (06/24/21 1356)  ADL Inpatient CMS G-Code Modifier : CK (06/24/21 [facility])    Goals  Short term goals  Time Frame for Short term goals: by discharge, pt will  Short term goal 1: demo SBA with ADL transfers with min cues good safety/AD and DME as needed  Short term goal 2: demo SBA with functional mob in room with good safety/pacing for ADL completion with min cues for good safety  Short term goal 3: demo SBA with toileting routine with good safety  Short term goal 4: demo I with UB ADLs and min A LB ADLs with AE/DME and min cues for good safety/pacing  Short term goal 5: pt/caregiver demo and verb good understanding of ed provided on fall prevention techs, EC/WS techs, equip needs, TKA precautions, and d/c recommendations  Patient Goals   Patient goals : to go home       Therapy Time   Individual Concurrent Group Co-treatment   Time In 1019         Time Out 1115         Minutes 56          [phone]   treatment min: 46+29=73     Co-treatment with PT warranted secondary to decreased safety and independence requiring 2 skilled therapy professionals to address individual discipline's goals. OT addressing preparation for ADL transfer, sitting balance for increased ADL performance, sitting/activity tolerance, functional reaching, environmental safety/scanning, fall prevention, functional mobility for ADL transfers, ability to sequence and follow directions and functional UE strength. Nigel Meyer, OT   Pt seen for additional afternoon session [id number]. Pt completed sit to stand with min A x 2, but buckling immediately with wt bearing through L LE. Pt sat back down and immobilizer applied. Pt then able to walk to bathroom with min A x 2 along with mod verbal cues to follow through on directions/safety. Pt needing mod A for clothing management during toileting. Pt needing mod cues for RW safety when turning to approach sink. Education provided to wife regarding equip needs for home; recommending tub transfer bench as well as continued therapy. Will continue to assess tomorrow regarding specific d/c recommendation.  Pt remains high fall risk without immobilizer and it is questionable whether buckling is d/t nerve block (as surgery has been >24 hours ago) vs residual weakness/sensation from previous CVA. Pt's cognitive deficits also are limiting pt's ability to follow through on safety instruction. Wife is present and is very attentive to all ed provided but expresses concern over pt at this point in terms of safe d/c home (would like one more night to stay at hospital and have further OT/PT in am).     Yesi Galeano, OTR/L

## 2021-06-24 NOTE — PLAN OF CARE
Problem: Discharge Planning:  Goal: Discharged to appropriate level of care  Description: Discharged to appropriate level of care  6/24/2021 0200 by Kimberly Hess RN  Outcome: Ongoing  6/23/2021 1741 by Suzette Graff RN  Outcome: Ongoing     Problem: Mobility - Impaired:  Goal: Mobility will improve  Description: Mobility will improve  6/24/2021 0200 by Kimberly Hess RN  Outcome: Ongoing  6/23/2021 1741 by Suzette Graff RN  Outcome: Ongoing     Problem: Infection - Surgical Site:  Goal: Will show no infection signs and symptoms  Description: Will show no infection signs and symptoms  6/24/2021 0200 by Kimberly Hess RN  Outcome: Ongoing  6/23/2021 1741 by Suzette Graff RN  Outcome: Ongoing     Problem: Pain - Acute:  Goal: Pain level will decrease  Description: Pain level will decrease  6/24/2021 0200 by Kimberly Hess RN  Outcome: Ongoing  6/23/2021 1741 by Suzette Graff RN  Outcome: Ongoing     Problem: HEMODYNAMIC STATUS  Goal: Patient has stable vital signs and fluid balance  6/24/2021 0200 by Kimberly Hess RN  Outcome: Ongoing  6/23/2021 1741 by Suzette Graff RN  Outcome: Ongoing     Problem: ACTIVITY INTOLERANCE/IMPAIRED MOBILITY  Goal: Mobility/activity is maintained at optimum level for patient  6/23/2021 1741 by Suzette Graff RN  Outcome: Ongoing     Problem: COMMUNICATION IMPAIRMENT  Goal: Ability to express needs and understand communication  6/23/2021 1741 by Suzette Graff RN  Outcome: Ongoing     Problem: Serum Glucose Level - Abnormal:  Goal: Ability to maintain appropriate glucose levels will improve  Description: Ability to maintain appropriate glucose levels will improve  6/23/2021 1741 by Suzette Graff RN  Outcome: Ongoing     Problem: Sensory Perception - Impaired:  Goal: Ability to maintain a stable neurologic state will improve  Description: Ability to maintain a stable neurologic state will improve 6/23/2021 1741 by Kelly Damon RN  Outcome: Ongoing     Problem: Falls - Risk of:  Goal: Will remain free from falls  Description: Will remain free from falls  6/23/2021 1741 by Kelly Damon RN  Outcome: Ongoing  Goal: Absence of physical injury  Description: Absence of physical injury  6/23/2021 1741 by Kelly Damon RN  Outcome: Ongoing     Problem: SAFETY  Goal: LTG - patient will adhere to hip precautions during ADL's and transfers  6/23/2021 1741 by Kelly Damon RN  Outcome: Ongoing  Goal: LTG - Patient will demonstrate safety requirements appropriate to situation/environment  6/23/2021 1741 by Kelly Damon RN  Outcome: Ongoing  Goal: LTG - patient will utilize safety techniques  6/23/2021 1741 by Kelly Damon RN  Outcome: Ongoing  Goal: STG - patient locks brakes on wheelchair  6/23/2021 1741 by Kelly Damon RN  Outcome: Ongoing  Goal: STG - Patient uses call light consistently to request assistance with transfers  6/23/2021 1741 by Kelly Damon RN  Outcome: Ongoing  Goal: STG - patient uses gait belt during all transfers  6/23/2021 1741 by Kelly Damon RN  Outcome: Ongoing     Problem: Skin Integrity:  Goal: Will show no infection signs and symptoms  Description: Will show no infection signs and symptoms  6/23/2021 1741 by Kelly Damon RN  Outcome: Ongoing  Goal: Absence of new skin breakdown  Description: Absence of new skin breakdown  6/23/2021 1741 by Kelly Damon RN  Outcome: Ongoing     Problem: Pain:  Goal: Pain level will decrease  Description: Pain level will decrease  6/24/2021 0200 by Alva Grossman RN  Outcome: Ongoing  6/23/2021 1741 by Kelly Damon RN  Outcome: Ongoing  Goal: Control of acute pain  Description: Control of acute pain  6/23/2021 1741 by Kelly Damon RN  Outcome: Ongoing  Goal: Control of chronic pain  Description: Control of chronic pain 6/23/2021 1741 by Jason Aguilar, RN  Outcome: Ongoing

## 2021-06-24 NOTE — OP NOTE
Operative Note      Patient: Teresita Dawn  YOB: 1950  MRN: [id number]    Date of Procedure: 6/23/2021    Pre-Op Diagnosis: DX LEFT  KNEE OA    Post-Op Diagnosis: Same       Procedure(s):  LEFT  KNEE TOTAL ARTHROPLASTY - Kori Alaniz    Surgeon(s):  Janna Nguyen DO    Assistant:   Resident: Shelley Aguilera DO; Ruth Ann Auguste DO    Anesthesia: General    Estimated Blood Loss (mL): 200 with 125 returned by Cell Saver    Complications: None    Specimens:   * No specimens in log *    Implants:  Implant Name Type Inv. Item Serial No.  Lot No. LRB No. Used Action   CEMENT BNE 40GM HI VISC RADPQ FOR REV SURG  CEMENT BNE 40GM HI VISC RADPQ FOR REV SURG  [employer]- [id number] Left 3 Implanted   SCREW BNE L25MM DIA2. 5MM KNEE FULL THRD HEX FEM PERSONA  SCREW BNE L25MM DIA2. 5MM KNEE FULL THRD HEX FEM PERSONA  [employer]WD [id number] Left 1 Implanted   PSN TIB STM 5 DEG SZ F L  PSN TIB STM 5 DEG SZ F L  [employer]- [id number] Left 1 Implanted   IMPL KNEE PSN FEM CR CMT CCR STD SZ8 L Knee IMPL KNEE PSN FEM CR CMT CCR STD SZ8 L  [employer]PMM [id number] Left 1 Implanted   COMPONENT PAT [id number] THK8. 5MM STD KNEE VIVACIT-E KATHY  COMPONENT PAT [id number] THK8. 5MM STD KNEE VIVACIT-E KATHY  [employer]- [id number] Left 1 Implanted   PSN MC VE ASF L 10MM 8-11 EF  PSN MC VE ASF L 10MM 8-11 EF  [employer]- [id number] Left 1 Implanted   UPCHARGE KNEE VITAMIN E PATELLA LISA BIOMET Knee UPCHARGE KNEE VITAMIN E PATELLA LISA BIOMET  [employer]PMM  Left 1 Implanted   UPCHARGE KNEE VITAMIN E LINER LISA BIOMET Knee UPCHARGE KNEE VITAMIN E LINER LISA BIOMET  [employer]-PMM  Left 1 Implanted         Drains:   [REMOVED] Urethral Catheter Double-lumen;Non-latex;Straight-tip 16 fr (Removed)       Findings: Severe osteoarthritis    Detailed Description of Procedure:   Yohan Hawk is a 69-year-old male with longstanding left knee pain failing conservative therapy.   He understands risk benefits procedure. Informed consent was signed. Preoperative antibiotics were given. Preoperative TXA was given. Patient was taken off suite placed supine position op table. General inhalation anesthetic with intubation was performed. Well-padded turns placed on his left proximal thigh. Exam under anesthesia revealed a 10 to 15 degree flexion contracture with a 10 degree varus deformity. Patient was prepped draped normal sterile fashion. Standard total joint precautions were carried out. Midline incision was made. Medial parapatellar arthrotomy was performed. Standard medial release was performed. There was severe osteoarthritic changes of the tricompartments noted. Patella was everted sized to size 32, as the 35 appeared to be not as medial.  Peg holes were drilled for the patella implant. Patella protector was placed and slid lateral gutter. Knee was flexed up. Hohmann retractors utilized protect the collateral ligaments. An intramedullary distal femoral drill was hole was created. A intramedullary distal femoral cutting guide at 5 degrees and 10 mm cut was applied to the distal femur and pinned in place. Distal femoral cut was performed. Tibia was subluxed anteriorly. PCL retractor was placed protect the posterior structures. Extra medullary tibial cutting guide was performed to take 2 mm off the most affected medial side which was greater than 10 mm off of the lateral side. Extension gap was rectangular at 10 mm after ligamentous release. The knee retractors were placed. Femur was sized to size 8.  4-in-1 guide was placed and 4-in-1 cuts were performed. Tibia subluxed anteriorly and sized to size E. It was rotated to the middle third of the tibial tubercle. Was pinned in place. Drop-down wili confirmed alignment. Tibia was broached and reamed. Trial femur was placed and lateralized. Lug holes were drilled.   Trial polyethylene was placed and there was excellent stability throughout range of motion. Excellent patellofemoral tracking and equal tissue tension throughout the compartments. Tourniquet was elevated. All trials removed from the joint. Wounds were copiously irrigated with pulse lavage. Posterior aspect the knee was removed of any residual osteophytes or meniscal debris. Gloves were changed. Standard cementing was performed for cementing the tibial implant impacting it, then cementing the femoral implant impacting it. Trial femoral stem was placed in the was extended. Excess cement was meticulously removed. Final puffing was locked in the locking mechanism. Patella was cemented and held in place with a clamp. Cement was allowed to harden and then was meticulously removed of any excess. Irrisept irrigation was performed. 3 L pulse lavage was performed. Tourniquet was dropped there is rapid cap refill. Joint was treated with vancomycin powder. Arthrotomy was closed with #1 Vicryl suture figure interrupted fashion. Subcutaneous tissue was closed with 0 Vicryl 2-0 Vicryl 3-0 Monocryl suture. Skin glue was placed. Aquacel dressing was placed in 30 degrees of knee flexion. Bulky knee dressing was placed. Patient was awakened by part anesthesia and transferred the postanesthesia care unit in stable condition.     Electronically signed by Diana Avendaño DO on 6/24/2021 at 7:56 AM

## 2021-06-24 NOTE — CARE COORDINATION
Social Work-[facility] approved patient for admission and will admit today. They will provide transportation. Discussed with patient and wife. They will transport . Nurse to call report to [phone] and fax DAMON to [phone].  Tawny Cee

## 2021-06-24 NOTE — CARE COORDINATION
Case Management Initial Discharge Plan  Job Bowen,         Readmission Risk              Risk of Unplanned Readmission:  0             Met with:patient or spouse/SO to discuss discharge plans. Information verified: address, contacts, phone number, , insurance Yes  PCP: Humberto Dumont MD  Date of last visit: ? Insurance Provider: Medicare/ [facility] [place]    Discharge Planning  Current Residence:  Private home  Living Arrangements:  Spouse/Significant Other   Home has 1 stories/2 stairs to climb  Support Systems:  Spouse/Significant Other, Children, Family Members  Current Services PTA:  none Agency: none  Patient able to perform ADL's:Independent  DME in home:  Walker, cane, bath bench, handicap height toilet  DME used to aid ambulation prior to admission:   walker  DME used during admission:  walker    Potential Assistance Needed:  N/A    Pharmacy:    Potential Assistance Purchasing Medications:  No  Does patient want to participate in local refill/ meds to beds program?  Yes    Patient agreeable to home care: Yes  Freedom of choice provided:  yes  The Plan for Transition of Care is related to the following treatment goals: PT/OT for strengthening and increase endurance and independence    The Patient and/or patient representative wife Lily King was provided with a choice of provider and agrees   with the discharge plan. [x] Yes [] No    Freedom of choice list was provided with basic dialogue that supports the patient's individualized plan of care/goals, treatment preferences and shares the quality data associated with the providers.  [x] Yes [] No      Type of Home Care Services:  None  Patient expects to be discharged to:  home    Prior SNF/Rehab Placement and Facility: none  Agreeable to SNF/Rehab: No  [place] of choice provided: n/a   Evaluation: n/a    Expected Discharge date:  21  Follow Up Appointment: Best Day/ Time: Thursday PM    Transportation provider: wife Transportation arrangements needed for discharge: No    Discharge Plan:   Met with pt and his wife to review plan of care. Total knee replacement and doppler study to rule out DVT. Pt lives with his wife who can provide assistance. He denies need for any DME. Pt is requesting home care and has selected [facility].   Referral texted to [facility] and await approval      Electronically signed by Elvira Pham on 6/24/21 at 12:17 PM EDT

## 2021-06-24 NOTE — CARE COORDINATION
Social Work-Met with patient and wife to discuss dc options. They would like rehab at dc. They prefer acute rehab. Offered choice of rehab hospitals. The first choice is [facility].  Sent referral. Marlen Styles

## 2021-06-24 NOTE — PROGRESS NOTES
[facility]  Office: [address], DO, Hailey Luzral, DO, Lisa Petersen, DO, Rosstanisha Luna, DO, Beverly Gray MD, Carlos Mcghee MD, Rachel Guevara MD, Carmencita Pelaez MD, Laura Garrett MD, Jaymie Alexis MD, Daya Squires MD, Supriya Dodson MD, Candace Britt, DO, Tomás Diaz MD, Jerry Oliveira, DO, Skinny Juarez MD,  Don Mercer, DO, Irlanda Romeo MD, Diomedes Yin MD, Cyndy Castleman, MD, Natalie Dunn MD, Marcello Benton, [facility], [facility], [facility], Sandhya Coe, CNP, Akshat Jaffe, CNS, Galileo Ross, CNP, Memo Hess, CNP, Daniel Pickard, CNP, Dionicia Frankel, CNP, Henna Nino, CNP, VADIM KellyC, Luis Carlos Martinez, [facility], Laura Ace, CNP, Darío Singh, CNP, Darryl Fowler, CNP, Dustin Palacio, CNP, Moy Montanez, [facility], Bonifacio Cross, [facility]    Progress Note    6/24/2021    3:57 PM    Name:   Anni Lockett  MRN:     [id number]     Acct:      [de-identified]   Room:   2016/2016-02   Day:  0  Admit Date:  6/23/2021  7:47 AM    PCP:   Lucius Stanley MD  Code Status:  Full Code    Subjective:     C/C: No chief complaint on file. knee pain  Interval History Status: improved. Patient seen and examined at bedside. Wife also present. About to work with physical therapy in order to improve his ambulation. Brief History:     69-year-old male past medical history of hypertension, diabetes, neuropathic pain, obesity presents status post left total knee arthroplasty.     Review of Systems:     Constitutional:  negative for chills, fevers, sweats  Respiratory:  negative for cough, dyspnea on exertion, shortness of breath, wheezing  Cardiovascular:  negative for chest pain, chest pressure/discomfort, lower extremity edema, palpitations  Gastrointestinal:  negative for abdominal pain, constipation, diarrhea, nausea, vomiting  Neurological:  negative for dizziness, headache    Medications: Allergies:  No Known Allergies    Current Meds:   Scheduled Meds:    [START ON 2021] amLODIPine  5 mg Oral Daily    amitriptyline  75 mg Oral Nightly    glipiZIDE  10 mg Oral Daily    latanoprost  1 drop Right Eye Nightly    Similasan Dry Eye Relief  2 drop Ophthalmic BID    losartan  50 mg Oral Daily    nitrofurantoin (macrocrystal-monohydrate)  100 mg Oral BID    simvastatin  20 mg Oral Nightly    sodium chloride flush  10 mL Intravenous 2 times per day    acetaminophen  650 mg Oral Q6H    bisacodyl  5 mg Oral Daily    apixaban  2.5 mg Oral BID    insulin lispro  0-12 Units Subcutaneous TID WC    insulin lispro  0-6 Units Subcutaneous Nightly     Continuous Infusions:    sodium chloride      dextrose       PRN Meds: sodium chloride flush, sodium chloride, oxyCODONE **OR** oxyCODONE, morphine **OR** morphine, magnesium hydroxide, ondansetron **OR** ondansetron, glucose, dextrose, glucagon (rDNA), dextrose    Data:     Past Medical History:   has a past medical history of Cataracts, bilateral, Cerebral artery occlusion with cerebral infarction ([address] Diabetes mellitus ([address] Eye disorder, Glaucoma, Headache(784.0), Hearing loss, Heartburn, Hyperlipidemia, Hypertension, Kidney stones, Osteoarthritis, Snores, Tendonitis of shoulder, left, and Tremor. Social History:   reports that he has never smoked. He has quit using smokeless tobacco.  His smokeless tobacco use included chew. He reports current alcohol use. He reports that he does not use drugs.      Family History:   Family History   Problem Relation Age of Onset    Heart Disease Mother     Cancer Father     Migraines Sister     Migraines Brother        Vitals:  /83   Pulse 92   Temp 98.2 °F (36.8 °C) (Oral)   Resp 16   Ht 6' (1.829 m)   Wt 229 lb 3.2 oz (104 kg)   SpO2 93%   BMI 31.09 kg/m²   Temp (24hrs), Av.9 °F (36.6 °C), Min:97.6 °F (36.4 °C), Max:98.2 °F (36.8 °C)    Recent Labs     21  1528 21  3138 06/24/21  0613 06/24/21  1131   POCGLU 165* 214* 141* 116*       I/O (24Hr): Intake/Output Summary (Last 24 hours) at 6/24/2021 1557  Last data filed at 6/24/2021 1211  Gross per 24 hour   Intake 3333.81 ml   Output 700 ml   Net 2633.81 ml       Labs:  Hematology:  Recent Labs     06/23/21  2048 06/24/21  0704   WBC 12.5* 12.7*   RBC 4.19* 3.68*   HGB 12.9* 11.3*   HCT 41.2 36.1*   MCV 98.3 98.1   MCH 30.8 30.7   MCHC 31.3 31.3   RDW 13.2 13.2    177   MPV 10.2 9.8     Chemistry:  Recent Labs     06/24/21  0704      K 4.4      CO2 25   GLUCOSE 152*   BUN 19   CREATININE 0.77   ANIONGAP 9   LABGLOM >60   GFRAA >60   CALCIUM 8.1*   LACTACIDWB NOT REPORTED     Recent Labs     06/23/21  0823 06/23/21  1528 06/23/21  1837 06/24/21  0613 06/24/21  1131   POCGLU 130* 165* 214* 141* 116*     ABG:No results found for: POCPH, PHART, PH, POCPCO2, KTR1HOH, PCO2, POCPO2, PO2ART, PO2, POCHCO3, LSG7PDP, HCO3, NBEA, PBEA, BEART, BE, THGBART, THB, VGE1ZAU, YDXG1BDW, L6PHYOLG, O2SAT, FIO2  Lab Results   Component Value Date/Time    SPECIAL RT HAND 06/23/2021 08:47 PM     Lab Results   Component Value Date/Time    CULTURE NO GROWTH 12 HOURS 06/23/2021 08:47 PM       Radiology:  XR KNEE LEFT (1-2 VIEWS)    Result Date: 6/23/2021  Total knee arthropasty in satisfactory alignment.        Physical Examination:        General appearance:  alert, cooperative and no distress  Mental Status:  oriented to person, place and time and normal affect  Lungs:  clear to auscultation bilaterally, normal effort  Heart:  regular rate and rhythm, no murmur  Abdomen:  soft, nontender, nondistended, normal bowel sounds, no masses, hepatomegaly, splenomegaly  Extremities:  no edema, redness, tenderness in the calves  Skin:  no gross lesions, rashes, induration    Assessment:        Hospital Problems         Last Modified POA    Essential hypertension 6/23/2021 Yes    Type 2 diabetes mellitus with diabetic autonomic neuropathy, without long-term current use of insulin ([address] 6/23/2021 Yes    Cerebral infarction due to occlusion of right middle cerebral artery ([address] 6/24/2021 Yes    Glaucomatous visual field defect 6/24/2021 Yes    Class 1 obesity due to excess calories with serious comorbidity and body mass index (BMI) of 31.0 to 31.9 in adult 6/24/2021 Yes    S/P total knee arthroplasty, left 6/23/2021 Yes          Plan:        1. Lactic acidosis resolving with fluids. White count overall stable. No fevers. Finished postop antibiotics  2. Hypertension. Decrease Norvasc to 5 mg, Cozaar 50 mg, Zocor 20 mg  3. DVT prophylaxis with Eliquis  4. Continue home medication of glipizide and insulin sliding scale  5. History of CVA. Follows up with neurology as outpatient. Continue with Zocor  6. PT/OT  7. History of Branch retinal vein occlusion, glaucoma, continue eyedrops  8. Currently on home dose of Macrobid  9. No objection to discharge from medicine perspective. Follow up PCP within 1 week.     Maritza Kumar MD  6/24/2021  3:57 PM

## 2021-06-24 NOTE — PROGRESS NOTES
Pt discharged to [facility] with all belongings, AVS/DAMON faxed per note, written prescriptions sent with patient, and leg immobilizer was placed prior to pt transferring into wheelchair. Pt left unit via wheelchair assisted by [facility] transportation employee. Pt left hospital grounds via [facility] transportation. Writer called report to RN at [facility].    Electronically signed by Sujey Campbell RN on 6/24/2021 at 6:35 PM

## 2021-06-24 NOTE — PROGRESS NOTES
Ortho Coordinator Daily Rounding Note    Admission Date:   6/23/2021 Chaim Andino is a 66 y.o.  male    Post Op Day # [address]:         Recent Labs     06/23/21 2048 06/24/21  0704   WBC 12.5* 12.7*   HGB 12.9* 11.3*    177       Recent Labs     06/24/21  0704      K 4.4      CO2 25   BUN 19   CREATININE 0.77   GLUCOSE 152*         Met with:     Patient and WIFE  Patient's LOC:   alert and oriented X3  Patient progress   GOOD  Patient's Pain:   Patient rates pain 2  Oral Intake:    good  Output:    adequate   Martin in:   no  ON-Q:    no    Walker/DME:  Walker/DME needed:  No  DME needed:    walker and bath bench   DME Agency:    PT HAS AT HOME    Anticoagulation:  Anticoagulation:  2.5MG ELIQUIS BID X 14 DAYS  Prescription in chart:  yes     Continuity of Care: The [address] form is on the chart. The [address] form is signed by the physician. Discharge Planning:  Confirmed with patient that discharge plan is Home. Agency/Facility chosen: [facility]  Awaiting pre-certification no  Anticipated discharge date: 06/24/2021 OR 06/25/2021. PT C/O OF SOME BUCKLING WHEN UP AND USING A KNEE IMMOBILIZER FOR SAFETY. Patient's questions and concerns were answered. Actively listened and provided reassurance.      Electronically signed by: Raya Peacock RN on 6/24/2021 at 1:42 PM

## 2021-06-24 NOTE — DISCHARGE INSTR - COC
Continuity of Care Form    Patient Name: Chaim Andino   :  1950  MRN:  [id number]    Admit date:  2021  Discharge date:  2021    Code Status Order: Full Code   Advance Directives:   Advance Care Flowsheet Documentation       Date/Time Healthcare Directive Type of Healthcare Directive Copy in [address] Agent's Name Healthcare Agent's Phone Number    21 172  No, patient does not have an advance directive for healthcare treatment -- -- -- -- --    21 1609  No, patient does not have an advance directive for healthcare treatment -- -- -- -- --            Admitting Physician:  Leigh Richard DO  PCP: Nadira Patel MD    Discharging Nurse: [address] [place] [address] Unit/Room#:   Discharging Unit Phone Number: [phone]    Emergency Contact:   Extended Emergency Contact Information  Primary Emergency Contact: [facility]  Address: [address] [place] [address] [address] Phone: [phone]  Mobile Phone: [phone]  Relation: Spouse  [facility] [address] [facility] Phone: [phone]  Relation: Child   needed?  No    Past Surgical History:  Past Surgical History:   Procedure Laterality Date    CATARACT REMOVAL Right 2015    right eye    CYSTOSCOPY      EYE SURGERY      HAND SURGERY Left     thumb    JOINT REPLACEMENT      KIDNEY STONE SURGERY      LITHOTRIPSY      REFRACTIVE SURGERY Right     THUMB AMPUTATION      TONSILLECTOMY      TOTAL KNEE ARTHROPLASTY Left 2021    LEFT  KNEE TOTAL ARTHROPLASTY - Minnette Saint performed by Leigh Richard DO at [address] [place] [address]       Immunization History:   Immunization History   Administered Date(s) Administered    COVID-19, Moderna, PF, 100mcg/0.5mL 2021, 2021    Influenza Virus Vaccine 2014, 2015, 2017    Influenza, Quadv, IM, PF (6 mo and older Fluzone, Flulaval, Fluarix, and 3 yrs and older Afluria) 11/14/2016    Influenza, Quadv, adjuvanted, 72 yrs +, IM, PF (Fluad) 10/01/2020    Influenza, Triv, inactivated, subunit, adjuvanted, IM (Fluad 65 yrs and older) 10/19/2018, 10/14/2019    Pneumococcal Conjugate 13-valent (Srinivas Stokes) 08/23/2017, 07/13/2020    Pneumococcal Polysaccharide ([id number]) 01/25/2019       Active Problems:  Patient Active Problem List   Diagnosis Code    Hypercholesteremia E78.00    Cerebral infarction ([facility] I63.9    Neuropathic pain M79.2    Weakness of both legs R29.898    Essential hypertension I10    Type 2 diabetes mellitus with diabetic autonomic neuropathy, without long-term current use of insulin ([facility]) E11.43    Eustachian tube dysfunction H69.80    Branch retinal vein occlusion D00.4338    Primary open angle glaucoma H40.1190    Chronic pain of both knees M25.561, M25.562, G89.29    Cerebral infarction due to occlusion of right middle cerebral artery (HCC) I63.511    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    Nuclear sclerotic cataract H25.10    Class 1 obesity due to excess calories without serious comorbidity with body mass index (BMI) of 32.0 to 32.9 in adult E66.09, Z68.32    Elevated PSA R97.20    S/P total knee arthroplasty, left Z96.652       Isolation/Infection:   Isolation            No Isolation          Patient Infection Status       None to display            Nurse Assessment:  Last Vital Signs: /64   Pulse 86   Temp 97.6 °F (36.4 °C) (Oral)   Resp 18   Ht 6' (1.829 m)   Wt 229 lb 3.2 oz (104 kg)   SpO2 94%   BMI 31.09 kg/m²     Last documented pain score (0-10 scale): Pain Level: 2  Last Weight:   Wt Readings from Last 1 Encounters:   06/23/21 229 lb 3.2 oz (104 kg)     Mental Status:  oriented and alert    IV Access:  - None    Nursing Mobility/ADLs:  Walking   Assisted  Transfer  Assisted  Bathing  Assisted  Dressing  Assisted  Toileting  Assisted  Feeding  Independent  Med Admin  Assisted  Med Delivery   whole    Wound Care Documentation and Therapy:        Elimination:  Continence:   · Bowel: Yes  · Bladder: Yes  Urinary Catheter: None   Colostomy/Ileostomy/Ileal Conduit: No       Date of Last BM: 06/22/2021    Intake/Output Summary (Last 24 hours) at 6/24/2021 0821  Last data filed at 6/24/2021 0647  Gross per 24 hour   Intake 3411.17 ml   Output 1400 ml   Net 2011.17 ml     I/O last 3 completed shifts: In: 3411.2 [P.O.:600; I.V.:2686.2; Blood:125]  Out: 1400 [Urine:1200; Blood:200]    Safety Concerns: At Risk for Falls    Impairments/Disabilities:      Vision and Hearing    Nutrition Therapy:  Current Nutrition Therapy:   - Oral Diet:  Carb Control 4 carbs/meal (1800kcals/day)    Routes of Feeding: Oral  Liquids: No Restrictions  Daily Fluid Restriction: no  Last Modified Barium Swallow with Video (Video Swallowing Test): not done    Treatments at the Time of Hospital Discharge:   Respiratory Treatments: None  Oxygen Therapy:  is not on home oxygen therapy.   Ventilator:    - No ventilator support    Rehab Therapies: Physical Therapy and Occupational Therapy  Weight Bearing Status/Restrictions: No weight bearing restirctions  Other Medical Equipment (for information only, NOT a DME order):  walker and braces (immobilizer LT leg)  Other Treatments: ***    Patient's personal belongings (please select all that are sent with patient):  Blanka    RN SIGNATURE:  Electronically signed by Maryam Wiseman RN on 6/24/21 at 6:05 PM EDT    CASE MANAGEMENT/SOCIAL WORK SECTION    Inpatient Status Date: ***    Readmission Risk Assessment Score:  Readmission Risk              Risk of Unplanned Readmission:  0           Discharging to Facility/ Agency   · Name: [facility]  · Address:  · Phone: [phone]  · Fax: [phone]  [facility] [phone] fax [phone]  Dialysis Facility (if applicable)   · Name:  · Address:  · Dialysis Schedule:  · Phone:  · Fax:    / signature: Electronically signed by Jose Alberto Logan on 6/24/21 at 12:23 PM EDT    PHYSICIAN SECTION    Prognosis: Good    Condition at Discharge: Stable    Rehab Potential (if transferring to Rehab): Good    Recommended Labs or Other Treatments After Discharge:         PT: TKA protocol- walker x 2 weeks. Emphasize full passive extension. OT: ADL's  Wound care: Aquacel x 7 days to be removed at 1st post op appt. Call Dr. Hilary Morris for all orthopedic/ surgical questions. [phone]  Nursing: Thigh high (preferred if appropriate) Jj hose on in the AM off in the PM      Physician Certification: I certify the above information and transfer of Josh Garcia  is necessary for the continuing treatment of the diagnosis listed and that he requires [address] for less 30 days.      Update Admission H&P: No change in H&P    PHYSICIAN SIGNATURE:  Electronically signed by Yoel Damon DO on 6/24/21 at 9:31 AM EDT

## 2021-06-24 NOTE — PROGRESS NOTES
Physical Therapy    Facility/Department: [facility] MED SURG  Reassessment AM  PM note at bottom    NAME: Dangelo Villalpando  : 1950  MRN: [id number]    Date of Service: 2021           L TKA by Dr. Rochelle Lopez, 21    Assessment   Assessment: Pt. with continued knee buckling today. Unsure from L hemiparesis/CVA or from nerve block. Will need to continue to assess. Recommend that pt. spend night and have another day with PT/OT due to cognitive deficits and needing more practice/ wife feeling more confident and to further assess knee control. Rubio Durand Specific instructions for Next Treatment: continue to monitor L knee control and use immobilizer if needed. Prognosis: Excellent  Decision Making: High Complexity  PT Education: Goals;PT Role;Plan of Care;Home Exercise Program;General Safety  REQUIRES PT FOLLOW UP: Yes  Activity Tolerance  Activity Tolerance: Pt limited by buckling L knee- unable to amb. at this time. Will try immobilizer in PM should pt. still have instability. Requesting pt. have another day with therapy at this time. Call out to [address], [address]. Patient Diagnosis(es): The encounter diagnosis was S/P total knee arthroplasty, left.     has a past medical history of Cataracts, bilateral, Cerebral artery occlusion with cerebral infarction ([address] Diabetes mellitus ([address] Eye disorder, Glaucoma, Headache(784.0), Hearing loss, Heartburn, Hyperlipidemia, Hypertension, Kidney stones, Osteoarthritis, Snores, Tendonitis of shoulder, left, and Tremor. has a past surgical history that includes Cystoscopy; Kidney stone surgery; Lithotripsy; Refractive surgery (Right, ); Cataract removal (Right, 2015); Thumb amputation; Hand surgery (Left); Tonsillectomy; Total knee arthroplasty (Left, 2021); joint replacement; and eye surgery.     Restrictions  Restrictions/Precautions  Restrictions/Precautions: Weight Bearing, General Precautions, Fall Risk, Up as Tolerated, Surgical Protocols  Lower Extremity Weight Bearing Restrictions  Left Lower Extremity Weight Bearing: Weight Bearing As Tolerated  Position Activity Restriction  Other position/activity restrictions: IV  Vision/Hearing        Subjective  General  Patient assessed for rehabilitation services?: Yes  Pain Screening  Patient Currently in Pain: Yes          Orientation     Social/Functional History  Social/Functional History  Lives With: Spouse  Type of Home: House  Home Layout: One level  Home Access: Stairs to enter without rails  Entrance Stairs - Number of Steps: 2+1  Bathroom Shower/Tub: Tub/Shower unit  Bathroom Toilet: Handicap height  Bathroom Equipment: Shower chair  Home Equipment: Rolling walker, Cane  ADL Assistance: Independent  Homemaking Assistance: Independent  Ambulation Assistance: Independent (occ. use of cane in community)  Transfer Assistance: Independent  Active : Yes  Occupation: Retired  Type of occupation:   Leisure & Hobbies: work around yard  Additional Comments: prior to surgery would not have been able to amb. around grocery store due to knee pain  Cognition   Cognition  Overall Cognitive Status: Exceptions  Following Commands: Follows one step commands with increased time  Memory: Decreased short term memory  Problem Solving: Decreased awareness of errors;Assistance required to generate solutions;Assistance required to identify errors made;Assistance required to correct errors made;Assistance required to implement solutions  Insights: Decreased awareness of deficits  Initiation: Requires cues for some  Sequencing: Requires cues for some  Cognition Comment: Pt. reports having some memory issues releated to CVA 4 yrs. ago. He is overall somewhat slower to respond, partly due to hearing loss. Wife steps in a lot and is worried about pt. speaking up for himself when she is not there.     Objective     Observation/Palpation  Observation: Neg. dopplers this AM    AROM LLE (degrees)  LLE General AROM: tolerated 0-80 degrees AAROM today  Strength LLE  Comment: - SLR ; pt. able to complete partial LAQ     Sensation  Overall Sensation Status:  (pt. unable to tell if sensation is off; buckling noted with gait)  Bed mobility  Supine to Sit: Minimal assistance  Sit to Supine: Minimal assistance  Transfers  Sit to Stand: Minimal Assistance  Stand to sit: Minimal Assistance  Ambulation  Ambulation?: Yes  Ambulation 1  Surface: level tile  Device: Rolling Walker  Assistance: Moderate assistance;2 Person assistance  Quality of Gait: Pre-gait sequence standing at bedside- wt. shifts and steps in place. Pt. with some waivering of knee/ questionable stability. Pt. also unable to follow directions for steps in place with RW. Attempted to amb away from bed and within 5 ft. pt's knee buckled. Pt. with L hemiparesis from CVA 4 yrs. ago, so unsure if this is weakness or numbness from nerve block. Informed RN. Distance: 5 ft. Comments: Up in chair     Balance  Posture: Good  Sitting - Static: Good  Sitting - Dynamic: Good  Standing - Static: Fair;- (RW; knee buckling L)  Comments: questionable knee control  Exercises  Knee Active Range of Motion: Seated LAQ with and without assist from Rt. LE;  also knee flexion with assist from Rt. LE. Pt. with good effort and tolerance to ROM. Good demo isometrics, AP and heel slide supine. Plan   Plan  Times per week: twice daily; 7 days/wk  Specific instructions for Next Treatment: continue to monitor L knee control and use immobilizer if needed. Current Treatment Recommendations: Strengthening, ROM, Balance Training, Functional Mobility Training, Transfer Training, Gait Training, Stair training, Safety Education & Training, Home Exercise Program  Safety Devices  Type of devices:  All fall risk precautions in place, Gait belt, Patient at risk for falls, Call light within reach, Left in bed, Nurse notified    G-Code       OutComes Score AM-PAC Score  AM-PAC Inpatient Mobility Raw Score : 13 (06/24/21 1415)  AM-PAC Inpatient T-Scale Score : 36.74 (06/24/21 1415)  Mobility Inpatient CMS 0-100% Score: 64.91 (06/24/21 1415)  Mobility Inpatient CMS G-Code Modifier : CL (06/24/21 1415)          Goals  Short term goals  Time Frame for Short term goals: 8 visits:  Short term goal 1: Pt. To be indep. With bed mob. Using sheet as leg  for surgical LE as needed  Short term goal 2: Pt. To be indep. With transfers with safe hand placement with % of time  Short term goal 3: Pt. To be indep with gait with RW, 100ft. Short term goal 4: Pt. To safely negotiate 2+1 stairs to prepare for entering home at discharge. Short term goal 5: Pt. To be indep with TKA HEP  Patient Goals   Patient goals : strengthen L LE to then get Rt. TKA       Therapy Time   Individual Concurrent Group Co-treatment   Time In 1057         Time Out 1153         Minutes 56           Treatment time 46 min. RETURNED IN PM from 6969-5262. Pt. Still up in chair from AM session and states he has been working on his L knee ROM ex. Sit to stand min A. Co-Tx with OT to again assess knee buckling. Took several steps away from chair with RW and L knee buckled. Pt. does not seem aware of instability as it's happening and reports no numbness. Had pt. return to chair and donned immobilizer. Then amb. With RW and CGA to bathroom . Pt. Stood at toilet, CGA. Then amb. Again for total distance of 45ft. Had pt. Return to supine, min A. And positioned for comfort. Reviewed quad/glut sets, AP and heel slides to be done tonight in bed. Recommending one more night for pt. and potential SNF. Have recommended that pt. Spend night so therapy can work with pt. tomorrow and reassess. Surgery  30hrs. ago and do not suspect buckling from nerve block but instead from LLE hemiparesis from CVA 4 yrs ago. Wife is concerned taking pt. Home with knee buckling.   Explained to wife that if weakness is from combination of CVA and TKA,  immobilizer is a temporary solution and cannot be used long term if pt. wants to strengthen and gain independence/ further therapy may be needed before d/c home. Will assess buckling again in AM with PT/OT team and will report. Co-treatment with OT warranted first time up day of surgery. Cotx due to potential risk of decreased sensation, muscle control and proprioception from spinal epidural and/or regional block. Decreased safety and independence requiring 2 skilled therapy professionals to address individual discipline's goals.        Darylene Ek, PT

## 2021-06-24 NOTE — FLOWSHEET NOTE
Pt in room with family and medical professional x1. Writer observes pt and family as calm and in no distress. D/c order is in for pt with plan for rehab. No current spiritual/emotional needs brought to writer's attention for pt. Chaplains will remain available to offer spiritual and emotional support as needed.        06/24/21 1635   Encounter Summary   Services provided to: Patient not available   Referral/Consult From: Rounding   Continue Visiting   (6/24/21)   Complexity of Encounter Low   Routine   Type Initial   Assessment Calm     Electronically signed by Mateo Dobson on 6/24/2021 at 4:37 PM.  [address] [facility]  [phone]

## 2021-06-24 NOTE — PROGRESS NOTES
Department of Orthopedic Surgery  Progress Note    Subjective:       Systemic or Specific Complaints: Pains. No nausea, vomiting, shortness of breath or chest pain. Pain is relatively controlled. Patient is complaining of left calf pain. Patient states it feels like he is getting a cramp in his calf. Objective:     Patient Vitals for the past 24 hrs:   BP Temp Temp src Pulse Resp SpO2   06/24/21 0719 112/64 97.6 °F (36.4 °C) Oral 86 18 94 %   06/24/21 0404 104/68 97.7 °F (36.5 °C) Oral 95 18 92 %   06/23/21 2351 125/70 97.9 °F (36.6 °C) Oral 100 18 96 %   06/23/21 1946 124/78 98 °F (36.7 °C) Oral 102 16 92 %   06/23/21 1632 124/77 97.9 °F (36.6 °C) Oral 94 16 (!) 87 %   06/23/21 1600 121/82   91 13 91 %   06/23/21 1545 117/79   91 15 94 %   06/23/21 1530 (!) 140/86   90 24 92 %   06/23/21 1525 132/88 96.8 °F (36 °C) Temporal 88 10 94 %   06/23/21 0927 128/84   76 12 97 %   06/23/21 0923 130/72   78 18 98 %   06/23/21 0918 133/79   82 16 98 %   06/23/21 0840 (!) 140/85   82 14 98 %   06/23/21 0837 (!) 142/87   83 24 98 %   06/23/21 0830    84 18 98 %       General: alert, appears stated age and cooperative   Wound:  Dressing clean and dry   Motion: Painful range of Motion in affected extremity   DVT Exam: No evidence of DVT seen on physical exam.     Additional exam: Neurocirculatory status checked and intact    Data Review  CBC:   Lab Results   Component Value Date    WBC 12.7 06/24/2021    RBC 3.68 06/24/2021    HGB 11.3 06/24/2021    HCT 36.1 06/24/2021     06/24/2021           Assessment:      left  total knee arthoplasty postop day #1  Acute blood loss anemia    Plan:     1. Continue Deep venous thrombosis prophylaxis  2. Continue Pain Control  3. TKA protocol  4. Doppler to rule out DVT of left calf.   5. If Doppler negative and medicine clears patient can to be discharged to home with home health care    Electronically signed by Tash Cruz PA-C on 6/24/2021 at 8:18 AM

## 2021-06-24 NOTE — PLAN OF CARE
Problem: Discharge Planning:  Goal: Discharged to appropriate level of care  Description: Discharged to appropriate level of care  6/24/2021 0748 by Thanh Bermudez RN  Outcome: Ongoing  6/24/2021 0200 by Delio Waddell RN  Outcome: Ongoing     Problem: Mobility - Impaired:  Goal: Mobility will improve  Description: Mobility will improve  6/24/2021 0748 by Thanh Bermudez RN  Outcome: Ongoing  6/24/2021 0200 by Delio Waddell RN  Outcome: Ongoing     Problem: Infection - Surgical Site:  Goal: Will show no infection signs and symptoms  Description: Will show no infection signs and symptoms  6/24/2021 0748 by Thanh Bermudez RN  Outcome: Ongoing  6/24/2021 0200 by Delio Waddell RN  Outcome: Ongoing     Problem: Pain - Acute:  Goal: Pain level will decrease  Description: Pain level will decrease  6/24/2021 0748 by Thanh Bermudez RN  Outcome: Ongoing  6/24/2021 0200 by Delio Waddell RN  Outcome: Ongoing     Problem: HEMODYNAMIC STATUS  Goal: Patient has stable vital signs and fluid balance  6/24/2021 0748 by Thanh Bermudez RN  Outcome: Ongoing  6/24/2021 0200 by Delio Waddell RN  Outcome: Ongoing     Problem: ACTIVITY INTOLERANCE/IMPAIRED MOBILITY  Goal: Mobility/activity is maintained at optimum level for patient  Outcome: Ongoing     Problem: COMMUNICATION IMPAIRMENT  Goal: Ability to express needs and understand communication  Outcome: Ongoing     Problem: Serum Glucose Level - Abnormal:  Goal: Ability to maintain appropriate glucose levels will improve  Description: Ability to maintain appropriate glucose levels will improve  Outcome: Ongoing     Problem: Falls - Risk of:  Goal: Will remain free from falls  Description: Will remain free from falls  Outcome: Ongoing  Goal: Absence of physical injury  Description: Absence of physical injury  Outcome: Ongoing     Problem: SAFETY  Goal: LTG - patient will adhere to hip precautions during ADL's and transfers  Outcome: Ongoing  Goal: LTG - Patient will demonstrate safety requirements appropriate to situation/environment  Outcome: Ongoing  Goal: LTG - patient will utilize safety techniques  Outcome: Ongoing  Goal: STG - patient locks brakes on wheelchair  Outcome: Ongoing  Goal: STG - Patient uses call light consistently to request assistance with transfers  Outcome: Ongoing  Goal: STG - patient uses gait belt during all transfers  Outcome: Ongoing     Problem: Skin Integrity:  Goal: Will show no infection signs and symptoms  Description: Will show no infection signs and symptoms  Outcome: Ongoing  Goal: Absence of new skin breakdown  Description: Absence of new skin breakdown  Outcome: Ongoing

## 2021-06-24 NOTE — PROGRESS NOTES
Physical Therapy    Facility/Department: [facility]  Initial Assessment    NAME: Laverne Light  : 1950  MRN: [id number]    Date of Service: 2021    L TKA by Dr. Reena Augustin, 21           Assessment   Assessment: Pt. able to stand at bedside day of surgery with RW, however did not take any steps as pt. with L knee buckling. Pt. with questionable cognition, so would recommend PT/OT to reassess together in AM for safety to reassess knee control and initiate gait. Informed SARAH Jones of knee buckling and advised to get pt. urinal for tonight. RN states she will pass on to night shift. Specific instructions for Next Treatment: REASSESS  Prognosis: Excellent  Decision Making: High Complexity  PT Education: Goals;PT Role;Plan of Care;Home Exercise Program;General Safety  Patient Education: impt. of OOT, see Ex section, walker safety and how to size to self  REQUIRES PT FOLLOW UP: Yes  Activity Tolerance  Activity Tolerance: Patient Tolerated treatment well       Patient Diagnosis(es): The encounter diagnosis was S/P total knee arthroplasty, left.     has a past medical history of Cataracts, bilateral, Cerebral artery occlusion with cerebral infarction ([address] Diabetes mellitus ([address] Eye disorder, Glaucoma, Headache(784.0), Hearing loss, Heartburn, Hyperlipidemia, Hypertension, Kidney stones, Osteoarthritis, Snores, Tendonitis of shoulder, left, and Tremor. has a past surgical history that includes Cystoscopy; Kidney stone surgery; Lithotripsy; Refractive surgery (Right, ); Cataract removal (Right, 2015); Thumb amputation; Hand surgery (Left); Tonsillectomy; Total knee arthroplasty (Left, 2021); joint replacement; and eye surgery.     Restrictions  Restrictions/Precautions  Restrictions/Precautions: Weight Bearing, General Precautions, Fall Risk, Up as Tolerated, Surgical Protocols  Lower Extremity Weight Bearing Restrictions  Left Lower Extremity Weight Bearing: Weight Bearing As Tolerated Position Activity Restriction  Other position/activity restrictions: IV  Vision/Hearing  Vision: Impaired  Vision Exceptions: Wears glasses at all times  Hearing: Exceptions to [facility]  Hearing Exceptions: Hard of hearing/hearing concerns (almost deaf Rt. ear)     Subjective  General  Chart Reviewed: Yes  Patient assessed for rehabilitation services?: Yes  Family / Caregiver Present: Yes (wife)  Follows Commands: Within Functional Limits  Subjective  Subjective: Reports 3/10 pain at time of eval, L TKA  Pain Screening  Patient Currently in Pain: Yes          Orientation  Orientation  Overall Orientation Status: Within Functional Limits  Social/Functional History  Social/Functional History  Lives With: Spouse  Type of Home: House  Home Layout: One level  Home Access: Stairs to enter without rails  Entrance Stairs - Number of Steps: 2+1  Bathroom Shower/Tub: Tub/Shower unit  Bathroom Toilet: Handicap height  Bathroom Equipment: Shower chair  Home Equipment: Rolling walker, Cane  ADL Assistance: Independent  Homemaking Assistance: Independent  Ambulation Assistance: Independent (occ. use of cane in community)  Transfer Assistance: Independent  Active : Yes  Occupation: Retired  Type of occupation:   Leisure & Hobbies: work around yard  Additional Comments: prior to surgery would not have been able to amb. around grocery store due to knee pain  Cognition   Cognition  Overall Cognitive Status: Exceptions  Following Commands:  Follows one step commands with increased time  Memory: Decreased short term memory  Problem Solving: Decreased awareness of errors;Assistance required to generate solutions;Assistance required to identify errors made;Assistance required to correct errors made;Assistance required to implement solutions  Insights: Decreased awareness of deficits  Initiation: Requires cues for some  Sequencing: Requires cues for some  Cognition Comment: Pt. reports having some memory issues releated to CVA 4 yrs. ago. He is overall somewhat slower to respond, partly due to hearing loss. Wife steps in a lot and is worried about pt. speaking up for himself when she is not there. Objective     Observation/Palpation  Posture: Good  Observation: knee buckling in WB- no amb. away from bed day of surgery    AROM RLE (degrees)  RLE AROM: WFL  AROM LLE (degrees)  LLE General AROM: tolerated 0-30 degrees knee AROM  Strength RLE  Strength RLE: WFL  Strength LLE  Comment: - SLR, - LAQ; DF 5  Strength Other  See OT eval for UE ROM/MMT       Sensation  Overall Sensation Status: Impaired (has a cold sensation in L foot - since CVA 4 yrs. ago.)  Bed mobility  Supine to Sit: Minimal assistance  Sit to Supine: Minimal assistance  Scooting: Contact guard assistance  Transfers  Sit to Stand: Minimal Assistance  Stand to sit: Minimal Assistance  Comment: Once standing safely at bedside with RW, took pt. through pre-gait sequence to ensure quad control prior to ambulation. Assessed control of knee during static standing, wt. shifts and steps in place. Pt. with significant buckling of knee and not safe to amb. away from bed at this time. Stood x 2 min. With minimal wt. Shifting onto LLE. Did not trust pt to do more at this time due to his questionable cognition. Had pt. Sit EOB and work on scooting using LLE as much as tolerated. This was painful for pt. , but able to scoot x 5. Ambulation  Ambulation?: No     Balance  Posture: Good  Sitting - Static: Good  Sitting - Dynamic: Good  Standing - Static: Fair;- (RW; knee buckling L)  Exercises  Quad Sets: 10  Heelslides: 5  Gluteal Sets: 10  Ankle Pumps: 10  Comments:Pt. instructed in quad/glute isometrics to be done x 10 reps every 1-2 hrs. as able. Encouraged pt. to frequently AP for increased LE circulation and perform heel slides as needed to prevent hip/knee stiffness.         Plan   Plan  Times per week: twice daily; 7 days/wk  Specific instructions for Next Treatment: REASSESS  Current Treatment Recommendations: Strengthening, ROM, Balance Training, Functional Mobility Training, Transfer Training, Gait Training, Stair training, Safety Education & Training, Home Exercise Program  Safety Devices  Type of devices: All fall risk precautions in place, Gait belt, Patient at risk for falls, Call light within reach, Left in bed, Nurse notified    G-Code       OutComes Score                                                  AM-PAC Score  AM-PAC Inpatient Mobility Raw Score : 12 (06/23/21 1946)  AM-PAC Inpatient T-Scale Score : 35.33 (06/23/21 1946)  Mobility Inpatient CMS 0-100% Score: 68.66 (06/23/21 1946)  Mobility Inpatient CMS G-Code Modifier : CL (06/23/21 1946)          Goals  Short Term Goals    Time Frame for Short term goals: 8visits    Short Term Goal 1: Pt. To be indep. With bed mob. Using sheet as leg  for surgical LE as needed     Short Term Goal 2: Pt. To be indep. With transfers with safe hand placement with % of time    Short Term Goal 3: Pt. To be indep with gait with RW, 100ft. Short Term Goal 4: Pt. To safely negotiate 2+1 stairs to prepare for entering home at discharge. Short Term Goal 5: Pt. To be indep with TKA  HEP  Patient goals : strengthen L LE to then get Rt. TKA       Therapy Time   Individual Concurrent Group Co-treatment   Time In 7282         Time Out 1840         Minutes 70           Treatment time:  60min.           Refugia Mean, PT

## 2021-06-28 ENCOUNTER — TELEPHONE (OUTPATIENT)
Dept: NEUROLOGY | Age: 71
End: 2021-06-28

## 2021-06-28 DIAGNOSIS — Z96.652 STATUS POST TOTAL LEFT KNEE REPLACEMENT: Primary | ICD-10-CM

## 2021-06-28 NOTE — TELEPHONE ENCOUNTER
----- Message from Bing Streeter MD sent at 6/25/2021  6:44 PM EDT -----  Let pt know carotid US with only plaque formation .  No significant stenosis

## 2021-06-29 NOTE — PROGRESS NOTES
[facility]  [facility] [facility] [address]  Dept: [phone]  Dept Fax: [phone]        Post Operative Follow Up    Subjective:     Chief Complaint   Patient presents with    Post-Op Check     L TKA dos 6/23/21     Post Op Surgery:     The patient is here for a follow up after having a total knee arthroplasty. The date of surgery was 6/23/2021. Therefore the patient is 1 week postop. Currently the patient's pain is controlled with Percocet. Physical therapy was started at [facility]. Patient presents today with wheelchair and thigh high compression stockings that is in good repair. Patient states they are doing better with rehab but they are not letting him walk without assistance yet. Review of Systems   Constitutional: Positive for activity change. Negative for appetite change, fatigue and fever. Respiratory: Negative. Negative for apnea, cough, chest tightness and shortness of breath. Cardiovascular: Negative. Negative for chest pain, palpitations and leg swelling. Gastrointestinal: Negative for abdominal distention, abdominal pain, constipation, diarrhea, nausea and vomiting. Genitourinary: Negative for difficulty urinating, dysuria and hematuria. Musculoskeletal: Positive for arthralgias and gait problem. Negative for joint swelling and myalgias. Skin: Negative for color change and rash. Neurological: Negative for dizziness, weakness, numbness and headaches. Psychiatric/Behavioral: Negative for sleep disturbance. I have reviewed the CC, HPI, ROS, PMH, FHX, Social History, and if not present in this note, I have reviewed in the patient's chart. I agree with the documentation provided by other staff and have reviewed their documentation prior to providing my signature indicating agreement.   Vitals:   BP (!) 148/81   Pulse 104   Resp 12   Ht 6' (1.829 m)   Wt 229 lb (103.9 kg)   BMI 31.06 kg/m²  Body mass index is 31.06 kg/m². Physical Examination:     Orthopedics:    GENERAL: Alert and oriented X3 in no acute distress. SKIN: Intact without lesions or ulcerations. Incision is healing well with no signs of infection. Ecchymosis noted  NEURO: Intact to sensory and motor testing. VASC: Capillary refill is less than 3 seconds. Post Op Exam:    LOCATION: Left knee  SITE: Distal neurocirculatory status is intact. EXAM: Sensation is intact to light touch, there is full motor function of the extremity. ROM: 10/85 degrees   Procedures:     Procedure:  No    Radiology:   KNEE - TKA X-RAY    4 views of the left knee including AP, bilateral tunnel, and lateral in the upright position, and skyline views reveal anatomic alignment with no fracture or dislocation. There is a left [employer] total knee replacement in good alignment. The implants are well seated with no signs of cement mantle loosening. There is no signs of osteolysis or wear. There is no acute bony abnormality, periosteal reaction or soft tissue masses present. Impression: Stable [employer] total knee replacement of the left knee. Assessment:     1. S/P total knee arthroplasty, left      Plan:   Post Op Treatment : Patient had the treatment regimen reviewed today 1 week status post left total knee arthroplasty. I reviewed the films with the patient. We discussed some of the etiologies and natural histories of recovery after a total knee arthroplasty. We also discussed the various treatment alternatives including anti-inflammatory medications, physical therapy, injections, further imaging studies and as a last resort surgery. During today's visit, we discussed that the patient is still not able to ambulate without assistance. [facility] will [facility] until he is safe to go home. He needs to work on getting his knee straight.   We had a long discussion with his wife on the best ways to do that.  The patient then stated that they understand the plan and at this time, the patient has opted continuing physical therapy. Patient should return to the office in 3 weeks to f/u with Jones GIBSON and at that visit, patient should have his knee completely straight and aim for at least 100 degrees of flexion. The patient will call the office immediately with any problems that may arise. No orders of the defined types were placed in this encounter. No orders of the defined types were placed in this encounter. Tomas Bennett PA-C, have personally seen this patient, reviewed the chart including history, and imaging if done. I personally  performed the physical exam and obtained any needed additional history. I placed orders, performed or supervised procedures and developed the treatment plan.     Electronically signed by Kassie Sanabria PA-C, on 6/30/2021 at 1:47 PM      Electronically signed by Kassie Sanabria PA-C, on 6/30/2021 at 1:45 PM

## 2021-06-30 ENCOUNTER — OFFICE VISIT (OUTPATIENT)
Dept: ORTHOPEDIC SURGERY | Age: 71
End: 2021-06-30

## 2021-06-30 VITALS
RESPIRATION RATE: 12 BRPM | HEIGHT: 72 IN | HEART RATE: 104 BPM | WEIGHT: 229 LBS | BODY MASS INDEX: 31.02 KG/M2 | SYSTOLIC BLOOD PRESSURE: 148 MMHG | DIASTOLIC BLOOD PRESSURE: 81 MMHG

## 2021-06-30 DIAGNOSIS — Z96.652 S/P TOTAL KNEE ARTHROPLASTY, LEFT: Primary | ICD-10-CM

## 2021-06-30 LAB
CULTURE: NORMAL
Lab: NORMAL
SPECIMEN DESCRIPTION: NORMAL

## 2021-06-30 PROCEDURE — 99024 POSTOP FOLLOW-UP VISIT: CPT | Performed by: PHYSICIAN ASSISTANT

## 2021-06-30 RX ORDER — ACETAMINOPHEN 325 MG/1
650 TABLET ORAL EVERY 6 HOURS PRN
COMMUNITY
End: 2022-02-09

## 2021-06-30 ASSESSMENT — ENCOUNTER SYMPTOMS
VOMITING: 0
ABDOMINAL PAIN: 0
SHORTNESS OF BREATH: 0
CHEST TIGHTNESS: 0
RESPIRATORY NEGATIVE: 1
CONSTIPATION: 0
COUGH: 0
APNEA: 0
NAUSEA: 0
COLOR CHANGE: 0
DIARRHEA: 0
ABDOMINAL DISTENTION: 0

## 2021-07-12 ENCOUNTER — TELEPHONE (OUTPATIENT)
Dept: ADMINISTRATIVE | Age: 71
End: 2021-07-12

## 2021-07-13 DIAGNOSIS — Z96.652 STATUS POST TOTAL LEFT KNEE REPLACEMENT: Primary | ICD-10-CM

## 2021-07-22 ENCOUNTER — OFFICE VISIT (OUTPATIENT)
Dept: ORTHOPEDIC SURGERY | Age: 71
End: 2021-07-22

## 2021-07-22 VITALS
BODY MASS INDEX: 31.02 KG/M2 | HEIGHT: 72 IN | HEART RATE: 95 BPM | SYSTOLIC BLOOD PRESSURE: 140 MMHG | RESPIRATION RATE: 12 BRPM | TEMPERATURE: 97.6 F | DIASTOLIC BLOOD PRESSURE: 88 MMHG | WEIGHT: 229 LBS

## 2021-07-22 DIAGNOSIS — Z96.652 S/P TOTAL KNEE ARTHROPLASTY, LEFT: Primary | ICD-10-CM

## 2021-07-22 DIAGNOSIS — M17.11 PRIMARY OSTEOARTHRITIS OF RIGHT KNEE: ICD-10-CM

## 2021-07-22 PROCEDURE — 99024 POSTOP FOLLOW-UP VISIT: CPT | Performed by: PHYSICIAN ASSISTANT

## 2021-07-22 RX ORDER — OXYCODONE HYDROCHLORIDE AND ACETAMINOPHEN 5; 325 MG/1; MG/1
1 TABLET ORAL EVERY 6 HOURS PRN
Qty: 28 TABLET | Refills: 0 | Status: SHIPPED | OUTPATIENT
Start: 2021-07-22 | End: 2021-07-29

## 2021-07-22 ASSESSMENT — ENCOUNTER SYMPTOMS
DIARRHEA: 0
COUGH: 0
CONSTIPATION: 0
ABDOMINAL DISTENTION: 0
VOMITING: 0
ABDOMINAL PAIN: 0
NAUSEA: 0
CHEST TIGHTNESS: 0
RESPIRATORY NEGATIVE: 1
COLOR CHANGE: 0
SHORTNESS OF BREATH: 0
APNEA: 0

## 2021-07-22 NOTE — PROGRESS NOTES
[facility]  [address]  [facility] [address]  Dept: [phone]  Dept Fax: [phone]        Post Operative Follow Up    Subjective:     Chief Complaint   Patient presents with    Post-Op Check     left TKA DOS:6/23/2021     Post Op Surgery:     The patient is here for a follow up after having a total knee arthroplasty. The date of surgery was 6/23/2021. Therefore the patient is 4 weeks postop. Currently the patient's pain is controlled with Percocet. Physical therapy was started. Patient presents today with cane that is in good repair. Patient states they are doing really well. He is anticipating doing his other knee as soon as possible. Review of Systems   Constitutional: Positive for activity change. Negative for appetite change, fatigue and fever. Respiratory: Negative. Negative for apnea, cough, chest tightness and shortness of breath. Cardiovascular: Negative. Negative for chest pain, palpitations and leg swelling. Gastrointestinal: Negative for abdominal distention, abdominal pain, constipation, diarrhea, nausea and vomiting. Genitourinary: Negative for difficulty urinating, dysuria and hematuria. Musculoskeletal: Positive for arthralgias, gait problem and joint swelling. Negative for myalgias. Skin: Negative for color change and rash. Neurological: Negative for dizziness, weakness, numbness and headaches. Psychiatric/Behavioral: Positive for sleep disturbance. I have reviewed the CC, HPI, ROS, PMH, FHX, Social History, and if not present in this note, I have reviewed in the patient's chart. I agree with the documentation provided by other staff and have reviewed their documentation prior to providing my signature indicating agreement.   Vitals:   BP (!) 140/88   Pulse 95   Temp 97.6 °F (36.4 °C)   Resp 12   Ht 6' (1.829 m)   Wt 229 lb (103.9 kg)   BMI 31.06 kg/m²  Body mass index is 31.06 kg/m². Physical Examination:     Orthopedics:    GENERAL: Alert and oriented X3 in no acute distress. SKIN: Intact without lesions or ulcerations. Incision is healing well with no signs of infection. NEURO: Intact to sensory and motor testing. VASC: Capillary refill is less than 3 seconds. Post Op Exam:    LOCATION: Left knee  SITE: Distal neurocirculatory status is intact. EXAM: Sensation is intact to light touch, there is full motor function of the extremity. ROM: 2/112 degrees. No valgus or varus instability. Procedures:     Procedure:  No    Radiology:   No results found. Assessment:     1. S/P total knee arthroplasty, left    2. Primary osteoarthritis of right knee      Plan:   Post Op Treatment : Patient had the treatment regimen reviewed today 4 weeks status post left total knee arthroplasty. I reviewed the films with the patient. We discussed some of the etiologies and natural histories of recovery after a total knee arthroplasty. We also discussed the various treatment alternatives including anti-inflammatory medications, physical therapy, injections, further imaging studies and as a last resort surgery. During today's visit, we discussed that the patient is doing really well. He is concerned that Dr. Lavern Murdock is no longer with [facility] and he wants to get his other knee replaced. I told him that I be happy to refer him to Dr. Laron Galvan for his right knee to establish care and set up a right total knee arthroplasty. The patient then stated that they understand the plan and at this time, the patient has opted continuing physical therapy for his left knee to get as strong as possible. He will then follow-up with Dr. Laron Galvan to establish care to replace his right knee. He would like to do that as soon as possible. The patient will follow up here with me in 4 weeks for his left total knee arthroplasty. The patient will call the office immediately with any problems that may arise.      No orders of the defined types were placed in this encounter. Orders Placed This Encounter   Procedures   [address], [address], DO, Orthopedic Surgery, [facility]     Referral Priority:   Routine     Referral Type:   Eval and Treat     Referral Reason:   Specialty Services Required     Referred to Provider:   Neva Morales DO     Requested Specialty:   Orthopedic Surgery     Number of Visits Requested:   1       I, Isrrael Barrios PA-C, have personally seen this patient, reviewed the chart including history, and imaging if done. I personally  performed the physical exam and obtained any needed additional history. I placed orders, performed or supervised procedures and developed the treatment plan.     Electronically signed by Valente Cheng PA-C, on 7/22/2021 at 12:34 PM      Electronically signed by Valente Cheng PA-C, on 7/22/2021 at 12:33 PM

## 2021-08-16 ENCOUNTER — OFFICE VISIT (OUTPATIENT)
Dept: ORTHOPEDIC SURGERY | Age: 71
End: 2021-08-16
Payer: MEDICARE

## 2021-08-16 VITALS — HEIGHT: 72 IN | BODY MASS INDEX: 30.07 KG/M2 | WEIGHT: 222 LBS

## 2021-08-16 DIAGNOSIS — M17.11 PRIMARY OSTEOARTHRITIS OF RIGHT KNEE: Primary | ICD-10-CM

## 2021-08-16 DIAGNOSIS — Z96.652 S/P TOTAL KNEE ARTHROPLASTY, LEFT: ICD-10-CM

## 2021-08-16 DIAGNOSIS — Z01.818 PRE-OP TESTING: ICD-10-CM

## 2021-08-16 DIAGNOSIS — M17.12 PRIMARY OSTEOARTHRITIS OF LEFT KNEE: ICD-10-CM

## 2021-08-16 PROCEDURE — 1123F ACP DISCUSS/DSCN MKR DOCD: CPT | Performed by: ORTHOPAEDIC SURGERY

## 2021-08-16 PROCEDURE — G8427 DOCREV CUR MEDS BY ELIG CLIN: HCPCS | Performed by: ORTHOPAEDIC SURGERY

## 2021-08-16 PROCEDURE — 4040F PNEUMOC VAC/ADMIN/RCVD: CPT | Performed by: ORTHOPAEDIC SURGERY

## 2021-08-16 PROCEDURE — 99214 OFFICE O/P EST MOD 30 MIN: CPT | Performed by: ORTHOPAEDIC SURGERY

## 2021-08-16 PROCEDURE — 3017F COLORECTAL CA SCREEN DOC REV: CPT | Performed by: ORTHOPAEDIC SURGERY

## 2021-08-16 PROCEDURE — 1036F TOBACCO NON-USER: CPT | Performed by: ORTHOPAEDIC SURGERY

## 2021-08-16 PROCEDURE — G8417 CALC BMI ABV UP PARAM F/U: HCPCS | Performed by: ORTHOPAEDIC SURGERY

## 2021-08-16 RX ORDER — CELECOXIB 200 MG/1
200 CAPSULE ORAL 2 TIMES DAILY
Qty: 60 CAPSULE | Refills: 3 | Status: SHIPPED | OUTPATIENT
Start: 2021-08-16 | End: 2022-01-27

## 2021-08-16 ASSESSMENT — ENCOUNTER SYMPTOMS
ABDOMINAL PAIN: 0
COUGH: 0
VOMITING: 0
CHEST TIGHTNESS: 0
APNEA: 0

## 2021-08-16 NOTE — PROGRESS NOTES
[facility]  [facility]  [id number]  Dept: [phone]  Dept Fax: [phone]        Postoperative follow-up note    Subjective:   Yesica Stovall is a 66y.o. year old male who presents to our office today for postoperative followup regarding his   1. Primary osteoarthritis of right knee    2. Primary osteoarthritis of left knee    3. S/P total knee arthroplasty, left    4. Pre-op testing    . Chief Complaint   Patient presents with    Follow-up     FU LEFT TKA - 06/23/2021 - former [id number] pt       Yesica Stovall  is a 66y.o. year old male who presents to our office today for postoperative follow up after having undergone a left total knee arthroplasty on 6/23/21 with Dr. Chay Garcia. The patient denies fevers, chills, nausea, vomiting, diarrhea. The patient has started physical therapy. The patient overall doing well with the left knee and continues to go to therapy for strengthening of the left knee. The patient has a previous history of stroke and is weak on the left side. The patient now having right knee pain that is more painful than the left knee. The patient has the same issues to the right knee and wants to discuss replacement. Review of Systems   Constitutional: Negative for chills and fever. Respiratory: Negative for apnea, cough and chest tightness. Cardiovascular: Negative for chest pain and palpitations. Gastrointestinal: Negative for abdominal pain and vomiting. Genitourinary: Negative for difficulty urinating. Musculoskeletal: Positive for arthralgias (right knee). Negative for gait problem, joint swelling and myalgias. Neurological: Negative for dizziness, weakness and numbness. I have reviewed the CC, HPI, ROS, PMH, FHX, Social History, and if not present in this note, I have reviewed in the patient's chart.    I agree with the documentation provided by other staff and have reviewed their documentation prior to providing my signature indicating agreement. Objective :   General: Krupa Rosales is a 66 y.o. male who is alert and oriented and sitting comfortably in our office. Ortho Exam  MS:   Left knee range of motion is 3-125 degrees. Incision left knee is healing well without signs of infection. Minimal knee effusion on the left is noted. Motor, sensory, vascular examination of the left lower extremity is grossly intact without focal deficits. Calves are supple bilaterally. Evaluation of the Right knee reveals no significant outward deformity. There is no erythema, warmth, skin lesions, signs of infection. There is tenderness over the medial joint line. There is a mild knee effusion. Range of motion of the Right knee is  3-125. No instability of the knee is appreciated at 0 and 30° of flexion. There is a negative anterior drawer Lachman's test.  There is increased pain with varus Aldo's testing. No calf tenderness is noted. There is a positive hip log roll and Stinchfield test.  Motor, sensory, vascular examination to the Right lower extremity is intact. Patient has full range of motion of the ankle. Neuro: alert. oriented  Eyes: Extra-ocular muscles intact  Mouth: Oral mucosa moist. No perioral lesions  Pulm: Respirations unlabored and regular. Skin: warm, well perfused  Psych:   Patient has good fund of knowledge and displays understanging of exam, diagnosis, and plan. Radiology:     No results found. Assessment:      1. Primary osteoarthritis of right knee    2. Primary osteoarthritis of left knee    3. S/P total knee arthroplasty, left    4. Pre-op testing         Plan:      Discussed etiology and natural history of right knee arthritis. The treatment options may include oral anti-inflammatories, bracing, injections, advanced imaging, activity modification, physical therapy and/or surgical intervention.  The risks and benefits of a right total knee arthroplasty were discussed with the patient today in the office. The patient would like to proceed with right total knee arthroplasty. The patient will follow up in the office 2 weeks after surgery. We discussed that the patient should call us with any concerns or questions. As for the left knee I would like the patient to continue to go to formal therapy or strengthening of the left knee. Celebrex ordered today to help with the patient's pain. He should not take aleve while taking the Celebrex. The patient should follow up in the office in 6-8 weeks for a left knee check         Follow up: Return in about 6 weeks (around 9/27/2021) or a recheck of the left knee. Orders Placed This Encounter   Medications    celecoxib (CELEBREX) 200 MG capsule     Sig: Take 1 capsule by mouth 2 times daily     Dispense:  60 capsule     Refill:  3       No orders of the defined types were placed in this encounter. Mitchell Marcial LPN am scribing for and in the presence of Dr. Leatha Andrade  8/16/2021 11:33 AM        I have reviewed and made changes accordingly to the work scribed by Drew Esposito LPN. The documentation accurately reflects work and decisions made by me. I have also reviewed documentation completed by clinical staff. Leatha Andrade DO, [facility]  8/16/2021 11:33 AM    This note is created with the assistance of a speech recognition program.  While intending to generate a document that actually reflects the content of the visit, the document can still have some errors including those of syntax and sound a like substitutions which may escape proof reading.  In such instances, actual meaning can be extrapolated by contextual diversion      All details of the procedure, as well as risks, benefits and alternatives, including the option of non operative versus operative treatment were discussed.   The patient understands that risks of the surgery include but are not limited to: bleeding, malunion/nonunion, loss of fixation, loss of reduction, hardware failure, angular or rotational deformity, length discrepancy, limp, transfusion, skin blistering or breakdown, progressive post traumatic degenerative joint disease, possible need for further surgery, bone grafting, infection, nerve injury, paralysis, numbness, blood vessel injury, excessive scaring, wound complication or breakdown, failure of symptoms to improve or actual deterioration in condition, significant acute and/or chronic pain, possible need for amputation, permanent loss of motion, and permanent loss of function. As well as the general complications of anesthesia, which include but are not limited to: myocardial infarction and/or heart attack, stroke, multi organ system failure or even possible death, prolonged hospital stay, blood clots, pulmonary embolism, abnormal reaction to medication, visual and neurological disturbances, constipation, ischemic bowel, bowel obstruction, bowel perforation, ileus and mental status changes. No guarantees were made.       Electronically signed by Maurilio Vu DO, FAOAO on 8/16/2021 at 11:33 AM

## 2021-08-19 ENCOUNTER — TELEPHONE (OUTPATIENT)
Dept: ORTHOPEDIC SURGERY | Age: 71
End: 2021-08-19

## 2021-08-19 NOTE — TELEPHONE ENCOUNTER
Pt's wife called and LVM stating that the pt was prescribed Celebrex at last appt 8/16/21. They looked at side effects and wonder if it is ok to take this bc pt also takes Aspirin 325mg everyday    Spoke to pt's wife:  Pt is aware that he should not take Aleve while on this medication. Aspirin 325mg was already on list of meds. I reviewed all other meds with pt's wife and updated list.   She also stated that pt had stroke a few years ago and wasn't sure that Dr. Carmela Barksdale was aware. After reading the side effects, they were concerned about starting this but will do so if still recommended.     Please review and advise:

## 2021-08-23 ENCOUNTER — TELEPHONE (OUTPATIENT)
Dept: ORTHOPEDIC SURGERY | Age: 71
End: 2021-08-23

## 2021-08-23 NOTE — TELEPHONE ENCOUNTER
Patient called in today because he was prescribed celebrex on 8/16/2021. He had a left total knee arthroplasty on 6/23/21 with Dr. Tiffanie Thomas. You took him off aleve and prescribed celebrex. He said he had a stroke 2013 and has been on 325mg aspirin since and wants to know if it is okay to take the celebrex along with the high dose aspirin?

## 2021-09-02 ENCOUNTER — OFFICE VISIT (OUTPATIENT)
Dept: NEUROLOGY | Age: 71
End: 2021-09-02
Payer: MEDICARE

## 2021-09-02 ENCOUNTER — TELEPHONE (OUTPATIENT)
Dept: FAMILY MEDICINE CLINIC | Age: 71
End: 2021-09-02

## 2021-09-02 VITALS
WEIGHT: 223 LBS | SYSTOLIC BLOOD PRESSURE: 127 MMHG | HEIGHT: 72 IN | HEART RATE: 86 BPM | DIASTOLIC BLOOD PRESSURE: 84 MMHG | BODY MASS INDEX: 30.2 KG/M2

## 2021-09-02 DIAGNOSIS — M17.0 ARTHRITIS OF BOTH KNEES: ICD-10-CM

## 2021-09-02 DIAGNOSIS — I63.9 CEREBRAL INFARCTION, UNSPECIFIED MECHANISM (HCC): Primary | ICD-10-CM

## 2021-09-02 DIAGNOSIS — G89.0 THALAMIC PAIN SYNDROME: ICD-10-CM

## 2021-09-02 PROCEDURE — 99214 OFFICE O/P EST MOD 30 MIN: CPT | Performed by: PSYCHIATRY & NEUROLOGY

## 2021-09-02 PROCEDURE — 3017F COLORECTAL CA SCREEN DOC REV: CPT | Performed by: PSYCHIATRY & NEUROLOGY

## 2021-09-02 PROCEDURE — 1036F TOBACCO NON-USER: CPT | Performed by: PSYCHIATRY & NEUROLOGY

## 2021-09-02 PROCEDURE — G8417 CALC BMI ABV UP PARAM F/U: HCPCS | Performed by: PSYCHIATRY & NEUROLOGY

## 2021-09-02 PROCEDURE — 1123F ACP DISCUSS/DSCN MKR DOCD: CPT | Performed by: PSYCHIATRY & NEUROLOGY

## 2021-09-02 PROCEDURE — G8427 DOCREV CUR MEDS BY ELIG CLIN: HCPCS | Performed by: PSYCHIATRY & NEUROLOGY

## 2021-09-02 PROCEDURE — 4040F PNEUMOC VAC/ADMIN/RCVD: CPT | Performed by: PSYCHIATRY & NEUROLOGY

## 2021-09-02 ASSESSMENT — ENCOUNTER SYMPTOMS
ALLERGIC/IMMUNOLOGIC NEGATIVE: 1
RESPIRATORY NEGATIVE: 1
EYES NEGATIVE: 1
GASTROINTESTINAL NEGATIVE: 1

## 2021-09-02 NOTE — PROGRESS NOTES
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 The condition is he had left total knee replacement in June without complication to have right knee relacement in October . Carotid US < 50 % stenosis bilaterally , June 2011. He is having right knee pain on weight bearing. He is on aspirin 325 mg po qd . His stroke was in 2013 with left side numbness with stinging pain in left arm and leg placed on elavil 75 mg po qhs  . He still reports intermittent numbness in upper outer arm and left thigh about twice per week lasting about one hour . There is no weakness , bulbar or visual complains . He has been going to pain management for arthritic knees right knee more than left on norco as needed . There is postural instability on his feet do to arthritic knees . There is no heart disturbance . He has tried coming off elavil having insomnia off this medication . Significant medications zocor 20 mg po qd , aspirin 325 mg po qd, elavil 75 mg po qhs  . Testing MRI of Head with chronic right thalamc infarction along with chronic bilateral basal ganglia infarcts with nonspecific areas of subcortical microhemorrhages , March 2014 . Carotid US left ICA with mild plaquing. Right ICA normal,  March 2014  . Cardiac 2 D echo normal LVF EF 58 % . Grade 1 diastolic dysfunction . Mild MR and TR , June 2013 .  Carotid US < 50 % stenosis bilaterally , June 2011     Past Medical History:   Diagnosis Date    Cataracts, bilateral     only left eye, had surgery on right    Cerebral artery occlusion with cerebral infarction (HCC)     weakness left leg, sees Dr Tina Ordonez Diabetes mellitus [facility])     type II, managed by Dr Meredith Stark (PCP)  range , checks daily    Eye disorder     history of a \"stroke\" in right eye    Glaucoma     right eye    Headache(784.0)     no current issues (6/10/21)    Hearing loss     Heartburn     occasionally, takes OTC acid reducer as needed    Hyperlipidemia     Hypertension     Kidney stones     Osteoarthritis     Snores     Tendonitis of shoulder, left     Tremor     no current problems (6/10/21)       Past Surgical History:   Procedure Laterality Date    CATARACT REMOVAL Right 08/2015    right eye    CYSTOSCOPY      EYE SURGERY      HAND SURGERY Left     thumb    JOINT REPLACEMENT      KIDNEY STONE SURGERY      LITHOTRIPSY      REFRACTIVE SURGERY Right 2014    THUMB AMPUTATION      TONSILLECTOMY      TOTAL KNEE ARTHROPLASTY Left 6/23/2021    LEFT  KNEE TOTAL ARTHROPLASTY - Vikas Gray performed by Tima Tovar,  at [address] History   Problem Relation Age of Onset    Heart Disease Mother     Cancer Father     Migraines Sister     Migraines Brother        Social History     Socioeconomic History    Marital status:      Spouse name: None    Number of children: None    Years of education: None    Highest education level: None   Occupational History    None   Tobacco Use    Smoking status: Never Smoker    Smokeless tobacco: Former User     Types: Chew    Tobacco comment: quit smokeless tobacco in late 80's   Vaping Use    Vaping Use: Never used   Substance and Sexual Activity    Alcohol use: Yes     Alcohol/week: 0.0 standard drinks     Comment: rarely    Drug use: No    Sexual activity: Yes   Other Topics Concern    None   Social History Narrative    None     Social Determinants of Health     Financial Resource Strain: Low Risk     Difficulty of Paying Living Expenses: Not hard at all   Food Insecurity: No Food Insecurity    Worried About Running Out of Food in the Last Year: Never true    Catalina of Food in the Last Year: Never true   Transportation Needs: No Transportation Needs    Lack of Transportation (Medical): No    Lack of Transportation (Non-Medical):  No   Physical Activity:     Days of Exercise per Week:     Minutes of Exercise per Session: Stress:     Feeling of Stress :    Social Connections:     Frequency of Communication with Friends and Family:     Frequency of Social Gatherings with Friends and Family:     Attends [employer] Services:     Active Member of Clubs or Organizations:     Attends Club or Organization Meetings:     Marital Status:    Intimate Partner Violence:     Fear of Current or Ex-Partner:     Emotionally Abused:     Physically Abused:     Sexually Abused:        Current Outpatient Medications   Medication Sig Dispense Refill    Chlorpheniramine Maleate (ALLERGY RELIEF PO) Take by mouth      acetaminophen (TYLENOL) 325 MG tablet Take 650 mg by mouth every 6 hours as needed for Pain      amLODIPine (NORVASC) 5 MG tablet Take 1 tablet by mouth daily 30 tablet 0    Homeopathic Products (SIMILASAN DRY EYE RELIEF) SOLN Apply 2 drops to eye 2 times daily Left eye      amitriptyline (ELAVIL) 75 MG tablet TAKE 1 TABLET NIGHTLY 90 tablet 1    glipiZIDE (GLUCOTROL) 10 MG tablet Take 1 tablet by mouth 2 times daily (before meals) (Patient taking differently: Take 10 mg by mouth daily ) 180 tablet 1    losartan (COZAAR) 50 MG tablet TAKE 1 TABLET DAILY 90 tablet 1    simvastatin (ZOCOR) 20 MG tablet TAKE 1 TABLET NIGHTLY 90 tablet 1    aspirin 325 MG tablet Take 325 mg by mouth daily       blood glucose monitor strips Provide insurance covered test strips compatible with glucometer, test 1 times a day 100 strip 5    Blood Glucose Monitoring Suppl (D-CARE GLUCOMETER) w/Device KIT Provide insurance covered glucometer, check blood sugars every morning 1 kit 0    timolol (TIMOPTIC) 0.5 % ophthalmic solution instill 1 drop into right eye twice a day  0    latanoprost (XALATAN) 0.005 % ophthalmic solution Place 1 drop into the right eye nightly       celecoxib (CELEBREX) 200 MG capsule Take 1 capsule by mouth 2 times daily (Patient not taking: Reported on 9/2/2021) 60 capsule 3     No current facility-administered medications for this visit. No Known Allergies      Review of Systems     Vitals:    09/02/21 1044   BP: 127/84   Pulse: 86     weight: 223 lb (101.2 kg)      Review of Systems   Constitutional: Negative. HENT: Negative. Eyes: Negative. Respiratory: Negative. Cardiovascular: Negative. Gastrointestinal: Negative. Endocrine: Negative. Genitourinary: Negative. Musculoskeletal: Positive for arthralgias. Skin: Negative. Allergic/Immunologic: Negative. Neurological: Negative. Hematological: Negative. Psychiatric/Behavioral: Negative. Neurological Examination  Constitutional .General exam well groomed   Head/Ears /Nose/Throat: external ear . Normal exam  Neck and thyroid . Normal size. No bruits  Respiratory . Breathsounds clear bilaterally  Cardiovascular: Auscultation of heart with regular rate and rhythm  Musculoskeletal. Muscle bulk and tone normal                                                           Muscle strength 5/5 strength throughout                                                                               No dysmetria or dysdiadokinesis  No tremor   Normal fine motor  Gait mild imbalance  Orientation Alert and oriented x 3   Attention and concentration normal  Short term memory normal  Language process and speech normal . No aphasia   Cranial nerve 2 normal acuety and visual fields  Cranial nerve 3, 4 and 6 . Extraocular muscles are intact . Pupils are equal and reactive   Cranial nerve 5 . Normal strength of masseter and temporalis . Intact corneal reflex. Normal facial sensation  Cranial nerve 7 normal exam   Cranial nerve 8. Grossly intact hearing   Cranial nerve 9 and 10. Symmetric palate elevation   Cranial nerve 11 , 5 out of 5 strength   Cranial Nerve 12 midline tongue . No atrophy  Sensation . Normal proprioception . Intact Vibration .  Normal pinprick and light touch   Deep Tendon Reflexes normal  Plantar response flexor

## 2021-09-23 ENCOUNTER — TELEPHONE (OUTPATIENT)
Dept: NEUROLOGY | Age: 71
End: 2021-09-23

## 2021-10-05 ENCOUNTER — TELEPHONE (OUTPATIENT)
Dept: ORTHOPEDIC SURGERY | Age: 71
End: 2021-10-05

## 2021-10-05 NOTE — TELEPHONE ENCOUNTER
Spoke with wife and Bob wants to cancel surgery right now. They are frustrated with PCP office they would not get patient in for a sooner appointment to be cleared for surgery. They have been at the office for a long time just different new doctors have came in. Bryce Hawley did say she spoke to someone today who was willing to get him in today or tomorrow but Gorge Eldridge is just frustrated and wants to cancel surgery for right now. Will call to schedule when he is ready.

## 2021-10-12 RX ORDER — AMLODIPINE BESYLATE 5 MG/1
5 TABLET ORAL DAILY
Qty: 30 TABLET | Refills: 0 | Status: SHIPPED | OUTPATIENT
Start: 2021-10-12 | End: 2021-11-08 | Stop reason: SDUPTHER

## 2021-10-12 NOTE — TELEPHONE ENCOUNTER
11/8/21    Hemoglobin A1C (%)   Date Value   06/10/2021 6.4 (H)   12/09/2020 6.0   07/08/2020 6.1             ( goal A1C is < 7)   Microalb/Crt.  Ratio (mcg/mg creat)   Date Value   05/11/2020 17 (H)     LDL Cholesterol (mg/dL)   Date Value   05/06/2021 90       (goal LDL is <100)   AST (U/L)   Date Value   05/06/2021 18     ALT (U/L)   Date Value   05/06/2021 10     BUN (mg/dL)   Date Value   06/24/2021 19     BP Readings from Last 3 Encounters:   09/02/21 127/84   07/22/21 (!) 140/88   06/30/21 (!) 148/81          (goal 120/80)        Patient Active Problem List:     Hypercholesteremia     Cerebral infarction ([address]     Neuropathic pain     Weakness of both legs     Essential hypertension     Type 2 diabetes mellitus with diabetic autonomic neuropathy, without long-term current use of insulin (HCC)     Eustachian tube dysfunction     Branch retinal vein occlusion     Primary open angle glaucoma     Chronic pain of both knees     Cerebral infarction due to occlusion of right middle cerebral artery (HCC)     Glaucomatous visual field defect     Nuclear sclerotic cataract     Class 1 obesity due to excess calories with serious comorbidity and body mass index (BMI) of 31.0 to 31.9 in adult     Elevated PSA     S/P total knee arthroplasty, left      ----Jj Garibay

## 2021-11-08 ENCOUNTER — OFFICE VISIT (OUTPATIENT)
Dept: FAMILY MEDICINE CLINIC | Age: 71
End: 2021-11-08
Payer: MEDICARE

## 2021-11-08 VITALS
HEART RATE: 85 BPM | BODY MASS INDEX: 30.79 KG/M2 | OXYGEN SATURATION: 95 % | WEIGHT: 227 LBS | SYSTOLIC BLOOD PRESSURE: 130 MMHG | DIASTOLIC BLOOD PRESSURE: 82 MMHG

## 2021-11-08 DIAGNOSIS — G89.29 CHRONIC PAIN OF LEFT KNEE: ICD-10-CM

## 2021-11-08 DIAGNOSIS — M25.562 CHRONIC PAIN OF LEFT KNEE: ICD-10-CM

## 2021-11-08 DIAGNOSIS — Z76.0 MEDICATION REFILL: ICD-10-CM

## 2021-11-08 DIAGNOSIS — Z86.73 S/P STROKE DUE TO CEREBROVASCULAR DISEASE: ICD-10-CM

## 2021-11-08 DIAGNOSIS — E11.43 TYPE 2 DIABETES MELLITUS WITH DIABETIC AUTONOMIC NEUROPATHY, WITHOUT LONG-TERM CURRENT USE OF INSULIN (HCC): Primary | ICD-10-CM

## 2021-11-08 DIAGNOSIS — R73.03 PREDIABETES: ICD-10-CM

## 2021-11-08 PROBLEM — F33.1 MAJOR DEPRESSIVE DISORDER, RECURRENT, MODERATE (HCC): Status: RESOLVED | Noted: 2021-11-08 | Resolved: 2021-11-08

## 2021-11-08 PROBLEM — F33.0 MAJOR DEPRESSIVE DISORDER, RECURRENT, MILD (HCC): Status: RESOLVED | Noted: 2021-11-08 | Resolved: 2021-11-08

## 2021-11-08 PROBLEM — F33.0 MAJOR DEPRESSIVE DISORDER, RECURRENT, MILD (HCC): Status: ACTIVE | Noted: 2021-11-08

## 2021-11-08 PROBLEM — F33.1 MAJOR DEPRESSIVE DISORDER, RECURRENT, MODERATE (HCC): Status: ACTIVE | Noted: 2021-11-08

## 2021-11-08 PROBLEM — F33.9 MAJOR DEPRESSIVE DISORDER, RECURRENT, UNSPECIFIED (HCC): Status: ACTIVE | Noted: 2021-11-08

## 2021-11-08 LAB
CREATININE URINE POCT: 100
HBA1C MFR BLD: 6.3 %
MICROALBUMIN/CREAT 24H UR: 10 MG/G{CREAT}
MICROALBUMIN/CREAT UR-RTO: <30

## 2021-11-08 PROCEDURE — G8427 DOCREV CUR MEDS BY ELIG CLIN: HCPCS | Performed by: NURSE PRACTITIONER

## 2021-11-08 PROCEDURE — 1036F TOBACCO NON-USER: CPT | Performed by: NURSE PRACTITIONER

## 2021-11-08 PROCEDURE — 3044F HG A1C LEVEL LT 7.0%: CPT | Performed by: NURSE PRACTITIONER

## 2021-11-08 PROCEDURE — 82044 UR ALBUMIN SEMIQUANTITATIVE: CPT | Performed by: NURSE PRACTITIONER

## 2021-11-08 PROCEDURE — 4040F PNEUMOC VAC/ADMIN/RCVD: CPT | Performed by: NURSE PRACTITIONER

## 2021-11-08 PROCEDURE — 2022F DILAT RTA XM EVC RTNOPTHY: CPT | Performed by: NURSE PRACTITIONER

## 2021-11-08 PROCEDURE — 99214 OFFICE O/P EST MOD 30 MIN: CPT | Performed by: NURSE PRACTITIONER

## 2021-11-08 PROCEDURE — 1123F ACP DISCUSS/DSCN MKR DOCD: CPT | Performed by: NURSE PRACTITIONER

## 2021-11-08 PROCEDURE — G8484 FLU IMMUNIZE NO ADMIN: HCPCS | Performed by: NURSE PRACTITIONER

## 2021-11-08 PROCEDURE — G8417 CALC BMI ABV UP PARAM F/U: HCPCS | Performed by: NURSE PRACTITIONER

## 2021-11-08 PROCEDURE — 83036 HEMOGLOBIN GLYCOSYLATED A1C: CPT | Performed by: NURSE PRACTITIONER

## 2021-11-08 PROCEDURE — 3017F COLORECTAL CA SCREEN DOC REV: CPT | Performed by: NURSE PRACTITIONER

## 2021-11-08 RX ORDER — AMLODIPINE BESYLATE 5 MG/1
5 TABLET ORAL DAILY
Qty: 90 TABLET | Refills: 1 | Status: SHIPPED | OUTPATIENT
Start: 2021-11-08 | End: 2021-11-30 | Stop reason: SDUPTHER

## 2021-11-08 RX ORDER — HYDROCODONE BITARTRATE AND ACETAMINOPHEN 5; 325 MG/1; MG/1
1 TABLET ORAL 2 TIMES DAILY PRN
Qty: 60 TABLET | Refills: 0 | Status: SHIPPED | OUTPATIENT
Start: 2021-11-08 | End: 2021-12-30 | Stop reason: SDUPTHER

## 2021-11-08 RX ORDER — LOSARTAN POTASSIUM 50 MG/1
TABLET ORAL
Qty: 90 TABLET | Refills: 1 | Status: SHIPPED | OUTPATIENT
Start: 2021-11-08 | End: 2022-02-09 | Stop reason: SDUPTHER

## 2021-11-08 RX ORDER — SIMVASTATIN 20 MG
TABLET ORAL
Qty: 90 TABLET | Refills: 1 | Status: SHIPPED | OUTPATIENT
Start: 2021-11-08 | End: 2022-02-25 | Stop reason: SDUPTHER

## 2021-11-08 RX ORDER — GLIPIZIDE 10 MG/1
10 TABLET ORAL DAILY
Qty: 90 TABLET | Refills: 1 | Status: SHIPPED | OUTPATIENT
Start: 2021-11-08 | End: 2022-08-09 | Stop reason: SDUPTHER

## 2021-11-08 RX ORDER — AMITRIPTYLINE HYDROCHLORIDE 75 MG/1
TABLET, FILM COATED ORAL
Qty: 90 TABLET | Refills: 1 | Status: SHIPPED | OUTPATIENT
Start: 2021-11-08 | End: 2022-03-03 | Stop reason: SDUPTHER

## 2021-11-08 ASSESSMENT — PATIENT HEALTH QUESTIONNAIRE - PHQ9
SUM OF ALL RESPONSES TO PHQ QUESTIONS 1-9: 0
SUM OF ALL RESPONSES TO PHQ9 QUESTIONS 1 & 2: 0
2. FEELING DOWN, DEPRESSED OR HOPELESS: 0
1. LITTLE INTEREST OR PLEASURE IN DOING THINGS: 0
SUM OF ALL RESPONSES TO PHQ QUESTIONS 1-9: 0
SUM OF ALL RESPONSES TO PHQ QUESTIONS 1-9: 0

## 2021-11-08 ASSESSMENT — ENCOUNTER SYMPTOMS
COUGH: 0
SHORTNESS OF BREATH: 0

## 2021-11-08 NOTE — PROGRESS NOTES
Mu Tariq (:  1950) is a 66 y.o. male,Established patient, here for evaluation of the following chief complaint(s):  Establish Care         ASSESSMENT/PLAN:  1. Type 2 diabetes mellitus with diabetic autonomic neuropathy, without long-term current use of insulin (HCC)  Assessment & Plan:   Well-controlled, continue current medications  Orders:  -     POCT glycosylated hemoglobin (Hb A1C)  -     POCT microalbumin  2. Medication refill  -     losartan (COZAAR) 50 MG tablet; TAKE 1 TABLET DAILY, Disp-90 tablet, R-1Normal  -     simvastatin (ZOCOR) 20 MG tablet; TAKE 1 TABLET NIGHTLY, Disp-90 tablet, R-1Normal  -     amitriptyline (ELAVIL) 75 MG tablet; TAKE 1 TABLET NIGHTLY, Disp-90 tablet, R-1Normal  3. S/P stroke due to cerebrovascular disease  -     amitriptyline (ELAVIL) 75 MG tablet; TAKE 1 TABLET NIGHTLY, Disp-90 tablet, R-1Normal  4. Prediabetes  -     glipiZIDE (GLUCOTROL) 10 MG tablet; Take 1 tablet by mouth daily, Disp-90 tablet, R-1Normal  5. Chronic pain of left knee  -     HYDROcodone-acetaminophen (NORCO) 5-325 MG per tablet; Take 1 tablet by mouth 2 times daily as needed for Pain for up to 30 days. , Disp-60 tablet, R-0Normal      No follow-ups on file. Subjective   SUBJECTIVE/OBJECTIVE:  HPI   Pt here for pre op clearance. Was supposed to have done in October, couldn't because he didn't have a pcp. He did have to see a cardio over the summer due to ekg, did have a stress test that he passed. Dm- stable a1c is 6.3. is checking sugars daily. Right knee pain- has been given celebrex, states this feels like a placebo. Was seeing pain management but didn't feel he needed any more after left knee was fixed. He has tried [email or web address], didn't help. The norco allows him to complete his adl's more comfortably. Does not want to do colon cancer screening. Review of Systems   Constitutional: Negative for chills and fever.    Respiratory: Negative for cough and shortness of breath. Cardiovascular: Negative for chest pain and leg swelling. Musculoskeletal: Positive for arthralgias. Objective    Vitals:    11/08/21 0853   BP: 130/82   Pulse: 85   SpO2: 95%       Physical Exam  Constitutional:       General: He is not in acute distress. Appearance: He is well-developed. HENT:      Head: Normocephalic. Right Ear: External ear normal.      Left Ear: External ear normal.   Cardiovascular:      Rate and Rhythm: Normal rate and regular rhythm. Heart sounds: Normal heart sounds. Pulmonary:      Effort: Pulmonary effort is normal.      Breath sounds: Normal breath sounds. Musculoskeletal:      Right knee: Decreased range of motion. Tenderness present. Skin:     General: Skin is warm and dry. Neurological:      Mental Status: He is alert and oriented to person, place, and time. On this date 11/8/2021 I have spent 25 minutes reviewing previous notes, test results and face to face with the patient discussing the diagnosis and importance of compliance with the treatment plan as well as documenting on the day of the visit. An electronic signature was used to authenticate this note.     --AMERICA Monterroso - CNP

## 2021-11-08 NOTE — PROGRESS NOTES
Patient is present to establish care    Patient was scheduled to have knee surgery but had to cancel due to not having an appt for surgery clearance  Patient states he is going to wait until after winter to reschedule    Patient is a diabetic  States this morning his sugar was 119 this morning    Patient refuses colon cancer screening

## 2021-11-30 ENCOUNTER — TELEPHONE (OUTPATIENT)
Dept: FAMILY MEDICINE CLINIC | Age: 71
End: 2021-11-30

## 2021-11-30 RX ORDER — AMLODIPINE BESYLATE 10 MG/1
10 TABLET ORAL DAILY
Qty: 90 TABLET | Refills: 1 | Status: SHIPPED | OUTPATIENT
Start: 2021-11-30 | End: 2022-02-09 | Stop reason: SDUPTHER

## 2021-11-30 NOTE — TELEPHONE ENCOUNTER
Patient's wife called stating patient has been checking his bp at home and as had elevated readings. States he is not have any chest pain, sob, or headaches. 11/24 173/99 am 154/78 pm  11/26 186/95  11/27 147/98    States he is supposed to be on 10mg of amlodipine but states 5mg was sent in.

## 2021-12-14 ENCOUNTER — TELEPHONE (OUTPATIENT)
Dept: FAMILY MEDICINE CLINIC | Age: 71
End: 2021-12-14

## 2021-12-14 NOTE — TELEPHONE ENCOUNTER
Wife called in with bp readings:    12/3 153/76  12/4 135/83  12/5 139/81  12/6 169/94  12/7 139/78    12/10 144/86  12/11 172/92  12/12 146/91  12/13 144/74  12/14 136/83    States readings are a couple hours after taking bp med.

## 2021-12-17 NOTE — TELEPHONE ENCOUNTER
His blood pressure was controlled when he was in the office. Is he symptomatic? Have they always checked bp's at home? Have they changed?

## 2021-12-30 DIAGNOSIS — G89.29 CHRONIC PAIN OF LEFT KNEE: ICD-10-CM

## 2021-12-30 DIAGNOSIS — M25.562 CHRONIC PAIN OF LEFT KNEE: ICD-10-CM

## 2021-12-30 RX ORDER — HYDROCODONE BITARTRATE AND ACETAMINOPHEN 5; 325 MG/1; MG/1
1 TABLET ORAL 2 TIMES DAILY PRN
Qty: 60 TABLET | Refills: 0 | Status: SHIPPED | OUTPATIENT
Start: 2021-12-30 | End: 2022-02-09 | Stop reason: SDUPTHER

## 2021-12-30 NOTE — TELEPHONE ENCOUNTER
Last visit: 11/8/21  Last Med refill: 11/8/21  Does patient have enough medication for 72 hours: No:     Next Visit Date:  Future Appointments   Date Time Provider Lev Huerta   2/9/2022  9:30 AM AMERICA Justin - CNP [facility]   9/13/2022 10:00 AM Mylene Flores MD Neuro [facility] [address] Maintenance   Topic Date Due    DTaP/Tdap/Td vaccine (1 - Tdap) Never done    Shingles Vaccine (1 of 2) Never done    Colon Cancer Screen FIT/FOBT  05/13/2017    Diabetic retinal exam  01/29/2020    PSA counseling  11/14/2020    Diabetic foot exam  01/10/2021    Annual Wellness Visit (AWV)  07/09/2021    Lipid screen  05/06/2022    Potassium monitoring  06/24/2022    Creatinine monitoring  06/24/2022    A1C test (Diabetic or Prediabetic)  11/08/2022    Diabetic microalbuminuria test  11/08/2022    Flu vaccine  Completed    Pneumococcal 65+ years Vaccine  Completed    COVID-19 Vaccine  Completed    Hepatitis C screen  Addressed    Hepatitis A vaccine  Aged Out    Hib vaccine  Aged Out    Meningococcal (ACWY) vaccine  Aged Out       Hemoglobin A1C (%)   Date Value   11/08/2021 6.3   06/10/2021 6.4 (H)   12/09/2020 6.0             ( goal A1C is < 7)   Microalb/Crt.  Ratio (mcg/mg creat)   Date Value   05/11/2020 17 (H)     LDL Cholesterol (mg/dL)   Date Value   05/06/2021 90   10/07/2019 100       (goal LDL is <100)   AST (U/L)   Date Value   05/06/2021 18     ALT (U/L)   Date Value   05/06/2021 10     BUN (mg/dL)   Date Value   06/24/2021 19     BP Readings from Last 3 Encounters:   11/08/21 130/82   09/02/21 127/84   07/22/21 (!) 140/88          (goal 120/80)    All Future Testing planned in [facility]  Lab Frequency Next Occurrence   Basic Metabolic Panel Once 43/62/0417   CBC Once 10/07/2021   XR CHEST (2 VW) Once 10/07/2021   Urinalysis Once 10/08/2021   MRSA DNA Probe, Nasal Once 10/07/2021               Patient Active Problem List:     Hypercholesteremia Neuropathic pain     Weakness of both legs     Essential hypertension     Type 2 diabetes mellitus with diabetic autonomic neuropathy, without long-term current use of insulin (HCC)     Eustachian tube dysfunction     Branch retinal vein occlusion     Primary open angle glaucoma     Chronic pain of both knees     Cerebral infarction due to occlusion of right middle cerebral artery (HCC)     Glaucomatous visual field defect     Nuclear sclerotic cataract     Class 1 obesity due to excess calories with serious comorbidity and body mass index (BMI) of 31.0 to 31.9 in adult     Elevated PSA     S/P total knee arthroplasty, left     Major depressive disorder, recurrent, unspecified

## 2022-01-10 NOTE — LETTER
[address]  [address] [place] [address]  Dept: [phone]  Dept Fax: [phone]      Medical Evaluation for Surgery    Patient Name:  Amos Love    1950     Type of Surgery:   Right total knee replacement DOS 2/15/22    Patient has been medically evaluated for the above surgery. __________He/She has a acceptable low risk for the proposed surgery. __________He/She has a moderate risk for the proposed surgery. __________He/She has a high risk for the proposed surgery. __________He/She needs further testing/consultation. Criteria for Total Joint Replacement:  *Free from UTI *HTN - Controlled *BMI < 40  *A1c < 7   *Smoking- referred to quit line; medication as appropriate  *CAD/CHF - Controlled - Cardiology Clearance Required    *HGB > 15 female or > 15 male    Thank you for your consultation. Should you have any questions, please do not hesitate to call the office.       Sincerely,       [employer]       Sign_____________________________________________Date__________________

## 2022-01-12 ENCOUNTER — TELEPHONE (OUTPATIENT)
Dept: NEUROLOGY | Age: 72
End: 2022-01-12

## 2022-01-20 NOTE — TELEPHONE ENCOUNTER
Dr. Dominic Liu is out of the office until next Tuesday. Will take form and discuss with him when he is back in the office.

## 2022-01-27 ENCOUNTER — HOSPITAL ENCOUNTER (OUTPATIENT)
Dept: PREADMISSION TESTING | Age: 72
Discharge: HOME | End: 2022-01-31
Payer: MEDICARE

## 2022-01-27 VITALS
OXYGEN SATURATION: 99 % | BODY MASS INDEX: 31.65 KG/M2 | DIASTOLIC BLOOD PRESSURE: 76 MMHG | TEMPERATURE: 97.6 F | SYSTOLIC BLOOD PRESSURE: 156 MMHG | RESPIRATION RATE: 16 BRPM | WEIGHT: 233.69 LBS | HEART RATE: 80 BPM | HEIGHT: 72 IN

## 2022-01-27 DIAGNOSIS — I10 ESSENTIAL HYPERTENSION: ICD-10-CM

## 2022-01-27 DIAGNOSIS — Z01.818 PRE-OP TESTING: ICD-10-CM

## 2022-01-27 LAB
-: ABNORMAL
ABO/RH: NORMAL
AMORPHOUS: ABNORMAL
ANION GAP SERPL CALCULATED.3IONS-SCNC: 8 MMOL/L (ref 9–17)
ANTIBODY SCREEN: NEGATIVE
ARM BAND NUMBER: NORMAL
BACTERIA: ABNORMAL
BILIRUBIN URINE: NEGATIVE
BUN BLDV-MCNC: 13 MG/DL (ref 8–23)
BUN/CREAT BLD: 19 (ref 9–20)
CALCIUM SERPL-MCNC: 9.2 MG/DL (ref 8.6–10.4)
CASTS UA: ABNORMAL /LPF
CHLORIDE BLD-SCNC: 101 MMOL/L (ref 98–107)
CO2: 28 MMOL/L (ref 20–31)
COLOR: YELLOW
COMMENT UA: ABNORMAL
CREAT SERPL-MCNC: 0.68 MG/DL (ref 0.7–1.2)
CRYSTALS, UA: ABNORMAL /HPF
EPITHELIAL CELLS UA: ABNORMAL /HPF (ref 0–5)
ESTIMATED AVERAGE GLUCOSE: 137 MG/DL
EXPIRATION DATE: NORMAL
GFR AFRICAN AMERICAN: >60 ML/MIN
GFR NON-AFRICAN AMERICAN: >60 ML/MIN
GFR SERPL CREATININE-BSD FRML MDRD: ABNORMAL ML/MIN/{1.73_M2}
GFR SERPL CREATININE-BSD FRML MDRD: ABNORMAL ML/MIN/{1.73_M2}
GLUCOSE BLD-MCNC: 122 MG/DL (ref 70–99)
GLUCOSE URINE: NEGATIVE
HBA1C MFR BLD: 6.4 % (ref 4–6)
HCT VFR BLD CALC: 43.6 % (ref 40.7–50.3)
HEMOGLOBIN: 13.8 G/DL (ref 13–17)
KETONES, URINE: NEGATIVE
LEUKOCYTE ESTERASE, URINE: ABNORMAL
MCH RBC QN AUTO: 30 PG (ref 25.2–33.5)
MCHC RBC AUTO-ENTMCNC: 31.7 G/DL (ref 28.4–34.8)
MCV RBC AUTO: 94.8 FL (ref 82.6–102.9)
MUCUS: ABNORMAL
NITRITE, URINE: NEGATIVE
NRBC AUTOMATED: 0 PER 100 WBC
OTHER OBSERVATIONS UA: ABNORMAL
PDW BLD-RTO: 13.6 % (ref 11.8–14.4)
PH UA: 6 (ref 5–8)
PLATELET # BLD: 182 K/UL (ref 138–453)
PMV BLD AUTO: 9.9 FL (ref 8.1–13.5)
POTASSIUM SERPL-SCNC: 3.7 MMOL/L (ref 3.7–5.3)
PROTEIN UA: NEGATIVE
RBC # BLD: 4.6 M/UL (ref 4.21–5.77)
RBC UA: ABNORMAL /HPF (ref 0–2)
RENAL EPITHELIAL, UA: ABNORMAL /HPF
SODIUM BLD-SCNC: 137 MMOL/L (ref 135–144)
SPECIFIC GRAVITY UA: 1.02 (ref 1–1.03)
TRICHOMONAS: ABNORMAL
TURBIDITY: CLEAR
URINE HGB: ABNORMAL
UROBILINOGEN, URINE: NORMAL
WBC # BLD: 6.9 K/UL (ref 3.5–11.3)
WBC UA: ABNORMAL /HPF (ref 0–5)
YEAST: ABNORMAL

## 2022-01-27 PROCEDURE — 81001 URINALYSIS AUTO W/SCOPE: CPT

## 2022-01-27 PROCEDURE — 86900 BLOOD TYPING SEROLOGIC ABO: CPT

## 2022-01-27 PROCEDURE — 87641 MR-STAPH DNA AMP PROBE: CPT

## 2022-01-27 PROCEDURE — 80048 BASIC METABOLIC PNL TOTAL CA: CPT

## 2022-01-27 PROCEDURE — 86901 BLOOD TYPING SEROLOGIC RH(D): CPT

## 2022-01-27 PROCEDURE — 36415 COLL VENOUS BLD VENIPUNCTURE: CPT

## 2022-01-27 PROCEDURE — 83036 HEMOGLOBIN GLYCOSYLATED A1C: CPT

## 2022-01-27 PROCEDURE — 86850 RBC ANTIBODY SCREEN: CPT

## 2022-01-27 PROCEDURE — 85027 COMPLETE CBC AUTOMATED: CPT

## 2022-01-27 PROCEDURE — 93005 ELECTROCARDIOGRAM TRACING: CPT

## 2022-01-27 RX ORDER — GABAPENTIN 300 MG/1
300 CAPSULE ORAL ONCE
Status: CANCELLED | OUTPATIENT
Start: 2022-02-15

## 2022-01-27 RX ORDER — CELECOXIB 200 MG/1
200 CAPSULE ORAL ONCE
Status: CANCELLED | OUTPATIENT
Start: 2022-02-15

## 2022-01-27 RX ORDER — ACETAMINOPHEN 500 MG
1000 TABLET ORAL ONCE
Status: CANCELLED | OUTPATIENT
Start: 2022-02-15

## 2022-01-27 ASSESSMENT — PROMIS GLOBAL HEALTH SCALE
IN GENERAL, WOULD YOU SAY YOUR QUALITY OF LIFE IS...[ON A SCALE OF 1 (POOR) TO 5 (EXCELLENT)]: 2
SUM OF RESPONSES TO QUESTIONS 2, 4, 5, & 10: 10
IN GENERAL, HOW WOULD YOU RATE YOUR MENTAL HEALTH, INCLUDING YOUR MOOD AND YOUR ABILITY TO THINK [ON A SCALE OF 1 (POOR) TO 5 (EXCELLENT)]?: 3
IN GENERAL, HOW WOULD YOU RATE YOUR PHYSICAL HEALTH [ON A SCALE OF 1 (POOR) TO 5 (EXCELLENT)]?: 3
HOW IS THE PROMIS V1.1 BEING ADMINISTERED?: 0
IN GENERAL, PLEASE RATE HOW WELL YOU CARRY OUT YOUR USUAL SOCIAL ACTIVITIES (INCLUDES ACTIVITIES AT HOME, AT WORK, AND IN YOUR COMMUNITY, AND RESPONSIBILITIES AS A PARENT, CHILD, SPOUSE, EMPLOYEE, FRIEND, ETC) [ON A SCALE OF 1 (POOR) TO 5 (EXCELLENT)]?: 2
TO WHAT EXTENT ARE YOU ABLE TO CARRY OUT YOUR EVERYDAY PHYSICAL ACTIVITIES SUCH AS WALKING, CLIMBING STAIRS, CARRYING GROCERIES, OR MOVING A CHAIR [ON A SCALE OF 1 (NOT AT ALL) TO 5 (COMPLETELY)]?: 2
IN THE PAST 7 DAYS, HOW WOULD YOU RATE YOUR PAIN ON AVERAGE [ON A SCALE FROM 0 (NO PAIN) TO 10 (WORST IMAGINABLE PAIN)]?: 6
IN GENERAL, WOULD YOU SAY YOUR HEALTH IS...[ON A SCALE OF 1 (POOR) TO 5 (EXCELLENT)]: 3
WHO IS THE PERSON COMPLETING THE PROMIS V1.1 SURVEY?: 0
IN GENERAL, HOW WOULD YOU RATE YOUR SATISFACTION WITH YOUR SOCIAL ACTIVITIES AND RELATIONSHIPS [ON A SCALE OF 1 (POOR) TO 5 (EXCELLENT)]?: 3
SUM OF RESPONSES TO QUESTIONS 3, 6, 7, & 8: 14
IN THE PAST 7 DAYS, HOW OFTEN HAVE YOU BEEN BOTHERED BY EMOTIONAL PROBLEMS, SUCH AS FEELING ANXIOUS, DEPRESSED, OR IRRITABLE [ON A SCALE FROM 1 (NEVER) TO 5 (ALWAYS)]?: 2
IN THE PAST 7 DAYS, HOW WOULD YOU RATE YOUR FATIGUE ON AVERAGE [ON A SCALE FROM 1 (NONE) TO 5 (VERY SEVERE)]?: 3

## 2022-01-27 ASSESSMENT — PAIN DESCRIPTION - LOCATION: LOCATION: KNEE

## 2022-01-27 ASSESSMENT — KOOS JR
RISING FROM SITTING: 2
BENDING TO THE FLOOR TO PICK UP OBJECT: 4
STANDING UPRIGHT: 2
GOING UP OR DOWN STAIRS: 3
HOW SEVERE IS YOUR KNEE STIFFNESS AFTER FIRST WAKING IN MORNING: 1
TWISING OR PIVOTING ON KNEE: 2
STRAIGHTENING KNEE FULLY: 1

## 2022-01-27 ASSESSMENT — PAIN DESCRIPTION - ONSET: ONSET: ON-GOING

## 2022-01-27 ASSESSMENT — PAIN DESCRIPTION - ORIENTATION: ORIENTATION: RIGHT

## 2022-01-27 ASSESSMENT — PAIN DESCRIPTION - DESCRIPTORS: DESCRIPTORS: ACHING;CONSTANT

## 2022-01-27 ASSESSMENT — PAIN - FUNCTIONAL ASSESSMENT: PAIN_FUNCTIONAL_ASSESSMENT: PREVENTS OR INTERFERES SOME ACTIVE ACTIVITIES AND ADLS

## 2022-01-27 ASSESSMENT — PAIN DESCRIPTION - PROGRESSION: CLINICAL_PROGRESSION: GRADUALLY WORSENING

## 2022-01-27 ASSESSMENT — PAIN DESCRIPTION - PAIN TYPE: TYPE: CHRONIC PAIN

## 2022-01-27 ASSESSMENT — PAIN DESCRIPTION - FREQUENCY: FREQUENCY: CONTINUOUS

## 2022-01-27 ASSESSMENT — PAIN SCALES - GENERAL: PAINLEVEL_OUTOF10: 5

## 2022-01-27 NOTE — PATIENT CARE CONFERENCE
[address] Tuesday, Feb 15,2022  Blanca Roberson from office will call you that week with an exact time for surgery    Once you enter the hospital lobby, take the elevators to the second floor. Check-In is at the surgery registration desk. Continue to take your home medications as you normally do up to and including the night before surgery with the exception of any blood thinning medications. Please stop any blood thinning medications as directed by your surgeon or prescribing physician. Failure to stop certain medications may interfere with your scheduled surgery. These may include:  Aspirin, Warfarin (Coumadin), Clopidogrel (Plavix), Ibuprofen (Motrin, Advil), Naproxen (Aleve), Meloxicam (Mobic), Celecoxib (Celebrex), Eliquis, Pradaxa, Xarelto, Effient, Fish Oil, Herbal supplements. Stop aspirin as directed by neurologist or cardiologist before surgery      Please take the following medication(s) the day of surgery with a small sip of water:  Amlodipine,        PREPARING FOR YOUR SURGERY:     Before surgery, you can play an important role in your own health. Because skin is not sterile, we need to be sure that your skin is as free of germs as possible before surgery by carefully washing before surgery. Preparing or prepping skin before surgery can reduce the risk of a surgical site infection.   Do not shave the area of your body where your surgery will be performed unless you received specific permission from your physician. You will need to shower at home the night before surgery and the morning of surgery with a special soap called chlorhexidine gluconate (CHG*). *Not to be used by people allergic to Chlorhexidine Gluconate (CHG). Following these instructions will help you be sure that your skin is clean before surgery. Instructions on cleaning your skin before surgery:      The night before your surgery:      You will need to shower with warm water (not hot) and the CHG soap.       Use a clean wash cloth and a clean towel. Have clean clothes available to put on after the shower.   First wash your hair with regular shampoo. Rinse your hair and body thoroughly to remove the shampoo.  Wash your face and genital area (private parts) with your regular soap or water only. Thoroughly rinse your body with warm water from the neck down.  Turn water off to prevent rinsing the soap off too soon.  With a clean wet washcloth and half of the CHG soap in the bottle, lather your entire body from the neck down. Do not use CHG soap near your eyes or ears to avoid injury to those areas.  Wash thoroughly, paying special attention to the area where your surgery will be performed.  Wash your body gently for five (5) minutes. Avoid scrubbing your skin too hard.  Turn the water back on and rinse your body thoroughly.  Pat yourself dry with a clean, soft towel. Do not apply lotion, cream or powder.  Dress with clean freshly washed clothes. The morning of surgery:     Repeat shower following steps above - using remaining half of CHG soap in bottle. Patient Instructions:    Oliva Fickle If you are having any type of anesthesia you are to have nothing to eat or drink after midnight the night before your surgery. This includes gum, hard candy, mints, water or smoking or chewing tobacco.  The only exception to this is a small sip of water to take with any morning dose of heart, blood pressure, or seizure medications. No alcoholic beverages for 24 hours prior to surgery.  Brush your teeth but do not swallow water.  Bring your eyeglasses and case with you. No contacts are to be worn the day of surgery. You also may bring your hearing aids. Most surgical procedures involving anesthesia will require that you remove your dentures prior to surgery. · Do not wear any jewelry or body piercings day of surgery.   Also, NO lotion, perfume or deodorant to be used the day of surgery. No nail polish on the operative extremity (arm/leg surgeries)     Please wear loose, comfortable clothing. If you are potentially going to have a cast or brace bring clothing that will fit over them.  In case of illness  If you have cold or flu like symptoms (high fever, runny nose, sore throat, cough, etc.) rash, nausea, vomiting, loose stools, and/or recent contact with someone who has a contagious disease (chicken pox, measles, etc.) Please call your doctor before coming to the hospital.         Day of Surgery/Procedure:    As a patient at [facility] you can expect quality medical and nursing care that is centered on your individual needs. Our goal is to make your surgical experience as comfortable as possible    . Transportation After Your Surgery/Procedure: You will need a friend or family member to drive you home after your procedure. Your  must be 25years of age or older and able to sign off on your discharge instructions. A taxi cab or any other form of public transportation is not acceptable. Your friend or family member must stay at the hospital throughout your procedure. Someone must remain with you for the first 24 hours after your surgery if you receive anesthesia or medication. If you do not have someone to stay with you, your procedure may be cancelled.       If you have any other questions regarding your procedure or the day of surgery, please call [phone]      _________________________  ____________________________  Signature (Patient)              Signature (Provider) & date

## 2022-01-28 LAB
EKG ATRIAL RATE: 81 BPM
EKG P AXIS: 51 DEGREES
EKG P-R INTERVAL: 188 MS
EKG Q-T INTERVAL: 436 MS
EKG QRS DURATION: 148 MS
EKG QTC CALCULATION (BAZETT): 506 MS
EKG R AXIS: -75 DEGREES
EKG T AXIS: 74 DEGREES
EKG VENTRICULAR RATE: 81 BPM
MRSA, DNA, NASAL: NEGATIVE
SPECIMEN DESCRIPTION: NORMAL

## 2022-02-09 ENCOUNTER — OFFICE VISIT (OUTPATIENT)
Dept: FAMILY MEDICINE CLINIC | Age: 72
End: 2022-02-09
Payer: MEDICARE

## 2022-02-09 VITALS
HEART RATE: 93 BPM | BODY MASS INDEX: 31.46 KG/M2 | SYSTOLIC BLOOD PRESSURE: 120 MMHG | DIASTOLIC BLOOD PRESSURE: 84 MMHG | OXYGEN SATURATION: 95 % | WEIGHT: 232 LBS

## 2022-02-09 DIAGNOSIS — G89.29 CHRONIC PAIN OF LEFT KNEE: ICD-10-CM

## 2022-02-09 DIAGNOSIS — Z76.0 MEDICATION REFILL: ICD-10-CM

## 2022-02-09 DIAGNOSIS — K59.00 CONSTIPATION, UNSPECIFIED CONSTIPATION TYPE: ICD-10-CM

## 2022-02-09 DIAGNOSIS — F33.9 RECURRENT MAJOR DEPRESSIVE DISORDER, REMISSION STATUS UNSPECIFIED (HCC): ICD-10-CM

## 2022-02-09 DIAGNOSIS — I10 ESSENTIAL HYPERTENSION: Primary | ICD-10-CM

## 2022-02-09 DIAGNOSIS — E11.43 TYPE 2 DIABETES MELLITUS WITH DIABETIC AUTONOMIC NEUROPATHY, WITHOUT LONG-TERM CURRENT USE OF INSULIN (HCC): ICD-10-CM

## 2022-02-09 DIAGNOSIS — M25.562 CHRONIC PAIN OF LEFT KNEE: ICD-10-CM

## 2022-02-09 PROCEDURE — 3017F COLORECTAL CA SCREEN DOC REV: CPT | Performed by: NURSE PRACTITIONER

## 2022-02-09 PROCEDURE — G8484 FLU IMMUNIZE NO ADMIN: HCPCS | Performed by: NURSE PRACTITIONER

## 2022-02-09 PROCEDURE — 1036F TOBACCO NON-USER: CPT | Performed by: NURSE PRACTITIONER

## 2022-02-09 PROCEDURE — 99214 OFFICE O/P EST MOD 30 MIN: CPT | Performed by: NURSE PRACTITIONER

## 2022-02-09 PROCEDURE — 3044F HG A1C LEVEL LT 7.0%: CPT | Performed by: NURSE PRACTITIONER

## 2022-02-09 PROCEDURE — G8417 CALC BMI ABV UP PARAM F/U: HCPCS | Performed by: NURSE PRACTITIONER

## 2022-02-09 PROCEDURE — G8427 DOCREV CUR MEDS BY ELIG CLIN: HCPCS | Performed by: NURSE PRACTITIONER

## 2022-02-09 PROCEDURE — 2022F DILAT RTA XM EVC RTNOPTHY: CPT | Performed by: NURSE PRACTITIONER

## 2022-02-09 PROCEDURE — 4040F PNEUMOC VAC/ADMIN/RCVD: CPT | Performed by: NURSE PRACTITIONER

## 2022-02-09 PROCEDURE — 1123F ACP DISCUSS/DSCN MKR DOCD: CPT | Performed by: NURSE PRACTITIONER

## 2022-02-09 RX ORDER — LOSARTAN POTASSIUM 50 MG/1
TABLET ORAL
Qty: 90 TABLET | Refills: 1 | Status: SHIPPED | OUTPATIENT
Start: 2022-02-09 | End: 2022-08-05

## 2022-02-09 RX ORDER — AMLODIPINE BESYLATE 10 MG/1
10 TABLET ORAL DAILY
Qty: 90 TABLET | Refills: 1 | Status: SHIPPED | OUTPATIENT
Start: 2022-02-09 | End: 2022-08-09 | Stop reason: SDUPTHER

## 2022-02-09 RX ORDER — HYDROCODONE BITARTRATE AND ACETAMINOPHEN 5; 325 MG/1; MG/1
1 TABLET ORAL 2 TIMES DAILY PRN
Qty: 12 TABLET | Refills: 0 | Status: ON HOLD | OUTPATIENT
Start: 2022-02-09 | End: 2022-02-15 | Stop reason: HOSPADM

## 2022-02-09 RX ORDER — DOCUSATE SODIUM 100 MG/1
100 CAPSULE, LIQUID FILLED ORAL 2 TIMES DAILY
Qty: 60 CAPSULE | Refills: 11 | Status: SHIPPED | OUTPATIENT
Start: 2022-02-09 | End: 2022-08-08 | Stop reason: ALTCHOICE

## 2022-02-09 ASSESSMENT — ENCOUNTER SYMPTOMS
SHORTNESS OF BREATH: 0
COUGH: 0

## 2022-02-09 NOTE — PROGRESS NOTES
Patient is present for 3 month f/u  Patient's A1C was 6.4 on 1/27  States sugar this morning was 126    Wife states they have been checking his bp at home and brought a bp log today  Patient also brought bp cuff- 129/85 pulse 93

## 2022-02-09 NOTE — PROGRESS NOTES
Veronica Pitt (:  1950) is a 66 y.o. male,Established patient, here for evaluation of the following chief complaint(s):  3 Month Follow-Up and Diabetes         ASSESSMENT/PLAN:  1. Essential hypertension  -     amLODIPine (NORVASC) 10 MG tablet; Take 1 tablet by mouth daily, Disp-90 tablet, R-1Normal  2. Chronic pain of left knee  -     HYDROcodone-acetaminophen (NORCO) 5-325 MG per tablet; Take 1 tablet by mouth 2 times daily as needed for Pain for up to 6 days. , Disp-12 tablet, R-0Normal  3. Medication refill  -     losartan (COZAAR) 50 MG tablet; TAKE 1 TABLET DAILY, Disp-90 tablet, R-1Normal  4. Recurrent major depressive disorder, remission status unspecified ([facility] [address]  5. Type 2 diabetes mellitus with diabetic autonomic neuropathy, without long-term current use of insulin ([facility] [address]  6. Constipation, unspecified constipation type  -     docusate sodium (COLACE) 100 MG capsule; Take 1 capsule by mouth 2 times daily, Disp-60 capsule, R-11Normal      No follow-ups on file. Subjective   SUBJECTIVE/OBJECTIVE:  HPI   Right knee pain- has been given celebrex, states this feels like a placebo. Was seeing pain management but didn't feel he needed any more after left knee was fixed. He has tried [employer], didn't help. The norco allows him to complete his adl's more comfortably. Has had some constipation lately. Is worried it may get worse as he ay be taking more pain medication due to his surgery. Review of Systems   Constitutional: Negative for chills and fever. Respiratory: Negative for cough and shortness of breath. Cardiovascular: Negative for chest pain and leg swelling. Musculoskeletal: Positive for arthralgias. Objective   Physical Exam  Constitutional:       General: He is not in acute distress. Appearance: He is well-developed. HENT:      Head: Normocephalic.       Right Ear: External ear normal.      Left Ear: External ear normal.   Cardiovascular:      Rate and Rhythm: Normal rate and regular rhythm. Heart sounds: Normal heart sounds. Pulmonary:      Effort: Pulmonary effort is normal.      Breath sounds: Normal breath sounds. Musculoskeletal:         General: Normal range of motion. Skin:     General: Skin is warm and dry. Neurological:      Mental Status: He is alert and oriented to person, place, and time. On this date 2/9/2022 I have spent 30 minutes reviewing previous notes, test results and face to face with the patient discussing the diagnosis and importance of compliance with the treatment plan as well as documenting on the day of the visit. An electronic signature was used to authenticate this note.     --Brooke Chery, APRN - CNP

## 2022-02-15 ENCOUNTER — APPOINTMENT (OUTPATIENT)
Dept: GENERAL RADIOLOGY | Age: 72
End: 2022-02-15
Attending: ORTHOPAEDIC SURGERY
Payer: MEDICARE

## 2022-02-15 ENCOUNTER — HOSPITAL ENCOUNTER (OUTPATIENT)
Age: 72
Discharge: HOME | End: 2022-02-16
Attending: ORTHOPAEDIC SURGERY | Admitting: ORTHOPAEDIC SURGERY
Payer: MEDICARE

## 2022-02-15 ENCOUNTER — ANESTHESIA (OUTPATIENT)
Dept: OPERATING ROOM | Age: 72
End: 2022-02-15
Payer: MEDICARE

## 2022-02-15 ENCOUNTER — ANESTHESIA EVENT (OUTPATIENT)
Dept: OPERATING ROOM | Age: 72
End: 2022-02-15
Payer: MEDICARE

## 2022-02-15 VITALS — SYSTOLIC BLOOD PRESSURE: 150 MMHG | TEMPERATURE: 95.5 F | DIASTOLIC BLOOD PRESSURE: 76 MMHG | OXYGEN SATURATION: 96 %

## 2022-02-15 DIAGNOSIS — Z96.651 S/P TOTAL KNEE ARTHROPLASTY, RIGHT: Primary | ICD-10-CM

## 2022-02-15 LAB
GLUCOSE BLD-MCNC: 112 MG/DL (ref 75–110)
GLUCOSE BLD-MCNC: 135 MG/DL (ref 75–110)
GLUCOSE BLD-MCNC: 141 MG/DL (ref 75–110)
GLUCOSE BLD-MCNC: 145 MG/DL (ref 75–110)
GLUCOSE BLD-MCNC: 148 MG/DL (ref 75–110)

## 2022-02-15 PROCEDURE — 6370000000 HC RX 637 (ALT 250 FOR IP): Performed by: STUDENT IN AN ORGANIZED HEALTH CARE EDUCATION/TRAINING PROGRAM

## 2022-02-15 PROCEDURE — 2500000003 HC RX 250 WO HCPCS

## 2022-02-15 PROCEDURE — 73562 X-RAY EXAM OF KNEE 3: CPT

## 2022-02-15 PROCEDURE — 2720000010 HC SURG SUPPLY STERILE: Performed by: ORTHOPAEDIC SURGERY

## 2022-02-15 PROCEDURE — 6370000000 HC RX 637 (ALT 250 FOR IP): Performed by: ANESTHESIOLOGY

## 2022-02-15 PROCEDURE — 7100000001 HC PACU RECOVERY - ADDTL 15 MIN: Performed by: ORTHOPAEDIC SURGERY

## 2022-02-15 PROCEDURE — 97116 GAIT TRAINING THERAPY: CPT

## 2022-02-15 PROCEDURE — 3700000000 HC ANESTHESIA ATTENDED CARE: Performed by: ORTHOPAEDIC SURGERY

## 2022-02-15 PROCEDURE — 6360000002 HC RX W HCPCS: Performed by: STUDENT IN AN ORGANIZED HEALTH CARE EDUCATION/TRAINING PROGRAM

## 2022-02-15 PROCEDURE — 2500000003 HC RX 250 WO HCPCS: Performed by: ANESTHESIOLOGY

## 2022-02-15 PROCEDURE — 3600000005 HC SURGERY LEVEL 5 BASE: Performed by: ORTHOPAEDIC SURGERY

## 2022-02-15 PROCEDURE — 2500000003 HC RX 250 WO HCPCS: Performed by: NURSE ANESTHETIST, CERTIFIED REGISTERED

## 2022-02-15 PROCEDURE — 64447 NJX AA&/STRD FEMORAL NRV IMG: CPT | Performed by: ANESTHESIOLOGY

## 2022-02-15 PROCEDURE — 2709999900 HC NON-CHARGEABLE SUPPLY: Performed by: ORTHOPAEDIC SURGERY

## 2022-02-15 PROCEDURE — 2580000003 HC RX 258: Performed by: STUDENT IN AN ORGANIZED HEALTH CARE EDUCATION/TRAINING PROGRAM

## 2022-02-15 PROCEDURE — 82947 ASSAY GLUCOSE BLOOD QUANT: CPT

## 2022-02-15 PROCEDURE — 6360000002 HC RX W HCPCS: Performed by: ANESTHESIOLOGY

## 2022-02-15 PROCEDURE — C1776 JOINT DEVICE (IMPLANTABLE): HCPCS | Performed by: ORTHOPAEDIC SURGERY

## 2022-02-15 PROCEDURE — 27447 TOTAL KNEE ARTHROPLASTY: CPT | Performed by: ORTHOPAEDIC SURGERY

## 2022-02-15 PROCEDURE — C9290 INJ, BUPIVACAINE LIPOSOME: HCPCS | Performed by: ANESTHESIOLOGY

## 2022-02-15 PROCEDURE — 2580000003 HC RX 258: Performed by: ANESTHESIOLOGY

## 2022-02-15 PROCEDURE — 97530 THERAPEUTIC ACTIVITIES: CPT

## 2022-02-15 PROCEDURE — 97162 PT EVAL MOD COMPLEX 30 MIN: CPT

## 2022-02-15 PROCEDURE — 3600000015 HC SURGERY LEVEL 5 ADDTL 15MIN: Performed by: ORTHOPAEDIC SURGERY

## 2022-02-15 PROCEDURE — 3700000001 HC ADD 15 MINUTES (ANESTHESIA): Performed by: ORTHOPAEDIC SURGERY

## 2022-02-15 PROCEDURE — 97166 OT EVAL MOD COMPLEX 45 MIN: CPT

## 2022-02-15 PROCEDURE — 97535 SELF CARE MNGMENT TRAINING: CPT

## 2022-02-15 PROCEDURE — 7100000000 HC PACU RECOVERY - FIRST 15 MIN: Performed by: ORTHOPAEDIC SURGERY

## 2022-02-15 PROCEDURE — 6360000002 HC RX W HCPCS: Performed by: NURSE ANESTHETIST, CERTIFIED REGISTERED

## 2022-02-15 PROCEDURE — 97110 THERAPEUTIC EXERCISES: CPT

## 2022-02-15 DEVICE — PSN FEM CR POR CCR STD SZ8 R: Type: IMPLANTABLE DEVICE | Site: KNEE | Status: FUNCTIONAL

## 2022-02-15 DEVICE — PSN TIB POR 2 PEG SZ F R: Type: IMPLANTABLE DEVICE | Site: KNEE | Status: FUNCTIONAL

## 2022-02-15 DEVICE — UPCHARGE KNEE VITAMIN E LINER ZIMMER BIOMET: Type: IMPLANTABLE DEVICE | Site: KNEE | Status: FUNCTIONAL

## 2022-02-15 DEVICE — PSN MC VE ASF R 14MM 8-11 EF: Type: IMPLANTABLE DEVICE | Site: KNEE | Status: FUNCTIONAL

## 2022-02-15 RX ORDER — TRANEXAMIC ACID 100 MG/ML
INJECTION, SOLUTION INTRAVENOUS PRN
Status: DISCONTINUED | OUTPATIENT
Start: 2022-02-15 | End: 2022-02-15 | Stop reason: SDUPTHER

## 2022-02-15 RX ORDER — SODIUM CHLORIDE 9 MG/ML
25 INJECTION, SOLUTION INTRAVENOUS PRN
Status: DISCONTINUED | OUTPATIENT
Start: 2022-02-15 | End: 2022-02-16 | Stop reason: HOSPADM

## 2022-02-15 RX ORDER — ONDANSETRON 4 MG/1
4 TABLET, ORALLY DISINTEGRATING ORAL EVERY 8 HOURS PRN
Status: DISCONTINUED | OUTPATIENT
Start: 2022-02-15 | End: 2022-02-16 | Stop reason: HOSPADM

## 2022-02-15 RX ORDER — ACETAMINOPHEN 500 MG
1000 TABLET ORAL ONCE
Status: COMPLETED | OUTPATIENT
Start: 2022-02-15 | End: 2022-02-15

## 2022-02-15 RX ORDER — CELECOXIB 200 MG/1
200 CAPSULE ORAL ONCE
Status: DISCONTINUED | OUTPATIENT
Start: 2022-02-15 | End: 2022-02-15 | Stop reason: SDUPTHER

## 2022-02-15 RX ORDER — DEXAMETHASONE SODIUM PHOSPHATE 10 MG/ML
8 INJECTION, SOLUTION INTRAMUSCULAR; INTRAVENOUS ONCE
Status: DISCONTINUED | OUTPATIENT
Start: 2022-02-15 | End: 2022-02-15

## 2022-02-15 RX ORDER — CELECOXIB 200 MG/1
200 CAPSULE ORAL ONCE
Status: DISCONTINUED | OUTPATIENT
Start: 2022-02-15 | End: 2022-02-15

## 2022-02-15 RX ORDER — SODIUM CHLORIDE 0.9 % (FLUSH) 0.9 %
5-40 SYRINGE (ML) INJECTION EVERY 12 HOURS SCHEDULED
Status: DISCONTINUED | OUTPATIENT
Start: 2022-02-15 | End: 2022-02-16 | Stop reason: HOSPADM

## 2022-02-15 RX ORDER — VANCOMYCIN HYDROCHLORIDE 1 G/20ML
INJECTION, POWDER, LYOPHILIZED, FOR SOLUTION INTRAVENOUS
Status: DISCONTINUED
Start: 2022-02-15 | End: 2022-02-15

## 2022-02-15 RX ORDER — PROPOFOL 10 MG/ML
INJECTION, EMULSION INTRAVENOUS CONTINUOUS PRN
Status: DISCONTINUED | OUTPATIENT
Start: 2022-02-15 | End: 2022-02-15 | Stop reason: SDUPTHER

## 2022-02-15 RX ORDER — AMITRIPTYLINE HYDROCHLORIDE 50 MG/1
75 TABLET, FILM COATED ORAL NIGHTLY
Status: DISCONTINUED | OUTPATIENT
Start: 2022-02-15 | End: 2022-02-16 | Stop reason: HOSPADM

## 2022-02-15 RX ORDER — NICOTINE POLACRILEX 4 MG
15 LOZENGE BUCCAL PRN
Status: DISCONTINUED | OUTPATIENT
Start: 2022-02-15 | End: 2022-02-16 | Stop reason: HOSPADM

## 2022-02-15 RX ORDER — TRAMADOL HYDROCHLORIDE 50 MG/1
50 TABLET ORAL EVERY 6 HOURS PRN
Status: DISCONTINUED | OUTPATIENT
Start: 2022-02-15 | End: 2022-02-16 | Stop reason: HOSPADM

## 2022-02-15 RX ORDER — ACETAMINOPHEN 500 MG
1000 TABLET ORAL EVERY 6 HOURS
Status: DISCONTINUED | OUTPATIENT
Start: 2022-02-15 | End: 2022-02-16 | Stop reason: HOSPADM

## 2022-02-15 RX ORDER — CELECOXIB 100 MG/1
100 CAPSULE ORAL ONCE
Status: COMPLETED | OUTPATIENT
Start: 2022-02-15 | End: 2022-02-15

## 2022-02-15 RX ORDER — LIDOCAINE HYDROCHLORIDE 10 MG/ML
1 INJECTION, SOLUTION EPIDURAL; INFILTRATION; INTRACAUDAL; PERINEURAL
Status: DISCONTINUED | OUTPATIENT
Start: 2022-02-16 | End: 2022-02-15 | Stop reason: HOSPADM

## 2022-02-15 RX ORDER — PREGABALIN 75 MG/1
75 CAPSULE ORAL ONCE
Status: DISCONTINUED | OUTPATIENT
Start: 2022-02-15 | End: 2022-02-15 | Stop reason: SDUPTHER

## 2022-02-15 RX ORDER — DEXTROSE MONOHYDRATE 25 G/50ML
12.5 INJECTION, SOLUTION INTRAVENOUS PRN
Status: DISCONTINUED | OUTPATIENT
Start: 2022-02-15 | End: 2022-02-15 | Stop reason: RX

## 2022-02-15 RX ORDER — BUPIVACAINE HYDROCHLORIDE 7.5 MG/ML
INJECTION, SOLUTION INTRASPINAL PRN
Status: DISCONTINUED | OUTPATIENT
Start: 2022-02-15 | End: 2022-02-15 | Stop reason: SDUPTHER

## 2022-02-15 RX ORDER — MIDAZOLAM HYDROCHLORIDE 1 MG/ML
2 INJECTION INTRAMUSCULAR; INTRAVENOUS ONCE
Status: COMPLETED | OUTPATIENT
Start: 2022-02-15 | End: 2022-02-15

## 2022-02-15 RX ORDER — CHLORPHENIRAMINE MALEATE 4 MG/1
4 TABLET ORAL DAILY
Status: DISCONTINUED | OUTPATIENT
Start: 2022-02-15 | End: 2022-02-16 | Stop reason: HOSPADM

## 2022-02-15 RX ORDER — ATORVASTATIN CALCIUM 10 MG/1
10 TABLET, FILM COATED ORAL DAILY
Status: DISCONTINUED | OUTPATIENT
Start: 2022-02-15 | End: 2022-02-16 | Stop reason: HOSPADM

## 2022-02-15 RX ORDER — OXYCODONE HYDROCHLORIDE 5 MG/1
10 TABLET ORAL EVERY 4 HOURS PRN
Status: DISCONTINUED | OUTPATIENT
Start: 2022-02-15 | End: 2022-02-16 | Stop reason: HOSPADM

## 2022-02-15 RX ORDER — GLYCOPYRROLATE 1 MG/5 ML
SYRINGE (ML) INTRAVENOUS
Status: COMPLETED
Start: 2022-02-15 | End: 2022-02-15

## 2022-02-15 RX ORDER — TRANEXAMIC ACID 100 MG/ML
INJECTION, SOLUTION INTRAVENOUS
Status: COMPLETED
Start: 2022-02-15 | End: 2022-02-15

## 2022-02-15 RX ORDER — LIDOCAINE HYDROCHLORIDE 10 MG/ML
INJECTION, SOLUTION INFILTRATION; PERINEURAL PRN
Status: DISCONTINUED | OUTPATIENT
Start: 2022-02-15 | End: 2022-02-15 | Stop reason: SDUPTHER

## 2022-02-15 RX ORDER — KETOROLAC TROMETHAMINE 30 MG/ML
30 INJECTION, SOLUTION INTRAMUSCULAR; INTRAVENOUS EVERY 6 HOURS PRN
Status: DISCONTINUED | OUTPATIENT
Start: 2022-02-15 | End: 2022-02-16 | Stop reason: HOSPADM

## 2022-02-15 RX ORDER — SENNA AND DOCUSATE SODIUM 50; 8.6 MG/1; MG/1
1 TABLET, FILM COATED ORAL 2 TIMES DAILY
Status: DISCONTINUED | OUTPATIENT
Start: 2022-02-15 | End: 2022-02-16 | Stop reason: HOSPADM

## 2022-02-15 RX ORDER — ONDANSETRON 2 MG/ML
4 INJECTION INTRAMUSCULAR; INTRAVENOUS
Status: COMPLETED | OUTPATIENT
Start: 2022-02-15 | End: 2022-02-15

## 2022-02-15 RX ORDER — LIDOCAINE HYDROCHLORIDE 20 MG/ML
INJECTION, SOLUTION EPIDURAL; INFILTRATION; INTRACAUDAL; PERINEURAL PRN
Status: DISCONTINUED | OUTPATIENT
Start: 2022-02-15 | End: 2022-02-15 | Stop reason: SDUPTHER

## 2022-02-15 RX ORDER — SODIUM CHLORIDE 9 MG/ML
INJECTION, SOLUTION INTRAVENOUS CONTINUOUS
Status: DISCONTINUED | OUTPATIENT
Start: 2022-02-15 | End: 2022-02-16 | Stop reason: ALTCHOICE

## 2022-02-15 RX ORDER — FENTANYL CITRATE 50 UG/ML
25 INJECTION, SOLUTION INTRAMUSCULAR; INTRAVENOUS EVERY 5 MIN PRN
Status: DISCONTINUED | OUTPATIENT
Start: 2022-02-15 | End: 2022-02-15 | Stop reason: HOSPADM

## 2022-02-15 RX ORDER — AMLODIPINE BESYLATE 10 MG/1
10 TABLET ORAL DAILY
Status: DISCONTINUED | OUTPATIENT
Start: 2022-02-16 | End: 2022-02-16 | Stop reason: HOSPADM

## 2022-02-15 RX ORDER — GLIPIZIDE 10 MG/1
10 TABLET ORAL DAILY
Status: DISCONTINUED | OUTPATIENT
Start: 2022-02-15 | End: 2022-02-16 | Stop reason: HOSPADM

## 2022-02-15 RX ORDER — SODIUM CHLORIDE 0.9 % (FLUSH) 0.9 %
5-40 SYRINGE (ML) INJECTION PRN
Status: DISCONTINUED | OUTPATIENT
Start: 2022-02-15 | End: 2022-02-15 | Stop reason: HOSPADM

## 2022-02-15 RX ORDER — PREGABALIN 75 MG/1
75 CAPSULE ORAL ONCE
Status: DISCONTINUED | OUTPATIENT
Start: 2022-02-15 | End: 2022-02-15

## 2022-02-15 RX ORDER — DOCUSATE SODIUM 100 MG/1
100 CAPSULE, LIQUID FILLED ORAL 2 TIMES DAILY PRN
Qty: 60 CAPSULE | Refills: 0 | Status: SHIPPED | OUTPATIENT
Start: 2022-02-15 | End: 2022-08-08 | Stop reason: ALTCHOICE

## 2022-02-15 RX ORDER — LOSARTAN POTASSIUM 50 MG/1
50 TABLET ORAL DAILY
Status: DISCONTINUED | OUTPATIENT
Start: 2022-02-15 | End: 2022-02-16 | Stop reason: HOSPADM

## 2022-02-15 RX ORDER — CELECOXIB 200 MG/1
200 CAPSULE ORAL 2 TIMES DAILY
Status: DISCONTINUED | OUTPATIENT
Start: 2022-02-15 | End: 2022-02-16 | Stop reason: HOSPADM

## 2022-02-15 RX ORDER — BUPIVACAINE HYDROCHLORIDE 5 MG/ML
INJECTION, SOLUTION EPIDURAL; INTRACAUDAL
Status: COMPLETED | OUTPATIENT
Start: 2022-02-15 | End: 2022-02-15

## 2022-02-15 RX ORDER — HYDROMORPHONE HYDROCHLORIDE 1 MG/ML
0.25 INJECTION, SOLUTION INTRAMUSCULAR; INTRAVENOUS; SUBCUTANEOUS EVERY 5 MIN PRN
Status: COMPLETED | OUTPATIENT
Start: 2022-02-15 | End: 2022-02-15

## 2022-02-15 RX ORDER — OXYCODONE HYDROCHLORIDE 5 MG/1
5 TABLET ORAL EVERY 4 HOURS PRN
Status: DISCONTINUED | OUTPATIENT
Start: 2022-02-15 | End: 2022-02-16 | Stop reason: HOSPADM

## 2022-02-15 RX ORDER — SODIUM CHLORIDE 0.9 % (FLUSH) 0.9 %
5-40 SYRINGE (ML) INJECTION EVERY 12 HOURS SCHEDULED
Status: DISCONTINUED | OUTPATIENT
Start: 2022-02-15 | End: 2022-02-15 | Stop reason: HOSPADM

## 2022-02-15 RX ORDER — DEXAMETHASONE SODIUM PHOSPHATE 10 MG/ML
10 INJECTION, SOLUTION INTRAMUSCULAR; INTRAVENOUS ONCE
Status: DISCONTINUED | OUTPATIENT
Start: 2022-02-15 | End: 2022-02-15

## 2022-02-15 RX ORDER — LATANOPROST 50 UG/ML
1 SOLUTION/ DROPS OPHTHALMIC NIGHTLY
Status: DISCONTINUED | OUTPATIENT
Start: 2022-02-15 | End: 2022-02-16 | Stop reason: HOSPADM

## 2022-02-15 RX ORDER — GLYCOPYRROLATE 1 MG/5 ML
0.2 SYRINGE (ML) INTRAVENOUS ONCE
Status: COMPLETED | OUTPATIENT
Start: 2022-02-15 | End: 2022-02-15

## 2022-02-15 RX ORDER — TRAMADOL HYDROCHLORIDE 50 MG/1
50 TABLET ORAL ONCE
Status: COMPLETED | OUTPATIENT
Start: 2022-02-15 | End: 2022-02-15

## 2022-02-15 RX ORDER — ACETAMINOPHEN 500 MG
1000 TABLET ORAL ONCE
Status: DISCONTINUED | OUTPATIENT
Start: 2022-02-15 | End: 2022-02-15 | Stop reason: SDUPTHER

## 2022-02-15 RX ORDER — GABAPENTIN 300 MG/1
300 CAPSULE ORAL ONCE
Status: COMPLETED | OUTPATIENT
Start: 2022-02-15 | End: 2022-02-15

## 2022-02-15 RX ORDER — OXYCODONE HYDROCHLORIDE AND ACETAMINOPHEN 5; 325 MG/1; MG/1
1 TABLET ORAL EVERY 6 HOURS PRN
Qty: 28 TABLET | Refills: 0 | Status: SHIPPED | OUTPATIENT
Start: 2022-02-15 | End: 2022-02-22 | Stop reason: SDUPTHER

## 2022-02-15 RX ORDER — DEXTROSE MONOHYDRATE 50 MG/ML
100 INJECTION, SOLUTION INTRAVENOUS PRN
Status: DISCONTINUED | OUTPATIENT
Start: 2022-02-15 | End: 2022-02-16 | Stop reason: HOSPADM

## 2022-02-15 RX ORDER — TIMOLOL MALEATE 5 MG/ML
1 SOLUTION/ DROPS OPHTHALMIC 2 TIMES DAILY
Status: DISCONTINUED | OUTPATIENT
Start: 2022-02-15 | End: 2022-02-16 | Stop reason: HOSPADM

## 2022-02-15 RX ORDER — ONDANSETRON 2 MG/ML
4 INJECTION INTRAMUSCULAR; INTRAVENOUS EVERY 6 HOURS PRN
Status: DISCONTINUED | OUTPATIENT
Start: 2022-02-15 | End: 2022-02-16 | Stop reason: HOSPADM

## 2022-02-15 RX ORDER — SODIUM CHLORIDE 0.9 % (FLUSH) 0.9 %
5-40 SYRINGE (ML) INJECTION PRN
Status: DISCONTINUED | OUTPATIENT
Start: 2022-02-15 | End: 2022-02-16 | Stop reason: HOSPADM

## 2022-02-15 RX ORDER — SCOLOPAMINE TRANSDERMAL SYSTEM 1 MG/1
1 PATCH, EXTENDED RELEASE TRANSDERMAL ONCE
Status: DISCONTINUED | OUTPATIENT
Start: 2022-02-15 | End: 2022-02-15

## 2022-02-15 RX ORDER — SODIUM CHLORIDE 9 MG/ML
25 INJECTION, SOLUTION INTRAVENOUS PRN
Status: DISCONTINUED | OUTPATIENT
Start: 2022-02-15 | End: 2022-02-15 | Stop reason: HOSPADM

## 2022-02-15 RX ORDER — SODIUM CHLORIDE, SODIUM LACTATE, POTASSIUM CHLORIDE, CALCIUM CHLORIDE 600; 310; 30; 20 MG/100ML; MG/100ML; MG/100ML; MG/100ML
INJECTION, SOLUTION INTRAVENOUS CONTINUOUS
Status: DISCONTINUED | OUTPATIENT
Start: 2022-02-15 | End: 2022-02-15 | Stop reason: SDUPTHER

## 2022-02-15 RX ORDER — SODIUM CHLORIDE, SODIUM LACTATE, POTASSIUM CHLORIDE, CALCIUM CHLORIDE 600; 310; 30; 20 MG/100ML; MG/100ML; MG/100ML; MG/100ML
INJECTION, SOLUTION INTRAVENOUS CONTINUOUS
Status: DISCONTINUED | OUTPATIENT
Start: 2022-02-16 | End: 2022-02-15

## 2022-02-15 RX ADMIN — GABAPENTIN 300 MG: 300 CAPSULE ORAL at 07:10

## 2022-02-15 RX ADMIN — SENNOSIDES AND DOCUSATE SODIUM 1 TABLET: 50; 8.6 TABLET ORAL at 20:58

## 2022-02-15 RX ADMIN — GLIPIZIDE 10 MG: 10 TABLET ORAL at 11:11

## 2022-02-15 RX ADMIN — PROPOFOL 50 MCG/KG/MIN: 10 INJECTION, EMULSION INTRAVENOUS at 07:35

## 2022-02-15 RX ADMIN — OXYCODONE HYDROCHLORIDE 10 MG: 5 TABLET ORAL at 15:34

## 2022-02-15 RX ADMIN — SODIUM CHLORIDE, POTASSIUM CHLORIDE, SODIUM LACTATE AND CALCIUM CHLORIDE: 600; 310; 30; 20 INJECTION, SOLUTION INTRAVENOUS at 06:42

## 2022-02-15 RX ADMIN — ACETAMINOPHEN 1000 MG: 500 TABLET ORAL at 07:10

## 2022-02-15 RX ADMIN — ACETAMINOPHEN 1000 MG: 500 TABLET ORAL at 12:54

## 2022-02-15 RX ADMIN — LIDOCAINE HYDROCHLORIDE 30 MG: 10 INJECTION, SOLUTION INFILTRATION; PERINEURAL at 07:24

## 2022-02-15 RX ADMIN — CEFAZOLIN SODIUM 2000 MG: 10 INJECTION, POWDER, FOR SOLUTION INTRAVENOUS at 23:05

## 2022-02-15 RX ADMIN — BUPIVACAINE 10 ML: 13.3 INJECTION, SUSPENSION, LIPOSOMAL INFILTRATION at 07:07

## 2022-02-15 RX ADMIN — HYDROMORPHONE HYDROCHLORIDE 0.25 MG: 1 INJECTION, SOLUTION INTRAMUSCULAR; INTRAVENOUS; SUBCUTANEOUS at 09:44

## 2022-02-15 RX ADMIN — HYDROMORPHONE HYDROCHLORIDE 0.25 MG: 1 INJECTION, SOLUTION INTRAMUSCULAR; INTRAVENOUS; SUBCUTANEOUS at 09:18

## 2022-02-15 RX ADMIN — KETOROLAC TROMETHAMINE 30 MG: 30 INJECTION, SOLUTION INTRAMUSCULAR; INTRAVENOUS at 11:36

## 2022-02-15 RX ADMIN — MIDAZOLAM 0.5 MG: 1 INJECTION INTRAMUSCULAR; INTRAVENOUS at 07:00

## 2022-02-15 RX ADMIN — ONDANSETRON 4 MG: 2 INJECTION INTRAMUSCULAR; INTRAVENOUS at 09:21

## 2022-02-15 RX ADMIN — Medication 0.2 MG: at 09:33

## 2022-02-15 RX ADMIN — ACETAMINOPHEN 1000 MG: 500 TABLET ORAL at 19:11

## 2022-02-15 RX ADMIN — CELECOXIB 100 MG: 100 CAPSULE ORAL at 07:11

## 2022-02-15 RX ADMIN — ATORVASTATIN CALCIUM 10 MG: 10 TABLET, FILM COATED ORAL at 11:11

## 2022-02-15 RX ADMIN — SODIUM CHLORIDE, PRESERVATIVE FREE 10 ML: 5 INJECTION INTRAVENOUS at 11:39

## 2022-02-15 RX ADMIN — SENNOSIDES AND DOCUSATE SODIUM 1 TABLET: 50; 8.6 TABLET ORAL at 11:11

## 2022-02-15 RX ADMIN — TRAMADOL HYDROCHLORIDE 50 MG: 50 TABLET, FILM COATED ORAL at 07:11

## 2022-02-15 RX ADMIN — AMITRIPTYLINE HYDROCHLORIDE 75 MG: 50 TABLET, FILM COATED ORAL at 20:58

## 2022-02-15 RX ADMIN — LIDOCAINE HYDROCHLORIDE 60 MG: 20 INJECTION, SOLUTION EPIDURAL; INFILTRATION; INTRACAUDAL; PERINEURAL at 07:35

## 2022-02-15 RX ADMIN — LOSARTAN POTASSIUM 50 MG: 50 TABLET, FILM COATED ORAL at 11:36

## 2022-02-15 RX ADMIN — HYDROMORPHONE HYDROCHLORIDE 0.25 MG: 1 INJECTION, SOLUTION INTRAMUSCULAR; INTRAVENOUS; SUBCUTANEOUS at 09:26

## 2022-02-15 RX ADMIN — LATANOPROST 1 DROP: 50 SOLUTION OPHTHALMIC at 20:58

## 2022-02-15 RX ADMIN — OXYCODONE HYDROCHLORIDE 10 MG: 5 TABLET ORAL at 11:10

## 2022-02-15 RX ADMIN — BUPIVACAINE HYDROCHLORIDE 10 ML: 5 INJECTION, SOLUTION EPIDURAL; INTRACAUDAL; PERINEURAL at 07:07

## 2022-02-15 RX ADMIN — TRANEXAMIC ACID 1000 MG: 100 INJECTION, SOLUTION INTRAVENOUS at 07:50

## 2022-02-15 RX ADMIN — CEFAZOLIN SODIUM 2000 MG: 10 INJECTION, POWDER, FOR SOLUTION INTRAVENOUS at 07:40

## 2022-02-15 RX ADMIN — KETOROLAC TROMETHAMINE 30 MG: 30 INJECTION, SOLUTION INTRAMUSCULAR; INTRAVENOUS at 21:18

## 2022-02-15 RX ADMIN — TRANEXAMIC ACID 1000 MG: 100 INJECTION, SOLUTION INTRAVENOUS at 08:22

## 2022-02-15 RX ADMIN — HYDROMORPHONE HYDROCHLORIDE 0.25 MG: 1 INJECTION, SOLUTION INTRAMUSCULAR; INTRAVENOUS; SUBCUTANEOUS at 09:53

## 2022-02-15 RX ADMIN — SODIUM CHLORIDE: 9 INJECTION, SOLUTION INTRAVENOUS at 23:02

## 2022-02-15 RX ADMIN — BUPIVACAINE HYDROCHLORIDE IN DEXTROSE 1.6 ML: 7.5 INJECTION, SOLUTION SUBARACHNOID at 07:24

## 2022-02-15 RX ADMIN — SODIUM CHLORIDE: 9 INJECTION, SOLUTION INTRAVENOUS at 11:39

## 2022-02-15 RX ADMIN — TIMOLOL MALEATE 1 DROP: 5 SOLUTION OPHTHALMIC at 20:57

## 2022-02-15 RX ADMIN — CEFAZOLIN SODIUM 2000 MG: 10 INJECTION, POWDER, FOR SOLUTION INTRAVENOUS at 15:37

## 2022-02-15 RX ADMIN — OXYCODONE HYDROCHLORIDE 10 MG: 5 TABLET ORAL at 21:19

## 2022-02-15 ASSESSMENT — PULMONARY FUNCTION TESTS
PIF_VALUE: 1
PIF_VALUE: 0
PIF_VALUE: 1
PIF_VALUE: 0
PIF_VALUE: 1

## 2022-02-15 ASSESSMENT — PAIN SCALES - GENERAL
PAINLEVEL_OUTOF10: 10
PAINLEVEL_OUTOF10: 10
PAINLEVEL_OUTOF10: 7
PAINLEVEL_OUTOF10: 6
PAINLEVEL_OUTOF10: 10
PAINLEVEL_OUTOF10: 7
PAINLEVEL_OUTOF10: 7
PAINLEVEL_OUTOF10: 10
PAINLEVEL_OUTOF10: 10
PAINLEVEL_OUTOF10: 6
PAINLEVEL_OUTOF10: 10

## 2022-02-15 ASSESSMENT — PAIN DESCRIPTION - FREQUENCY
FREQUENCY: CONTINUOUS
FREQUENCY: CONTINUOUS

## 2022-02-15 ASSESSMENT — PAIN DESCRIPTION - ONSET
ONSET: ON-GOING
ONSET: ON-GOING

## 2022-02-15 ASSESSMENT — PAIN DESCRIPTION - PROGRESSION
CLINICAL_PROGRESSION: GRADUALLY IMPROVING
CLINICAL_PROGRESSION: GRADUALLY WORSENING

## 2022-02-15 ASSESSMENT — PAIN DESCRIPTION - PAIN TYPE
TYPE: SURGICAL PAIN
TYPE: SURGICAL PAIN

## 2022-02-15 ASSESSMENT — PAIN DESCRIPTION - DESCRIPTORS
DESCRIPTORS: ACHING

## 2022-02-15 ASSESSMENT — PAIN DESCRIPTION - LOCATION
LOCATION: KNEE

## 2022-02-15 ASSESSMENT — PAIN - FUNCTIONAL ASSESSMENT
PAIN_FUNCTIONAL_ASSESSMENT: ACTIVITIES ARE NOT PREVENTED
PAIN_FUNCTIONAL_ASSESSMENT: 0-10
PAIN_FUNCTIONAL_ASSESSMENT: PREVENTS OR INTERFERES SOME ACTIVE ACTIVITIES AND ADLS

## 2022-02-15 ASSESSMENT — PAIN DESCRIPTION - ORIENTATION
ORIENTATION: RIGHT

## 2022-02-15 NOTE — PROGRESS NOTES
Spoke with Laura Nieves, pt would like to go to [facility] at discharge d/t HX of CVA and left side weakness.

## 2022-02-15 NOTE — ANESTHESIA PRE PROCEDURE
Department of Anesthesiology  Preprocedure Note       Name:  Sonia Colon   Age:  66 y.o.  :  1950                                          MRN:  [id number]         Date:  2/15/2022      Surgeon: Cristina Birch):  Zackery Novak DO    Procedure: Procedure(s):  RIGHT KNEE TOTAL ARTHROPLASTY - Harlon Scotia    Medications prior to admission:   Prior to Admission medications    Medication Sig Start Date End Date Taking? Authorizing Provider   HYDROcodone-acetaminophen (NORCO) 5-325 MG per tablet Take 1 tablet by mouth 2 times daily as needed for Pain for up to 6 days.  2/9/22 2/15/22  Jania Dry, APRN - CNP   losartan (COZAAR) 50 MG tablet TAKE 1 TABLET DAILY 22   Jania Dry, APRN - CNP   amLODIPine (NORVASC) 10 MG tablet Take 1 tablet by mouth daily 2/9/22 3/11/22  Jania Dry, APRN - CNP   docusate sodium (COLACE) 100 MG capsule Take 1 capsule by mouth 2 times daily 22   Jania Dry, APRN - CNP   simvastatin (ZOCOR) 20 MG tablet TAKE 1 TABLET NIGHTLY 21   Jania Dry, APRN - CNP   amitriptyline (ELAVIL) 75 MG tablet TAKE 1 TABLET NIGHTLY 21   Jania Dry, APRN - CNP   glipiZIDE (GLUCOTROL) 10 MG tablet Take 1 tablet by mouth daily 21   Jania Dry, APRN - CNP   Chlorpheniramine Maleate (ALLERGY RELIEF PO) Take 1 tablet by mouth daily     Historical Provider, MD   Homeopathic Products ([address] [place] [address]) SOLN Apply 2 drops to eye 2 times daily Left eye    Historical Provider, MD   blood glucose monitor strips Provide insurance covered test strips compatible with glucometer, test 1 times a day 20   Julian Hess PA-C   Blood Glucose Monitoring Suppl (D-CARE GLUCOMETER) w/Device KIT Provide insurance covered glucometer, check blood sugars every morning 19  Julian Hess PA-C   timolol (TIMOPTIC) 0.5 % ophthalmic solution instill 1 drop into right eye twice a day 18   Historical Provider, MD   latanoprost (XALATAN) 0.005 % ophthalmic solution Place 1 drop into the right eye nightly  11/13/14   Historical Provider, MD       Current medications:    No current facility-administered medications for this visit. No current outpatient medications on file.      Facility-Administered Medications Ordered in Other Visits   Medication Dose Route Frequency Provider Last Rate Last Admin    [START ON 2/16/2022] lactated ringers infusion   IntraVENous Continuous Liliana Lopez MD 50 mL/hr at 02/15/22 0642 New Bag at 02/15/22 0642    [START ON 2/16/2022] lidocaine PF 1 % injection 1 mL  1 mL IntraDERmal Once PRN Liliana Lopez MD        bupivacaine liposome (EXPAREL) 1.3 % injection 133 mg  10 mL SubCUTAneous Once Italia Ortiz MD        midazolam (VERSED) injection 2 mg  2 mg IntraVENous Once Italia Ortiz MD        acetaminophen (TYLENOL) tablet 1,000 mg  1,000 mg Oral Once Donney Asia, DO        traMADol Bora Arch) tablet 50 mg  50 mg Oral Once Donney Asia, DO        tranexamic acid (CYKLOKAPRON) 1,000 mg in dextrose 5 % 100 mL IVPB  1,000 mg IntraVENous Once Donney Asia, DO        tranexamic acid (CYKLOKAPRON) 1,000 mg in dextrose 5 % 100 mL IVPB  1,000 mg IntraVENous Once Donney Asia, DO        ceFAZolin (ANCEF) 2000 mg in dextrose 5 % 50 mL IVPB  2,000 mg IntraVENous On Call to [address], DO        celecoxib (CELEBREX) capsule 100 mg  100 mg Oral Once Homar Bowling, DO        sodium chloride flush 0.9 % injection 5-40 mL  5-40 mL IntraVENous 2 times per day Homar Bowling, DO        sodium chloride flush 0.9 % injection 5-40 mL  5-40 mL IntraVENous PRN Homar Bowling, DO        0.9 % sodium chloride infusion  25 mL IntraVENous PRN Homar Bowling, DO        gabapentin (NEURONTIN) capsule 300 mg  300 mg Oral Once Italia Ortiz MD        tranexamic acid (CYKLOKAPRON) 1000 MG/10ML injection             vancomycin (VANCOCIN) 1 g injection                Allergies: No Known Allergies    Problem List:    Patient Active Problem List   Diagnosis Code    Hypercholesteremia E78.00    Neuropathic pain M79.2    Weakness of both legs R29.898    Essential hypertension I10    Type 2 diabetes mellitus with diabetic autonomic neuropathy, without long-term current use of insulin ([facility]) E11.43    Eustachian tube dysfunction H69.80    Branch retinal vein occlusion M52.60    Primary open angle glaucoma H40.1190    Chronic pain of both knees M25.561, M25.562, G89.29    Cerebral infarction due to occlusion of right middle cerebral artery (HCC) I63.511    Glaucomatous visual field defect H40.50X0, H53.40    Nuclear sclerotic cataract H25.10    Class 1 obesity due to excess calories with serious comorbidity and body mass index (BMI) of 31.0 to 31.9 in adult E66.09, Z68.31    Elevated PSA R97.20    S/P total knee arthroplasty, left T16.012    Major depressive disorder, recurrent, unspecified F33.9       Past Medical History:        Diagnosis Date    Cataracts, bilateral     only left eye, had surgery on right    Cerebral artery occlusion with cerebral infarction ([facility] [address] 2014    weakness left leg, sees Dr Marcus Wilhelm Diabetes mellitus ([facility] [address]     type II, managed by Dr Papo Oliveira (PCP)  range , checks daily    Eye disorder     history of a \"stroke\" in right eye    Glaucoma     right eye    Headache(784.0)     no current issues (6/10/21)    Hearing loss     Heartburn     occasionally, takes OTC acid reducer as needed    Hyperlipidemia     Hypertension     Kidney stones     Osteoarthritis     Snores     Tendonitis of shoulder, left     Tremor     no current problems (6/10/21)       Past Surgical History:        Procedure Laterality Date    CATARACT REMOVAL Right 08/2015    right eye    CYSTOSCOPY      EYE SURGERY      HAND SURGERY Left     thumb    JOINT REPLACEMENT      KIDNEY STONE SURGERY      LITHOTRIPSY      REFRACTIVE SURGERY Right 2014    THUMB AMPUTATION  TONSILLECTOMY      TOTAL KNEE ARTHROPLASTY Left 6/23/2021    LEFT  KNEE TOTAL ARTHROPLASTY - Sharon Valentin performed by Leonel Carroll DO at [place] [address] History:    Social History     Tobacco Use    Smoking status: Never Smoker    Smokeless tobacco: Former User     Types: Chew    Tobacco comment: quit smokeless tobacco in late 80's   Substance Use Topics    Alcohol use: Yes     Alcohol/week: 0.0 standard drinks     Comment: rarely                                Counseling given: Not Answered  Comment: quit smokeless tobacco in late 80's      Vital Signs (Current): There were no vitals filed for this visit.                                            BP Readings from Last 3 Encounters:   02/15/22 (!) 143/93   02/09/22 120/84   01/27/22 (!) 156/76       NPO Status:                                                                                 BMI:   Wt Readings from Last 3 Encounters:   02/15/22 233 lb 11 oz (106 kg)   02/09/22 232 lb (105.2 kg)   01/27/22 233 lb 11 oz (106 kg)     There is no height or weight on file to calculate BMI.    CBC:   Lab Results   Component Value Date    WBC 6.9 01/27/2022    RBC 4.60 01/27/2022    HGB 13.8 01/27/2022    HCT 43.6 01/27/2022    MCV 94.8 01/27/2022    RDW 13.6 01/27/2022     01/27/2022       CMP:   Lab Results   Component Value Date     01/27/2022    K 3.7 01/27/2022     01/27/2022    CO2 28 01/27/2022    BUN 13 01/27/2022    CREATININE 0.68 01/27/2022    GFRAA >60 01/27/2022    LABGLOM >60 01/27/2022    GLUCOSE 122 01/27/2022    PROT 7.1 05/06/2021    CALCIUM 9.2 01/27/2022    BILITOT 0.39 05/06/2021    ALKPHOS 63 05/06/2021    AST 18 05/06/2021    ALT 10 05/06/2021       POC Tests:   Recent Labs     02/15/22  0638   POCGLU 145*       Coags:   Lab Results   Component Value Date    PROTIME 14.2 06/10/2021    INR 1.1 06/10/2021    APTT 27.8 06/10/2021       HCG (If Applicable): No results found for: PREGTESTUR, PREGSERUM, HCG, HCGQUANT     ABGs: No results found for: PHART, PO2ART, LFJ4TWP, JCH9XHX, BEART, B7CXLZNG     Type & Screen (If Applicable):  No results found for: LABABO, LABRH    Drug/Infectious Status (If Applicable):  No results found for: HIV, HEPCAB    COVID-19 Screening (If Applicable): No results found for: COVID19        Anesthesia Evaluation  Patient summary reviewed and Nursing notes reviewed no history of anesthetic complications:   Airway: Mallampati: II  TM distance: >3 FB   Neck ROM: full  Mouth opening: > = 3 FB Dental: normal exam   (+) edentulous      Pulmonary:normal exam        (-) COPD and asthma                           Cardiovascular:  Exercise tolerance: no interval change,   (+) hypertension:,     (-)  angina    ECG reviewed    Rate: normal  Echocardiogram reviewed  Stress test reviewed  Cleared by cardiology              Neuro/Psych:   (+) CVA: residual symptoms, headaches:,              ROS comment: L side weakness GI/Hepatic/Renal:        (-) GERD       Endo/Other:    (+) Diabetes, . Abdominal:             Vascular: Other Findings:               Anesthesia Plan      general, regional and spinal     ASA 3       Induction: intravenous. MIPS: Postoperative opioids intended and Prophylactic antiemetics administered. Anesthetic plan and risks discussed with patient. Plan discussed with CRNA.     Attending anesthesiologist reviewed and agrees with Kale Horta MD   2/15/2022

## 2022-02-15 NOTE — PROGRESS NOTES
Ortho Coordinator Daily Rounding Note    Admission Date:   2/15/2022 Denson Mohs is a 66 y.o.  male    Post Op Day # 0 RTKA    Labs:         No results for input(s): WBC, HGB, PLT in the last 72 hours. No results for input(s): NA, K, CL, CO2, BUN, CREATININE, GLUCOSE in the last 72 hours. Met with:     Patient and Family  Patient's LOC:   alert and oriented X3  Patient progress   FAIR  Patient's Pain:   Patient rates pain 10  Oral Intake:    decreased but adequate  Output:    PT HAS NOT VOIDED S/P RTKA   Martin in:   no  ON-Q:    no    Walker/DME:  Walker/DME needed:  No  DME needed:    walker   DME Agency:    PT HAS HIS OWN FWW    Anticoagulation:  Anticoagulation:  2.5MG ELIQUIS BID  Prescription in chart:  yes     Continuity of Care: The [address] form is on the chart. The [address] form is not signed by the physician. Discharge Planning:  Confirmed with patient that discharge plan is To a non-[facility] facility. Agency/Facility chosen: [address] [place] [address]  Awaiting pre-certification yes  Anticipated discharge date: 02/16/2022. Patient's questions and concerns were answered. Actively listened and provided reassurance.      Electronically signed by: Thomas Cole RN on 2/15/2022 at 11:29 AM       \

## 2022-02-15 NOTE — PROGRESS NOTES
Pt admitted to room 2103 from PACU  Patient alert and oriented x4  Oriented to room and call light/tv controls. Bed in lowest position, wheels locked, 2/4 side rails up  Call light in reach, room free of clutter, adequate lighting provided.

## 2022-02-15 NOTE — PLAN OF CARE
Problem: Pain:  Goal: Pain level will decrease  Description: Pain level will decrease  Outcome: Ongoing  Note: Pain level assessment complete. Patient educated on pain scale and control interventions  PRN pain medication given per patient request-Oxycodone  Patient instructed to call out with new onset of pain or unrelieved pain    Goal: Control of acute pain  Description: Control of acute pain  Outcome: Ongoing  Goal: Control of chronic pain  Description: Control of chronic pain  Outcome: Ongoing     Problem: Falls - Risk of:  Goal: Will remain free from falls  Description: Will remain free from falls  Outcome: Ongoing  Note: Siderails up x 2  Hourly rounding  Call light in reach  Instructed to call for assist before attempting out of bed.   Remains free from falls and accidental injury at this time   Floor free from obstacles  Bed is locked and in lowest position  Adequate lighting provided  Bed alarm on, [place] star and Stay with Me signs posted  Goal: Absence of physical injury  Description: Absence of physical injury  Outcome: Ongoing

## 2022-02-15 NOTE — ANESTHESIA PROCEDURE NOTES
Peripheral Block    Patient location during procedure: pre-op  Start time: 2/15/2022 6:58 AM  End time: 2/15/2022 7:03 AM  Staffing  Performed: anesthesiologist   Anesthesiologist: Chandrika Bush MD  Preanesthetic Checklist  Completed: patient identified, IV checked, site marked, risks and benefits discussed, surgical consent, monitors and equipment checked, pre-op evaluation, timeout performed, anesthesia consent given, oxygen available and patient being monitored  Peripheral Block  Patient position: supine  Prep: ChloraPrep  Patient monitoring: cardiac monitor, continuous pulse ox, continuous capnometry, frequent blood pressure checks and IV access  Block type: Femoral  Laterality: right  Injection technique: single-shot  Guidance: ultrasound guided  Local infiltration: lidocaine  Infiltration strength: 1 %  Dose: 4 mL  Adductor canal  Provider prep: mask and sterile gloves  Local infiltration: lidocaine  Needle  Needle type: pencil-tip   Needle gauge: 20 G  Needle localization: ultrasound guidance  Assessment  Injection assessment: negative aspiration for heme, no paresthesia on injection and local visualized surrounding nerve on ultrasound  Paresthesia pain: none  Slow fractionated injection: yes  Hemodynamics: stable  Additional Notes  preprocedure ready time 0658  Medications Administered  Bupivacaine (PF) (MARCAINE) 0.5 % injection, 10 mL  bupivacaine liposome (EXPAREL) 1.3 % injection, 10 mL  Reason for block: procedure for pain, post-op pain management and at surgeon's request

## 2022-02-15 NOTE — PLAN OF CARE
PROTOCOLS  NURSING IMPLEMENTED    TOTAL JOINT DVT/PE  VENOUS THROMBOEMBOLISM PROPHYLAXIS  (Nursing Automatically Implement)    William Menard  [id number]  [unfilled]  2/14/22    YES DVT RISK FACTOR SCORE YES MAJOR BLEEDING RISK FACTORS SCORE     [x] 48years old or greater (1)   [] Hx. Easy Bleeding (1)      [] Heart failure (2)   [] NSAID Use in Last 5 Days (2)      [] Varicose veins - Hx. (1)   [] Gastrointestinal or Genitourinary bleeding in Last 14 Days (2)      [] Myocardial Infarction - Hx. (1)         [] Cancer - Hx. (2)         [] Atrial fibrillation - Hx. (1)         [x] Ischemic Stroke - Hx. (1)         [x] Diabetes Mellitus - Hx. (1)         [] Previous DVT/PE - Hx.  (2)         [] Hormone Replacement Therapy (1)         [x] Obesity (1)         [] Paralysis (1)         [] Pregnancy (1)         [] Smoking (1)                   [] Thromophilia (1)   []   Mild to Moderate Bleeding (2)      [] Total Hip Arthroplasty (1)   [] Active Bleeding (4)      [] Family history of PE or DVT? (4) (Consider the following labs to test for presence of inhibitor deficiency state:) Factor V Leiden, Prothrombin Gene Mutation, Protein S Deficiency, Protien C Deficiency, Antithrombin Deficiency   [] Malignant Hypertension (2)        [] Thrombocytopenia 20k to 100k (2)        [] Thrombocytopenia less than 20k (4)        [] Bleeding Diathesis (4)        [] \"Bloody Stick\" Epidural or Spinal (2)     TOTAL DVT SCORE   TOTAL BLEEDING SCORE      [] CLASS A   Standard Risk DVT (0-3)    [x] CLASS X Standard Risk Bleeding (0-4)      [x] CLASS B Elevated Risk DVT (greater than 3)    [] CLASS Y High Risk Bleeding (greater than 4)     FINAL MATRIX (e.g. AY)       *If allergic to ASA use Warfarin  *BY patient consider no treatment  **Consider venous filter with high risk PE  **If on Coumadin pre-op, then restart night of surgery      []  DVT Prophylaxis: Class AX, AY    Ecotrin 81 mg by mouth BID starting day of surgery for 6 weeks for all total  joints. (If allergic to aspirin, give eliquis 2.5mg BID X 14 days (knees) or 35 days  (hips) starting first day postop at 0600). [x]  DVT Prophylaxis:  Class BX (cee choice)    []Eliquis 2.5mg BID x 14 days (knees) or 35 days (hips) starting first day postop      at 0600      [] Lovenox 40 mg subcu daily starting first day postop at 0600 x 14 days                             (knees) or 35 days (hips)    Ecotrin 81 mg PO BID-start when Lovenox is finished. (Teach injection prior to discharge.)       [] Xarelto 10 mg by mouth daily starting first day postop at 0600     14 days (knees) or 35 days (hips). If creatinine clearance less than 30 mL/min, give Lovenox 30 mg subcu   Daily starting first day postop at 0600. Ecotrin 81 mg PO BID-starting   when Lovenox is finished. (Teach injection prior to      discharge.)  (For discharge fill throughout the 10 mg daily with no    refills.)      []  DVT Prophylaxis:  Class BY (cee choice)               []  Eliquis 2.5mg BID x 14 days (knees) or 35 days (hips) starting first day                              postop at 0600        []  Ecotrin 81 mg PO BID x 6weeks (all joints)      []  Lovenox 40mg SQ daily to start at 0600 first day post-Op day. 14 days for knees, 35 days for hips    Ecotrin 81 mg PO BID - start when Lovenox is finished. (Teach injection prior to discharge.)       [] Xarelto 10 mg PO daily starting first day Post-Op at 0600    14 days (knees) or 35 days (hips)    If Creatinine Clearance less than 30ml/min, give Lovenox 30 mg    SQ daily starting first day Post-Op at 0600 x 14 days (knees) or 35   days (hips). Ecotrin 81 mg PO BID - start when Lovenox is finished.     (Teach injection prior to discharge.)  (For discharge fill throughout the   10 mg daily #32 with no refills.)      Electronically signed by August Khalil DO on 2/14/2022 at 8:20 PM

## 2022-02-15 NOTE — PROGRESS NOTES
Tolerance: Patient Tolerated treatment well  Activity Tolerance: Fair  Safety Devices  Safety Devices in place: Yes  Type of devices: Nurse notified;Gait belt;Call light within reach; Patient at risk for falls; Left in chair (Pts wife and granddaughter in room at end of session)           Patient Diagnosis(es): The encounter diagnosis was S/P total knee arthroplasty, right.     has a past medical history of Cataracts, bilateral, Cerebral artery occlusion with cerebral infarction ([address] Diabetes mellitus ([address] Eye disorder, Glaucoma, Headache(784.0), Hearing loss, Heartburn, Hyperlipidemia, Hypertension, Kidney stones, Osteoarthritis, Snores, Tendonitis of shoulder, left, and Tremor. has a past surgical history that includes Cystoscopy; Kidney stone surgery; Lithotripsy; Refractive surgery (Right, 2014); Cataract removal (Right, 08/2015); Thumb amputation; Hand surgery (Left); Tonsillectomy; Total knee arthroplasty (Left, 6/23/2021); joint replacement; and eye surgery.      ADELE SANDHU-        Restrictions  Restrictions/Precautions  Restrictions/Precautions: General Precautions,Fall Risk,Weight Bearing  Lower Extremity Weight Bearing Restrictions  Right Lower Extremity Weight Bearing: Weight Bearing As Tolerated  Position Activity Restriction  Other position/activity restrictions: B thigh high Jj hose,Previous CVA, elevate OP extremity, [place]    Subjective   General  Chart Reviewed: Yes  Patient assessed for rehabilitation services?: Yes  Family / Caregiver Present: Yes (Pts wife and granddaughter in room)  Subjective  Subjective: Pt resting in bed, agreeable to OT eval.  Patient Currently in Pain: Yes  Pre Treatment Pain Screening  Comments / Details: Pt reporting pain is more controlled then earlier, reports 7/10 pain RN aware gave pain medications prior to writers arrival      Social/Functional History  Social/Functional History  Lives With: Spouse  Type of Home: House  Home Layout: One level  Home Access: Stairs to enter without rails  Entrance Stairs - Number of Steps: 2 + 1  Bathroom Shower/Tub: Tub/Shower unit  Bathroom Toilet: Handicap height  Bathroom Equipment: Shower chair,Tub transfer bench  Home Equipment: Cane,Rolling walker,Quad cane  ADL Assistance: Independent  Homemaking Assistance: Independent (shares chores with wife)  Ambulation Assistance: Independent (uses cane)  Transfer Assistance: Independent  Active : No  Patient's  Info: wife can drive  Occupation: Retired  Type of occupation:   Leisure & Hobbies: poker, Ipad  Additional Comments: Had L TKA in June went to [facility] for 2 weeks and then had OP PT after reports he has been doing well since the sx. Pt denies any recent falls       Objective   Vision: Impaired  Vision Exceptions: Wears glasses at all times  Hearing: Exceptions to [facility] (Pt reports R worse than left)  Hearing Exceptions: Hard of hearing/hearing concerns; No hearing aid    Orientation  Overall Orientation Status: Within Functional Limits  Observation/Palpation  Posture: Fair (with RW)  Observation: R LE wrapped, L Jj hose on  Scar: Previous L TKA scar       Balance  Sitting Balance: Stand by assistance  Standing Balance: Minimal assistance (Min-Mod x2 staff assist for first stand after sx, progressed to Min A with RW)  Standing Balance  Time: standing ~ 1 min  Activity: weight shifting, functional mobility  Functional Mobility  Functional - Mobility Device: Rolling Walker  Activity:  (bed forward 3 steps and back, bed to bedside chair)  Assist Level: Moderate assistance (Min-Mod x2)  Functional Mobility Comments: Pt given Mod verbal cues for step sequence with RW, pacing self, pursed lip breathing, upright posture, extending B UE on RW for increased support, awareness/assist with lines all to increase safety. Some buckling noted in R knee.   Toilet Transfers  Toilet Transfer: Unable to assess  Toilet Transfers Comments: Pt with no needs at this time ADL  Feeding: Setup  Grooming: Setup;Stand by assistance (seated)  UE Bathing: Setup;Stand by assistance  LE Bathing: Setup; Moderate assistance  UE Dressing: Setup;Minimal assistance (to tie/adjust hosp gown for modesty)  LE Dressing: Setup; Moderate assistance (pt attempted to don R sock while long sitting in bed able to get it on toes but required assist for rest)  Toileting: Moderate assistance  Additional Comments: Pt is limited by pain, cognition and overall body habitus       Tone RUE  RUE Tone: Normotonic  Tone LUE  LUE Tone: Normotonic  Coordination  Movements Are Fluid And Coordinated: Yes        Bed mobility  Supine to Sit: Minimal assistance  Sit to Supine: Unable to assess (QUIQUE pt agreeable to sitting in bedside chair at end of session)  Scooting: Minimal assistance  Comment: Pt given Min verbal cues for pacing self, use of bedrail, and pursed lip breathing all to increase ease/safety. Transfers  Sit to stand: Moderate assistance;2 Person assistance  Stand to sit: Moderate assistance  Transfer Comments: Pt required Mod x2 for initial stand at EOB some buckling noted. Pt given Mod verbal cues for pacing self, RW safety, upright posture, controlled stand to sit, squaring self/AD up to surface and reaching back prior to sitting, and pursed lip breathing all to increase safety. Cognition  Overall Cognitive Status: Exceptions  Following Commands: Follows one step commands with repetition; Follows one step commands with increased time  Attention Span: Appears intact  Memory: Decreased short term memory  Safety Judgement: Decreased awareness of need for safety;Decreased awareness of need for assistance  Problem Solving: Decreased awareness of errors  Insights: Decreased awareness of deficits  Initiation: Requires cues for some  Sequencing: Requires cues for some           Sensation  Overall Sensation Status: Impaired          LUE AROM (degrees)  LUE AROM : WFL  RUE AROM (degrees)  RUE AROM : WFL  LUE Strength  Gross LUE Strength: WFL  LUE Strength Comment: ~ 4/5  RUE Strength  Gross RUE Strength: WFL  RUE Strength Comment: ~ 4/5                   Plan   Plan  Times per week: 5-6x/wk 1-2x/day as Tracey Arteaga  Current Treatment Recommendations: Strengthening,Endurance Training,Balance Training,Functional Mobility Training,Safety Education & Training,Pain Management,Home Management Training,Self-Care / ADL,Equipment Evaluation, Education, & procurement,Patient/Caregiver Education & Training                                   AM-PAC Score        AM-PAC Inpatient Daily Activity Raw Score: 16 (02/15/22 1517)  AM-PAC Inpatient ADL T-Scale Score : 35.96 (02/15/22 1517)  ADL Inpatient CMS 0-100% Score: 53.32 (02/15/22 1517)  ADL Inpatient CMS G-Code Modifier : CK (02/15/22 1517)        Goals  Short term goals  Time Frame for Short term goals: By discharge, pt to demo  Short term goal 1: ADL transfers and functional mobility to CGA with use of AD as needed. Short term goal 2: toileting to SBA with use of AD/grab bars as needed. Short term goal 3: increased B UE strength by 1/2 grade to assist with functional tasks/I with B UE HEP with use of handouts as needed. Short term goal 4: UB ADLs to to Set up and LB ADLs to Min A with use of AD/AE as needed. Short term goal 5: bed mobility to Mod I with use of bedrails as needed. Long term goals  Long term goal 1: Pt to be I with fall prevention education, EC/WS tech, safe car transfer, Jj hose wear/care, and recommendations for discharge/AE with use of handouts as needed. Patient Goals   Patient goals : To go home! Therapy Time   Individual Concurrent Group Co-treatment   Time In 1240         Time Out 1333         Minutes 53          tx time: 38 min     Co-treatment with PT warranted first time up day of surgery. Cotx due to potential risk of decreased sensation, muscle control and proprioception from spinal epidural and/or regional block.  Decreased safety and independence requiring 2 skilled therapy professionals to address individual discipline's goals.           Brenda Minaya, OT

## 2022-02-15 NOTE — PROGRESS NOTES
Physical Therapy  Facility/Department: [facility]  Daily Treatment Note  NAME: Esthela Mauricio  : 1950  MRN: [id number]    Date of Service: 2/15/2022    Discharge Recommendations:  Patient would benefit from continued therapy after discharge        Assessment   Body structures, Functions, Activity limitations: Decreased functional mobility ; Decreased safe awareness;Decreased balance;Decreased strength;Decreased endurance;Decreased ROM  Assessment: Pt presents same day Rt TKA w/ good tolerance to mobility and weight bearing ability w/ slight buckle but w/ UE able to sustain support. Pt able to increase amb distance  Specific instructions for Next Treatment: mobility; stretches; issue HEP  Activity Tolerance  Activity Tolerance: Patient Tolerated treatment well     Patient Diagnosis(es): The encounter diagnosis was S/P total knee arthroplasty, right.     has a past medical history of Cataracts, bilateral, Cerebral artery occlusion with cerebral infarction ([address] Diabetes mellitus ([address] Eye disorder, Glaucoma, Headache(784.0), Hearing loss, Heartburn, Hyperlipidemia, Hypertension, Kidney stones, Osteoarthritis, Snores, Tendonitis of shoulder, left, and Tremor. has a past surgical history that includes Cystoscopy; Kidney stone surgery; Lithotripsy; Refractive surgery (Right, ); Cataract removal (Right, 2015); Thumb amputation; Hand surgery (Left); Tonsillectomy; Total knee arthroplasty (Left, 2021); joint replacement; and eye surgery.     Restrictions  Restrictions/Precautions  Restrictions/Precautions: General Precautions,Fall Risk,Weight Bearing  Lower Extremity Weight Bearing Restrictions  Right Lower Extremity Weight Bearing: Weight Bearing As Tolerated  Position Activity Restriction  Other position/activity restrictions: B thigh high Jj hose,Previous CVA, elevate OP extremity, [place]  Subjective   General  Chart Reviewed: Yes  Subjective  Subjective: Pt reports he may want to go home at time of d/c.  General Comment  Comments: pt in chair upon arrival agreeable to PT states he recently walked to the bathroom with nursing  Pain Screening  Patient Currently in Pain: Yes  Pain Assessment  Pain Assessment: 0-10  Pain Level: 6  Pain Location: Knee  Pain Orientation: Right  Vital Signs  Patient Currently in Pain: Yes       Orientation     Cognition      Objective      Transfers  Sit to Stand: Minimal Assistance  Stand to sit: Minimal Assistance  Stand Pivot Transfers: Minimal Assistance  Ambulation  Ambulation?: Yes  Ambulation 1  Surface: level tile  Device: Rolling Walker  Assistance: Minimal assistance  Quality of Gait: verbal cues for sequencing, slight bobbing of R knee but not a complete buckling during amb. Gait Deviations: Decreased step length;Decreased step height  Distance: 25 ft     Balance  Posture: Good  Sitting - Static: Good  Sitting - Dynamic: Good  Standing - Static: Fair  Standing - Dynamic: Fair;-  Exercises  Comments: TKA exercises x 10 reps; chair push ups x 10 reps; Comment: seated heel slides x 10               G-Code     OutComes Score                                                     AM-PAC Score  AM-PAC Inpatient Mobility Raw Score : 17 (02/15/22 1554)  AM-PAC Inpatient T-Scale Score : 42.13 (02/15/22 1554)  Mobility Inpatient CMS 0-100% Score: 50.57 (02/15/22 1554)  Mobility Inpatient CMS G-Code Modifier : CK (02/15/22 1554)          Goals  Short term goals  Time Frame for Short term goals: 8  Short term goal 1: Bed mob ind  Short term goal 2: transfers ind  Short term goal 3: Amb x 75 ft x 1 w/ rwalker independent  Short term goal 4: steps x 4 w/ one rail and one cane ind  Short term goal 5: pt issued HEP and independent w/ exercises  Patient Goals   Patient goals : Pt goal is for safe d/c home    Plan    Plan  Times per week: 2x/day.   7x /week  Specific instructions for Next Treatment: mobility; stretches; issue HEP  Current Treatment Recommendations: Strengthening,Transfer Carmen Tenorio Re-education,Patient/Caregiver Education & Training,ROM,Balance Training,Home Exercise Program,Gait Training,Functional Mobility Training,Cognitive/Perceptual Training,Stair training,Safety Education & Training,Positioning  Safety Devices  Type of devices:  All fall risk precautions in place,Call light within reach,Left in chair     Therapy Time   Individual Concurrent Group Co-treatment   Time In 1504         Time Out 1530         Minutes 26                 KM ALMAZAN, PTA

## 2022-02-15 NOTE — ANESTHESIA POSTPROCEDURE EVALUATION
Department of Anesthesiology  Postprocedure Note    Patient: Laura Bullard  MRN: [id number]  YOB: 1950  Date of evaluation: 2/15/2022  Time:  2:52 PM     Procedure Summary     Date: 02/15/22 Room / Location: [address] [place] [address] Place / [place] [address]    Anesthesia Start: 5381 Anesthesia Stop: 2889    Procedure: RIGHT KNEE TOTAL ARTHROPLASTY - Pili Arango (Right Knee) Diagnosis: (DX RIGHT KNEE ARTHRITIS)    Surgeons: Emily Lambert DO Responsible Provider: Jori Bird MD    Anesthesia Type: general, regional, spinal ASA Status: 3          Anesthesia Type: general, regional, spinal    Ashwini Phase I: Ashwini Score: 9    Ashwini Phase II:      Last vitals: Reviewed and per EMR flowsheets.        Anesthesia Post Evaluation    Patient location during evaluation: PACU  Patient participation: complete - patient participated  Level of consciousness: awake  Airway patency: patent  Nausea & Vomiting: no nausea  Complications: no  Cardiovascular status: blood pressure returned to baseline  Respiratory status: acceptable  Hydration status: euvolemic  Comments: Multimodal analgesia pain management as indicated by procedure  Multimodal analgesia pain management approach

## 2022-02-15 NOTE — CARE COORDINATION
Social work: Referral to [facility] ARU, per patient request.     UPDATE: [facility] is able to accept and will start precert when PT/OT notes are available.

## 2022-02-15 NOTE — ANESTHESIA PROCEDURE NOTES
Spinal Block    Patient location during procedure: OR  Start time: 2/15/2022 7:20 AM  End time: 2/15/2022 7:24 AM  Reason for block: primary anesthetic  Staffing  Performed: resident/CRNA   Anesthesiologist: Jose Alberto Randolph MD  Resident/CRNA: AMERICA Liz CRNA  Preanesthetic Checklist  Completed: patient identified, IV checked, site marked, risks and benefits discussed, surgical consent, monitors and equipment checked, pre-op evaluation, timeout performed, anesthesia consent given, oxygen available and patient being monitored  Spinal Block  Patient position: sitting  Prep: ChloraPrep  Patient monitoring: continuous pulse ox and frequent blood pressure checks  Approach: midline  Location: L4/L5  Provider prep: mask and sterile gloves  Local infiltration: lidocaine  Agent: bupivacaine  Dose: 1.6  Dose: 1.6  Needle  Needle type: Pencan   Needle gauge: 24 G  Needle length: 4 in  Assessment  Sensory level: T6  Events: cerebrospinal fluid  Swirl obtained: Yes  CSF: clear  Attempts: 1  Hemodynamics: stable

## 2022-02-15 NOTE — H&P
Interval H&P Note    Pt Name: Baldo Holman  MRN: [id number]  YOB: 1950  Date of evaluation: 2/15/2022      [x] I have reviewed in Epic the family nurse practitioner progress note by Dorys COPELAND dated 2/9/2022 attached below which meets the criteria for an Interval History and Physical note. [x] I have examined  Baldo Holman  There are no changes to the patient who is scheduled for a right total knee arthroplasty by Dr. Todd Dunlap for right knee arthritis. The patient denies new health changes, fever, chills, wheezing, cough, increased SOB, chest pain, open sores or wounds. Hx diabetes. POC . Denies taking any blood thinning medications in the last 10 days. Vital signs: BP (!) 144/78   Pulse 91   Temp 96.9 °F (36.1 °C) (Temporal)   Resp 20   Ht 6' (1.829 m)   Wt 233 lb 11 oz (106 kg)   SpO2 95%   BMI 31.69 kg/m²     Allergies:  Patient has no known allergies. Medications:    Prior to Admission medications    Medication Sig Start Date End Date Taking? Authorizing Provider   HYDROcodone-acetaminophen (NORCO) 5-325 MG per tablet Take 1 tablet by mouth 2 times daily as needed for Pain for up to 6 days.  2/9/22 2/15/22 Yes AMERICA Mccarthy CNP   losartan (COZAAR) 50 MG tablet TAKE 1 TABLET DAILY 2/9/22  Yes AMERICA Mccarthy CNP   amLODIPine (NORVASC) 10 MG tablet Take 1 tablet by mouth daily 2/9/22 3/11/22 Yes AMERICA Mccarthy CNP   docusate sodium (COLACE) 100 MG capsule Take 1 capsule by mouth 2 times daily 2/9/22  Yes AMERICA Mccarthy CNP   simvastatin (ZOCOR) 20 MG tablet TAKE 1 TABLET NIGHTLY 11/8/21  Yes AMERICA Mccarthy CNP   amitriptyline (ELAVIL) 75 MG tablet TAKE 1 TABLET NIGHTLY 11/8/21  Yes AMERICA Mccarthy CNP   glipiZIDE (GLUCOTROL) 10 MG tablet Take 1 tablet by mouth daily 11/8/21  Yes Sharolyn , APRN - CNP   Chlorpheniramine Maleate (ALLERGY RELIEF PO) Take 1 tablet by mouth daily    Yes Historical Provider, MD   timolol (TIMOPTIC) 0.5 % ophthalmic solution instill 1 drop into right eye twice a day 18  Yes Historical Provider, MD   latanoprost (XALATAN) 0.005 % ophthalmic solution Place 1 drop into the right eye nightly  14  Yes Historical Provider, MD   Homeopathic Products ([address] [place] [address]) SOLN Apply 2 drops to eye 2 times daily Left eye    Historical Provider, MD   blood glucose monitor strips Provide insurance covered test strips compatible with glucometer, test 1 times a day 20   Everlene Model, PA-WILLOW   Blood Glucose Monitoring Suppl (D-CARE GLUCOMETER) w/Device KIT Provide insurance covered glucometer, check blood sugars every morning 19  Everlene Model PA-C         This is a 66 y.o. male who is pleasant, cooperative, alert and oriented x3, in no acute distress. Heart: Heart sounds are normal.  HR 91 regular rate and rhythm without murmur, gallop or rub. Lungs: Normal respiratory effort with equal expansion, good air exchange, unlabored and clear to auscultation without wheezes or rales bilaterally   Abdomen: soft, nontender, nondistended with bowel sounds active. Labs:  Recent Labs     22  1322   HGB 13.8   HCT 43.6   WBC 6.9   MCV 94.8         K 3.7      CO2 28   BUN 13   CREATININE 0.68*   GLUCOSE 122*       No results for input(s): COVID19 in the last 720 hours. AMERICA Mahoney CNP  Electronically signed 2/15/2022 at 6:46 AM      AMERICA Zabala CNP   Nurse Practitioner   Specialty:  Family Nurse Practitioner   Progress Notes      Signed   Encounter Date:  2022         Related encounter: Office Visit from 2022 in [address]              Show:Clear all  [x]Manual[x]Template[x]Copied    Added by:  AMERICA Chavez CNP      []Zachariah for details       Crispin Adam (:  1950) is a 66 y.o. male,Established patient, here for evaluation of the following chief complaint(s):  3 Month Follow-Up and Diabetes        ASSESSMENT/PLAN:  1. Essential hypertension  -     amLODIPine (NORVASC) 10 MG tablet; Take 1 tablet by mouth daily, Disp-90 tablet, R-1Normal  2. Chronic pain of left knee  -     HYDROcodone-acetaminophen (NORCO) 5-325 MG per tablet; Take 1 tablet by mouth 2 times daily as needed for Pain for up to 6 days. , Disp-12 tablet, R-0Normal  3. Medication refill  -     losartan (COZAAR) 50 MG tablet; TAKE 1 TABLET DAILY, Disp-90 tablet, R-1Normal  4. Recurrent major depressive disorder, remission status unspecified ([facility]  5. Type 2 diabetes mellitus with diabetic autonomic neuropathy, without long-term current use of insulin ([facility] [address]  6. Constipation, unspecified constipation type  -     docusate sodium (COLACE) 100 MG capsule; Take 1 capsule by mouth 2 times daily, Disp-60 capsule, R-11Normal        No follow-ups on file. Subjective   SUBJECTIVE/OBJECTIVE:  HPI   Right knee pain- has been given celebrex, states this feels like a placebo. Was seeing pain management but didn't feel he needed any more after left knee was fixed. He has tried Mobclix, didn't help. The norco allows him to complete his adl's more comfortably. Has had some constipation lately. Is worried it may get worse as he ay be taking more pain medication due to his surgery. Review of Systems   Constitutional: Negative for chills and fever. Respiratory: Negative for cough and shortness of breath. Cardiovascular: Negative for chest pain and leg swelling. Musculoskeletal: Positive for arthralgias. Objective   Physical Exam  Constitutional:       General: He is not in acute distress. Appearance: He is well-developed. HENT:      Head: Normocephalic. Right Ear: External ear normal.      Left Ear: External ear normal.   Cardiovascular:      Rate and Rhythm: Normal rate and regular rhythm.       Heart sounds: Normal heart sounds. Pulmonary:      Effort: Pulmonary effort is normal.      Breath sounds: Normal breath sounds. Musculoskeletal:         General: Normal range of motion. Skin:     General: Skin is warm and dry. Neurological:      Mental Status: He is alert and oriented to person, place, and time. On this date 2/9/2022 I have spent 30 minutes reviewing previous notes, test results and face to face with the patient discussing the diagnosis and importance of compliance with the treatment plan as well as documenting on the day of the visit. An electronic signature was used to authenticate this note.      --Sony Queen, AMERICA - CNP

## 2022-02-15 NOTE — PROGRESS NOTES
Physical Therapy    Facility/Department: [facility]  Initial Assessment    NAME: Beatrice Funes  : 1950  MRN: [id number]    Date of Service: 2/15/2022    Discharge Recommendations:  Patient would benefit from continued therapy after discharge Due to recent hospitalization and medical condition, pt would benefit from additional therapy at time of discharge to ensure safety. Please refer to the AM-PAC score for current functional status. Assessment   Body structures, Functions, Activity limitations: Decreased functional mobility ; Decreased safe awareness;Decreased balance;Decreased strength;Decreased endurance;Decreased ROM    Assessment: Pt presents same day Rt TKA by Dr. Lou Cartwright w/ spinal by Dr. Mary Anne Andersen. Pt w/ good tolerance to mobility and weight bearing ability w/ slight buckle but w/ UE able to sustain support. Pt able to tolerate vertical positioning w/o symptoms and w/ good tolerance to up to chair post walk. Pt w/ supportive wife and grandaughter in room. Pt is motivated and pleasant throughout treatment. Will progress as able but expect a safe discharge. Specific instructions for Next Treatment: mobility; stretches; issue HEP  Prognosis: Excellent  Decision Making: Medium Complexity  Clinical Presentation: evolving  PT Education: Goals;Weight-bearing Education;Transfer Training;PT Role;Plan of Care;General Safety;Home Exercise Program;Pressure Relief  REQUIRES PT FOLLOW UP: Yes  Activity Tolerance  Activity Tolerance: Patient Tolerated treatment well       Patient Diagnosis(es): The encounter diagnosis was S/P total knee arthroplasty, right.     has a past medical history of Cataracts, bilateral, Cerebral artery occlusion with cerebral infarction ([address] Diabetes mellitus ([address] Eye disorder, Glaucoma, Headache(784.0), Hearing loss, Heartburn, Hyperlipidemia, Hypertension, Kidney stones, Osteoarthritis, Snores, Tendonitis of shoulder, left, and Tremor.    has a past surgical history that includes Cystoscopy; Kidney stone surgery; Lithotripsy; Refractive surgery (Right, 2014); Cataract removal (Right, 08/2015); Thumb amputation; Hand surgery (Left); Tonsillectomy; Total knee arthroplasty (Left, 6/23/2021); joint replacement; and eye surgery. Restrictions  Restrictions/Precautions  Restrictions/Precautions: General Precautions,Fall Risk,Weight Bearing  Lower Extremity Weight Bearing Restrictions  Right Lower Extremity Weight Bearing: Weight Bearing As Tolerated  Position Activity Restriction  Other position/activity restrictions: B thigh high Jj hose,Previous CVA, elevate OP extremity, [place]- talk into Lt ear  Vision/Hearing  Vision: Impaired  Vision Exceptions: Wears glasses at all times  Hearing: Exceptions to [facility]  Hearing Exceptions: Hard of hearing/hearing concerns; No hearing aid     Subjective  General  Chart Reviewed: Yes  Patient assessed for rehabilitation services?: Yes  Response To Previous Treatment: Not applicable  Family / Caregiver Present: Yes  Subjective  Subjective: Pt reports he may want to go home at time of d/c.   Pain Screening  Patient Currently in Pain: Yes  Vital Signs  Patient Currently in Pain: Yes       Orientation  Orientation  Overall Orientation Status: Within Functional Limits  Social/Functional History  Social/Functional History  Lives With: Spouse  Type of Home: House  Home Layout: One level  Home Access: Stairs to enter without rails  Entrance Stairs - Number of Steps: 2 + 1  Bathroom Shower/Tub: Tub/Shower unit  Bathroom Toilet: Handicap height  Bathroom Equipment: Shower chair,Tub transfer bench  Home Equipment: Cane,Rolling walker,Quad cane  ADL Assistance: Independent  Homemaking Assistance: Independent (shares chores with wife)  Ambulation Assistance: Independent (uses cane)  Transfer Assistance: Independent  Active : No  Patient's  Info: wife can drive  Occupation: Retired  Type of occupation:   Leisure & Hobbies: poker, Ipad  Additional Comments: Had L TKA in [facility] and then had OP PT after reports he has been doing well since the sx. Pt denies any recent falls  Cognition        Objective     Observation/Palpation  Scar: Previous L TKA scar    AROM RLE (degrees)  RLE General AROM: 5-75  AROM LLE (degrees)  LLE AROM : WFL  Strength RLE  Comment: 3/5 hip; 3-/5 knee; 3+/5  Strength LLE  Strength LLE: WFL  Motor Control  Gross Motor?: WFL  Sensation  Overall Sensation Status: Impaired  Bed mobility  Supine to Sit: Minimal assistance  Sit to Supine: Minimal assistance  Scooting: Minimal assistance  Transfers  Sit to Stand: Moderate Assistance  Stand to sit: Moderate Assistance  Bed to Chair: Moderate assistance  Ambulation  Ambulation?: Yes  Ambulation 1  Surface: level tile  Device: Rolling Walker  Assistance: Minimal assistance  Gait Deviations: Slow Claudia  Distance: 5 FT X 1; 2 FT X 1  Comments: PAIN LIMITING DISTANCE THIS DATE     Balance  Posture: Good  Sitting - Static: Good  Sitting - Dynamic: Good  Standing - Static: Fair  Standing - Dynamic: Fair;-  Exercises  TKA exercises x 10 reps; chair push ups x 10 reps; spirometer x 7 reps w/ good technique. Pt education on positioning and importance of OOB. Rt knee in full extension while up in chair. Plan   Plan  Times per week: 2x/day. 7x /week  Specific instructions for Next Treatment: mobility; stretches; issue HEP  Current Treatment Recommendations: Joana Monreal Re-education,Patient/Caregiver Education & Training,ROM,Balance Training,Home Exercise Program,Gait Training,Functional Mobility Training,Cognitive/Perceptual Training,Stair training,Safety Education & Training,Positioning  Safety Devices  Type of devices:  All fall risk precautions in place,Call light within reach,Left in chair    AM-PAC Score 18/24      Goals  Short term goals  Time Frame for Short term goals: 8  Short term goal 1: Bed mob ind  Short term goal 2: transfers ind  Short term goal 3: Amb x 75 ft x 1 w/ rwalker independent  Short term goal 4: steps x 4 w/ one rail and one cane ind  Short term goal 5: pt issued HEP and independent w/ exercises  Patient Goals   Patient goals : Pt goal is for safe d/c home     Therapy Time   Individual   Time In 1315   Time Out 1409   Minutes 54     Treatment time x 38 minutes.      Lexi Browning, PT

## 2022-02-15 NOTE — DISCHARGE INSTR - COC
Continuity of Care Form    Patient Name: [place] Mormon   :  1950  MRN:  [id number]    Admit date:  2/15/2022  Discharge date:  2022    Code Status Order: Full Code   Advance Directives:      Admitting Physician:  Reuben De Leon DO  PCP: AMERICA Haywood CNP    Discharging Nurse: Cheryle Gay. [address] Unit/Room#: 2104/2104-01  Discharging Unit Phone Number: [phone]    Emergency Contact:   Extended Emergency Contact Information  Primary Emergency Contact: Alicia Husbands  Address: [address] [place] [address] , [address] [place] [address] Phone: [phone]  Mobile Phone: [phone]  Relation: Spouse  Secondary Emergency Contact: [address], [address] [facility] Phone: [phone]  Relation: Child   needed?  No    Past Surgical History:  Past Surgical History:   Procedure Laterality Date    CATARACT REMOVAL Right 2015    right eye    CYSTOSCOPY      EYE SURGERY      HAND SURGERY Left     thumb    JOINT REPLACEMENT      KIDNEY STONE SURGERY      LITHOTRIPSY      REFRACTIVE SURGERY Right     THUMB AMPUTATION      TONSILLECTOMY      TOTAL KNEE ARTHROPLASTY Left 2021    LEFT  KNEE TOTAL ARTHROPLASTY - Jacklyn Malone performed by Chica Panchal DO at [facility]       Immunization History:   Immunization History   Administered Date(s) Administered    [id number], Primary or Immunocompromised, PF, 100mcg/0.5mL 2021, 2021, 10/26/2021    Influenza Virus Vaccine 2014, 2015, 2017    Influenza, Quadv, IM, PF (6 mo and older Fluzone, Flulaval, Fluarix, and 3 yrs and older Afluria) 2016    Influenza, Quadv, adjuvanted, 65 yrs +, IM, PF (Fluad) 10/01/2020, 10/07/2021    Influenza, Triv, inactivated, subunit, adjuvanted, IM (Fluad 65 yrs and older) 10/19/2018, 10/14/2019    Pneumococcal Conjugate 13-valent ([id number]) 2017, 2020    Pneumococcal Polysaccharide (Ofesbhmoh70) 2019 Active Problems:  Patient Active Problem List   Diagnosis Code    Hypercholesteremia E78.00    Neuropathic pain M79.2    Weakness of both legs R29.898    Essential hypertension I10    Type 2 diabetes mellitus with diabetic autonomic neuropathy, without long-term current use of insulin ([facility]) E11.43    Eustachian tube dysfunction H69.80    Branch retinal vein occlusion U01.0761    Primary open angle glaucoma H40.1190    Chronic pain of both knees M25.561, M25.562, G89.29    Cerebral infarction due to occlusion of right middle cerebral artery (HCC) I63.511    Glaucomatous visual field defect H40.50X0, H53.40    Nuclear sclerotic cataract H25.10    Class 1 obesity due to excess calories with serious comorbidity and body mass index (BMI) of 31.0 to 31.9 in adult E66.09, Z68.31    Elevated PSA R97.20    S/P total knee arthroplasty, left X00.419    Major depressive disorder, recurrent, unspecified F33.9    S/P total knee arthroplasty, right Z96.651       Isolation/Infection:   Isolation            No Isolation          Patient Infection Status       None to display            Nurse Assessment:  Last Vital Signs: /83   Pulse 84   Temp 98 °F (36.7 °C) (Axillary)   Resp 18   Ht 6' (1.829 m)   Wt 233 lb 11 oz (106 kg)   SpO2 96%   BMI 31.69 kg/m²     Last documented pain score (0-10 scale): Pain Level: 10  Last Weight:   Wt Readings from Last 1 Encounters:   02/15/22 233 lb 11 oz (106 kg)     Mental Status:  oriented, alert, coherent, logical, thought processes intact, and able to concentrate and follow conversation    IV Access:  - None    Nursing Mobility/ADLs:  Walking   Assisted  Transfer  Assisted  Bathing  Assisted  Dressing  Assisted  Toileting  Assisted  Feeding  Independent  Med Admin  Independent  Med Delivery   whole    Wound Care Documentation and Therapy:        Elimination:  Continence:    Bowel: Yes  Bladder: Yes  Urinary Catheter: None   Colostomy/Ileostomy/Ileal Conduit: No       Date of Last BM: 02/14/2022    Intake/Output Summary (Last 24 hours) at 2/15/2022 1130  Last data filed at 2/15/2022 1030  Gross per 24 hour   Intake 422 ml   Output --   Net 422 ml     No intake/output data recorded. Safety Concerns: At Risk for Falls    Impairments/Disabilities:      Vision and Hearing. Pt wears glasses and very [place]. Nutrition Therapy:  Current Nutrition Therapy:   - Oral Diet:  General    Routes of Feeding: Oral  Liquids: No Restrictions  Daily Fluid Restriction: no  Last Modified Barium Swallow with Video (Video Swallowing Test): not done    Treatments at the Time of Hospital Discharge:   Respiratory Treatments: ***  Oxygen Therapy:  is not on home oxygen therapy. Ventilator:    - No ventilator support    Rehab Therapies: Physical Therapy  Weight Bearing Status/Restrictions: No weight bearing restirctions  Other Medical Equipment (for information only, NOT a DME order):  walker  Other Treatments: physical therapy 2-3 days a week S/P right total knee replacement    Patient's personal belongings (please select all that are sent with patient):  Glasses, Dentures upper and lower    RN SIGNATURE:  Electronically signed by Giovany Aguilar RN on 2/16/22 at 1:41 PM EST    CASE MANAGEMENT/SOCIAL WORK SECTION    Inpatient Status Date: ***    Readmission Risk Assessment Score:  Readmission Risk              Risk of Unplanned Readmission:  0           Discharging to Facility/ Agency   Name: [address] [place] [address]  [facility].  [phone]    Dialysis Facility (if applicable)   Name:  Address:  Dialysis Schedule:  Phone:  Fax:    / signature: Electronically signed by Abby Qureshi RN on 2/16/22 at 12:21 PM EST    PHYSICIAN SECTION    Prognosis: Good    Condition at Discharge: Stable    Rehab Potential (if transferring to Rehab): Good    Recommended Labs or Other Treatments After Discharge: Weight-bear as tolerated right lower extremity.   Keep right knee incision clean and dry. Physical therapy and Occupational Therapy to evaluate and treat. Physician Certification: I certify the above information and transfer of Parish Precise  is necessary for the continuing treatment of the diagnosis listed and that he requires [address] for less 30 days.      Update Admission H&P: No change in H&P    PHYSICIAN SIGNATURE:  Electronically signed by Fouzia Dan DO on 2/15/22 at 11:57 AM EST

## 2022-02-16 VITALS
SYSTOLIC BLOOD PRESSURE: 108 MMHG | WEIGHT: 233.69 LBS | RESPIRATION RATE: 18 BRPM | DIASTOLIC BLOOD PRESSURE: 58 MMHG | HEART RATE: 87 BPM | HEIGHT: 72 IN | TEMPERATURE: 97.7 F | BODY MASS INDEX: 31.65 KG/M2 | OXYGEN SATURATION: 93 %

## 2022-02-16 LAB
ABSOLUTE EOS #: 0.24 K/UL (ref 0–0.44)
ABSOLUTE IMMATURE GRANULOCYTE: 0.03 K/UL (ref 0–0.3)
ABSOLUTE LYMPH #: 1.56 K/UL (ref 1.1–3.7)
ABSOLUTE MONO #: 0.89 K/UL (ref 0.1–1.2)
BASOPHILS # BLD: 0 % (ref 0–2)
BASOPHILS ABSOLUTE: 0.03 K/UL (ref 0–0.2)
DIFFERENTIAL TYPE: ABNORMAL
EOSINOPHILS RELATIVE PERCENT: 3 % (ref 1–4)
GLUCOSE BLD-MCNC: 116 MG/DL (ref 75–110)
GLUCOSE BLD-MCNC: 120 MG/DL (ref 75–110)
HCT VFR BLD CALC: 36.4 % (ref 40.7–50.3)
HEMOGLOBIN: 11.5 G/DL (ref 13–17)
IMMATURE GRANULOCYTES: 0 %
LYMPHOCYTES # BLD: 18 % (ref 24–43)
MCH RBC QN AUTO: 30.7 PG (ref 25.2–33.5)
MCHC RBC AUTO-ENTMCNC: 31.6 G/DL (ref 28.4–34.8)
MCV RBC AUTO: 97.1 FL (ref 82.6–102.9)
MONOCYTES # BLD: 10 % (ref 3–12)
NRBC AUTOMATED: 0 PER 100 WBC
PDW BLD-RTO: 13.7 % (ref 11.8–14.4)
PLATELET # BLD: 176 K/UL (ref 138–453)
PLATELET ESTIMATE: ABNORMAL
PMV BLD AUTO: 9.8 FL (ref 8.1–13.5)
RBC # BLD: 3.75 M/UL (ref 4.21–5.77)
RBC # BLD: ABNORMAL 10*6/UL
SEG NEUTROPHILS: 69 % (ref 36–65)
SEGMENTED NEUTROPHILS ABSOLUTE COUNT: 6.04 K/UL (ref 1.5–8.1)
WBC # BLD: 8.8 K/UL (ref 3.5–11.3)
WBC # BLD: ABNORMAL 10*3/UL

## 2022-02-16 PROCEDURE — 97530 THERAPEUTIC ACTIVITIES: CPT

## 2022-02-16 PROCEDURE — 97535 SELF CARE MNGMENT TRAINING: CPT

## 2022-02-16 PROCEDURE — 85025 COMPLETE CBC W/AUTO DIFF WBC: CPT

## 2022-02-16 PROCEDURE — 6370000000 HC RX 637 (ALT 250 FOR IP): Performed by: STUDENT IN AN ORGANIZED HEALTH CARE EDUCATION/TRAINING PROGRAM

## 2022-02-16 PROCEDURE — 6360000002 HC RX W HCPCS: Performed by: STUDENT IN AN ORGANIZED HEALTH CARE EDUCATION/TRAINING PROGRAM

## 2022-02-16 PROCEDURE — 97116 GAIT TRAINING THERAPY: CPT

## 2022-02-16 PROCEDURE — 82947 ASSAY GLUCOSE BLOOD QUANT: CPT

## 2022-02-16 PROCEDURE — 97110 THERAPEUTIC EXERCISES: CPT

## 2022-02-16 PROCEDURE — 36415 COLL VENOUS BLD VENIPUNCTURE: CPT

## 2022-02-16 RX ADMIN — KETOROLAC TROMETHAMINE 30 MG: 30 INJECTION, SOLUTION INTRAMUSCULAR; INTRAVENOUS at 08:39

## 2022-02-16 RX ADMIN — OXYCODONE HYDROCHLORIDE 10 MG: 5 TABLET ORAL at 02:03

## 2022-02-16 RX ADMIN — LOSARTAN POTASSIUM 50 MG: 50 TABLET, FILM COATED ORAL at 08:39

## 2022-02-16 RX ADMIN — ACETAMINOPHEN 1000 MG: 500 TABLET ORAL at 02:03

## 2022-02-16 RX ADMIN — ATORVASTATIN CALCIUM 10 MG: 10 TABLET, FILM COATED ORAL at 08:39

## 2022-02-16 RX ADMIN — TIMOLOL MALEATE 1 DROP: 5 SOLUTION OPHTHALMIC at 08:47

## 2022-02-16 RX ADMIN — CELECOXIB 200 MG: 200 CAPSULE ORAL at 08:39

## 2022-02-16 RX ADMIN — SENNOSIDES AND DOCUSATE SODIUM 1 TABLET: 50; 8.6 TABLET ORAL at 08:40

## 2022-02-16 RX ADMIN — OXYCODONE HYDROCHLORIDE 10 MG: 5 TABLET ORAL at 12:47

## 2022-02-16 RX ADMIN — OXYCODONE HYDROCHLORIDE 10 MG: 5 TABLET ORAL at 06:30

## 2022-02-16 RX ADMIN — CEFAZOLIN SODIUM 2000 MG: 10 INJECTION, POWDER, FOR SOLUTION INTRAVENOUS at 06:31

## 2022-02-16 RX ADMIN — ACETAMINOPHEN 1000 MG: 500 TABLET ORAL at 12:47

## 2022-02-16 RX ADMIN — GLIPIZIDE 10 MG: 10 TABLET ORAL at 08:39

## 2022-02-16 RX ADMIN — APIXABAN 2.5 MG: 2.5 TABLET, FILM COATED ORAL at 08:39

## 2022-02-16 RX ADMIN — AMLODIPINE BESYLATE 10 MG: 10 TABLET ORAL at 08:39

## 2022-02-16 ASSESSMENT — PAIN DESCRIPTION - PROGRESSION
CLINICAL_PROGRESSION: GRADUALLY IMPROVING
CLINICAL_PROGRESSION: GRADUALLY IMPROVING

## 2022-02-16 ASSESSMENT — PAIN DESCRIPTION - ONSET
ONSET: ON-GOING
ONSET: ON-GOING

## 2022-02-16 ASSESSMENT — PAIN SCALES - GENERAL
PAINLEVEL_OUTOF10: 5
PAINLEVEL_OUTOF10: 7

## 2022-02-16 ASSESSMENT — PAIN DESCRIPTION - DESCRIPTORS
DESCRIPTORS: ACHING
DESCRIPTORS: ACHING

## 2022-02-16 ASSESSMENT — PAIN - FUNCTIONAL ASSESSMENT
PAIN_FUNCTIONAL_ASSESSMENT: ACTIVITIES ARE NOT PREVENTED
PAIN_FUNCTIONAL_ASSESSMENT: ACTIVITIES ARE NOT PREVENTED

## 2022-02-16 ASSESSMENT — PAIN DESCRIPTION - LOCATION
LOCATION: KNEE

## 2022-02-16 ASSESSMENT — PAIN DESCRIPTION - ORIENTATION
ORIENTATION: RIGHT
ORIENTATION: RIGHT

## 2022-02-16 ASSESSMENT — PAIN DESCRIPTION - PAIN TYPE
TYPE: SURGICAL PAIN

## 2022-02-16 ASSESSMENT — PAIN DESCRIPTION - FREQUENCY
FREQUENCY: CONTINUOUS
FREQUENCY: CONTINUOUS

## 2022-02-16 NOTE — PLAN OF CARE
Problem: Pain:  Goal: Pain level will decrease  Description: Pain level will decrease  Outcome: Ongoing  Note: Pain level assessment complete. Patient educated on pain scale and control interventions  PRN pain medication given per patient request  Patient instructed to call out with new onset of pain or unrelieved pain  Goal: Control of acute pain  Description: Control of acute pain  Outcome: Ongoing  Goal: Control of chronic pain  Description: Control of chronic pain  Outcome: Ongoing     Problem: Falls - Risk of:  Goal: Will remain free from falls  Description: Will remain free from falls  Outcome: Ongoing  Note: Siderails up x 2  Hourly rounding  Call light in reach  Instructed to call for assist before attempting out of bed. Remains free from falls and accidental injury at this time   Floor free from obstacles  Bed is locked and in lowest position  Adequate lighting provided  Bed alarm on, [place] star and Stay with Me signs posted  Goal: Absence of physical injury  Description: Absence of physical injury  Outcome: Ongoing     Problem: Discharge Planning:  Goal: Discharged to appropriate level of care  Description: Discharged to appropriate level of care  Outcome: Ongoing     Problem: Mobility - Impaired:  Goal: Mobility will improve  Description: Mobility will improve  Outcome: Ongoing     Problem: Infection - Surgical Site:  Goal: Will show no infection signs and symptoms  Description: Will show no infection signs and symptoms  Outcome: Ongoing  Note: No sign of infection noted at this time. Problem: Pain - Acute:  Goal: Pain level will decrease  Description: Pain level will decrease  Outcome: Ongoing  Note: Pain level assessment complete.    Patient educated on pain scale and control interventions  PRN pain medication given per patient request  Patient instructed to call out with new onset of pain or unrelieved pain

## 2022-02-16 NOTE — PROGRESS NOTES
Occupational Therapy  Facility/Department: [facility]  Daily Treatment Note  NAME: Debi Zelaya  : 1950  MRN: [id number]    Date of Service: 2022   SARAH Kenny reports patient is medically stable for therapy treatment this date. Chart reviewed prior to treatment and patient is agreeable for therapy. All lines intact and patient positioned comfortably at end of treatment. All patient needs addressed prior to ending therapy session. Discharge Recommendations:  Patient would benefit from continued therapy after discharge  OT Equipment Recommendations  ADL Assistive Devices: Long-handled Shoe Horn;Long-handled Sponge;Reacher;Sock-Aid Hard;Emergency Alert System   Pt currently functioning below baseline. Would suggest additional therapy at time of discharge to maximize long term outcomes and prevent re-admission. Please refer to AM-PAC score for current level of function. Assessment   Performance deficits / Impairments: Decreased functional mobility ; Decreased ADL status; Decreased strength;Decreased safe awareness;Decreased cognition;Decreased endurance;Decreased sensation;Decreased fine motor control;Decreased high-level IADLs;Decreased balance;Decreased coordination;Decreased posture  Assessment: Pt would benefit from continued therapy after discharge to maximize IND to PLOF and decrease caregiver burden. Prognosis: Good  OT Education: OT Role;Transfer Training;Energy Conservation;Plan of Care;Precautions; Equipment;ADL Adaptive Strategies  Patient Education: safety in function, fall prevention/call light use, benefits of being oob, recommendations for continued therapy, Jj hose wear/care, OT POC, easy slide use/benefits, being up every 1-2 hours when home, RW safety, home safety/EC/WS. Pt verbalized understanding of edu.   REQUIRES OT FOLLOW UP: Yes  Activity Tolerance  Activity Tolerance: Patient Tolerated treatment well  Activity Tolerance: Fair  Safety Devices  Safety Devices in place: Yes  Type of devices: Nurse notified;Gait belt;Call light within reach; Patient at risk for falls; Left in bed (sitting EOB with PT present in room)         Patient Diagnosis(es): The encounter diagnosis was S/P total knee arthroplasty, right.      has a past medical history of Cataracts, bilateral, Cerebral artery occlusion with cerebral infarction ([facility] [address] Diabetes mellitus ([address] Eye disorder, Glaucoma, Headache(784.0), Hearing loss, Heartburn, Hyperlipidemia, Hypertension, Kidney stones, Osteoarthritis, Snores, Tendonitis of shoulder, left, and Tremor. has a past surgical history that includes Cystoscopy; Kidney stone surgery; Lithotripsy; Refractive surgery (Right, 2014); Cataract removal (Right, 08/2015); Thumb amputation; Hand surgery (Left); Tonsillectomy; Total knee arthroplasty (Left, 06/23/2021); joint replacement; eye surgery; Total knee arthroplasty (Right, 02/15/2022); and Total knee arthroplasty (Right, 2/15/2022). Restrictions  Restrictions/Precautions  Restrictions/Precautions: General Precautions,Fall Risk,Weight Bearing  Required Braces or Orthoses?: No  Lower Extremity Weight Bearing Restrictions  Right Lower Extremity Weight Bearing: Weight Bearing As Tolerated  Position Activity Restriction  Other position/activity restrictions: B thigh high Jj hose,Previous CVA, elevate OP extremity, [place]  Subjective   General  Chart Reviewed: Yes  Patient assessed for rehabilitation services?: Yes  Family / Caregiver Present: No  Subjective  Subjective: Pt resting in bed, agreeable to OT treatment      Orientation  Orientation  Overall Orientation Status: Within Functional Limits  Objective    ADL  Grooming: Contact guard assistance;Setup (brushing gums standing at sink in bathroom)  LE Dressing: Maximum assistance (donning FRANCISCO stockings.  Pt shown/edu on benefits of easy slide and where to purchase.)  Toileting: Contact guard assistance (CGA for safety while pt stood to urinate into toilet) Balance  Sitting Balance: Supervision  Standing Balance: Contact guard assistance  Standing Balance  Time: 3-4 min  Activity: ADLs and functional mobility in room  Functional Mobility  Functional - Mobility Device: Rolling Walker  Assist Level: Contact guard assistance  Functional Mobility Comments: Pt did not demo any LOBs or any knee buckling at this time. Pt ambulated from EOB to door x3 and throughout bathroom for oral care and transfers. Min vc's for visual scanning, pursed lip breathing, RW safety, upright posture, pacing, slow/controlled movement, using BUE on RW for compensating with balance/pain, and assist with lines for increased safety/decreased risk of falls. Toilet Transfers  Equipment Used: Standard toilet (w/grab bars)  Toilet Transfer: Contact guard assistance  Shower Transfers  Shower - Transfer Type: To and From  Shower - Transfer To: Shower seat with back  Shower - Technique: Ambulating  Shower Transfers: Contact Guard  Bed mobility  Supine to Sit: Stand by assistance  Sit to Supine:  (Pt left sitting EOB with PT present in room)  Scooting: Stand by assistance  Comment: Pt demo'd proper technique and use of bed rails for progression to seated EOB. Pt scooted properly on own to positon self safely seated EOB with FRANCISCO LEs placed on ground. Transfers  Sit to stand: Minimal assistance;Contact guard assistance  Stand to sit: Minimal assistance;Contact guard assistance  Transfer Comments: Pt intially requiring Min A for first transfer but progressing to [address]. Pt performed mock transfers into shower using a shower chair and onto toilet. Pt performed well and only required cueing for safe sequencing. Pt verbalized understanding and that this would transition well to home d/t having a higher toilet and a shower bench. In addition, pt's wife does not work and can assist pt as needed.  Mod vc's for pushing up from bed with both hands when standing, pursed lip breathing, backing up completly to surface and reaching back prior to sitting, slow/controlled transfers, upright posture, weight shifting, RW safety/mgmt, and assist with lines for increased safety/decreased risk of falls. Cognition  Overall Cognitive Status: Exceptions  Following Commands: Follows one step commands with repetition; Follows one step commands with increased time  Attention Span: Appears intact  Memory: Decreased short term memory  Safety Judgement: Decreased awareness of need for assistance;Decreased awareness of need for safety  Problem Solving: Decreased awareness of errors  Insights: Decreased awareness of deficits  Initiation: Requires cues for some  Sequencing: Requires cues for some                                         Plan   Plan  Times per week: 5-6x/wk 1-2x/day as Mariaelena Lemos  Current Treatment Recommendations: Strengthening,Endurance Training,Balance Training,Functional Mobility Training,Safety Education & Training,Pain Management,Home Management Training,Self-Care / ADL,Equipment Evaluation, Education, & procurement,Patient/Caregiver Education & Training                                                  AM-PAC Score        AM-[facility] Inpatient Daily Activity Raw Score: 20 (02/16/22 [facility])  AM-PAC Inpatient ADL T-Scale Score : 42.03 (02/16/22 [facility])  ADL Inpatient CMS 0-100% Score: 38.32 (02/16/22 [facility])  ADL Inpatient CMS G-Code Modifier : Mello Olya (02/16/22 [facility])    Goals  Short term goals  Time Frame for Short term goals: By discharge, pt to demo  Short term goal 1: ADL transfers and functional mobility to CGA with use of AD as needed. Short term goal 2: toileting to SBA with use of AD/grab bars as needed. Short term goal 3: increased B UE strength by 1/2 grade to assist with functional tasks/I with B UE HEP with use of handouts as needed. Short term goal 4: UB ADLs to to Set up and LB ADLs to Min A with use of AD/AE as needed. Short term goal 5: bed mobility to Mod I with use of bedrails as needed.   Long term goals  Long term goal 1: Pt to be I with fall prevention education, EC/WS tech, safe car transfer, Jj hose wear/care, and recommendations for discharge/AE with use of handouts as needed. Patient Goals   Patient goals : To go home!        Therapy Time   Individual Concurrent Group Co-treatment   Time In 0933         Time Out 1027         Minutes [address] [place] [address]

## 2022-02-16 NOTE — PROGRESS NOTES
CLINICAL PHARMACY NOTE: MEDS TO BEDS    Total # of Prescriptions Filled: 3   The following medications were delivered to the patient:  · PERCOCET 5-325  · ELIQUIS 2.5MG  · STOOL SOFTENER 100MG    Additional Documentation:

## 2022-02-16 NOTE — OP NOTE
Operative Note        Patient: William Menard  YOB: 1950  MRN: [id number]     Date of Procedure: 2/15/2022     Pre-Op Diagnosis: RIGHT KNEE OSTEOARTHRITIS     Post-Op Diagnosis: Same       Procedure(s):  RIGHT KNEE TOTAL ARTHROPLASTY - Wen Paiz     Surgeon(s):  David Sagastume DO     Assistant:  Resident: Sd العراقي DO; Homar Kaiser DO     Anesthesia: General, regional     Estimated Blood Loss (mL): 100     Fluids: 1L crystalloid     Complications: None     Specimens:   * No specimens in log *     Implants:  Implant Name Type Inv. Item Serial No.  Lot No. LRB No. Used Action   PSN FEM CR POR CCR STD SZ8 R - [id number]   PSN FEM CR POR CCR STD SZ8 R   [employer] [id number] Right 1 Implanted   PSN TIB POR 2 PEG SZ F R - [id number]   PSN TIB POR 2 PEG SZ F R   [employer] [id number] Right 1 Implanted   PSN MC VE ASF R 14MM 8-11 EF - [id number]   PSN MC VE ASF R 14MM 8-11 EF   [employer]   Right 1 Implanted          Drains: * No LDAs found *      Indications: The patient is a 66 y.o.male who has been seen for degenerative joint disease of Right knee. Patient failed conservative  treatment including injections and physical therapy, activity modification, and  presents at this time for Right total knee arthroplasty. All details of the procedure, as well as risks, benefits and alternatives, including the option of non operative versus operative treatment were discussed.   The patient understands that risks of the surgery include but are not limited to: bleeding, malunion/nonunion, loss of fixation, loss of reduction, hardware failure, angular or rotational deformity, length discrepancy, limp, transfusion, skin blistering or breakdown, progressive post traumatic degenerative joint disease, possible need for further surgery, bone grafting, infection, nerve injury, paralysis, numbness, blood vessel injury, excessive scaring, wound complication or breakdown, failure of symptoms to improve or actual deterioration in condition, significant acute and/or chronic pain, possible need for amputation, permanent loss of motion, and permanent loss of function. As well as the general complications of anesthesia, which include but are not limited to: myocardial infarction and/or heart attack, stroke, multi organ system failure or even possible death, prolonged hospital stay, blood clots, pulmonary embolism, abnormal reaction to medication, visual and neurological disturbances, constipation, ischemic bowel, bowel obstruction, bowel perforation, ileus and mental status changes. No guarantees were made. Procedure: On the day of surgery, the patient was met in the preoperative unit, where written consent from office and H&P were reviewed. The operative site was marked with a permanent marker. Final questions addressed. The anesthesia department then performed a bedside nerve block under ultrasound guidance. The patient was then wheeled back to the operating suite and laid in the supine position on the OR table. Anesthesia department then induced spinal anesthesia   At this time, the right knee was examined under anesthesia and was found to be ligamentously stable. A well-padded bump was placed under the ipsilateral hip. At this time, appropriate antibiotics were being infused. A time-out was held, and after all members were in agreement, we proceeded forward with surgery. After standard sterile preparation and draping of the right lower extremity was complete, we moved forward with surgery. With knee in 30 degrees flexion in demayo vazquez, an approximately 10-cm incision was made midline over the patella, starting approximately 2 fingerbreadths proximal to the superior pole of the patella and extending just medial to the tibial tuberosity about 3 fingerbreadths distal to the inferior pole.  Sharp dissection was then taken down to the skin with a #10 blade, and then full-thickness skin flaps were raised under each side of the incision with meticulous hemostasis using bovie. At this time, a medial parapatellar arthrotomy was performed with bovie. Next we performed a limited medial peel utilizing bovie and then a [place] to elevate the medial soft tissue envelope off of the proximal tibia to the mid-coronal line. Then the patellar fat bad was removed. Next the synovium from the suprapatellar pouch was split. Next, the patella was everted and held in place with clamps. Lateral facetecomy was performed with saw. Remaining patella cartilage appeared well intact with minimal wear and no overhanging osteophytes therefore we did not resurface. At this time the knee was flexed up,  and the patella was then translated and placed into the lateral gutter. Throughout the entirety of the procedure, the collateral ligaments were protected. The opening reamer was used to gain entry to the femoral canal and the intra-medullary distal femoral cutting guide was applied. We performed the distal femoral cut so that the cut surface was just into the notch. Of note, we took +2mm off distal femur to get into base of trochlea. ACL was sharply excised from the notch and off the articular surface of the tibia. A portion of the lateral meniscus was also debrided. At this time we completed preparation of the femur by applying the 4-in-1 cutting jig after obtaining appropriate rotation and alignment. Lug holes drilled. We then applied the intramedullary tibial cutting guide and made our proximal tibial cut taking two mm off medial side. Medial and lateral meniscal were then removed to clear tibial surface. Trial femur was implanted. Trial poly with tibial tray was inserted. The knee was taken through a ROM and varus/valgus stability assessed. We liked our gaps. We then finished our tibia with appropriate rotation lining up with medial 1/3 tibial tubercle.  We felt bone quality was strong, therefore we went forward with cementless implant prep. Tibial lug holes drilled as well as keel punched. All trials removed, we then thoroughly irrigated the bony surfaces with the pulse lavage  and irricept. At this time final implants were put into place. Knee was taken to final range of motion and found to be satisfactory. The joint capsule was closed with 1 vicryl and 1 stratafix. Subcutaneous tissue was closed with 0 vircyl and 2-0 stratafix. Skin was closed with 3-0 stratafox. Dermabond was applied and an optifoam dressing was placed. At this point patient was reversed without complication after reversal of anesthesia. Patient was moved back to the hospital bed and wheeled to the recovery unit in stable condition. Dr. Jada Hoyt was present for and active in all critical aspects of the case.         Electronically signed by Marina Yepez DO on 2/16/2022 at 3:29 AM

## 2022-02-16 NOTE — PLAN OF CARE
Problem: Pain:  Goal: Pain level will decrease  Description: Pain level will decrease  2/15/2022 2332 by Suzzanne Alpers, RN  Outcome: Ongoing  2/15/2022 1812 by Dariela Sky RN  Outcome: Ongoing  Note: Pain level assessment complete. Patient educated on pain scale and control interventions  PRN pain medication given per patient request-Oxycodone  Patient instructed to call out with new onset of pain or unrelieved pain    Goal: Control of acute pain  Description: Control of acute pain  2/15/2022 2332 by Suzzanne Alpers, RN  Outcome: Ongoing  2/15/2022 1812 by Dariela Sky RN  Outcome: Ongoing  Goal: Control of chronic pain  Description: Control of chronic pain  2/15/2022 2332 by Suzzanne Alpers, RN  Outcome: Ongoing  2/15/2022 1812 by Dariela Sky RN  Outcome: Ongoing     Problem: Falls - Risk of:  Goal: Will remain free from falls  Description: Will remain free from falls  2/15/2022 2332 by Suzzanne Alpers, RN  Outcome: Ongoing  2/15/2022 1812 by Dariela Sky RN  Outcome: Ongoing  Note: Siderails up x 2  Hourly rounding  Call light in reach  Instructed to call for assist before attempting out of bed.   Remains free from falls and accidental injury at this time   Floor free from obstacles  Bed is locked and in lowest position  Adequate lighting provided  Bed alarm on, [place] star and Stay with Me signs posted  Goal: Absence of physical injury  Description: Absence of physical injury  2/15/2022 2332 by Suzzanne Alpers, RN  Outcome: Ongoing  2/15/2022 1812 by Dariela Sky RN  Outcome: Ongoing     Problem: Discharge Planning:  Goal: Discharged to appropriate level of care  Description: Discharged to appropriate level of care  Outcome: Ongoing     Problem: Mobility - Impaired:  Goal: Mobility will improve  Description: Mobility will improve  Outcome: Ongoing     Problem: Infection - Surgical Site:  Goal: Will show no infection signs and symptoms  Description: Will show no infection signs and symptoms  Outcome: Ongoing     Problem: Pain - Acute:  Goal: Pain level will decrease  Description: Pain level will decrease  2/15/2022 2332 by Sparkle Winslow RN  Outcome: Ongoing  2/15/2022 1812 by Ramandeep Velasco RN  Outcome: Ongoing  Note: Pain level assessment complete.    Patient educated on pain scale and control interventions  PRN pain medication given per patient request-Oxycodone  Patient instructed to call out with new onset of pain or unrelieved pain

## 2022-02-16 NOTE — PROGRESS NOTES
Pt discharged to home with belongings via spouse. Discharge instructions given. AVS understood and signed. \"Meds To Beds\" medication at bedside. Pt denies having any further questions at this time. Locked up home medication(s)/personal items given to patient at discharge. Patient/family state they have everything they were admitted with.

## 2022-02-16 NOTE — PROGRESS NOTES
Physical Therapy  Facility/Department: [facility] MED SURG  Daily Treatment Note  NAME: Baldo Holman  : 1950  MRN: [id number]    Date of Service: 2022    Discharge Recommendations:  Patient would benefit from continued therapy after discharge     Pt currently functioning below baseline. Would suggest additional therapy at time of discharge to maximize long term outcomes and prevent re-admission. Please refer to AM-PAC score for current level of function. Assessment   Body structures, Functions, Activity limitations: Decreased functional mobility ; Decreased safe awareness;Decreased balance;Decreased strength;Decreased endurance;Decreased ROM  Assessment: Patient demo'd improve safety, mobility, tolerance and ableto perfrom stairs today. Patient is appropraite for discharge  Prognosis: Excellent  Decision Making: Medium Complexity  PT Education: Goals;Weight-bearing Education;Transfer Training;PT Role;Plan of Care;General Safety;Home Exercise Program;Pressure Relief  REQUIRES PT FOLLOW UP: Yes  Activity Tolerance  Activity Tolerance: Patient Tolerated treatment well     Patient Diagnosis(es): The encounter diagnosis was S/P total knee arthroplasty, right.     has a past medical history of Cataracts, bilateral, Cerebral artery occlusion with cerebral infarction ([address] Diabetes mellitus ([address] Eye disorder, Glaucoma, Headache(784.0), Hearing loss, Heartburn, Hyperlipidemia, Hypertension, Kidney stones, Osteoarthritis, Snores, Tendonitis of shoulder, left, and Tremor. has a past surgical history that includes Cystoscopy; Kidney stone surgery; Lithotripsy; Refractive surgery (Right, ); Cataract removal (Right, 2015); Thumb amputation; Hand surgery (Left); Tonsillectomy; Total knee arthroplasty (Left, 2021); joint replacement; eye surgery;  Total knee arthroplasty (Right, 02/15/2022); and Total knee arthroplasty (Right, amb.  Gait Deviations: Decreased step length;Decreased step height  Distance: 150' x 1  Comments: ambulated back to room post stair training. Stairs/Curb  Stairs?: Yes  Stairs  # Steps : 5  Stairs Height: 6\"  Rails: Bilateral  Device: No Device  Assistance: Contact guard assistance  Comment: Patient required extensive education regarding stair training but patient able to repeat back stair training and perfrom correctly during stairs. Balance  Posture: Good  Sitting - Static: Good  Sitting - Dynamic: Good  Standing - Static: Good  Standing - Dynamic: Good;-  Comments: w/ RW for standing balance  Exercises  Comments: Patient issued seated and supine TKA HEP. Patient perform seated sintia LE AROM x 10 reps with extensive VCs and tactile cues to perform. Patient demo's delay carryover but appears to be from pervious CVA. Access Code: [email or web address]: [employer]/Prepared by: Otto Sloan  Exercises    Seated Scapular Retraction - 1 x daily - 7 x weekly - 10 reps - 3 sets    Seated Ankle Pumps - 1 x daily - 7 x weekly - 10 reps - 3 sets    Seated Gluteal Sets - 1 x daily - 7 x weekly - 10 reps - 3 sets    Seated Long Arc Quad - 1 x daily - 7 x weekly - 10 reps - 3 sets    Seated March - 1 x daily - 7 x weekly - 10 reps - 3 sets    Seated Hip Abduction - 1 x daily - 7 x weekly - 10 reps - 3 sets    Seated Pursed Lip Breathing - 1 x daily - 7 x weekly - 10 reps - 3 sets  Access Code: [email or web address]: [employer]NS [employer]/  Prepared by: Otto Sloan  Exercises    Supine Ankle Pumps - 1 x daily - 7 x weekly - 10 reps - 3 sets    Supine Quad Set - 1 x daily - 7 x weekly - 10 reps - 3 sets    Supine Heel Slide - 1 x daily - 7 x weekly - 10 reps - 3 sets    Supine Active Straight Leg Raise - 1 x daily - 7 x weekly - 10 reps - 3 sets    Supine Hip Abduction AROM - 1 x daily - 7 x weekly - 10 reps - 3 sets    Supine Bridge - 1 x daily - 7 x weekly - 10 reps - 3 sets    Supine Gluteal Sets - 1 x daily - 7

## 2022-02-18 ENCOUNTER — TELEPHONE (OUTPATIENT)
Dept: FAMILY MEDICINE CLINIC | Age: 72
End: 2022-02-18

## 2022-02-18 NOTE — TELEPHONE ENCOUNTER
Michelle [facility] Transitions Initial Follow Up Call    Outreach made within 2 business days of discharge: Yes    Patient: Donnell Ramires Patient : 1950   MRN: [id number]  Reason for Admission: There are no discharge diagnoses documented for the most recent discharge. Discharge Date: 22       Spoke with: wife    Discharge department/facility: [facility] Interactive Patient Contact:  Was patient able to fill all prescriptions: Yes  Was patient instructed to bring all medications to the follow-up visit: Yes  Is patient taking all medications as directed in the discharge summary?  Yes  Does patient understand their discharge instructions: Yes  Does patient have questions or concerns that need addressed prior to 7-14 day follow up office visit: no    Scheduled appointment with PCP within 7-14 days    Patient is following up with Dr. Yury Naranjo   Date Time Provider Lev Huerta   3/2/2022  1:20 PM Bright Miles   2022  9:45 AM AMERICA Bridges - CNP [id number]   2022 10:00 AM Marnie Francis MD Neuro Spec [address], [place]

## 2022-02-22 DIAGNOSIS — Z96.651 S/P TOTAL KNEE ARTHROPLASTY, RIGHT: ICD-10-CM

## 2022-02-22 RX ORDER — OXYCODONE HYDROCHLORIDE AND ACETAMINOPHEN 5; 325 MG/1; MG/1
1 TABLET ORAL EVERY 6 HOURS PRN
Qty: 28 TABLET | Refills: 0 | Status: SHIPPED | OUTPATIENT
Start: 2022-02-22 | End: 2022-03-02 | Stop reason: SDUPTHER

## 2022-02-23 DIAGNOSIS — Z76.0 MEDICATION REFILL: ICD-10-CM

## 2022-02-25 RX ORDER — SIMVASTATIN 20 MG
TABLET ORAL
Qty: 90 TABLET | Refills: 1 | Status: SHIPPED | OUTPATIENT
Start: 2022-02-25 | End: 2022-07-27

## 2022-02-28 ENCOUNTER — TELEPHONE (OUTPATIENT)
Dept: ORTHOPEDIC SURGERY | Age: 72
End: 2022-02-28

## 2022-02-28 NOTE — TELEPHONE ENCOUNTER
FYI  Patients home health nurse called patient is having pain 8/10. Pain sharp and shooting. No drainage. No fvr. Knee slight warmth, but not specifically the incision. No redness. No calf tenderness. No streaking. Appointment with Dr. Bev Humphries Wednesday. Advised to keep appointment. Would you like anything else.

## 2022-03-01 DIAGNOSIS — Z96.651 S/P TOTAL KNEE ARTHROPLASTY, RIGHT: Primary | ICD-10-CM

## 2022-03-02 ENCOUNTER — OFFICE VISIT (OUTPATIENT)
Dept: ORTHOPEDIC SURGERY | Age: 72
End: 2022-03-02

## 2022-03-02 VITALS — HEIGHT: 72 IN | RESPIRATION RATE: 16 BRPM | BODY MASS INDEX: 31.56 KG/M2 | WEIGHT: 233 LBS

## 2022-03-02 DIAGNOSIS — Z96.651 S/P TOTAL KNEE ARTHROPLASTY, RIGHT: ICD-10-CM

## 2022-03-02 DIAGNOSIS — Z96.651 S/P TOTAL KNEE ARTHROPLASTY, RIGHT: Primary | ICD-10-CM

## 2022-03-02 PROCEDURE — 99024 POSTOP FOLLOW-UP VISIT: CPT | Performed by: ORTHOPAEDIC SURGERY

## 2022-03-02 RX ORDER — OXYCODONE HYDROCHLORIDE AND ACETAMINOPHEN 5; 325 MG/1; MG/1
1 TABLET ORAL EVERY 6 HOURS PRN
Qty: 28 TABLET | Refills: 0 | Status: SHIPPED | OUTPATIENT
Start: 2022-03-02 | End: 2022-03-11

## 2022-03-02 ASSESSMENT — ENCOUNTER SYMPTOMS
ROS SKIN COMMENTS: NEGATIVE FOR RASH
EYE DISCHARGE: 0
ABDOMINAL PAIN: 0
SHORTNESS OF BREATH: 0

## 2022-03-02 NOTE — PROGRESS NOTES
[address] AND SPORTS MEDICINE  [facility] En[place] Winston  [address] [place] [address]  Dept: [phone]  Dept Fax: [phone]        Postoperative follow-up note    Subjective:   Angeles Montanez is a 66y.o. year old male who presents to our office today for postoperative followup regarding his   1. S/P total knee arthroplasty, right    . Chief Complaint   Patient presents with    Knee Pain     R TKA 2/15/22     Patient underwent right total knee arthroplasty on 2/15/2022. He is a 70-year-old male. He notes some clicking along the posterior lateral aspect of the knee occasionally but otherwise notes that his pain is well controlled. He denies fevers, chills, nausea, vomiting, diarrhea. Review of Systems   Constitutional: Positive for activity change. Negative for fever. HENT: Negative for dental problem. Eyes: Negative for discharge. Respiratory: Negative for shortness of breath. Cardiovascular: Negative for chest pain. Gastrointestinal: Negative for abdominal pain. Genitourinary: Negative. Musculoskeletal: Positive for arthralgias. Skin:        Negative for rash   Neurological: Positive for weakness. Psychiatric/Behavioral: Negative for confusion. I have reviewed the CC, HPI, ROS, PMH, FHX, Social History, and if not present in this note, I have reviewed in the patient's chart. I agree with the documentation provided by other staff and have reviewed their documentation prior to providing my signature indicating agreement. Objective :   General: Angeles Montanez is a 66 y.o. male who is alert and oriented and sitting comfortably in our office. Ortho Exam  MS:   Right knee incision is healing well without signs of infection. Range of motion is 10 to 95 degrees. Mild diffuse swelling of the right knee is noted. Calf is supple.   Motor, sensory, vascular examination of the right lower extremity is grossly intact without focal deficits. No gross instability of the right knee is noted. Neuro: alert. oriented  Eyes: Extra-ocular muscles intact  Mouth: Oral mucosa moist. No perioral lesions  Pulm: Respirations unlabored and regular. Skin: warm, well perfused  Psych:   Patient has good fund of knowledge and displays understanging of exam, diagnosis, and plan. Radiology:   XR KNEE RIGHT (3 VIEWS)    Result Date: 2/15/2022  EXAMINATION: THREE XRAY VIEWS OF THE RIGHT KNEE 2/15/2022 10:27 am COMPARISON: None. HISTORY: ORDERING SYSTEM PROVIDED HISTORY: total knee TECHNOLOGIST PROVIDED HISTORY: Of operative side while in recovery room. AP, lateral, sunrise total knee Reason for Exam: Post op right total knee FINDINGS: There is a new right knee arthroplasty, which appears to be in good position. Postsurgical gas is noted in the area. Satisfactory appearance of right knee arthroplasty. XR KNEE RIGHT (MIN 4 VIEWS)    Result Date: 3/2/2022  History:  Right  total knee arthroplasty Findings: AP, lateral, merchant view x-rays of the Right  knee done in the office standing today shows Right total knee arthroplasty in good position without  complications. No loosening of components is appreciated. No evidence of fracture, subluxation, dislocation, radiopaque foreign body, radiopaque tumors noted. Alignment is near-anatomic. Impression: Status post Right total knee arthroplasty as described above. Assessment:      1. S/P total knee arthroplasty, right         Plan:      Patient is doing well. He is going to continue with physical therapy process and in fact will be starting outpatient physical therapy shortly. I plan to see him back in 4 weeks for further evaluation or sooner as necessary. The patient is going to start aspirin tomorrow as he is now off of the anticoagulant after surgery. Follow up: No follow-ups on file.     No orders of the defined types were placed in this encounter.       Orders Placed This Encounter   Procedures    External Referral To Physical Therapy     Referral Priority:   Routine     Referral Type:   Eval and Treat     Referral Reason:   Specialty Services Required     Requested Specialty:   Physical Therapy     Number of Visits Requested:   1       This note is created with the assistance of a speech recognition program.  While intending to generate a document that actually reflects the content of the visit, the document can still have some errors including those of syntax and sound a like substitutions which may escape proof reading.  In such instances, actual meaning can be extrapolated by contextual diversion      Electronically signed by Sherita Godinez on 3/2/2022 at 2:11 PM

## 2022-03-03 DIAGNOSIS — Z86.73 S/P STROKE DUE TO CEREBROVASCULAR DISEASE: ICD-10-CM

## 2022-03-03 DIAGNOSIS — Z76.0 MEDICATION REFILL: ICD-10-CM

## 2022-03-03 RX ORDER — AMITRIPTYLINE HYDROCHLORIDE 75 MG/1
TABLET, FILM COATED ORAL
Qty: 90 TABLET | Refills: 1 | Status: SHIPPED | OUTPATIENT
Start: 2022-03-03 | End: 2022-08-09 | Stop reason: SDUPTHER

## 2022-03-11 DIAGNOSIS — Z96.651 S/P TOTAL KNEE ARTHROPLASTY, RIGHT: ICD-10-CM

## 2022-03-11 RX ORDER — OXYCODONE HYDROCHLORIDE AND ACETAMINOPHEN 5; 325 MG/1; MG/1
1 TABLET ORAL EVERY 8 HOURS PRN
Qty: 21 TABLET | Refills: 0 | Status: SHIPPED | OUTPATIENT
Start: 2022-03-11 | End: 2022-03-18

## 2022-03-11 NOTE — TELEPHONE ENCOUNTER
Spoke with patients wife due to patient behind hard of hearing. Expressed to her the new sig. She stated understanding, and will relay the change to the patient as well. She had no further questions.

## 2022-03-30 ENCOUNTER — OFFICE VISIT (OUTPATIENT)
Dept: ORTHOPEDIC SURGERY | Age: 72
End: 2022-03-30

## 2022-03-30 VITALS — BODY MASS INDEX: 31.56 KG/M2 | WEIGHT: 233 LBS | HEIGHT: 72 IN | RESPIRATION RATE: 16 BRPM

## 2022-03-30 DIAGNOSIS — Z96.651 S/P TOTAL KNEE ARTHROPLASTY, RIGHT: Primary | ICD-10-CM

## 2022-03-30 PROCEDURE — 99024 POSTOP FOLLOW-UP VISIT: CPT | Performed by: ORTHOPAEDIC SURGERY

## 2022-03-30 RX ORDER — OXYCODONE HYDROCHLORIDE AND ACETAMINOPHEN 5; 325 MG/1; MG/1
1 TABLET ORAL 2 TIMES DAILY PRN
Qty: 14 TABLET | Refills: 0 | Status: SHIPPED | OUTPATIENT
Start: 2022-03-30 | End: 2022-04-06

## 2022-03-30 ASSESSMENT — ENCOUNTER SYMPTOMS
ROS SKIN COMMENTS: NEGATIVE FOR RASH
EYE DISCHARGE: 0
SHORTNESS OF BREATH: 0
ABDOMINAL PAIN: 0

## 2022-03-30 NOTE — PROGRESS NOTES
[address] AND SPORTS MEDICINE  [facility]  [address] [place] [address]  Dept: [phone]  Dept Fax: [phone]        Postoperative follow-up note    Subjective:   Wilfrid Carter is a 67y.o. year old male who presents to our office today for postoperative followup regarding his   1. S/P total knee arthroplasty, right    . Chief Complaint   Patient presents with    Knee Pain     R TKA 2/15/22     Marce Garza is a 74-year-old male who presents the office today for recheck of his right knee status post total knee arthroplasty on 2/15/2022. He notes some pain still and feels like the pain with his right knee is little worse than when he underwent left total knee arthroplasty. He does feel like he is making progress in physical therapy however. He denies any fevers, chills, nausea, vomiting, diarrhea. Review of Systems   Constitutional: Positive for activity change. Negative for fever. HENT: Negative for dental problem. Eyes: Negative for discharge. Respiratory: Negative for shortness of breath. Cardiovascular: Negative for chest pain. Gastrointestinal: Negative for abdominal pain. Genitourinary: Negative. Musculoskeletal: Positive for arthralgias. Skin:        Negative for rash   Neurological: Positive for weakness. Psychiatric/Behavioral: Negative for confusion. I have reviewed the CC, HPI, ROS, PMH, FHX, Social History, and if not present in this note, I have reviewed in the patient's chart. I agree with the documentation provided by other staff and have reviewed their documentation prior to providing my signature indicating agreement. Objective :   General: Wilfrid Carter is a 67 y.o. male who is alert and oriented and sitting comfortably in our office. Ortho Exam  MS:   Evaluation of the right knee reveals well-healed midline incision.   Mild diffuse swelling without signs of infection right knee is appreciated. Range of motion is 3-115. Patient ambulates without antalgia with no assistive devices. Motor, sensory, vascular examination of the right lower extremity is grossly intact without focal deficits. Neuro: alert. oriented  Eyes: Extra-ocular muscles intact  Mouth: Oral mucosa moist. No perioral lesions  Pulm: Respirations unlabored and regular. Skin: warm, well perfused  Psych:   Patient has good fund of knowledge and displays understanging of exam, diagnosis, and plan. Radiology: No x-rays were taken in office today. Assessment:      1. S/P total knee arthroplasty, right         Plan:      Patient is improving appropriately with physical therapy. He should continue the physical therapy process and I plan to see him back in 6 weeks or sooner as necessary. Follow up: No follow-ups on file. Orders Placed This Encounter   Medications    oxyCODONE-acetaminophen (PERCOCET) 5-325 MG per tablet     Sig: Take 1 tablet by mouth 2 times daily as needed for Pain for up to 7 days. Dispense:  14 tablet     Refill:  0     Reduce doses taken as pain becomes manageable       No orders of the defined types were placed in this encounter.       This note is created with the assistance of a speech recognition program.  While intending to generate a document that actually reflects the content of the visit, the document can still have some errors including those of syntax and sound a like substitutions which may escape proof reading.  In such instances, actual meaning can be extrapolated by contextual diversion      Electronically signed by Liliana Maldonado on 3/30/2022 at 1:52 PM

## 2022-05-18 ENCOUNTER — OFFICE VISIT (OUTPATIENT)
Dept: ORTHOPEDIC SURGERY | Age: 72
End: 2022-05-18

## 2022-05-18 VITALS — WEIGHT: 233 LBS | HEIGHT: 72 IN | BODY MASS INDEX: 31.56 KG/M2 | RESPIRATION RATE: 16 BRPM

## 2022-05-18 DIAGNOSIS — Z96.651 S/P TOTAL KNEE ARTHROPLASTY, RIGHT: Primary | ICD-10-CM

## 2022-05-18 PROCEDURE — 99024 POSTOP FOLLOW-UP VISIT: CPT | Performed by: PHYSICIAN ASSISTANT

## 2022-05-18 ASSESSMENT — ENCOUNTER SYMPTOMS
CONSTIPATION: 0
NAUSEA: 0
ABDOMINAL PAIN: 0
COUGH: 0
DIARRHEA: 0
COLOR CHANGE: 0
APNEA: 0
RESPIRATORY NEGATIVE: 1
VOMITING: 0
ABDOMINAL DISTENTION: 0
CHEST TIGHTNESS: 0
SHORTNESS OF BREATH: 0

## 2022-05-18 NOTE — PROGRESS NOTES
[address] [place] [address] AND SPORTS MEDICINE  [address]  [facility] [address]  Dept: [phone]  Dept Fax: [phone]        Post Operative Follow Up    Subjective:     Chief Complaint   Patient presents with    Knee Pain     R TKA 2/15/22      Post Op Surgery:     The patient is here for a follow up after having a right knee status post total knee arthroplasty on 2/15/2022. Therefore he is 12 weeks postop. Patient states they are doing well. He finished physical therapy and is continuing to do his exercises on his own. He is extremely happy with his knee replacement. We discussed dental prophylaxis but he has full dentures. He states his biggest concern is just some balance issues. Review of Systems   Constitutional: Positive for activity change. Negative for appetite change, fatigue and fever. Respiratory: Negative. Negative for apnea, cough, chest tightness and shortness of breath. Cardiovascular: Negative. Negative for chest pain, palpitations and leg swelling. Gastrointestinal: Negative for abdominal distention, abdominal pain, constipation, diarrhea, nausea and vomiting. Genitourinary: Negative for difficulty urinating, dysuria and hematuria. Musculoskeletal: Positive for arthralgias. Negative for gait problem, joint swelling and myalgias. Skin: Negative for color change and rash. Neurological: Negative for dizziness, weakness, numbness and headaches. Psychiatric/Behavioral: Negative for sleep disturbance. I have reviewed the CC, HPI, ROS, PMH, FHX, Social History, and if not present in this note, I have reviewed in the patient's chart. I agree with the documentation provided by other staff and have reviewed their documentation prior to providing my signature indicating agreement.   Vitals:   Resp 16   Ht 6' (1.829 m)   Wt 233 lb (105.7 kg)   BMI 31.60 kg/m²  Body mass index is 31.6 kg/m². Physical Examination:     Orthopedics:    GENERAL: Alert and oriented X3 in no acute distress. SKIN: Intact without lesions or ulcerations. Incision is healing well with no signs of infection. NEURO: Intact to sensory and motor testing. VASC: Capillary refill is less than 3 seconds. Post Op Exam:    LOCATION: Right knee  SITE: Distal neurocirculatory status is intact. EXAM: Sensation is intact to light touch, there is full motor function of the extremity. ROM: 2/115 degrees     Radiology:   No results found. Assessment:     1. S/P total knee arthroplasty, right      Plan:   Post Op Treatment : Patient had the treatment regimen reviewed today 12 weeks status post right total knee arthroplasty. I reviewed the films with the patient. During today's visit, we discussed that overall he is doing really well. He still has some issues with his balance. I explained that he needs to continue working with that on his own. If he is good to be on uneven terrain he may want to think about pulling out a cane or a walking stick. I would like him to follow-up with our office in approximately 9 months and we will do pre-clinic x-rays of both knees. The patient will call the office immediately with any problems that may arise. No orders of the defined types were placed in this encounter. No orders of the defined types were placed in this encounter.         Electronically signed by Cielo Blackman PA-C, on 5/18/2022 at 2:07 PM

## 2022-07-05 DIAGNOSIS — I10 ESSENTIAL HYPERTENSION: ICD-10-CM

## 2022-07-05 DIAGNOSIS — Z76.0 MEDICATION REFILL: ICD-10-CM

## 2022-07-06 RX ORDER — LOSARTAN POTASSIUM 50 MG/1
TABLET ORAL
Qty: 90 TABLET | Refills: 1 | OUTPATIENT
Start: 2022-07-06

## 2022-07-06 RX ORDER — AMLODIPINE BESYLATE 10 MG/1
TABLET ORAL
Qty: 90 TABLET | Refills: 1 | OUTPATIENT
Start: 2022-07-06

## 2022-07-27 DIAGNOSIS — Z76.0 MEDICATION REFILL: ICD-10-CM

## 2022-07-27 RX ORDER — SIMVASTATIN 20 MG
TABLET ORAL
Qty: 90 TABLET | Refills: 1 | Status: SHIPPED | OUTPATIENT
Start: 2022-07-27

## 2022-07-27 NOTE — TELEPHONE ENCOUNTER
Last visit: 2/9/22  Last Med refill: 2/25/22  Does patient have enough medication for 72 hours: Yes    Next Visit Date:  Future Appointments   Date Time Provider Lev Huerta   8/8/2022  9:45 AM Parnell Shone, APRN - CNP [facility]   9/13/2022 10:00 AM Elba Tovar MD Neuro Spec [facility]   2/23/2023 10:00 AM Candice Lynn PA-C Sports Med [address] Maintenance   Topic Date Due    DTaP/Tdap/Td vaccine (1 - Tdap) Never done    Shingles vaccine (1 of 2) Never done    Colorectal Cancer Screen  05/13/2017    Prostate Specific Antigen (PSA) Screening or Monitoring  11/14/2020    Diabetic foot exam  01/10/2021    Annual Wellness Visit (AWV)  07/09/2021    COVID-19 Vaccine (4 - Booster for Moderna series) 02/26/2022    Lipids  05/06/2022    Flu vaccine (1) 09/01/2022    Diabetic microalbuminuria test  11/08/2022    Depression Monitoring  11/08/2022    A1C test (Diabetic or Prediabetic)  01/27/2023    Diabetic retinal exam  03/21/2023    Pneumococcal 65+ years Vaccine  Completed    Hepatitis C screen  Addressed    Hepatitis A vaccine  Aged Out    Hib vaccine  Aged Out    Meningococcal (ACWY) vaccine  Aged Out       Hemoglobin A1C (%)   Date Value   01/27/2022 6.4 (H)   11/08/2021 6.3   06/10/2021 6.4 (H)             ( goal A1C is < 7)   Microalb/Crt.  Ratio (mcg/mg creat)   Date Value   05/11/2020 17 (H)     LDL Cholesterol (mg/dL)   Date Value   05/06/2021 90   10/07/2019 100       (goal LDL is <100)   AST (U/L)   Date Value   05/06/2021 18     ALT (U/L)   Date Value   05/06/2021 10     BUN (mg/dL)   Date Value   01/27/2022 13     BP Readings from Last 3 Encounters:   02/16/22 (!) 108/58   02/15/22 (!) 150/76   02/09/22 120/84          (goal 120/80)    All Future Testing planned in [facility]  Lab Frequency Next Occurrence               Patient Active Problem List:     Hypercholesteremia     Neuropathic pain     Weakness of both legs     Essential hypertension     Type 2 diabetes mellitus with diabetic autonomic neuropathy, without long-term current use of insulin (HCC)     Eustachian tube dysfunction     Branch retinal vein occlusion     Primary open angle glaucoma     Chronic pain of both knees     Cerebral infarction due to occlusion of right middle cerebral artery (HCC)     Glaucomatous visual field defect     Nuclear sclerotic cataract     Class 1 obesity due to excess calories with serious comorbidity and body mass index (BMI) of 31.0 to 31.9 in adult     Elevated PSA     S/P total knee arthroplasty, left     Major depressive disorder, recurrent, unspecified     S/P total knee arthroplasty, right

## 2022-08-03 DIAGNOSIS — Z76.0 MEDICATION REFILL: ICD-10-CM

## 2022-08-03 NOTE — TELEPHONE ENCOUNTER
Last visit: 02/09/2022  Last Med refill: 05/04/2022  Does patient have enough medication for 72 hours: No:     Next Visit Date:  Future Appointments   Date Time Provider Lev Huerta   8/8/2022  9:45 AM AMERICA Islas CNP [facility]   9/13/2022 10:00 AM Sukumar Amaya MD Neuro Spec [facility]   2/23/2023 10:00 AM Snehal Hernandez PA-C Sports Med [address] Maintenance   Topic Date Due    DTaP/Tdap/Td vaccine (1 - Tdap) Never done    Shingles vaccine (1 of 2) Never done    Colorectal Cancer Screen  05/13/2017    Prostate Specific Antigen (PSA) Screening or Monitoring  11/14/2020    Diabetic foot exam  01/10/2021    Annual Wellness Visit (AWV)  07/09/2021    COVID-19 Vaccine (4 - Booster for Moderna series) 02/26/2022    Lipids  05/06/2022    Flu vaccine (1) 09/01/2022    Diabetic microalbuminuria test  11/08/2022    Depression Monitoring  11/08/2022    A1C test (Diabetic or Prediabetic)  01/27/2023    Diabetic retinal exam  03/21/2023    Pneumococcal 65+ years Vaccine  Completed    Hepatitis C screen  Addressed    Hepatitis A vaccine  Aged Out    Hib vaccine  Aged Out    Meningococcal (ACWY) vaccine  Aged Out       Hemoglobin A1C (%)   Date Value   01/27/2022 6.4 (H)   11/08/2021 6.3   06/10/2021 6.4 (H)             ( goal A1C is < 7)   Microalb/Crt.  Ratio (mcg/mg creat)   Date Value   05/11/2020 17 (H)     LDL Cholesterol (mg/dL)   Date Value   05/06/2021 90   10/07/2019 100       (goal LDL is <100)   AST (U/L)   Date Value   05/06/2021 18     ALT (U/L)   Date Value   05/06/2021 10     BUN (mg/dL)   Date Value   01/27/2022 13     BP Readings from Last 3 Encounters:   02/16/22 (!) 108/58   02/15/22 (!) 150/76   02/09/22 120/84          (goal 120/80)    All Future Testing planned in [facility]  Lab Frequency Next Occurrence               Patient Active Problem List:     Hypercholesteremia     Neuropathic pain     Weakness of both legs     Essential hypertension     Type 2

## 2022-08-05 DIAGNOSIS — I10 ESSENTIAL HYPERTENSION: ICD-10-CM

## 2022-08-05 RX ORDER — LOSARTAN POTASSIUM 50 MG/1
TABLET ORAL
Qty: 90 TABLET | Refills: 1 | Status: SHIPPED | OUTPATIENT
Start: 2022-08-05

## 2022-08-05 NOTE — TELEPHONE ENCOUNTER
Last visit: 02/09/2022  Last Med refill: 05/09/2022  Does patient have enough medication for 72 hours: No:     Next Visit Date:  Future Appointments   Date Time Provider Lev Huerta   8/8/2022  9:45 AM Clinton Severin, APRN - CNP [facility]   9/13/2022 10:00 AM Tatiana Scherer MD Neuro Spec [facility]   2/23/2023 10:00 AM Christy Toribio PA-C Sports Med [address] Maintenance   Topic Date Due    DTaP/Tdap/Td vaccine (1 - Tdap) Never done    Shingles vaccine (1 of 2) Never done    Colorectal Cancer Screen  05/13/2017    Prostate Specific Antigen (PSA) Screening or Monitoring  11/14/2020    Diabetic foot exam  01/10/2021    Annual Wellness Visit (AWV)  07/09/2021    COVID-19 Vaccine (4 - Booster for Moderna series) 02/26/2022    Lipids  05/06/2022    Flu vaccine (1) 09/01/2022    Diabetic microalbuminuria test  11/08/2022    Depression Monitoring  11/08/2022    A1C test (Diabetic or Prediabetic)  01/27/2023    Diabetic retinal exam  03/21/2023    Pneumococcal 65+ years Vaccine  Completed    Hepatitis C screen  Addressed    Hepatitis A vaccine  Aged Out    Hib vaccine  Aged Out    Meningococcal (ACWY) vaccine  Aged Out       Hemoglobin A1C (%)   Date Value   01/27/2022 6.4 (H)   11/08/2021 6.3   06/10/2021 6.4 (H)             ( goal A1C is < 7)   Microalb/Crt.  Ratio (mcg/mg creat)   Date Value   05/11/2020 17 (H)     LDL Cholesterol (mg/dL)   Date Value   05/06/2021 90   10/07/2019 100       (goal LDL is <100)   AST (U/L)   Date Value   05/06/2021 18     ALT (U/L)   Date Value   05/06/2021 10     BUN (mg/dL)   Date Value   01/27/2022 13     BP Readings from Last 3 Encounters:   02/16/22 (!) 108/58   02/15/22 (!) 150/76   02/09/22 120/84          (goal 120/80)    All Future Testing planned in [facility]  Lab Frequency Next Occurrence               Patient Active Problem List:     Hypercholesteremia     Neuropathic pain     Weakness of both legs     Essential hypertension     Type 2 diabetes mellitus with diabetic autonomic neuropathy, without long-term current use of insulin (HCC)     Eustachian tube dysfunction     Branch retinal vein occlusion     Primary open angle glaucoma     Chronic pain of both knees     Cerebral infarction due to occlusion of right middle cerebral artery (HCC)     Glaucomatous visual field defect     Nuclear sclerotic cataract     Class 1 obesity due to excess calories with serious comorbidity and body mass index (BMI) of 31.0 to 31.9 in adult     Elevated PSA     S/P total knee arthroplasty, left     Major depressive disorder, recurrent, unspecified     S/P total knee arthroplasty, right

## 2022-08-08 ENCOUNTER — OFFICE VISIT (OUTPATIENT)
Dept: FAMILY MEDICINE CLINIC | Age: 72
End: 2022-08-08
Payer: MEDICARE

## 2022-08-08 VITALS
HEIGHT: 72 IN | OXYGEN SATURATION: 96 % | DIASTOLIC BLOOD PRESSURE: 70 MMHG | WEIGHT: 230 LBS | HEART RATE: 85 BPM | SYSTOLIC BLOOD PRESSURE: 124 MMHG | BODY MASS INDEX: 31.15 KG/M2

## 2022-08-08 DIAGNOSIS — Z13.220 SCREENING FOR HYPERLIPIDEMIA: ICD-10-CM

## 2022-08-08 DIAGNOSIS — R73.03 PREDIABETES: ICD-10-CM

## 2022-08-08 DIAGNOSIS — Z12.5 SCREENING FOR PROSTATE CANCER: ICD-10-CM

## 2022-08-08 DIAGNOSIS — Z76.0 MEDICATION REFILL: ICD-10-CM

## 2022-08-08 DIAGNOSIS — Z86.73 S/P STROKE DUE TO CEREBROVASCULAR DISEASE: ICD-10-CM

## 2022-08-08 DIAGNOSIS — I10 ESSENTIAL HYPERTENSION: ICD-10-CM

## 2022-08-08 DIAGNOSIS — Z00.00 MEDICARE ANNUAL WELLNESS VISIT, SUBSEQUENT: Primary | ICD-10-CM

## 2022-08-08 PROCEDURE — 1123F ACP DISCUSS/DSCN MKR DOCD: CPT | Performed by: NURSE PRACTITIONER

## 2022-08-08 PROCEDURE — G0439 PPPS, SUBSEQ VISIT: HCPCS | Performed by: NURSE PRACTITIONER

## 2022-08-08 PROCEDURE — 3017F COLORECTAL CA SCREEN DOC REV: CPT | Performed by: NURSE PRACTITIONER

## 2022-08-08 SDOH — ECONOMIC STABILITY: FOOD INSECURITY: WITHIN THE PAST 12 MONTHS, THE FOOD YOU BOUGHT JUST DIDN'T LAST AND YOU DIDN'T HAVE MONEY TO GET MORE.: NEVER TRUE

## 2022-08-08 SDOH — ECONOMIC STABILITY: FOOD INSECURITY: WITHIN THE PAST 12 MONTHS, YOU WORRIED THAT YOUR FOOD WOULD RUN OUT BEFORE YOU GOT MONEY TO BUY MORE.: NEVER TRUE

## 2022-08-08 ASSESSMENT — PATIENT HEALTH QUESTIONNAIRE - PHQ9
3. TROUBLE FALLING OR STAYING ASLEEP: 0
SUM OF ALL RESPONSES TO PHQ QUESTIONS 1-9: 0
8. MOVING OR SPEAKING SO SLOWLY THAT OTHER PEOPLE COULD HAVE NOTICED. OR THE OPPOSITE, BEING SO FIGETY OR RESTLESS THAT YOU HAVE BEEN MOVING AROUND A LOT MORE THAN USUAL: 0
10. IF YOU CHECKED OFF ANY PROBLEMS, HOW DIFFICULT HAVE THESE PROBLEMS MADE IT FOR YOU TO DO YOUR WORK, TAKE CARE OF THINGS AT HOME, OR GET ALONG WITH OTHER PEOPLE: 0
4. FEELING TIRED OR HAVING LITTLE ENERGY: 0
9. THOUGHTS THAT YOU WOULD BE BETTER OFF DEAD, OR OF HURTING YOURSELF: 0
2. FEELING DOWN, DEPRESSED OR HOPELESS: 0
SUM OF ALL RESPONSES TO PHQ QUESTIONS 1-9: 0
7. TROUBLE CONCENTRATING ON THINGS, SUCH AS READING THE NEWSPAPER OR WATCHING TELEVISION: 0
5. POOR APPETITE OR OVEREATING: 0
1. LITTLE INTEREST OR PLEASURE IN DOING THINGS: 0
SUM OF ALL RESPONSES TO PHQ9 QUESTIONS 1 & 2: 0
6. FEELING BAD ABOUT YOURSELF - OR THAT YOU ARE A FAILURE OR HAVE LET YOURSELF OR YOUR FAMILY DOWN: 0

## 2022-08-08 ASSESSMENT — LIFESTYLE VARIABLES
HOW OFTEN DO YOU HAVE A DRINK CONTAINING ALCOHOL: MONTHLY OR LESS
HOW MANY STANDARD DRINKS CONTAINING ALCOHOL DO YOU HAVE ON A TYPICAL DAY: 1 OR 2

## 2022-08-08 ASSESSMENT — SOCIAL DETERMINANTS OF HEALTH (SDOH): HOW HARD IS IT FOR YOU TO PAY FOR THE VERY BASICS LIKE FOOD, HOUSING, MEDICAL CARE, AND HEATING?: NOT HARD AT ALL

## 2022-08-08 NOTE — PROGRESS NOTES
Medicare Annual Wellness Visit    Cassy Hinds is here for Medicare AWV    Assessment & Plan   Medication refill  The following orders have not been finalized:  -     amitriptyline (ELAVIL) 75 MG tablet  S/P stroke due to cerebrovascular disease  The following orders have not been finalized:  -     amitriptyline (ELAVIL) 75 MG tablet  Essential hypertension  The following orders have not been finalized:  -     amLODIPine (NORVASC) 10 MG tablet  Prediabetes  The following orders have not been finalized:  -     glipiZIDE (GLUCOTROL) 10 MG tablet    Recommendations for Preventive Services Due: see orders and patient instructions/AVS.  Recommended screening schedule for the next 5-10 years is provided to the patient in written form: see Patient Instructions/AVS.  Visual inspection:  Deformity/amputation: absent  Skin lesions/pre-ulcerative calluses: absent  Edema: right- negative, left- negative    Sensory exam:  Monofilament sensation: normal  (minimum of 5 random plantar locations tested, avoiding callused areas - > 1 area with absence of sensation is + for neuropathy)    Plus at least one of the following:  Pulses: normal,   Pinprick: Intact         No follow-ups on file. Subjective       Patient's complete Health Risk Assessment and screening values have been reviewed and are found in Flowsheets. The following problems were reviewed today and where indicated follow up appointments were made and/or referrals ordered.     Positive Risk Factor Screenings with Interventions:             General Health and ACP:  General  In general, how would you say your health is?: Good  In the past 7 days, have you experienced any of the following: New or Increased Pain, New or Increased Fatigue, Loneliness, Social Isolation, Stress or Anger?: No  Do you get the social and emotional support that you need?: Yes  Do you have a Living Will?: (!) No    Advance Directives       Power of  Living Will ACP-Advance Directive ACP-Power of     Not on File Not on File Not on File Not on File        General Health Risk Interventions:  No Living Will: discussed and documents provided    Health Habits/Nutrition:  Physical Activity: Inactive    Days of Exercise per Week: 0 days    Minutes of Exercise per Session: 0 min     Have you lost any weight without trying in the past 3 months?: No  Body mass index: (!) 31.19  Have you seen the dentist within the past year?: N/A - wear dentures  Health Habits/Nutrition Interventions:  Inadequate physical activity:  patient is not ready to increase his/her physical activity level at this time    Hearing/Vision:  Do you or your family notice any trouble with your hearing that hasn't been managed with hearing aids?: (!) Yes  Do you have difficulty driving, watching TV, or doing any of your daily activities because of your eyesight?: No  Have you had an eye exam within the past year?: Yes  No results found. Hearing/Vision Interventions:  Hearing concerns:  patient declines any further evaluation/treatment for hearing issues            Objective   Vitals:    08/08/22 0950   BP: 124/70   Site: Left Upper Arm   Position: Sitting   Cuff Size: Large Adult   Pulse: 85   SpO2: 96%   Weight: 230 lb (104.3 kg)   Height: 6' (1.829 m)      Body mass index is 31.19 kg/m². No Known Allergies  Prior to Visit Medications    Medication Sig Taking?  Authorizing Provider   losartan (COZAAR) 50 MG tablet TAKE 1 TABLET EVERY DAY Yes AMERICA Marie CNP   simvastatin (ZOCOR) 20 MG tablet TAKE 1 TABLET NIGHTLY Yes AMERICA Reilly NP   amitriptyline (ELAVIL) 75 MG tablet TAKE 1 TABLET NIGHTLY Yes AMERICA Marie CNP   amLODIPine (NORVASC) 10 MG tablet Take 1 tablet by mouth daily Yes AMERICA Marie CNP   glipiZIDE (GLUCOTROL) 10 MG tablet Take 1 tablet by mouth daily Yes AMERICA Marie CNP   Chlorpheniramine Maleate (ALLERGY RELIEF PO) Take 1 tablet by mouth daily  Yes Historical Provider, MD   Homeopathic Products [facility] DRY EYE RELIEF) SOLN Apply 2 drops to eye 2 times daily Left eye Yes Historical Provider, MD   blood glucose monitor strips Provide insurance covered test strips compatible with glucometer, test 1 times a day Yes Gely Bolanos PA-C   timolol (TIMOPTIC) 0.5 % ophthalmic solution instill 1 drop into right eye twice a day Yes Historical Provider, MD   latanoprost (XALATAN) 0.005 % ophthalmic solution Place 1 drop into the right eye nightly  Yes Historical Provider, MD   apixaban (ELIQUIS) 2.5 MG TABS tablet Take 1 tablet by mouth 2 times daily for 14 days  Patient not taking: Reported on 8/8/2022  Maxine Morales DO   docusate sodium (COLACE) 100 MG capsule Take 1 capsule by mouth 2 times daily as needed for Constipation  Patient not taking: Reported on 8/8/2022  Maxine Morales,    docusate sodium (COLACE) 100 MG capsule Take 1 capsule by mouth 2 times daily  Patient not taking: Reported on 8/8/2022  AMERICA Sutherland CNP   Blood Glucose Monitoring Suppl (D-CARE GLUCOMETER) w/Device KIT Provide insurance covered glucometer, check blood sugars every morning  LEANNA Rivero (Including outside providers/suppliers regularly involved in providing care):   Patient Care Team:  AMERICA Sutherland CNP as PCP - General (Family Nurse Practitioner)  AMERICA Sutherland CNP as PCP - [facility] Empaneled Provider  Paula Quintero MD as Consulting Physician (Pain Management)  Jorge Cuello MD as Consulting Physician (Ophthalmology)     Reviewed and updated this visit:  Allergies  Meds

## 2022-08-09 RX ORDER — AMITRIPTYLINE HYDROCHLORIDE 75 MG/1
TABLET, FILM COATED ORAL
Qty: 90 TABLET | Refills: 1 | Status: SHIPPED | OUTPATIENT
Start: 2022-08-09

## 2022-08-09 RX ORDER — AMLODIPINE BESYLATE 10 MG/1
TABLET ORAL
Qty: 90 TABLET | Refills: 1 | Status: SHIPPED | OUTPATIENT
Start: 2022-08-09

## 2022-08-09 RX ORDER — AMLODIPINE BESYLATE 10 MG/1
10 TABLET ORAL DAILY
Qty: 90 TABLET | Refills: 1 | Status: SHIPPED | OUTPATIENT
Start: 2022-08-09 | End: 2022-09-08

## 2022-08-09 RX ORDER — GLIPIZIDE 10 MG/1
10 TABLET ORAL DAILY
Qty: 90 TABLET | Refills: 1 | Status: SHIPPED | OUTPATIENT
Start: 2022-08-09

## 2022-09-13 ENCOUNTER — OFFICE VISIT (OUTPATIENT)
Dept: NEUROLOGY | Age: 72
End: 2022-09-13
Payer: MEDICARE

## 2022-09-13 VITALS
SYSTOLIC BLOOD PRESSURE: 117 MMHG | DIASTOLIC BLOOD PRESSURE: 82 MMHG | BODY MASS INDEX: 31.29 KG/M2 | WEIGHT: 231 LBS | HEIGHT: 72 IN | HEART RATE: 89 BPM

## 2022-09-13 DIAGNOSIS — G89.0 THALAMIC PAIN SYNDROME: ICD-10-CM

## 2022-09-13 DIAGNOSIS — I63.9 CEREBRAL INFARCTION, UNSPECIFIED MECHANISM (HCC): Primary | ICD-10-CM

## 2022-09-13 DIAGNOSIS — I63.89 OTHER CEREBRAL INFARCTION (HCC): ICD-10-CM

## 2022-09-13 PROCEDURE — G8417 CALC BMI ABV UP PARAM F/U: HCPCS | Performed by: PSYCHIATRY & NEUROLOGY

## 2022-09-13 PROCEDURE — G8427 DOCREV CUR MEDS BY ELIG CLIN: HCPCS | Performed by: PSYCHIATRY & NEUROLOGY

## 2022-09-13 PROCEDURE — 1036F TOBACCO NON-USER: CPT | Performed by: PSYCHIATRY & NEUROLOGY

## 2022-09-13 PROCEDURE — 1123F ACP DISCUSS/DSCN MKR DOCD: CPT | Performed by: PSYCHIATRY & NEUROLOGY

## 2022-09-13 PROCEDURE — 99214 OFFICE O/P EST MOD 30 MIN: CPT | Performed by: PSYCHIATRY & NEUROLOGY

## 2022-09-13 PROCEDURE — 3017F COLORECTAL CA SCREEN DOC REV: CPT | Performed by: PSYCHIATRY & NEUROLOGY

## 2022-09-13 ASSESSMENT — ENCOUNTER SYMPTOMS
RESPIRATORY NEGATIVE: 1
GASTROINTESTINAL NEGATIVE: 1
ALLERGIC/IMMUNOLOGIC NEGATIVE: 1
EYES NEGATIVE: 1

## 2022-09-13 NOTE — PROGRESS NOTES
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 The condition is he continues with numbness of let arm and left leg . There is no burning or stabbing on elavil 75 mg po qhs tolerating medication well . This also helps him with sleep . He had left knee replacement in June last year with right knee replacement in February with only mild right knee pain at this time . There is hearing loss more right ear not wearing hearing aides . He is on aspirin 325 mg po qd . His stroke was in 2013 with left side numbness with stinging pain in left arm and leg placed on elavil 75 mg po qhs  . He still reports intermittent numbness in upper outer arm and left thigh about twice per week lasting about one hour . There is no weakness , bulbar or visual complains . There is postural instability on his feet do to arthritic knees . There is no heart disturbance . He has tried coming off elavil having insomnia off this medication . Significant medications zocor 20 mg po qd , aspirin 325 mg po qd, elavil 75 mg po qhs  . Testing MRI of Head with chronic right thalamc infarction along with chronic bilateral basal ganglia infarcts with nonspecific areas of subcortical microhemorrhages , March 2014 . Carotid US left ICA with mild plaquing. Right ICA normal,  March 2014  . Cardiac 2 D echo normal LVF EF 58 % . Grade 1 diastolic dysfunction . Mild MR and TR , June 2013 .  Carotid US < 50 % stenosis bilaterally , June 2011     Past Medical History:   Diagnosis Date    Cataracts, bilateral     only left eye, had surgery on right    Cerebral artery occlusion with cerebral infarction [facility]) 2014    weakness left leg, sees Dr Dar Sharma    Diabetes mellitus [facility])     type II, managed by Dr Osman Del Real (PCP)  range , checks daily    Eye disorder     history of a \"stroke\" in right eye    Glaucoma     right eye    Headache(784.0)     no current issues (6/10/21)    Hearing loss     Heartburn     occasionally, takes OTC acid reducer as needed    Hyperlipidemia     Hypertension     Kidney stones     Osteoarthritis     Snores     Tendonitis of shoulder, left     Tremor     no current problems (6/10/21)       Past Surgical History:   Procedure Laterality Date    CATARACT REMOVAL Right 08/2015    right eye    CYSTOSCOPY      EYE SURGERY      HAND SURGERY Left     thumb    JOINT REPLACEMENT      KIDNEY STONE SURGERY      LITHOTRIPSY      REFRACTIVE SURGERY Right 2014    THUMB AMPUTATION      TONSILLECTOMY      TOTAL KNEE ARTHROPLASTY Left 06/23/2021    LEFT  KNEE TOTAL ARTHROPLASTY - Ori [facility] Right 02/15/2022    By Dr. Elizabeth Barajas Right 2/15/2022    RIGHT KNEE TOTAL ARTHROPLASTY - Cherelle Sage performed by Sarthak Brush DO at [place] [id number] History   Problem Relation Age of Onset    Heart Disease Mother     Cancer Father     Migraines Sister     Migraines Brother        Social History     Socioeconomic History    Marital status:      Spouse name: None    Number of children: None    Years of education: None    Highest education level: None   Tobacco Use    Smoking status: Never    Smokeless tobacco: Former     Types: Chew    Tobacco comments:     quit smokeless tobacco in late 80's   Vaping Use    Vaping Use: Never used   Substance and Sexual Activity    Alcohol use:  Yes     Alcohol/week: 0.0 standard drinks     Comment: rarely    Drug use: No    Sexual activity: Yes     Social Determinants of Health     Financial Resource Strain: Low Risk     Difficulty of Paying Living Expenses: Not hard at all   Food Insecurity: No Food Insecurity    Worried About Running Out of Food in the Last Year: Never true    Ran Out of Food in the Last Year: Never true   Physical Activity: Inactive    Days of Exercise per Week: 0 days    Minutes of Exercise per Session: 0 min       Current Outpatient Medications   Medication Sig Dispense Refill    amLODIPine (NORVASC) 10 MG tablet TAKE 1 TABLET EVERY DAY 90 tablet 1    amitriptyline (ELAVIL) 75 MG tablet TAKE 1 TABLET NIGHTLY 90 tablet 1    glipiZIDE (GLUCOTROL) 10 MG tablet Take 1 tablet by mouth in the morning. 90 tablet 1    losartan (COZAAR) 50 MG tablet TAKE 1 TABLET EVERY DAY 90 tablet 1    simvastatin (ZOCOR) 20 MG tablet TAKE 1 TABLET NIGHTLY 90 tablet 1    Chlorpheniramine Maleate (ALLERGY RELIEF PO) Take 1 tablet by mouth daily       timolol (TIMOPTIC) 0.5 % ophthalmic solution instill 1 drop into right eye twice a day  0    latanoprost (XALATAN) 0.005 % ophthalmic solution Place 1 drop into the right eye nightly       amLODIPine (NORVASC) 10 MG tablet Take 1 tablet by mouth in the morning. (Patient not taking: Reported on 9/13/2022) 90 tablet 1    Homeopathic Products ([facility] DRY EYE RELIEF) SOLN Apply 2 drops to eye 2 times daily Left eye (Patient not taking: Reported on 9/13/2022)      blood glucose monitor strips Provide insurance covered test strips compatible with glucometer, test 1 times a day 100 strip 5    Blood Glucose Monitoring Suppl (D-CARE GLUCOMETER) w/Device KIT Provide insurance covered glucometer, check blood sugars every morning 1 kit 0     No current facility-administered medications for this visit. No Known Allergies      Review of Systems     Vitals:    09/13/22 0959   BP: 117/82   Pulse: 89     weight: 231 lb (104.8 kg)      Review of Systems   Constitutional: Negative. HENT: Negative. Eyes: Negative. Respiratory: Negative. Cardiovascular: Negative. Gastrointestinal: Negative. Endocrine: Negative. Genitourinary: Negative. Musculoskeletal:  Positive for arthralgias. Skin: Negative. Allergic/Immunologic: Negative. Neurological: Negative. Hematological: Negative. Psychiatric/Behavioral: Negative.         Neurological Examination  Constitutional .General exam well groomed   Head/Ears /Nose/Throat: external ear . Normal exam  Neck and thyroid . Normal size. No bruits  Respiratory . Breathsounds clear bilaterally  Cardiovascular: Auscultation of heart with regular rate and rhythm  Musculoskeletal. Muscle bulk and tone normal                                                           Muscle strength 5/5 strength throughout                                                                               No dysmetria or dysdiadokinesis  No tremor   Normal fine motor  Gait mild imbalance  Orientation Alert and oriented x 3   Attention and concentration normal  Short term memory normal  Language process and speech normal . No aphasia   Cranial nerve 2 normal acuety and visual fields  Cranial nerve 3, 4 and 6 . Extraocular muscles are intact . Pupils are equal and reactive   Cranial nerve 5 . Normal strength of masseter and temporalis . Intact corneal reflex. Normal facial sensation  Cranial nerve 7 normal exam   Cranial nerve 8. Hearing loss right ear   Cranial nerve 9 and 10. Symmetric palate elevation   Cranial nerve 11 , 5 out of 5 strength   Cranial Nerve 12 midline tongue . No atrophy  Sensation . Normal proprioception . Intact Vibration . Normal pinprick and light touch   Deep Tendon Reflexes normal  Plantar response flexor bilaterally      ASSESSMENT/PLAN      Diagnosis Orders   1. Cerebral infarction, unspecified mechanism ([address]  VL DUP CAROTID BILATERAL      2. Thalamic pain syndrome        3.  Other cerebral infarction ([address]   VL DUP CAROTID BILATERAL      He is to continue current medication regimen to undergo carotid US     Orders Placed This Encounter   Procedures    VL DUP CAROTID BILATERAL     Standing Status:   Future     Standing Expiration Date:   9/13/2023

## 2022-09-30 ENCOUNTER — HOSPITAL ENCOUNTER (OUTPATIENT)
Dept: VASCULAR LAB | Age: 72
Discharge: HOME | End: 2022-09-30
Payer: MEDICARE

## 2022-09-30 DIAGNOSIS — I63.9 CEREBRAL INFARCTION, UNSPECIFIED MECHANISM (HCC): ICD-10-CM

## 2022-09-30 DIAGNOSIS — I63.89 OTHER CEREBRAL INFARCTION (HCC): ICD-10-CM

## 2022-09-30 PROCEDURE — 93880 EXTRACRANIAL BILAT STUDY: CPT

## 2023-02-03 DIAGNOSIS — R73.03 PREDIABETES: ICD-10-CM

## 2023-02-03 DIAGNOSIS — Z76.0 MEDICATION REFILL: ICD-10-CM

## 2023-02-03 DIAGNOSIS — I10 ESSENTIAL HYPERTENSION: ICD-10-CM

## 2023-02-03 DIAGNOSIS — Z86.73 S/P STROKE DUE TO CEREBROVASCULAR DISEASE: ICD-10-CM

## 2023-02-03 RX ORDER — LOSARTAN POTASSIUM 50 MG/1
TABLET ORAL
Qty: 90 TABLET | Refills: 0 | Status: SHIPPED | OUTPATIENT
Start: 2023-02-03

## 2023-02-03 RX ORDER — AMITRIPTYLINE HYDROCHLORIDE 75 MG/1
TABLET, FILM COATED ORAL
Qty: 90 TABLET | Refills: 0 | Status: SHIPPED | OUTPATIENT
Start: 2023-02-03

## 2023-02-03 RX ORDER — AMLODIPINE BESYLATE 10 MG/1
TABLET ORAL
Qty: 90 TABLET | Refills: 0 | Status: SHIPPED | OUTPATIENT
Start: 2023-02-03

## 2023-02-03 RX ORDER — SIMVASTATIN 20 MG
TABLET ORAL
Qty: 90 TABLET | Refills: 0 | Status: SHIPPED | OUTPATIENT
Start: 2023-02-03

## 2023-02-03 NOTE — TELEPHONE ENCOUNTER
Last visit: 8/9/22  Last Med refill: 8/9/22  Does patient have enough medication for 72 hours: Yes    Patient is due for appt--spoke with wife and scheduled for 3/13    Next Visit Date:  Future Appointments   Date Time Provider Lev Huerta   2/23/2023 10:00 AM Chandler Hall PA-C Sports Med Rafal Salehudent   3/13/2023  9:15 AM Leigh Nair APRN - CNP [facility] [address] Maintenance   Topic Date Due    DTaP/Tdap/Td vaccine (1 - Tdap) Never done    Shingles vaccine (1 of 2) Never done    Prostate Specific Antigen (PSA) Screening or Monitoring  11/14/2020    COVID-19 Vaccine (4 - Booster for Moderna series) 12/21/2021    Lipids  05/06/2022    Flu vaccine (1) 08/01/2022    Diabetic Alb to Cr ratio (uACR) test  11/08/2022    A1C test (Diabetic or Prediabetic)  01/27/2023    GFR test (Diabetes, CKD 3-4, OR last GFR 15-59)  01/27/2023    Depression Monitoring  08/08/2023    Colorectal Cancer Screen  08/08/2023    Diabetic foot exam  08/09/2023    Annual Wellness Visit (AWV)  08/09/2023    Diabetic retinal exam  09/26/2023    Pneumococcal 65+ years Vaccine  Completed    Hepatitis C screen  Addressed    Hepatitis A vaccine  Aged Out    Hib vaccine  Aged Out    Meningococcal (ACWY) vaccine  Aged Out       Hemoglobin A1C (%)   Date Value   01/27/2022 6.4 (H)   11/08/2021 6.3   06/10/2021 6.4 (H)             ( goal A1C is < 7)   Microalb/Crt.  Ratio (mcg/mg creat)   Date Value   05/11/2020 17 (H)     LDL Cholesterol (mg/dL)   Date Value   05/06/2021 90   10/07/2019 100       (goal LDL is <100)   AST (U/L)   Date Value   05/06/2021 18     ALT (U/L)   Date Value   05/06/2021 10     BUN (mg/dL)   Date Value   01/27/2022 13     BP Readings from Last 3 Encounters:   09/13/22 117/82   08/08/22 124/70   02/16/22 (!) 108/58          (goal 120/80)    All Future Testing planned in [facility]  Lab Frequency Next Occurrence   PSA Once 08/08/2022   Lipid Panel Once 09/08/2022               Patient Active Problem List:     Hypercholesteremia     Neuropathic pain     Weakness of both legs     Essential hypertension     Type 2 diabetes mellitus with diabetic autonomic neuropathy, without long-term current use of insulin (HCC)     Eustachian tube dysfunction     Branch retinal vein occlusion     Primary open angle glaucoma     Chronic pain of both knees     Cerebral infarction due to occlusion of right middle cerebral artery (HCC)     Glaucomatous visual field defect     Nuclear sclerotic cataract     Class 1 obesity due to excess calories with serious comorbidity and body mass index (BMI) of 31.0 to 31.9 in adult     Elevated PSA     S/P total knee arthroplasty, left     Major depressive disorder, recurrent, unspecified     S/P total knee arthroplasty, right

## 2023-02-17 DIAGNOSIS — R73.03 PREDIABETES: ICD-10-CM

## 2023-02-20 NOTE — TELEPHONE ENCOUNTER
Last visit: 8/8/22  Last Med refill: 8/9/22  Does patient have enough medication for 72 hours: No:     Next Visit Date:  Future Appointments   Date Time Provider Lev Huerta   2/23/2023 10:00 AM Ruba Dueñas PA-C Sports Med Cameron Rdo   3/13/2023  9:15 AM AMERICA Phelps - CNP [facility] [address] Maintenance   Topic Date Due    DTaP/Tdap/Td vaccine (1 - Tdap) Never done    Shingles vaccine (1 of 2) Never done    Prostate Specific Antigen (PSA) Screening or Monitoring  11/14/2020    COVID-19 Vaccine (4 - Booster for Moderna series) 12/21/2021    Lipids  05/06/2022    Flu vaccine (1) 08/01/2022    Diabetic Alb to Cr ratio (uACR) test  11/08/2022    A1C test (Diabetic or Prediabetic)  01/27/2023    GFR test (Diabetes, CKD 3-4, OR last GFR 15-59)  01/27/2023    Depression Monitoring  08/08/2023    Colorectal Cancer Screen  08/08/2023    Diabetic foot exam  08/09/2023    Annual Wellness Visit (AWV)  08/09/2023    Diabetic retinal exam  09/26/2023    Pneumococcal 65+ years Vaccine  Completed    Hepatitis C screen  Addressed    Hepatitis A vaccine  Aged Out    Hib vaccine  Aged Out    Meningococcal (ACWY) vaccine  Aged Out       Hemoglobin A1C (%)   Date Value   01/27/2022 6.4 (H)   11/08/2021 6.3   06/10/2021 6.4 (H)             ( goal A1C is < 7)   Microalb/Crt.  Ratio (mcg/mg creat)   Date Value   05/11/2020 17 (H)     LDL Cholesterol (mg/dL)   Date Value   05/06/2021 90   10/07/2019 100       (goal LDL is <100)   AST (U/L)   Date Value   05/06/2021 18     ALT (U/L)   Date Value   05/06/2021 10     BUN (mg/dL)   Date Value   01/27/2022 13     BP Readings from Last 3 Encounters:   09/13/22 117/82   08/08/22 124/70   02/16/22 (!) 108/58          (goal 120/80)    All Future Testing planned in [facility]  Lab Frequency Next Occurrence   PSA Once 08/08/2022   Lipid Panel Once 09/08/2022               Patient Active Problem List:     Hypercholesteremia     Neuropathic pain     Weakness of both legs     Essential hypertension     Type 2 diabetes mellitus with diabetic autonomic neuropathy, without long-term current use of insulin (HCC)     Eustachian tube dysfunction     Branch retinal vein occlusion     Primary open angle glaucoma     Chronic pain of both knees     Cerebral infarction due to occlusion of right middle cerebral artery (HCC)     Glaucomatous visual field defect     Nuclear sclerotic cataract     Class 1 obesity due to excess calories with serious comorbidity and body mass index (BMI) of 31.0 to 31.9 in adult     Elevated PSA     S/P total knee arthroplasty, left     Major depressive disorder, recurrent, unspecified     S/P total knee arthroplasty, right

## 2023-02-21 RX ORDER — GLIPIZIDE 10 MG/1
TABLET ORAL
Qty: 90 TABLET | Refills: 1 | Status: SHIPPED | OUTPATIENT
Start: 2023-02-21

## 2023-02-22 DIAGNOSIS — Z96.653 STATUS POST TOTAL BILATERAL KNEE REPLACEMENT: Primary | ICD-10-CM

## 2023-02-22 DIAGNOSIS — Z96.651 S/P TOTAL KNEE ARTHROPLASTY, RIGHT: Primary | ICD-10-CM

## 2023-02-23 ENCOUNTER — OFFICE VISIT (OUTPATIENT)
Dept: ORTHOPEDIC SURGERY | Age: 73
End: 2023-02-23

## 2023-02-23 VITALS — HEIGHT: 72 IN | RESPIRATION RATE: 14 BRPM | BODY MASS INDEX: 32.37 KG/M2 | WEIGHT: 239 LBS

## 2023-02-23 DIAGNOSIS — Z96.653 HISTORY OF TOTAL KNEE ARTHROPLASTY, BILATERAL: Primary | ICD-10-CM

## 2023-02-23 DIAGNOSIS — M22.41 CHONDROMALACIA, PATELLA, RIGHT: ICD-10-CM

## 2023-02-23 ASSESSMENT — ENCOUNTER SYMPTOMS
ABDOMINAL PAIN: 0
CONSTIPATION: 0
ABDOMINAL DISTENTION: 0
APNEA: 0
VOMITING: 0
NAUSEA: 0
DIARRHEA: 0
SHORTNESS OF BREATH: 0
COUGH: 0
RESPIRATORY NEGATIVE: 1
CHEST TIGHTNESS: 0
COLOR CHANGE: 0

## 2023-02-23 NOTE — PROGRESS NOTES
[address] [place] [address] AND SPORTS MEDICINE  Pending sale to [facility]  [address] [place] [address]  Dept: [phone]  Dept Fax: [phone]        Ambulatory Follow Up      Subjective:   Jet Perry is a 67y.o. year old male who presents to our office today for routine followup regarding his   1. History of total knee arthroplasty, bilateral    2. Chondromalacia, patella, right    . Chief Complaint   Patient presents with    Knee Pain     B Knee s/p R TKA 2/15/22, L TKA 6/23/21           HPI Jet Perry  is a 67 y.o.  male who presents today in follow for yearly checks for his total knee arthroplasties. The patient had a right total knee arthroplasty on 2/15/2022 by Dr. Hailey Becerra DO. He had a left total knee arthroplasty on 6/23/2021 by Dr. Laura Ybarra DO. He states that on the right knee he feels something gets loose and stuck and he has to wiggle it to get it out. He has significantly less pain than he had prior to surgery but sometimes there is still discomfort. Review of Systems   Constitutional:  Positive for activity change. Negative for appetite change, fatigue and fever. Respiratory: Negative. Negative for apnea, cough, chest tightness and shortness of breath. Cardiovascular: Negative. Negative for chest pain, palpitations and leg swelling. Gastrointestinal:  Negative for abdominal distention, abdominal pain, constipation, diarrhea, nausea and vomiting. Genitourinary:  Negative for difficulty urinating, dysuria and hematuria. Musculoskeletal:  Positive for arthralgias and gait problem. Negative for joint swelling and myalgias. Skin:  Negative for color change and rash. Neurological:  Negative for dizziness, weakness, numbness and headaches. Psychiatric/Behavioral:  Negative for sleep disturbance.         Objective :   Resp 14   Ht 6' (1.829 m)   Wt 239 lb (108.4 kg)   BMI 32.41 kg/m²  Body mass index is 32.41 kg/m². General: Antoinette Haq is a 67 y.o. male who is alert and oriented and sitting comfortably in our office. Orthopedics:    GENERAL: Alert and oriented X3 in no acute distress. SKIN: Intact without lesions or ulcerations. NEURO: Musculoskeletal and axillary nerves intact to sensory and motor testing. VASC: Capillary refill is less than 3 seconds. Knee Exam    LOCATION: Bilateral knee  GEN: Alert and oriented X 3, in no acute distress. GAIT: The patient's gait was observed while entering the exam room and was noted to be non antalgic. The extremity is in anatomic alignment. SKIN: Intact without rashes, lesions, or ulcerations. No obvious deformity or swelling. NEURO: The patient responds to light touch throughout bilateral LE. Patellar and Achilles reflexes are 2/4. VASC: The bilateral LE is neurovascularly intact with 2/4 DP and 2/4 PT pulses. Brisk capillary refill. ROM: 0/120 degrees. There is no effusion. MUSC: Slightly decreased quad tone on the right. Crepitation with knee extension  LIGAMENT: There is 2+ on the right and 1+ on the left varus instability at 0 degrees and 2+ on the right and 1+ on the left varus instability at 30 degrees. There is 2+ on the right and 0 on the left valgus instability at 0 degrees and 2+ on the right and 0 on the left valgus instability at 30 degrees. PALP: There is  no  joint line pain. Radiology:   KNEE - TKA X-RAY    4 views of the right knee including AP, tunnel, and lateral in the upright position, and skyline views reveal anatomic alignment with no fracture or dislocation. There is a Ginette right total knee replacement in good alignment. The implants are well seated with no signs of cement mantle loosening. There is no signs of osteolysis or wear. There is no acute bony abnormality, periosteal reaction or soft tissue masses present. Impression: Stable total knee replacement of the right knee.      KNEE - TKA X-RAY    4 views of the left knee including AP, tunnel, and lateral in the upright position, and skyline views reveal anatomic alignment with no fracture or dislocation. There is a left Ginette total knee replacement in good alignment. The implants are well seated with no signs of cement mantle loosening. There is no signs of osteolysis or wear. There is no acute bony abnormality, periosteal reaction or soft tissue masses present. Impression: Stable total knee replacement of the left knee. Procedure: None    Assessment:      1. History of total knee arthroplasty, bilateral    2. Chondromalacia, patella, right       Plan:   I reviewed the patient's x-rays and he has no sign of any prosthetic loosening. On the right knee he did not have the patella replacement as he did on the left. He may be feeling some of the pain if the patella is catching. We discussed the various treatment alternatives including anti-inflammatory medications, physical therapy, injections, further imaging studies and a last resort surgery. We had a long discussion that he does have weakness of his right quadricep muscle and he should begin working on that again. He should definitely work on his VMO and I showed him exercises that he could do to help hold the kneecap more medial.  The patient has opted for doing exercises on his own for the next 6 weeks and refused physical therapy. I did explain to the patient that it would be a big surgery if he decided to have anything done. He does not believe he would want to do anything surgically. He will then follow-up with Dr. Ryland Rosenberg in 6 weeks to see how he is doing. I did encourage him to get up walking to strengthen his legs. He states he does not do much exercise. He will call if he changes his mind and wants to go back to formal physical therapy. The patient will call if he has any questions or concerns. Follow up:Return in about 6 weeks (around 4/6/2023).           No orders of the defined types were placed in this encounter. No orders of the defined types were placed in this encounter. This note is created with the assistance of a speech recognition program.  While intending to generate a document that actually reflects the content of the visit, the document can still have some errors including those of syntax and sound a like substitutions which may escape proof reading. In such instances, actual meaning can be extrapolated by contextual diversion.      Electronically signed by Ruchi Groves PA-C on 2/23/2023 at 10:29 AM

## 2023-04-04 ENCOUNTER — OFFICE VISIT (OUTPATIENT)
Dept: FAMILY MEDICINE CLINIC | Age: 73
End: 2023-04-04
Payer: MEDICARE

## 2023-04-04 VITALS
SYSTOLIC BLOOD PRESSURE: 122 MMHG | WEIGHT: 239 LBS | BODY MASS INDEX: 32.37 KG/M2 | HEART RATE: 92 BPM | HEIGHT: 72 IN | OXYGEN SATURATION: 95 % | DIASTOLIC BLOOD PRESSURE: 70 MMHG

## 2023-04-04 DIAGNOSIS — I10 ESSENTIAL HYPERTENSION: ICD-10-CM

## 2023-04-04 DIAGNOSIS — E11.43 TYPE 2 DIABETES MELLITUS WITH DIABETIC AUTONOMIC NEUROPATHY, WITHOUT LONG-TERM CURRENT USE OF INSULIN (HCC): Primary | ICD-10-CM

## 2023-04-04 DIAGNOSIS — Z76.0 MEDICATION REFILL: ICD-10-CM

## 2023-04-04 LAB
CREATININE URINE POCT: 200
HBA1C MFR BLD: 7 %
MICROALBUMIN/CREAT 24H UR: 80 MG/G{CREAT}
MICROALBUMIN/CREAT UR-RTO: <30

## 2023-04-04 PROCEDURE — 3078F DIAST BP <80 MM HG: CPT | Performed by: NURSE PRACTITIONER

## 2023-04-04 PROCEDURE — 3051F HG A1C>EQUAL 7.0%<8.0%: CPT | Performed by: NURSE PRACTITIONER

## 2023-04-04 PROCEDURE — 1123F ACP DISCUSS/DSCN MKR DOCD: CPT | Performed by: NURSE PRACTITIONER

## 2023-04-04 PROCEDURE — 1036F TOBACCO NON-USER: CPT | Performed by: NURSE PRACTITIONER

## 2023-04-04 PROCEDURE — 82044 UR ALBUMIN SEMIQUANTITATIVE: CPT | Performed by: NURSE PRACTITIONER

## 2023-04-04 PROCEDURE — 3017F COLORECTAL CA SCREEN DOC REV: CPT | Performed by: NURSE PRACTITIONER

## 2023-04-04 PROCEDURE — 99214 OFFICE O/P EST MOD 30 MIN: CPT | Performed by: NURSE PRACTITIONER

## 2023-04-04 PROCEDURE — 2022F DILAT RTA XM EVC RTNOPTHY: CPT | Performed by: NURSE PRACTITIONER

## 2023-04-04 PROCEDURE — 3074F SYST BP LT 130 MM HG: CPT | Performed by: NURSE PRACTITIONER

## 2023-04-04 PROCEDURE — 83036 HEMOGLOBIN GLYCOSYLATED A1C: CPT | Performed by: NURSE PRACTITIONER

## 2023-04-04 PROCEDURE — G8427 DOCREV CUR MEDS BY ELIG CLIN: HCPCS | Performed by: NURSE PRACTITIONER

## 2023-04-04 PROCEDURE — G8417 CALC BMI ABV UP PARAM F/U: HCPCS | Performed by: NURSE PRACTITIONER

## 2023-04-04 RX ORDER — LOSARTAN POTASSIUM 50 MG/1
TABLET ORAL
Qty: 90 TABLET | Refills: 1 | Status: SHIPPED | OUTPATIENT
Start: 2023-04-04

## 2023-04-04 RX ORDER — SIMVASTATIN 20 MG
TABLET ORAL
Qty: 90 TABLET | Refills: 1 | Status: SHIPPED | OUTPATIENT
Start: 2023-04-04

## 2023-04-04 RX ORDER — AMLODIPINE BESYLATE 10 MG/1
10 TABLET ORAL EVERY MORNING
Qty: 90 TABLET | Refills: 1 | Status: SHIPPED | OUTPATIENT
Start: 2023-04-04

## 2023-04-04 RX ORDER — AMLODIPINE BESYLATE 10 MG/1
10 TABLET ORAL DAILY
Qty: 90 TABLET | Refills: 1 | Status: SHIPPED | OUTPATIENT
Start: 2023-04-04

## 2023-04-04 RX ORDER — AMITRIPTYLINE HYDROCHLORIDE 75 MG/1
TABLET, FILM COATED ORAL
Qty: 90 TABLET | Refills: 1 | Status: SHIPPED | OUTPATIENT
Start: 2023-04-04

## 2023-04-04 SDOH — ECONOMIC STABILITY: INCOME INSECURITY: HOW HARD IS IT FOR YOU TO PAY FOR THE VERY BASICS LIKE FOOD, HOUSING, MEDICAL CARE, AND HEATING?: NOT HARD AT ALL

## 2023-04-04 SDOH — ECONOMIC STABILITY: FOOD INSECURITY: WITHIN THE PAST 12 MONTHS, YOU WORRIED THAT YOUR FOOD WOULD RUN OUT BEFORE YOU GOT MONEY TO BUY MORE.: NEVER TRUE

## 2023-04-04 SDOH — ECONOMIC STABILITY: FOOD INSECURITY: WITHIN THE PAST 12 MONTHS, THE FOOD YOU BOUGHT JUST DIDN'T LAST AND YOU DIDN'T HAVE MONEY TO GET MORE.: NEVER TRUE

## 2023-04-04 SDOH — ECONOMIC STABILITY: HOUSING INSECURITY
IN THE LAST 12 MONTHS, WAS THERE A TIME WHEN YOU DID NOT HAVE A STEADY PLACE TO SLEEP OR SLEPT IN A SHELTER (INCLUDING NOW)?: NO

## 2023-04-04 ASSESSMENT — PATIENT HEALTH QUESTIONNAIRE - PHQ9
SUM OF ALL RESPONSES TO PHQ QUESTIONS 1-9: 0
SUM OF ALL RESPONSES TO PHQ QUESTIONS 1-9: 0
5. POOR APPETITE OR OVEREATING: 0
SUM OF ALL RESPONSES TO PHQ QUESTIONS 1-9: 0
6. FEELING BAD ABOUT YOURSELF - OR THAT YOU ARE A FAILURE OR HAVE LET YOURSELF OR YOUR FAMILY DOWN: 0
10. IF YOU CHECKED OFF ANY PROBLEMS, HOW DIFFICULT HAVE THESE PROBLEMS MADE IT FOR YOU TO DO YOUR WORK, TAKE CARE OF THINGS AT HOME, OR GET ALONG WITH OTHER PEOPLE: 0
SUM OF ALL RESPONSES TO PHQ9 QUESTIONS 1 & 2: 0
3. TROUBLE FALLING OR STAYING ASLEEP: 0
7. TROUBLE CONCENTRATING ON THINGS, SUCH AS READING THE NEWSPAPER OR WATCHING TELEVISION: 0
SUM OF ALL RESPONSES TO PHQ QUESTIONS 1-9: 0
9. THOUGHTS THAT YOU WOULD BE BETTER OFF DEAD, OR OF HURTING YOURSELF: 0
4. FEELING TIRED OR HAVING LITTLE ENERGY: 0
2. FEELING DOWN, DEPRESSED OR HOPELESS: 0
8. MOVING OR SPEAKING SO SLOWLY THAT OTHER PEOPLE COULD HAVE NOTICED. OR THE OPPOSITE, BEING SO FIGETY OR RESTLESS THAT YOU HAVE BEEN MOVING AROUND A LOT MORE THAN USUAL: 0
1. LITTLE INTEREST OR PLEASURE IN DOING THINGS: 0

## 2023-04-04 ASSESSMENT — ENCOUNTER SYMPTOMS
COUGH: 0
SHORTNESS OF BREATH: 0

## 2023-04-04 NOTE — PROGRESS NOTES
Patient is present for f/u for DM
Musculoskeletal:         General: Normal range of motion. Skin:     General: Skin is warm and dry. Neurological:      Mental Status: He is alert and oriented to person, place, and time. An electronic signature was used to authenticate this note.     --AMERICA Issa - CNP
H/O craniotomy  left frontal craniotomy for repair of AVM 7/2016  H/O laparoscopy  "endometriosis"-1/23/2012  Nasal septal defect  s/p repair 4/2016

## 2023-04-18 DIAGNOSIS — Z86.73 S/P STROKE DUE TO CEREBROVASCULAR DISEASE: ICD-10-CM

## 2023-04-18 DIAGNOSIS — M25.562 CHRONIC PAIN OF LEFT KNEE: Primary | ICD-10-CM

## 2023-04-18 DIAGNOSIS — G89.29 CHRONIC PAIN OF LEFT KNEE: Primary | ICD-10-CM

## 2023-04-18 NOTE — TELEPHONE ENCOUNTER
Patient is requesting a renewal on Handicap placard  Please call when ready for     Patient was last seen 04/04/2023

## 2023-05-17 ENCOUNTER — HOSPITAL ENCOUNTER (OUTPATIENT)
Age: 73
Setting detail: SPECIMEN
Discharge: HOME | End: 2023-05-17

## 2023-05-17 DIAGNOSIS — E11.43 TYPE 2 DIABETES MELLITUS WITH DIABETIC AUTONOMIC NEUROPATHY, WITHOUT LONG-TERM CURRENT USE OF INSULIN (HCC): ICD-10-CM

## 2023-05-17 LAB
ALBUMIN SERPL-MCNC: 4 G/DL (ref 3.5–5.2)
ALBUMIN/GLOB SERPL: 1.3 {RATIO} (ref 1–2.5)
ALP SERPL-CCNC: 74 U/L (ref 40–129)
ALT SERPL-CCNC: 15 U/L (ref 5–41)
ANION GAP SERPL CALCULATED.3IONS-SCNC: 18 MMOL/L (ref 9–17)
AST SERPL-CCNC: 24 U/L
BILIRUB SERPL-MCNC: 0.4 MG/DL (ref 0.3–1.2)
BUN SERPL-MCNC: 11 MG/DL (ref 8–23)
CALCIUM SERPL-MCNC: 9.1 MG/DL (ref 8.6–10.4)
CHLORIDE SERPL-SCNC: 103 MMOL/L (ref 98–107)
CO2 SERPL-SCNC: 19 MMOL/L (ref 20–31)
CREAT SERPL-MCNC: 0.64 MG/DL (ref 0.7–1.2)
ERYTHROCYTE [DISTWIDTH] IN BLOOD BY AUTOMATED COUNT: 14.2 % (ref 11.8–14.4)
GFR SERPL CREATININE-BSD FRML MDRD: >60 ML/MIN/1.73M2
GLUCOSE SERPL-MCNC: 108 MG/DL (ref 70–99)
HCT VFR BLD AUTO: 48.1 % (ref 40.7–50.3)
HGB BLD-MCNC: 14.7 G/DL (ref 13–17)
MCH RBC QN AUTO: 30.4 PG (ref 25.2–33.5)
MCHC RBC AUTO-ENTMCNC: 30.6 G/DL (ref 28.4–34.8)
MCV RBC AUTO: 99.6 FL (ref 82.6–102.9)
NRBC AUTOMATED: 0 PER 100 WBC
PLATELET # BLD AUTO: 199 K/UL (ref 138–453)
PMV BLD AUTO: 10 FL (ref 8.1–13.5)
POTASSIUM SERPL-SCNC: 4.3 MMOL/L (ref 3.7–5.3)
PROT SERPL-MCNC: 7.2 G/DL (ref 6.4–8.3)
RBC # BLD AUTO: 4.83 M/UL (ref 4.21–5.77)
SODIUM SERPL-SCNC: 140 MMOL/L (ref 135–144)
WBC OTHER # BLD: 7.3 K/UL (ref 3.5–11.3)

## 2023-08-11 ENCOUNTER — OFFICE VISIT (OUTPATIENT)
Dept: FAMILY MEDICINE CLINIC | Age: 73
End: 2023-08-11

## 2023-08-11 ENCOUNTER — HOSPITAL ENCOUNTER (OUTPATIENT)
Age: 73
Setting detail: SPECIMEN
Discharge: HOME | End: 2023-08-11

## 2023-08-11 VITALS
OXYGEN SATURATION: 98 % | SYSTOLIC BLOOD PRESSURE: 124 MMHG | BODY MASS INDEX: 32.18 KG/M2 | DIASTOLIC BLOOD PRESSURE: 80 MMHG | HEART RATE: 89 BPM | WEIGHT: 234 LBS

## 2023-08-11 DIAGNOSIS — Z00.00 MEDICARE ANNUAL WELLNESS VISIT, SUBSEQUENT: ICD-10-CM

## 2023-08-11 DIAGNOSIS — Z12.11 SCREENING FOR MALIGNANT NEOPLASM OF COLON: ICD-10-CM

## 2023-08-11 DIAGNOSIS — Z12.5 SCREENING FOR MALIGNANT NEOPLASM OF PROSTATE: ICD-10-CM

## 2023-08-11 DIAGNOSIS — Z12.5 ENCOUNTER FOR SCREENING FOR MALIGNANT NEOPLASM OF PROSTATE: ICD-10-CM

## 2023-08-11 DIAGNOSIS — Z76.0 MEDICATION REFILL: ICD-10-CM

## 2023-08-11 DIAGNOSIS — I10 ESSENTIAL HYPERTENSION: ICD-10-CM

## 2023-08-11 DIAGNOSIS — E78.00 HYPERCHOLESTEREMIA: ICD-10-CM

## 2023-08-11 DIAGNOSIS — E78.00 HYPERCHOLESTEREMIA: Primary | ICD-10-CM

## 2023-08-11 DIAGNOSIS — R73.03 PREDIABETES: ICD-10-CM

## 2023-08-11 LAB
CHOLEST SERPL-MCNC: 146 MG/DL
CHOLESTEROL/HDL RATIO: 4.3
HDLC SERPL-MCNC: 34 MG/DL
LDLC SERPL CALC-MCNC: 84 MG/DL (ref 0–130)
PSA SERPL-MCNC: 10.76 NG/ML
TRIGL SERPL-MCNC: 139 MG/DL

## 2023-08-11 RX ORDER — GLIPIZIDE 10 MG/1
10 TABLET ORAL EVERY MORNING
Qty: 90 TABLET | Refills: 1 | Status: SHIPPED | OUTPATIENT
Start: 2023-08-11

## 2023-08-11 RX ORDER — AMITRIPTYLINE HYDROCHLORIDE 75 MG/1
TABLET ORAL
Qty: 90 TABLET | Refills: 1 | Status: SHIPPED | OUTPATIENT
Start: 2023-08-11

## 2023-08-11 RX ORDER — SIMVASTATIN 20 MG
TABLET ORAL
Qty: 90 TABLET | Refills: 1 | Status: SHIPPED | OUTPATIENT
Start: 2023-08-11

## 2023-08-11 RX ORDER — LOSARTAN POTASSIUM 50 MG/1
TABLET ORAL
Qty: 90 TABLET | Refills: 1 | Status: SHIPPED | OUTPATIENT
Start: 2023-08-11

## 2023-08-11 RX ORDER — AMLODIPINE BESYLATE 10 MG/1
10 TABLET ORAL EVERY MORNING
Qty: 90 TABLET | Refills: 1 | Status: SHIPPED | OUTPATIENT
Start: 2023-08-11

## 2023-08-11 RX ORDER — AMLODIPINE BESYLATE 10 MG/1
10 TABLET ORAL DAILY
Qty: 90 TABLET | Refills: 1 | Status: SHIPPED | OUTPATIENT
Start: 2023-08-11

## 2023-08-11 ASSESSMENT — PATIENT HEALTH QUESTIONNAIRE - PHQ9
SUM OF ALL RESPONSES TO PHQ9 QUESTIONS 1 & 2: 0
4. FEELING TIRED OR HAVING LITTLE ENERGY: 0
6. FEELING BAD ABOUT YOURSELF - OR THAT YOU ARE A FAILURE OR HAVE LET YOURSELF OR YOUR FAMILY DOWN: 0
1. LITTLE INTEREST OR PLEASURE IN DOING THINGS: 0
SUM OF ALL RESPONSES TO PHQ QUESTIONS 1-9: 0
SUM OF ALL RESPONSES TO PHQ QUESTIONS 1-9: 0
10. IF YOU CHECKED OFF ANY PROBLEMS, HOW DIFFICULT HAVE THESE PROBLEMS MADE IT FOR YOU TO DO YOUR WORK, TAKE CARE OF THINGS AT HOME, OR GET ALONG WITH OTHER PEOPLE: 0
9. THOUGHTS THAT YOU WOULD BE BETTER OFF DEAD, OR OF HURTING YOURSELF: 0
3. TROUBLE FALLING OR STAYING ASLEEP: 0
SUM OF ALL RESPONSES TO PHQ QUESTIONS 1-9: 0
5. POOR APPETITE OR OVEREATING: 0
8. MOVING OR SPEAKING SO SLOWLY THAT OTHER PEOPLE COULD HAVE NOTICED. OR THE OPPOSITE, BEING SO FIGETY OR RESTLESS THAT YOU HAVE BEEN MOVING AROUND A LOT MORE THAN USUAL: 0
2. FEELING DOWN, DEPRESSED OR HOPELESS: 0
7. TROUBLE CONCENTRATING ON THINGS, SUCH AS READING THE NEWSPAPER OR WATCHING TELEVISION: 0
SUM OF ALL RESPONSES TO PHQ QUESTIONS 1-9: 0

## 2023-08-11 ASSESSMENT — LIFESTYLE VARIABLES
HOW OFTEN DO YOU HAVE A DRINK CONTAINING ALCOHOL: NEVER
HOW MANY STANDARD DRINKS CONTAINING ALCOHOL DO YOU HAVE ON A TYPICAL DAY: PATIENT DOES NOT DRINK

## 2023-08-11 NOTE — PROGRESS NOTES
Patient is present for AWV
MD   Blood Glucose Monitoring Suppl (D-CARE GLUCOMETER) w/Device KIT Provide insurance covered glucometer, check blood sugars every morning  LEANNA ConstantinoTeam (Including outside providers/suppliers regularly involved in providing care):   Patient Care Team:  AMERICA Atwood CNP as PCP - General (Family Nurse Practitioner)  AMERICA Atwood CNP as PCP - Empaneled Provider  Pushpa Christianson MD as Consulting Physician (Pain Management)  Yan Wasserman MD as Consulting Physician (Ophthalmology)     Reviewed and updated this visit:  Allergies  Meds

## 2023-08-15 DIAGNOSIS — R97.20 ELEVATED PSA: Primary | ICD-10-CM

## 2023-10-05 LAB — NONINV COLON CA DNA+OCC BLD SCRN STL QL: NEGATIVE

## 2023-12-11 ENCOUNTER — OFFICE VISIT (OUTPATIENT)
Dept: FAMILY MEDICINE CLINIC | Age: 73
End: 2023-12-11
Payer: MEDICARE

## 2023-12-11 VITALS
DIASTOLIC BLOOD PRESSURE: 74 MMHG | HEART RATE: 89 BPM | BODY MASS INDEX: 32.68 KG/M2 | OXYGEN SATURATION: 98 % | WEIGHT: 237.6 LBS | SYSTOLIC BLOOD PRESSURE: 124 MMHG

## 2023-12-11 DIAGNOSIS — I10 ESSENTIAL HYPERTENSION: ICD-10-CM

## 2023-12-11 DIAGNOSIS — R73.03 PREDIABETES: Primary | ICD-10-CM

## 2023-12-11 DIAGNOSIS — F33.9 RECURRENT MAJOR DEPRESSIVE DISORDER, REMISSION STATUS UNSPECIFIED (HCC): ICD-10-CM

## 2023-12-11 LAB — HBA1C MFR BLD: 6.5 %

## 2023-12-11 PROCEDURE — 83036 HEMOGLOBIN GLYCOSYLATED A1C: CPT | Performed by: NURSE PRACTITIONER

## 2023-12-11 PROCEDURE — 1123F ACP DISCUSS/DSCN MKR DOCD: CPT | Performed by: NURSE PRACTITIONER

## 2023-12-11 PROCEDURE — G8484 FLU IMMUNIZE NO ADMIN: HCPCS | Performed by: NURSE PRACTITIONER

## 2023-12-11 PROCEDURE — 3078F DIAST BP <80 MM HG: CPT | Performed by: NURSE PRACTITIONER

## 2023-12-11 PROCEDURE — 3017F COLORECTAL CA SCREEN DOC REV: CPT | Performed by: NURSE PRACTITIONER

## 2023-12-11 PROCEDURE — G8417 CALC BMI ABV UP PARAM F/U: HCPCS | Performed by: NURSE PRACTITIONER

## 2023-12-11 PROCEDURE — G8427 DOCREV CUR MEDS BY ELIG CLIN: HCPCS | Performed by: NURSE PRACTITIONER

## 2023-12-11 PROCEDURE — 99214 OFFICE O/P EST MOD 30 MIN: CPT | Performed by: NURSE PRACTITIONER

## 2023-12-11 PROCEDURE — 3074F SYST BP LT 130 MM HG: CPT | Performed by: NURSE PRACTITIONER

## 2023-12-11 PROCEDURE — 1036F TOBACCO NON-USER: CPT | Performed by: NURSE PRACTITIONER

## 2023-12-11 NOTE — PROGRESS NOTES
Brianne Solano (:  1950) is a 75 y.o. male,Established patient, here for evaluation of the following chief complaint(s):  Diabetes (Patient is here for 4 month follow up.)         ASSESSMENT/PLAN:  1. Prediabetes  -     POCT glycosylated hemoglobin (Hb A1C)  Stable on glipizide continue medication    2. Essential hypertension  Stable on losartan, amlodipine continue medication    3. Recurrent major depressive disorder, remission status unspecified ([address])  Stable      Return in about 3 months (around 3/11/2024). Subjective   SUBJECTIVE/OBJECTIVE:  Diabetes  Pertinent negatives for diabetes include no chest pain. Dm A1c is 6.5 today. Reminded about eye exam.   Already had flu shot. Review of Systems   Constitutional:  Negative for chills and fever. Respiratory:  Negative for cough and shortness of breath. Cardiovascular:  Negative for chest pain and leg swelling. Objective   Vitals:    23 1013   BP: 124/74   Pulse: 89   SpO2: 98%     Physical Exam  Constitutional:       General: He is not in acute distress. Appearance: He is well-developed. HENT:      Head: Normocephalic. Right Ear: External ear normal.      Left Ear: External ear normal.   Cardiovascular:      Rate and Rhythm: Normal rate and regular rhythm. Heart sounds: Normal heart sounds. Pulmonary:      Effort: Pulmonary effort is normal.      Breath sounds: Normal breath sounds. Musculoskeletal:         General: Normal range of motion. Skin:     General: Skin is warm and dry. Neurological:      Mental Status: He is alert and oriented to person, place, and time. An electronic signature was used to authenticate this note.     --AMERICA Vora - CNP

## 2024-01-16 ENCOUNTER — OFFICE VISIT (OUTPATIENT)
Dept: NEUROLOGY | Age: 74
End: 2024-01-16
Payer: MEDICARE

## 2024-01-16 VITALS
WEIGHT: 240 LBS | HEIGHT: 72 IN | BODY MASS INDEX: 32.51 KG/M2 | SYSTOLIC BLOOD PRESSURE: 121 MMHG | DIASTOLIC BLOOD PRESSURE: 75 MMHG | HEART RATE: 85 BPM

## 2024-01-16 DIAGNOSIS — G89.0 THALAMIC PAIN SYNDROME: ICD-10-CM

## 2024-01-16 DIAGNOSIS — I63.9 CEREBRAL INFARCTION, UNSPECIFIED MECHANISM (HCC): ICD-10-CM

## 2024-01-16 DIAGNOSIS — H90.0 CONDUCTIVE HEARING LOSS, BILATERAL: Primary | ICD-10-CM

## 2024-01-16 PROCEDURE — 99214 OFFICE O/P EST MOD 30 MIN: CPT | Performed by: PSYCHIATRY & NEUROLOGY

## 2024-01-16 PROCEDURE — G8417 CALC BMI ABV UP PARAM F/U: HCPCS | Performed by: PSYCHIATRY & NEUROLOGY

## 2024-01-16 PROCEDURE — G8427 DOCREV CUR MEDS BY ELIG CLIN: HCPCS | Performed by: PSYCHIATRY & NEUROLOGY

## 2024-01-16 PROCEDURE — 3074F SYST BP LT 130 MM HG: CPT | Performed by: PSYCHIATRY & NEUROLOGY

## 2024-01-16 PROCEDURE — 1123F ACP DISCUSS/DSCN MKR DOCD: CPT | Performed by: PSYCHIATRY & NEUROLOGY

## 2024-01-16 PROCEDURE — G8484 FLU IMMUNIZE NO ADMIN: HCPCS | Performed by: PSYCHIATRY & NEUROLOGY

## 2024-01-16 PROCEDURE — 3078F DIAST BP <80 MM HG: CPT | Performed by: PSYCHIATRY & NEUROLOGY

## 2024-01-16 PROCEDURE — 3017F COLORECTAL CA SCREEN DOC REV: CPT | Performed by: PSYCHIATRY & NEUROLOGY

## 2024-01-16 PROCEDURE — 1036F TOBACCO NON-USER: CPT | Performed by: PSYCHIATRY & NEUROLOGY

## 2024-01-16 RX ORDER — AMITRIPTYLINE HYDROCHLORIDE 50 MG/1
50 TABLET, FILM COATED ORAL NIGHTLY
Qty: 90 TABLET | Refills: 3 | Status: SHIPPED | OUTPATIENT
Start: 2024-01-16

## 2024-01-16 ASSESSMENT — ENCOUNTER SYMPTOMS
RESPIRATORY NEGATIVE: 1
EYES NEGATIVE: 1
ALLERGIC/IMMUNOLOGIC NEGATIVE: 1
GASTROINTESTINAL NEGATIVE: 1

## 2024-01-16 NOTE — PROGRESS NOTES
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 The condition is he continues with numbness of left arm and left leg . There burning or stabbing attenuated on elavil 75 mg po qhs tolerating medication well . There is sensitivity to touch with some stinging sensation when pressure is put on skin on left side such as when weaning long pants . Elavil also helps him with sleep . He has had bilateral knee replacements in June and October 2022 . There is hearing loss more right ear not wearing hearing aides . He is on aspirin 325 mg po qd . His stroke was in 2013 with left side numbness with stinging pain in left arm and leg placed on elavil 75 mg po qhs  . He still reports intermittent numbness in upper outer arm and left thigh about twice per week lasting about one hour . There is no weakness , bulbar or visual complains .  There is postural instability on his feet do to arthritic knees . There is no heart disturbance . He has tried coming off elavil having insomnia off this medication . There is mild forgetfulness . Significant medications zocor 20 mg po qd , aspirin 325 mg po qd, elavil 50 mg po qhs. Testing MRI of Head with chronic right thalamc infarction along with chronic bilateral basal ganglia infarcts with nonspecific areas of subcortical microhemorrhages , March 2014 . Carotid US left ICA with mild plaquing. Right ICA normal,  March 2014  . Cardiac 2 D echo normal LVF EF 58 % . Grade 1 diastolic dysfunction . Mild MR and TR , June 2013 . Carotid US < 50 % stenosis bilaterally , September 2022     Past Medical History:   Diagnosis Date    Cataracts, bilateral     only left eye, had surgery on right    Cerebral artery occlusion with cerebral infarction (HCC) 2014    weakness left leg, sees Dr Salinas    Diabetes mellitus (HCC)     type II, managed by Dr Sousa (PCP)  range , checks daily    Eye disorder     history of a

## 2024-01-31 DIAGNOSIS — R73.03 PREDIABETES: ICD-10-CM

## 2024-01-31 NOTE — TELEPHONE ENCOUNTER
Last Visit:  12/11/2023     Next Visit Date:  Future Appointments   Date Time Provider Department Center   3/11/2024 10:15 AM Everett Marin, APRN - CNP [facility]       Health Maintenance   Topic Date Due    DTaP/Tdap/Td vaccine (1 - Tdap) Never done    Shingles vaccine (1 of 2) Never done    Respiratory Syncytial Virus (RSV) Pregnant or age 60 yrs+ (1 - 1-dose 60+ series) Never done    Diabetic retinal exam  01/29/2020    Flu vaccine (1) 08/01/2023    Diabetic foot exam  08/09/2023    Diabetic Alb to Cr ratio (uACR) test  04/04/2024    GFR test (Diabetes, CKD 3-4, OR last GFR 15-59)  05/17/2024    Lipids  08/11/2024    Depression Monitoring  08/11/2024    Annual Wellness Visit (Medicare)  08/11/2024    Prostate Specific Antigen (PSA) Screening or Monitoring  08/11/2024    A1C test (Diabetic or Prediabetic)  12/11/2024    Colorectal Cancer Screen  09/27/2026    Pneumococcal 65+ years Vaccine  Completed    COVID-19 Vaccine  Completed    Hepatitis C screen  Addressed    Hepatitis A vaccine  Aged Out    Hepatitis B vaccine  Aged Out    Hib vaccine  Aged Out    Polio vaccine  Aged Out    Meningococcal (ACWY) vaccine  Aged Out       Hemoglobin A1C (%)   Date Value   12/11/2023 6.5   04/04/2023 7.0   01/27/2022 6.4 (H)             ( goal A1C is < 7)   No components found for: \"LABMICR\"  LDL Cholesterol (mg/dL)   Date Value   08/11/2023 84   05/06/2021 90       (goal LDL is <100)   AST (U/L)   Date Value   05/17/2023 24     ALT (U/L)   Date Value   05/17/2023 15     BUN (mg/dL)   Date Value   05/17/2023 11     BP Readings from Last 3 Encounters:   01/16/24 121/75   12/11/23 124/74   08/11/23 124/80          (goal 120/80)    All Future Testing planned in [facility]  Lab Frequency Next Occurrence               Patient Active Problem List:     Hypercholesteremia     Neuropathic pain     Weakness of both legs     Essential hypertension     Type 2 diabetes mellitus with diabetic autonomic neuropathy, without

## 2024-02-01 RX ORDER — GLIPIZIDE 10 MG/1
10 TABLET ORAL EVERY MORNING
Qty: 90 TABLET | Refills: 1 | Status: SHIPPED | OUTPATIENT
Start: 2024-02-01

## 2024-02-13 NOTE — TELEPHONE ENCOUNTER
Kavita patients wife called in stating that they never received patient's medication Amitriptyline 50 mg. I reviewed the pharmacy that it went to with her she stated that it was incorrect. The patient is now using [facility] as his pharmacy. Pharmacy corrected and medication pending. Can you please advise

## 2024-02-16 RX ORDER — AMITRIPTYLINE HYDROCHLORIDE 50 MG/1
50 TABLET, FILM COATED ORAL NIGHTLY
Qty: 90 TABLET | Refills: 3 | Status: SHIPPED | OUTPATIENT
Start: 2024-02-16

## 2024-02-19 DIAGNOSIS — Z76.0 MEDICATION REFILL: ICD-10-CM

## 2024-02-19 DIAGNOSIS — I10 ESSENTIAL HYPERTENSION: ICD-10-CM

## 2024-02-22 RX ORDER — AMLODIPINE BESYLATE 10 MG/1
10 TABLET ORAL EVERY MORNING
Qty: 90 TABLET | Refills: 1 | Status: SHIPPED | OUTPATIENT
Start: 2024-02-22

## 2024-02-22 RX ORDER — LOSARTAN POTASSIUM 50 MG/1
TABLET ORAL
Qty: 90 TABLET | Refills: 1 | Status: SHIPPED | OUTPATIENT
Start: 2024-02-22

## 2024-02-22 RX ORDER — SIMVASTATIN 20 MG
TABLET ORAL
Qty: 90 TABLET | Refills: 1 | Status: SHIPPED | OUTPATIENT
Start: 2024-02-22

## 2024-04-26 ENCOUNTER — OFFICE VISIT (OUTPATIENT)
Dept: FAMILY MEDICINE CLINIC | Age: 74
End: 2024-04-26

## 2024-04-26 ENCOUNTER — HOSPITAL ENCOUNTER (OUTPATIENT)
Facility: CLINIC | Age: 74
End: 2024-04-26
Payer: MEDICARE

## 2024-04-26 ENCOUNTER — HOSPITAL ENCOUNTER (OUTPATIENT)
Dept: GENERAL RADIOLOGY | Facility: CLINIC | Age: 74
End: 2024-04-26
Payer: MEDICARE

## 2024-04-26 VITALS
DIASTOLIC BLOOD PRESSURE: 70 MMHG | BODY MASS INDEX: 32.55 KG/M2 | WEIGHT: 240 LBS | OXYGEN SATURATION: 97 % | SYSTOLIC BLOOD PRESSURE: 130 MMHG | HEART RATE: 85 BPM

## 2024-04-26 DIAGNOSIS — F33.9 RECURRENT MAJOR DEPRESSIVE DISORDER, REMISSION STATUS UNSPECIFIED (HCC): ICD-10-CM

## 2024-04-26 DIAGNOSIS — E11.43 TYPE 2 DIABETES MELLITUS WITH DIABETIC AUTONOMIC NEUROPATHY, WITHOUT LONG-TERM CURRENT USE OF INSULIN (HCC): Primary | ICD-10-CM

## 2024-04-26 DIAGNOSIS — I10 ESSENTIAL HYPERTENSION: ICD-10-CM

## 2024-04-26 DIAGNOSIS — M54.2 NECK PAIN: ICD-10-CM

## 2024-04-26 LAB — HBA1C MFR BLD: 6.5 %

## 2024-04-26 PROCEDURE — 72040 X-RAY EXAM NECK SPINE 2-3 VW: CPT

## 2024-04-26 RX ORDER — ASPIRIN 325 MG
325 TABLET ORAL DAILY
COMMUNITY

## 2024-04-26 SDOH — ECONOMIC STABILITY: FOOD INSECURITY: WITHIN THE PAST 12 MONTHS, YOU WORRIED THAT YOUR FOOD WOULD RUN OUT BEFORE YOU GOT MONEY TO BUY MORE.: NEVER TRUE

## 2024-04-26 SDOH — ECONOMIC STABILITY: FOOD INSECURITY: WITHIN THE PAST 12 MONTHS, THE FOOD YOU BOUGHT JUST DIDN'T LAST AND YOU DIDN'T HAVE MONEY TO GET MORE.: NEVER TRUE

## 2024-04-26 SDOH — ECONOMIC STABILITY: INCOME INSECURITY: HOW HARD IS IT FOR YOU TO PAY FOR THE VERY BASICS LIKE FOOD, HOUSING, MEDICAL CARE, AND HEATING?: NOT HARD AT ALL

## 2024-04-26 ASSESSMENT — PATIENT HEALTH QUESTIONNAIRE - PHQ9
6. FEELING BAD ABOUT YOURSELF - OR THAT YOU ARE A FAILURE OR HAVE LET YOURSELF OR YOUR FAMILY DOWN: NOT AT ALL
SUM OF ALL RESPONSES TO PHQ QUESTIONS 1-9: 0
10. IF YOU CHECKED OFF ANY PROBLEMS, HOW DIFFICULT HAVE THESE PROBLEMS MADE IT FOR YOU TO DO YOUR WORK, TAKE CARE OF THINGS AT HOME, OR GET ALONG WITH OTHER PEOPLE: NOT DIFFICULT AT ALL
5. POOR APPETITE OR OVEREATING: NOT AT ALL
8. MOVING OR SPEAKING SO SLOWLY THAT OTHER PEOPLE COULD HAVE NOTICED. OR THE OPPOSITE, BEING SO FIGETY OR RESTLESS THAT YOU HAVE BEEN MOVING AROUND A LOT MORE THAN USUAL: NOT AT ALL
SUM OF ALL RESPONSES TO PHQ QUESTIONS 1-9: 0
SUM OF ALL RESPONSES TO PHQ9 QUESTIONS 1 & 2: 0
4. FEELING TIRED OR HAVING LITTLE ENERGY: NOT AT ALL
SUM OF ALL RESPONSES TO PHQ QUESTIONS 1-9: 0
2. FEELING DOWN, DEPRESSED OR HOPELESS: NOT AT ALL
1. LITTLE INTEREST OR PLEASURE IN DOING THINGS: NOT AT ALL
7. TROUBLE CONCENTRATING ON THINGS, SUCH AS READING THE NEWSPAPER OR WATCHING TELEVISION: NOT AT ALL
3. TROUBLE FALLING OR STAYING ASLEEP: NOT AT ALL
SUM OF ALL RESPONSES TO PHQ QUESTIONS 1-9: 0
9. THOUGHTS THAT YOU WOULD BE BETTER OFF DEAD, OR OF HURTING YOURSELF: NOT AT ALL

## 2024-04-26 ASSESSMENT — ENCOUNTER SYMPTOMS
SHORTNESS OF BREATH: 0
COUGH: 0

## 2024-04-26 NOTE — PROGRESS NOTES
Corbin Gallardo (:  1950) is a 74 y.o. male,Established patient, here for evaluation of the following chief complaint(s):  Diabetes      Assessment & Plan   ASSESSMENT/PLAN:  1. Type 2 diabetes mellitus with diabetic autonomic neuropathy, without long-term current use of insulin ([facility])  A1c is 6.5, continue medication.   -     POCT glycosylated hemoglobin (Hb A1C)  2. Essential hypertension  stable  3. Recurrent major depressive disorder, remission status unspecified ([facility])  4. Neck pain  -     External Referral To Physical Therapy  -     XR CERVICAL SPINE (2-3 VIEWS); Future      No follow-ups on file.         Subjective   SUBJECTIVE/OBJECTIVE:  Diabetes  Pertinent negatives for diabetes include no chest pain.   Right side of neck pops when he turns his head to the right.  It is bothersome.  It hurts when it pops and then the pain goes away. No other sxs.  No known injury.  Going on a few months.  Has not tried any tx.     Review of Systems   Constitutional:  Negative for chills and fever.   Respiratory:  Negative for cough and shortness of breath.    Cardiovascular:  Negative for chest pain and leg swelling.          Objective   Vitals:    24 0818   BP: 130/70   Pulse: 85   SpO2: 97%     Physical Exam  Constitutional:       General: He is not in acute distress.     Appearance: He is well-developed.   HENT:      Head: Normocephalic.      Right Ear: External ear normal.      Left Ear: External ear normal.   Neck:      Comments: Tense cervical spinal muscle to right side of neck, tender to palpation.  Do feel some clicking when head is rotated to the right.  Cardiovascular:      Rate and Rhythm: Normal rate and regular rhythm.      Heart sounds: Normal heart sounds.   Pulmonary:      Effort: Pulmonary effort is normal.      Breath sounds: Normal breath sounds.   Musculoskeletal:      Cervical back: No edema or erythema. Decreased range of motion (d/t pain).   Skin:     General: Skin is warm and

## 2024-04-29 DIAGNOSIS — R93.89 ABNORMAL X-RAY: Primary | ICD-10-CM

## 2024-05-02 ENCOUNTER — HOSPITAL ENCOUNTER (OUTPATIENT)
Dept: CT IMAGING | Facility: CLINIC | Age: 74
Discharge: HOME | End: 2024-05-04
Payer: MEDICARE

## 2024-05-02 DIAGNOSIS — R93.89 ABNORMAL X-RAY: ICD-10-CM

## 2024-05-02 PROCEDURE — 72125 CT NECK SPINE W/O DYE: CPT

## 2024-07-09 DIAGNOSIS — R73.03 PREDIABETES: ICD-10-CM

## 2024-07-09 RX ORDER — GLIPIZIDE 10 MG/1
10 TABLET ORAL EVERY MORNING
Qty: 90 TABLET | Refills: 1 | Status: SHIPPED | OUTPATIENT
Start: 2024-07-09

## 2024-07-09 NOTE — TELEPHONE ENCOUNTER
Last visit: 04/26/24  Last Med refill: 04/23/24  Does patient have enough medication for 72 hours: Yes    Next Visit Date:  Future Appointments   Date Time Provider Department Center   7/26/2024  8:15 AM Everett Marin, APRN - CNP [facility]       Health Maintenance   Topic Date Due    DTaP/Tdap/Td vaccine (1 - Tdap) Never done    Shingles vaccine (1 of 2) Never done    Respiratory Syncytial Virus (RSV) Pregnant or age 60 yrs+ (1 - 1-dose 60+ series) Never done    Diabetic retinal exam  01/29/2020    Diabetic foot exam  08/09/2023    Flu vaccine (1) 08/01/2024    Lipids  08/11/2024    Annual Wellness Visit (Medicare)  08/11/2024    Prostate Specific Antigen (PSA) Screening or Monitoring  08/11/2024    A1C test (Diabetic or Prediabetic)  04/26/2025    Depression Monitoring  04/26/2025    Colorectal Cancer Screen  09/27/2026    Pneumococcal 65+ years Vaccine  Completed    COVID-19 Vaccine  Completed    Hepatitis C screen  Addressed    Hepatitis A vaccine  Aged Out    Hepatitis B vaccine  Aged Out    Hib vaccine  Aged Out    Polio vaccine  Aged Out    Meningococcal (ACWY) vaccine  Aged Out    Diabetic Alb to Cr ratio (uACR) test  Discontinued    GFR test (Diabetes, CKD 3-4, OR last GFR 15-59)  Discontinued       Hemoglobin A1C (%)   Date Value   04/26/2024 6.5   12/11/2023 6.5   04/04/2023 7.0             ( goal A1C is < 7)   No components found for: \"LABMICR\"  No components found for: \"LDLCHOLESTEROL\", \"LDLCALC\"    (goal LDL is <100)   AST (U/L)   Date Value   05/17/2023 24     ALT (U/L)   Date Value   05/17/2023 15     BUN (mg/dL)   Date Value   05/17/2023 11     BP Readings from Last 3 Encounters:   04/26/24 130/70   01/16/24 121/75   12/11/23 124/74          (goal 120/80)    All Future Testing planned in [facility]  Lab Frequency Next Occurrence               Patient Active Problem List:     Hypercholesteremia     Neuropathic pain     Weakness of both legs     Essential hypertension     Type 2

## 2024-07-26 ENCOUNTER — HOSPITAL ENCOUNTER (OUTPATIENT)
Dept: GENERAL RADIOLOGY | Facility: CLINIC | Age: 74
End: 2024-07-26
Payer: MEDICARE

## 2024-07-26 ENCOUNTER — OFFICE VISIT (OUTPATIENT)
Dept: FAMILY MEDICINE CLINIC | Age: 74
End: 2024-07-26

## 2024-07-26 ENCOUNTER — HOSPITAL ENCOUNTER (OUTPATIENT)
Facility: CLINIC | Age: 74
End: 2024-07-26
Payer: MEDICARE

## 2024-07-26 ENCOUNTER — HOSPITAL ENCOUNTER (OUTPATIENT)
Age: 74
Setting detail: SPECIMEN
Discharge: HOME | End: 2024-07-26

## 2024-07-26 VITALS
DIASTOLIC BLOOD PRESSURE: 80 MMHG | BODY MASS INDEX: 32.14 KG/M2 | OXYGEN SATURATION: 96 % | HEART RATE: 91 BPM | WEIGHT: 237 LBS | SYSTOLIC BLOOD PRESSURE: 130 MMHG

## 2024-07-26 DIAGNOSIS — Z12.5 SCREENING FOR MALIGNANT NEOPLASM OF PROSTATE: ICD-10-CM

## 2024-07-26 DIAGNOSIS — R06.09 DOE (DYSPNEA ON EXERTION): ICD-10-CM

## 2024-07-26 DIAGNOSIS — H34.8392 BRANCH RETINAL VEIN OCCLUSION, UNSPECIFIED COMPLICATION STATUS, UNSPECIFIED LATERALITY: ICD-10-CM

## 2024-07-26 DIAGNOSIS — Z76.0 MEDICATION REFILL: ICD-10-CM

## 2024-07-26 DIAGNOSIS — I10 ESSENTIAL HYPERTENSION: ICD-10-CM

## 2024-07-26 DIAGNOSIS — E11.43 TYPE 2 DIABETES MELLITUS WITH DIABETIC AUTONOMIC NEUROPATHY, WITHOUT LONG-TERM CURRENT USE OF INSULIN (HCC): Primary | ICD-10-CM

## 2024-07-26 LAB
ALBUMIN SERPL-MCNC: 4.3 G/DL (ref 3.5–5.2)
ALBUMIN/GLOB SERPL: 2 {RATIO} (ref 1–2.5)
ALP SERPL-CCNC: 72 U/L (ref 40–129)
ALT SERPL-CCNC: 22 U/L (ref 10–50)
ANION GAP SERPL CALCULATED.3IONS-SCNC: 8 MMOL/L (ref 9–16)
AST SERPL-CCNC: 26 U/L (ref 10–50)
BILIRUB SERPL-MCNC: 0.6 MG/DL (ref 0–1.2)
BNP SERPL-MCNC: 1448 PG/ML (ref 0–300)
BUN SERPL-MCNC: 16 MG/DL (ref 8–23)
CALCIUM SERPL-MCNC: 9.4 MG/DL (ref 8.6–10.4)
CHLORIDE SERPL-SCNC: 104 MMOL/L (ref 98–107)
CHOLEST SERPL-MCNC: 147 MG/DL (ref 0–199)
CHOLESTEROL/HDL RATIO: 4
CO2 SERPL-SCNC: 29 MMOL/L (ref 20–31)
CREAT SERPL-MCNC: 0.8 MG/DL (ref 0.7–1.2)
ERYTHROCYTE [DISTWIDTH] IN BLOOD BY AUTOMATED COUNT: 14.3 % (ref 11.8–14.4)
GFR, ESTIMATED: >90 ML/MIN/1.73M2
GLUCOSE SERPL-MCNC: 169 MG/DL (ref 74–99)
HBA1C MFR BLD: 6.4 %
HCT VFR BLD AUTO: 41.4 % (ref 40.7–50.3)
HDLC SERPL-MCNC: 36 MG/DL
HGB BLD-MCNC: 13.1 G/DL (ref 13–17)
LDLC SERPL CALC-MCNC: 88 MG/DL (ref 0–100)
MCH RBC QN AUTO: 30.2 PG (ref 25.2–33.5)
MCHC RBC AUTO-ENTMCNC: 31.6 G/DL (ref 28.4–34.8)
MCV RBC AUTO: 95.4 FL (ref 82.6–102.9)
NRBC BLD-RTO: 0 PER 100 WBC
PLATELET # BLD AUTO: 204 K/UL (ref 138–453)
PMV BLD AUTO: 10.9 FL (ref 8.1–13.5)
POTASSIUM SERPL-SCNC: 4.4 MMOL/L (ref 3.7–5.3)
PROT SERPL-MCNC: 6.7 G/DL (ref 6.6–8.7)
PSA SERPL-MCNC: 5.4 NG/ML (ref 0–4)
RBC # BLD AUTO: 4.34 M/UL (ref 4.21–5.77)
SODIUM SERPL-SCNC: 141 MMOL/L (ref 136–145)
TRIGL SERPL-MCNC: 114 MG/DL
VLDLC SERPL CALC-MCNC: 23 MG/DL
WBC OTHER # BLD: 7.9 K/UL (ref 3.5–11.3)

## 2024-07-26 PROCEDURE — 71046 X-RAY EXAM CHEST 2 VIEWS: CPT

## 2024-07-26 ASSESSMENT — ENCOUNTER SYMPTOMS
SHORTNESS OF BREATH: 0
COUGH: 0

## 2024-07-26 NOTE — PROGRESS NOTES
Patient is present for 3 month f/u for DM    States he does not need refills at this time  
is normal.      Breath sounds: Normal breath sounds.   Musculoskeletal:         General: Normal range of motion.   Skin:     General: Skin is warm and dry.   Neurological:      Mental Status: He is alert and oriented to person, place, and time.            An electronic signature was used to authenticate this note.    --AMERICA CHILDERS - CNP

## 2024-07-30 ENCOUNTER — TELEPHONE (OUTPATIENT)
Dept: FAMILY MEDICINE CLINIC | Age: 74
End: 2024-07-30

## 2024-07-30 DIAGNOSIS — R06.09 DOE (DYSPNEA ON EXERTION): ICD-10-CM

## 2024-07-30 DIAGNOSIS — Z76.0 MEDICATION REFILL: ICD-10-CM

## 2024-07-30 DIAGNOSIS — R79.89 ELEVATED BRAIN NATRIURETIC PEPTIDE (BNP) LEVEL: Primary | ICD-10-CM

## 2024-07-30 RX ORDER — LOSARTAN POTASSIUM 50 MG/1
TABLET ORAL
Qty: 90 TABLET | Refills: 1 | Status: SHIPPED | OUTPATIENT
Start: 2024-07-30

## 2024-07-30 RX ORDER — FUROSEMIDE 20 MG/1
20 TABLET ORAL DAILY
Qty: 3 TABLET | Refills: 0 | Status: SHIPPED | OUTPATIENT
Start: 2024-07-30 | End: 2024-08-02

## 2024-07-30 NOTE — TELEPHONE ENCOUNTER
Patient's wife called asking what the next step is for patients SOB. I do see patient completed a chest x-ray. She is asking if further testing is needed or if they need to see a specialist.

## 2024-07-30 NOTE — TELEPHONE ENCOUNTER
Pt wife was notified. She will schedule the Echo. Pt wife states he is not any worse since Friday. She states he is still about the same.

## 2024-07-30 NOTE — TELEPHONE ENCOUNTER
Last visit: 07/26/2024  Last Med refill: 05/17/2024  Does patient have enough medication for 72 hours: No:     Next Visit Date:  Future Appointments   Date Time Provider Department Center   8/9/2024  8:30 AM [facility] ECHO 1 [facility]   10/28/2024  8:45 AM Everett Marin, APRN - CNP [facility]       Health Maintenance   Topic Date Due    DTaP/Tdap/Td vaccine (1 - Tdap) Never done    Shingles vaccine (1 of 2) Never done    Respiratory Syncytial Virus (RSV) Pregnant or age 60 yrs+ (1 - 1-dose 60+ series) Never done    Diabetic retinal exam  01/29/2020    Diabetic foot exam  08/09/2023    Flu vaccine (1) 08/01/2024    Annual Wellness Visit (Medicare)  08/11/2024    Depression Monitoring  04/26/2025    A1C test (Diabetic or Prediabetic)  07/26/2025    Lipids  07/26/2025    Prostate Specific Antigen (PSA) Screening or Monitoring  07/26/2025    Colorectal Cancer Screen  09/27/2026    Pneumococcal 65+ years Vaccine  Completed    COVID-19 Vaccine  Completed    Hepatitis C screen  Addressed    Hepatitis A vaccine  Aged Out    Hepatitis B vaccine  Aged Out    Hib vaccine  Aged Out    Polio vaccine  Aged Out    Meningococcal (ACWY) vaccine  Aged Out    Diabetic Alb to Cr ratio (uACR) test  Discontinued    GFR test (Diabetes, CKD 3-4, OR last GFR 15-59)  Discontinued       Hemoglobin A1C (%)   Date Value   07/26/2024 6.4   04/26/2024 6.5   12/11/2023 6.5             ( goal A1C is < 7)   No components found for: \"LABMICR\"  No components found for: \"LDLCHOLESTEROL\", \"LDLCALC\"    (goal LDL is <100)   AST (U/L)   Date Value   07/26/2024 26     ALT (U/L)   Date Value   07/26/2024 22     BUN (mg/dL)   Date Value   07/26/2024 16     BP Readings from Last 3 Encounters:   07/26/24 130/80   04/26/24 130/70   01/16/24 121/75          (goal 120/80)    All Future Testing planned in [facility]  Lab Frequency Next Occurrence   Echo (TTE) complete (PRN contrast/bubble/strain/3D) Once 07/30/2024               Patient Active

## 2024-07-30 NOTE — TELEPHONE ENCOUNTER
I have called him in 3 days of a water pill and ordered and echocardiogram.  His labs suggest he has some heart failure.  Is any more symptomatic than he was on Friday?

## 2024-08-05 NOTE — TELEPHONE ENCOUNTER
Kavita was notified of response from Everett. Kavita is trying to talk him into going to [facility] states he will go tomorrow.

## 2024-08-05 NOTE — TELEPHONE ENCOUNTER
Patient wife called wanting to let provider know that patient is still having sob and not feeling well.  States his echo is scheduled for 8/9.    Asked wife if he is feeling any worse or having any new symptoms.  States patient's breathing is about the same, but he is having chest pressure.    Advised wife to take him to the ER. States she will talk with him and he see what he says.

## 2024-08-09 ENCOUNTER — HOSPITAL ENCOUNTER (INPATIENT)
Age: 74
LOS: 7 days | Discharge: HOME HEALTH | DRG: 323 | End: 2024-08-16
Attending: EMERGENCY MEDICINE | Admitting: INTERNAL MEDICINE
Payer: MEDICARE

## 2024-08-09 ENCOUNTER — APPOINTMENT (OUTPATIENT)
Dept: GENERAL RADIOLOGY | Age: 74
DRG: 323 | End: 2024-08-09
Payer: MEDICARE

## 2024-08-09 ENCOUNTER — HOSPITAL ENCOUNTER (OUTPATIENT)
Age: 74
End: 2024-08-09
Payer: MEDICARE

## 2024-08-09 VITALS — SYSTOLIC BLOOD PRESSURE: 132 MMHG | HEART RATE: 99 BPM | DIASTOLIC BLOOD PRESSURE: 80 MMHG

## 2024-08-09 DIAGNOSIS — I50.21 ACUTE SYSTOLIC CONGESTIVE HEART FAILURE (HCC): ICD-10-CM

## 2024-08-09 DIAGNOSIS — I50.9 HEART FAILURE (HCC): ICD-10-CM

## 2024-08-09 DIAGNOSIS — R79.89 ELEVATED BRAIN NATRIURETIC PEPTIDE (BNP) LEVEL: ICD-10-CM

## 2024-08-09 DIAGNOSIS — I10 ESSENTIAL HYPERTENSION: ICD-10-CM

## 2024-08-09 DIAGNOSIS — I25.10 CORONARY ARTERY DISEASE INVOLVING NATIVE CORONARY ARTERY OF NATIVE HEART WITHOUT ANGINA PECTORIS: ICD-10-CM

## 2024-08-09 DIAGNOSIS — I50.21 ACUTE SYSTOLIC HEART FAILURE (HCC): ICD-10-CM

## 2024-08-09 DIAGNOSIS — Z95.5 S/P PRIMARY ANGIOPLASTY WITH CORONARY STENT: ICD-10-CM

## 2024-08-09 DIAGNOSIS — R06.09 DOE (DYSPNEA ON EXERTION): ICD-10-CM

## 2024-08-09 DIAGNOSIS — I50.9 CONGESTIVE HEART FAILURE, UNSPECIFIED HF CHRONICITY, UNSPECIFIED HEART FAILURE TYPE (HCC): Primary | ICD-10-CM

## 2024-08-09 DIAGNOSIS — I25.10 CORONARY ARTERY DISEASE: ICD-10-CM

## 2024-08-09 LAB
ANION GAP SERPL CALCULATED.3IONS-SCNC: 11 MMOL/L (ref 9–17)
BASOPHILS # BLD: 0.03 K/UL (ref 0–0.2)
BASOPHILS NFR BLD: 0 % (ref 0–2)
BNP SERPL-MCNC: 1322 PG/ML
BUN SERPL-MCNC: 20 MG/DL (ref 8–23)
BUN/CREAT SERPL: 22 (ref 9–20)
CALCIUM SERPL-MCNC: 8.9 MG/DL (ref 8.6–10.4)
CHLORIDE SERPL-SCNC: 104 MMOL/L (ref 98–107)
CO2 SERPL-SCNC: 26 MMOL/L (ref 20–31)
CREAT SERPL-MCNC: 0.9 MG/DL (ref 0.7–1.2)
ECHO AO ROOT DIAM: 3.1 CM
ECHO AO ROOT INDEX: 1.35 CM/M2
ECHO AV AREA PEAK VELOCITY: 3.3 CM2
ECHO AV AREA VTI: 3.4 CM2
ECHO AV AREA/BSA PEAK VELOCITY: 1.4 CM2/M2
ECHO AV AREA/BSA VTI: 1.5 CM2/M2
ECHO AV MEAN GRADIENT: 6 MMHG
ECHO AV MEAN GRADIENT: 6 MMHG
ECHO AV MEAN VELOCITY: 1.1 M/S
ECHO AV PEAK GRADIENT: 12 MMHG
ECHO AV PEAK VELOCITY: 1.8 M/S
ECHO AV VELOCITY RATIO: 0.67
ECHO AV VTI: 30.1 CM
ECHO BSA: 2.35 M2
ECHO EST RA PRESSURE: 3 MMHG
ECHO LA AREA 4C: 34 CM2
ECHO LA DIAMETER INDEX: 2.3 CM/M2
ECHO LA DIAMETER: 5.3 CM
ECHO LA MAJOR AXIS: 7.2 CM
ECHO LA TO AORTIC ROOT RATIO: 1.71
ECHO LA VOL MOD A4C: 126 ML (ref 18–58)
ECHO LA VOLUME INDEX MOD A4C: 55 ML/M2 (ref 16–34)
ECHO LV E' LATERAL VELOCITY: 9 CM/S
ECHO LV E' SEPTAL VELOCITY: 6 CM/S
ECHO LV FRACTIONAL SHORTENING: 11 % (ref 28–44)
ECHO LV INTERNAL DIMENSION DIASTOLE INDEX: 3.22 CM/M2
ECHO LV INTERNAL DIMENSION DIASTOLIC: 7.4 CM (ref 4.2–5.9)
ECHO LV INTERNAL DIMENSION SYSTOLIC INDEX: 2.87 CM/M2
ECHO LV INTERNAL DIMENSION SYSTOLIC: 6.6 CM
ECHO LV IVSD: 1.2 CM (ref 0.6–1)
ECHO LV MASS 2D: 446.5 G (ref 88–224)
ECHO LV MASS INDEX 2D: 194.1 G/M2 (ref 49–115)
ECHO LV POSTERIOR WALL DIASTOLIC: 1.2 CM (ref 0.6–1)
ECHO LV RELATIVE WALL THICKNESS RATIO: 0.32
ECHO LVOT AREA: 4.9 CM2
ECHO LVOT AV VTI INDEX: 0.69
ECHO LVOT DIAM: 2.5 CM
ECHO LVOT MEAN GRADIENT: 2 MMHG
ECHO LVOT PEAK GRADIENT: 5 MMHG
ECHO LVOT PEAK VELOCITY: 1.2 M/S
ECHO LVOT STROKE VOLUME INDEX: 44.4 ML/M2
ECHO LVOT SV: 102.1 ML
ECHO LVOT VTI: 20.8 CM
ECHO MV A VELOCITY: 0.5 M/S
ECHO MV E DECELERATION TIME (DT): 95 MS
ECHO MV E VELOCITY: 1.01 M/S
ECHO MV E/A RATIO: 2.02
ECHO MV E/E' LATERAL: 11.22
ECHO MV E/E' RATIO (AVERAGED): 14.03
ECHO MV E/E' SEPTAL: 16.83
ECHO RIGHT VENTRICULAR SYSTOLIC PRESSURE (RVSP): 42 MMHG
ECHO TV REGURGITANT MAX VELOCITY: 3.14 M/S
ECHO TV REGURGITANT PEAK GRADIENT: 39 MMHG
EKG ATRIAL RATE: 91 BPM
EKG P AXIS: 52 DEGREES
EKG P-R INTERVAL: 208 MS
EKG Q-T INTERVAL: 446 MS
EKG QRS DURATION: 168 MS
EKG QTC CALCULATION (BAZETT): 548 MS
EKG R AXIS: -71 DEGREES
EKG T AXIS: 90 DEGREES
EKG VENTRICULAR RATE: 91 BPM
EOSINOPHIL # BLD: 0.25 K/UL (ref 0–0.44)
EOSINOPHILS RELATIVE PERCENT: 4 % (ref 1–4)
ERYTHROCYTE [DISTWIDTH] IN BLOOD BY AUTOMATED COUNT: 14.4 % (ref 11.8–14.4)
GFR, ESTIMATED: 89 ML/MIN/1.73M2
GLUCOSE BLD-MCNC: 117 MG/DL (ref 75–110)
GLUCOSE BLD-MCNC: 196 MG/DL (ref 75–110)
GLUCOSE SERPL-MCNC: 139 MG/DL (ref 70–99)
HCT VFR BLD AUTO: 39.3 % (ref 40.7–50.3)
HGB BLD-MCNC: 12.8 G/DL (ref 13–17)
IMM GRANULOCYTES # BLD AUTO: 0.03 K/UL (ref 0–0.3)
IMM GRANULOCYTES NFR BLD: 0 %
LEFT VENTRICULAR EJECTION FRACTION MODE: NORMAL
LV EF: 20 %
LYMPHOCYTES NFR BLD: 1.52 K/UL (ref 1.1–3.7)
LYMPHOCYTES RELATIVE PERCENT: 23 % (ref 24–43)
MAGNESIUM SERPL-MCNC: 2 MG/DL (ref 1.6–2.6)
MCH RBC QN AUTO: 30.8 PG (ref 25.2–33.5)
MCHC RBC AUTO-ENTMCNC: 32.6 G/DL (ref 28.4–34.8)
MCV RBC AUTO: 94.7 FL (ref 82.6–102.9)
MONOCYTES NFR BLD: 0.44 K/UL (ref 0.1–1.2)
MONOCYTES NFR BLD: 7 % (ref 3–12)
NEUTROPHILS NFR BLD: 66 % (ref 36–65)
NEUTS SEG NFR BLD: 4.44 K/UL (ref 1.5–8.1)
NRBC BLD-RTO: 0 PER 100 WBC
PLATELET # BLD AUTO: 186 K/UL (ref 138–453)
PMV BLD AUTO: 10 FL (ref 8.1–13.5)
POTASSIUM SERPL-SCNC: 3.8 MMOL/L (ref 3.7–5.3)
RBC # BLD AUTO: 4.15 M/UL (ref 4.21–5.77)
SODIUM SERPL-SCNC: 141 MMOL/L (ref 135–144)
TROPONIN I SERPL HS-MCNC: 20 NG/L (ref 0–22)
TSH SERPL DL<=0.05 MIU/L-ACNC: 1.57 UIU/ML (ref 0.3–5)
WBC OTHER # BLD: 6.7 K/UL (ref 3.5–11.3)

## 2024-08-09 PROCEDURE — 6370000000 HC RX 637 (ALT 250 FOR IP): Performed by: NURSE PRACTITIONER

## 2024-08-09 PROCEDURE — 93306 TTE W/DOPPLER COMPLETE: CPT | Performed by: INTERNAL MEDICINE

## 2024-08-09 PROCEDURE — 84484 ASSAY OF TROPONIN QUANT: CPT

## 2024-08-09 PROCEDURE — 2580000003 HC RX 258: Performed by: NURSE PRACTITIONER

## 2024-08-09 PROCEDURE — 6360000002 HC RX W HCPCS: Performed by: NURSE PRACTITIONER

## 2024-08-09 PROCEDURE — 85025 COMPLETE CBC W/AUTO DIFF WBC: CPT

## 2024-08-09 PROCEDURE — 2060000000 HC ICU INTERMEDIATE R&B

## 2024-08-09 PROCEDURE — 99223 1ST HOSP IP/OBS HIGH 75: CPT | Performed by: NURSE PRACTITIONER

## 2024-08-09 PROCEDURE — 71046 X-RAY EXAM CHEST 2 VIEWS: CPT

## 2024-08-09 PROCEDURE — C8929 TTE W OR WO FOL WCON,DOPPLER: HCPCS

## 2024-08-09 PROCEDURE — 82947 ASSAY GLUCOSE BLOOD QUANT: CPT

## 2024-08-09 PROCEDURE — 71045 X-RAY EXAM CHEST 1 VIEW: CPT

## 2024-08-09 PROCEDURE — 84443 ASSAY THYROID STIM HORMONE: CPT

## 2024-08-09 PROCEDURE — 99285 EMERGENCY DEPT VISIT HI MDM: CPT

## 2024-08-09 PROCEDURE — 83735 ASSAY OF MAGNESIUM: CPT

## 2024-08-09 PROCEDURE — 93005 ELECTROCARDIOGRAM TRACING: CPT | Performed by: EMERGENCY MEDICINE

## 2024-08-09 PROCEDURE — 80048 BASIC METABOLIC PNL TOTAL CA: CPT

## 2024-08-09 PROCEDURE — 83880 ASSAY OF NATRIURETIC PEPTIDE: CPT

## 2024-08-09 RX ORDER — SODIUM CHLORIDE 9 MG/ML
INJECTION, SOLUTION INTRAVENOUS PRN
Status: DISCONTINUED | OUTPATIENT
Start: 2024-08-09 | End: 2024-08-16 | Stop reason: HOSPADM

## 2024-08-09 RX ORDER — ATORVASTATIN CALCIUM 20 MG/1
20 TABLET, FILM COATED ORAL DAILY
Status: DISCONTINUED | OUTPATIENT
Start: 2024-08-09 | End: 2024-08-16 | Stop reason: HOSPADM

## 2024-08-09 RX ORDER — POTASSIUM CHLORIDE 7.45 MG/ML
10 INJECTION INTRAVENOUS PRN
Status: DISCONTINUED | OUTPATIENT
Start: 2024-08-09 | End: 2024-08-16 | Stop reason: HOSPADM

## 2024-08-09 RX ORDER — M-VIT,TX,IRON,MINS/CALC/FOLIC 27MG-0.4MG
1 TABLET ORAL DAILY
COMMUNITY

## 2024-08-09 RX ORDER — DEXTROSE MONOHYDRATE 100 MG/ML
INJECTION, SOLUTION INTRAVENOUS CONTINUOUS PRN
Status: DISCONTINUED | OUTPATIENT
Start: 2024-08-09 | End: 2024-08-16 | Stop reason: HOSPADM

## 2024-08-09 RX ORDER — POLYETHYLENE GLYCOL 3350 17 G/17G
17 POWDER, FOR SOLUTION ORAL DAILY PRN
Status: DISCONTINUED | OUTPATIENT
Start: 2024-08-09 | End: 2024-08-16 | Stop reason: HOSPADM

## 2024-08-09 RX ORDER — ONDANSETRON 4 MG/1
4 TABLET, ORALLY DISINTEGRATING ORAL EVERY 8 HOURS PRN
Status: DISCONTINUED | OUTPATIENT
Start: 2024-08-09 | End: 2024-08-16 | Stop reason: HOSPADM

## 2024-08-09 RX ORDER — LOSARTAN POTASSIUM 50 MG/1
50 TABLET ORAL DAILY
Status: DISCONTINUED | OUTPATIENT
Start: 2024-08-09 | End: 2024-08-10

## 2024-08-09 RX ORDER — SODIUM CHLORIDE 0.9 % (FLUSH) 0.9 %
10 SYRINGE (ML) INJECTION PRN
Status: DISCONTINUED | OUTPATIENT
Start: 2024-08-09 | End: 2024-08-16 | Stop reason: HOSPADM

## 2024-08-09 RX ORDER — AMLODIPINE BESYLATE 10 MG/1
10 TABLET ORAL DAILY
Status: DISCONTINUED | OUTPATIENT
Start: 2024-08-09 | End: 2024-08-10

## 2024-08-09 RX ORDER — POTASSIUM CHLORIDE 20 MEQ/1
40 TABLET, EXTENDED RELEASE ORAL PRN
Status: DISCONTINUED | OUTPATIENT
Start: 2024-08-09 | End: 2024-08-16 | Stop reason: HOSPADM

## 2024-08-09 RX ORDER — ACETAMINOPHEN 325 MG/1
650 TABLET ORAL EVERY 6 HOURS PRN
Status: DISCONTINUED | OUTPATIENT
Start: 2024-08-09 | End: 2024-08-16 | Stop reason: SDUPTHER

## 2024-08-09 RX ORDER — GLIPIZIDE 10 MG/1
10 TABLET ORAL EVERY MORNING
Status: DISCONTINUED | OUTPATIENT
Start: 2024-08-10 | End: 2024-08-15

## 2024-08-09 RX ORDER — MAGNESIUM SULFATE IN WATER 40 MG/ML
2000 INJECTION, SOLUTION INTRAVENOUS PRN
Status: DISCONTINUED | OUTPATIENT
Start: 2024-08-09 | End: 2024-08-16 | Stop reason: HOSPADM

## 2024-08-09 RX ORDER — SPIRONOLACTONE 25 MG/1
25 TABLET ORAL DAILY
Status: DISCONTINUED | OUTPATIENT
Start: 2024-08-09 | End: 2024-08-16 | Stop reason: HOSPADM

## 2024-08-09 RX ORDER — ONDANSETRON 2 MG/ML
4 INJECTION INTRAMUSCULAR; INTRAVENOUS EVERY 6 HOURS PRN
Status: DISCONTINUED | OUTPATIENT
Start: 2024-08-09 | End: 2024-08-16 | Stop reason: HOSPADM

## 2024-08-09 RX ORDER — ENOXAPARIN SODIUM 100 MG/ML
30 INJECTION SUBCUTANEOUS 2 TIMES DAILY
Status: DISCONTINUED | OUTPATIENT
Start: 2024-08-09 | End: 2024-08-16

## 2024-08-09 RX ORDER — ASPIRIN 325 MG
325 TABLET ORAL NIGHTLY
Status: DISCONTINUED | OUTPATIENT
Start: 2024-08-09 | End: 2024-08-10

## 2024-08-09 RX ORDER — ACETAMINOPHEN 650 MG/1
650 SUPPOSITORY RECTAL EVERY 6 HOURS PRN
Status: DISCONTINUED | OUTPATIENT
Start: 2024-08-09 | End: 2024-08-16 | Stop reason: SDUPTHER

## 2024-08-09 RX ORDER — SODIUM CHLORIDE 0.9 % (FLUSH) 0.9 %
5-40 SYRINGE (ML) INJECTION EVERY 12 HOURS SCHEDULED
Status: DISCONTINUED | OUTPATIENT
Start: 2024-08-09 | End: 2024-08-16 | Stop reason: HOSPADM

## 2024-08-09 RX ORDER — FUROSEMIDE 10 MG/ML
40 INJECTION INTRAMUSCULAR; INTRAVENOUS 2 TIMES DAILY
Status: DISCONTINUED | OUTPATIENT
Start: 2024-08-09 | End: 2024-08-14

## 2024-08-09 RX ORDER — CARVEDILOL 3.12 MG/1
3.12 TABLET ORAL 2 TIMES DAILY WITH MEALS
Status: DISCONTINUED | OUTPATIENT
Start: 2024-08-10 | End: 2024-08-10

## 2024-08-09 RX ADMIN — ATORVASTATIN CALCIUM 20 MG: 20 TABLET, FILM COATED ORAL at 16:09

## 2024-08-09 RX ADMIN — ASPIRIN 325 MG: 325 TABLET ORAL at 21:08

## 2024-08-09 RX ADMIN — SODIUM CHLORIDE, PRESERVATIVE FREE 10 ML: 5 INJECTION INTRAVENOUS at 21:09

## 2024-08-09 RX ADMIN — EMPAGLIFLOZIN 10 MG: 10 TABLET, FILM COATED ORAL at 16:09

## 2024-08-09 RX ADMIN — SPIRONOLACTONE 25 MG: 25 TABLET ORAL at 16:09

## 2024-08-09 RX ADMIN — FUROSEMIDE 40 MG: 10 INJECTION, SOLUTION INTRAMUSCULAR; INTRAVENOUS at 18:06

## 2024-08-09 RX ADMIN — SODIUM CHLORIDE 10 ML: 9 INJECTION INTRAMUSCULAR; INTRAVENOUS; SUBCUTANEOUS at 09:15

## 2024-08-09 ASSESSMENT — PAIN - FUNCTIONAL ASSESSMENT: PAIN_FUNCTIONAL_ASSESSMENT: NONE - DENIES PAIN

## 2024-08-09 NOTE — ED PROVIDER NOTES
[facility]  Emergency Medicine Department    Pt Name: Corbin Gallardo  MRN: [id number]  Birthdate 1950  Date of evaluation: 8/9/2024  Provider: Dario Butt MD    CHIEF COMPLAINT     Chief Complaint   Patient presents with    Abnormal Test Results    Shortness of Breath       HISTORY OF PRESENT ILLNESS  (Location/Symptom, Timing/Onset, Context/Setting,Quality, Duration, Modifying Factors, Severity.)   Corbin Gallardo is a 74 y.o. male who presents to the emergency department complaining of shortness of breath over the past month that has been worsening recently and associated with chest pressure.  No shortness of breath at rest.  He has no known cardiac problems.  His doctor ordered an echo which she had done this morning and was subsequently referred to the ER for admission due to concerning findings on the echo.    Nursing Notes were reviewed.    ALLERGIES     Patient has no known allergies.    CURRENT MEDICATIONS       Previous Medications    AMITRIPTYLINE (ELAVIL) 50 MG TABLET    Take 1 tablet by mouth nightly    AMLODIPINE (NORVASC) 10 MG TABLET    Take 1 tablet by mouth daily    ASPIRIN 325 MG TABLET    Take 1 tablet by mouth nightly    BLOOD GLUCOSE MONITOR STRIPS    Provide insurance covered test strips compatible with glucometer, test 1 times a day    BLOOD GLUCOSE MONITORING SUPPL (D-CARE GLUCOMETER) W/DEVICE KIT    Provide insurance covered glucometer, check blood sugars every morning    CHLORPHENIRAMINE MALEATE (ALLERGY RELIEF PO)    Take 1 tablet by mouth daily     GLIPIZIDE (GLUCOTROL) 10 MG TABLET    TAKE 1 TABLET EVERY MORNING    HANDICAP PLACARD MISC    by Does not apply route EXP: 04/18/2028    HOMEOPATHIC PRODUCTS (SIMILASAN DRY EYE RELIEF) SOLN    Place 2 drops into the left eye in the morning and 2 drops in the evening.    LATANOPROST (XALATAN) 0.005 % OPHTHALMIC SOLUTION    Place 1 drop into the right eye nightly    LOSARTAN (COZAAR) 50 MG TABLET    TAKE 1

## 2024-08-09 NOTE — PLAN OF CARE
Patient is A/Ox4, on RA, ambulates standby assist.   Patient admitted from ED around 1600  Vitals have remained stable, denies pain.   Chest x-ray completed bedside.   IV lasixs started.   Family at bedside, plan of care reviewed, questions answered, bed alarm is on, bed in lowest position, call light within reach.   Problem: Discharge Planning  Goal: Discharge to home or other facility with appropriate resources  Outcome: Progressing     Problem: Safety - Adult  Goal: Free from fall injury  Outcome: Progressing     Problem: Pain  Goal: Verbalizes/displays adequate comfort level or baseline comfort level  Outcome: Progressing     Problem: Metabolic/Fluid and Electrolytes - Adult  Goal: Electrolytes maintained within normal limits  Outcome: Progressing     Problem: Skin/Tissue Integrity - Adult  Goal: Skin integrity remains intact  Outcome: Progressing     Problem: Respiratory - Adult  Goal: Achieves optimal ventilation and oxygenation  Outcome: Progressing     Problem: Chronic Conditions and Co-morbidities  Goal: Patient's chronic conditions and co-morbidity symptoms are monitored and maintained or improved  Outcome: Progressing

## 2024-08-09 NOTE — H&P
[facility]  Office: [phone]  Austin Galaviz DO, Bertin Wood DO, Epifanio Finley DO, Jonny Luna DO, Charito Hernandez MD, Bernie Lagunas MD, Vesna Baker MD, Renate Lyn MD,  Antony Rashid MD, John Guthrie MD, Angie Headley MD,  Kathy Kenney DO, Ana Holt MD, Baldo Velarde MD, Denis Galaviz DO, Marquita Goncalves MD,  Alexis Odom DO, Eladia Reveles MD, Louisa Jones MD, Jerilyn Mast MD, Sampson Graves MD,  Juwan Bah MD, Negro Gore MD, Gabe Roberts MD, Obie York MD, Tony Coelho MD, Yung Buckner MD, Konard Reyes DO, Cristino Long DO, Tg Lozano MD,  Pankaj Jules MD, Shirley Waterhouse, CNP,  Lyric Liu CNP, Hemant Ruiz, CNP,  Edilma Zhou, HARINDER, Jocy Cordero, CNP, Margarita Graves, CNP, Socorro Flores CNP, Yanely Rapp CNP, Lexy Cronin PA-C, Chasity Pedro PA-C, Awa Christine, CNP, Renee Najera, CNP, Laura Beckwith, CNP, Laurie Avila, CNP, Ashley Chandler, CNS, Ayana Madrigal, CNP, Usha Parra CNP, Tracy Schwab, CNP         [facility]   IN-PATIENT SERVICE   [facility]    HISTORY AND PHYSICAL EXAMINATION            Date:   8/9/2024  Patient name:  Corbin Gallardo  Date of admission:  8/9/2024  9:25 AM  MRN:   [id number]  Account:  [id number]  YOB: 1950  PCP:    Everett Marin APRN - CNP  Room:   [place] [address]  Code Status:    Full code    Chief Complaint:     Chief Complaint   Patient presents with    Abnormal Test Results    Shortness of Breath       History Obtained From:     patient    History of Present Illness:     Corbin Gallardo is a 74 y.o. Non- / non  male who presents with Abnormal Test Results and Shortness of Breath   and is admitted to the hospital for the management of Acute systolic heart failure (HCC).    Reported to primary care office for complaint of exertional fatigue worsening in intensity for the last 6 weeks and a phlegmy cough.  PCP

## 2024-08-09 NOTE — PROGRESS NOTES
Transitions of Care Pharmacy Service   Medication Review    The patient's list of current home medications has been reviewed. His PCP office prescribes in [facility].    Source(s) of information: patient, spouse, Epic, Care Everywhere, Surescripts refill report    PROVIDER ACTION REQUESTED  Medications that need to be addressed by a physician/nurse practitioner:    Medication Action Requested        Home med list is ready for provider reconciliation         Please feel free to call me with any questions about this encounter. Thank you.    Natalia Rubio [facility]   Transitions of Care Pharmacy Service  Phone:  [phone]  Fax: [phone]      Electronically signed by Natalia Rubio [facility] on 8/9/2024 at 1:41 PM       Prior to Admission medications    Medication Sig   Multiple Vitamins-Minerals (THERAPEUTIC MULTIVITAMIN-MINERALS) tablet Take 1 tablet by mouth daily   losartan (COZAAR) 50 MG tablet TAKE 1 TABLET DAILY   glipiZIDE (GLUCOTROL) 10 MG tablet TAKE 1 TABLET EVERY MORNING   aspirin 325 MG tablet Take 1 tablet by mouth nightly   simvastatin (ZOCOR) 20 MG tablet TAKE 1 TABLET NIGHTLY   amitriptyline (ELAVIL) 50 MG tablet Take 1 tablet by mouth nightly   amLODIPine (NORVASC) 10 MG tablet Take 1 tablet by mouth daily   Chlorpheniramine Maleate (ALLERGY RELIEF PO) Take 1 tablet by mouth daily    Homeopathic Products (SIMILASAN DRY EYE RELIEF) SOLN Place 2 drops into the left eye in the morning and 2 drops in the evening.   timolol (TIMOPTIC) 0.5 % ophthalmic solution instill 1 drop into right eye twice a day   latanoprost (XALATAN) 0.005 % ophthalmic solution Place 1 drop into the right eye nightly

## 2024-08-09 NOTE — ED NOTES
ED to inpatient nurses report     Chief Complaint   Patient presents with    Abnormal Test Results    Shortness of Breath      Present to ED from Home  LOC: alert and orientated to name, place, date  Vital signs   Vitals:    08/09/24 1305 08/09/24 1353 08/09/24 1419 08/09/24 1438   BP:   118/60    Pulse: 85 85 78 85   Resp: 21 24 12 22   Temp:       SpO2: 95% 95% 93% 94%   Weight:       Height:          Oxygen Baseline 94 RA    Current needs required None   SEPSIS: N  [] Lactate X 2 ordered (Yes or No)  [] Antibiotics given (Yes or No)  [] IV Fluids ordered (Yes or No)             [] 2nd IV completed (Yes or No)  [] Hourly Vital Signs (Validated)  [] Outstanding Orders:     LDAs:   Peripheral IV 08/09/24 Right Antecubital (Active)   Site Assessment Clean, dry & intact 08/09/24 0937     Mobility: Independent  Fall Risk: [place] 1 Fall Risk  Presents to emergency department  because of falls (Syncope, seizure, or loss of consciousness): No (08/09/24 0928)  Age > 70: Yes (08/09/24 0928)  Altered Mental Status, Intoxication with alcohol or substance confusion (Disorientation, impaired judgment, poor safety awaremess, or inability to follow instructions): No (08/09/24 0928)  Impaired Mobility: Ambulates or transfers with assistive devices or assistance; Unable to ambulate or transer.: No (08/09/24 0928)  Nursing Judgement: No (08/09/24 0928)  Pending ED orders: None   Present condition: Stable  Code Status: Full  Consults: IP CONSULT TO INTERNAL MEDICINE  IP CONSULT TO DIETITIAN  []  Hospitalist  Completed  [] yes [] no Who:   []  Medicine  Completed  [] yes [] No Who:   []  Cardiology  Completed  [] yes [] No Who:   []  GI   Completed  [] yes [] No Who:   []  Neurology  Completed  [] yes [] No Who:   []  Nephrology Completed  [] yes [] No Who:    []  Vascular  Completed  [] yes [] No Who:   []  Ortho  Completed  [] yes [] No Who:     []  Surgery  Completed  [] yes [] No Who:    []  Urology  Completed  [] yes [] No Who:

## 2024-08-09 NOTE — ED TRIAGE NOTES
Pt sent from echo following an abnormal study. Reported abnormal EF. Pt reports echo was ordered per PCP for shortness of breath. Pt denies chest pain

## 2024-08-10 LAB
ANION GAP SERPL CALCULATED.3IONS-SCNC: 13 MMOL/L (ref 9–17)
BASOPHILS # BLD: 0.03 K/UL (ref 0–0.2)
BASOPHILS NFR BLD: 0 % (ref 0–2)
BNP SERPL-MCNC: 1506 PG/ML
BUN SERPL-MCNC: 17 MG/DL (ref 8–23)
BUN/CREAT SERPL: 21 (ref 9–20)
CALCIUM SERPL-MCNC: 9.4 MG/DL (ref 8.6–10.4)
CHLORIDE SERPL-SCNC: 105 MMOL/L (ref 98–107)
CHOLEST SERPL-MCNC: 154 MG/DL (ref 0–199)
CHOLESTEROL/HDL RATIO: 4
CO2 SERPL-SCNC: 27 MMOL/L (ref 20–31)
CREAT SERPL-MCNC: 0.8 MG/DL (ref 0.7–1.2)
EOSINOPHIL # BLD: 0.38 K/UL (ref 0–0.44)
EOSINOPHILS RELATIVE PERCENT: 5 % (ref 1–4)
ERYTHROCYTE [DISTWIDTH] IN BLOOD BY AUTOMATED COUNT: 14.3 % (ref 11.8–14.4)
GFR, ESTIMATED: >90 ML/MIN/1.73M2
GLUCOSE BLD-MCNC: 115 MG/DL (ref 75–110)
GLUCOSE BLD-MCNC: 124 MG/DL (ref 75–110)
GLUCOSE BLD-MCNC: 187 MG/DL (ref 75–110)
GLUCOSE BLD-MCNC: 87 MG/DL (ref 75–110)
GLUCOSE SERPL-MCNC: 113 MG/DL (ref 70–99)
HCT VFR BLD AUTO: 43.8 % (ref 40.7–50.3)
HDLC SERPL-MCNC: 37 MG/DL
HGB BLD-MCNC: 14.1 G/DL (ref 13–17)
IMM GRANULOCYTES # BLD AUTO: 0.03 K/UL (ref 0–0.3)
IMM GRANULOCYTES NFR BLD: 0 %
LDLC SERPL CALC-MCNC: 92 MG/DL (ref 0–100)
LYMPHOCYTES NFR BLD: 1.55 K/UL (ref 1.1–3.7)
LYMPHOCYTES RELATIVE PERCENT: 19 % (ref 24–43)
MAGNESIUM SERPL-MCNC: 2.2 MG/DL (ref 1.6–2.6)
MCH RBC QN AUTO: 30.9 PG (ref 25.2–33.5)
MCHC RBC AUTO-ENTMCNC: 32.2 G/DL (ref 28.4–34.8)
MCV RBC AUTO: 96.1 FL (ref 82.6–102.9)
MONOCYTES NFR BLD: 0.67 K/UL (ref 0.1–1.2)
MONOCYTES NFR BLD: 8 % (ref 3–12)
NEUTROPHILS NFR BLD: 68 % (ref 36–65)
NEUTS SEG NFR BLD: 5.36 K/UL (ref 1.5–8.1)
NRBC BLD-RTO: 0 PER 100 WBC
PLATELET # BLD AUTO: 186 K/UL (ref 138–453)
PMV BLD AUTO: 10.1 FL (ref 8.1–13.5)
POTASSIUM SERPL-SCNC: 3.8 MMOL/L (ref 3.7–5.3)
RBC # BLD AUTO: 4.56 M/UL (ref 4.21–5.77)
SODIUM SERPL-SCNC: 145 MMOL/L (ref 135–144)
TRIGL SERPL-MCNC: 126 MG/DL
TSH SERPL DL<=0.05 MIU/L-ACNC: 1.75 UIU/ML (ref 0.3–5)
VLDLC SERPL CALC-MCNC: 25 MG/DL
WBC OTHER # BLD: 8 K/UL (ref 3.5–11.3)

## 2024-08-10 PROCEDURE — 83735 ASSAY OF MAGNESIUM: CPT

## 2024-08-10 PROCEDURE — 85025 COMPLETE CBC W/AUTO DIFF WBC: CPT

## 2024-08-10 PROCEDURE — 6370000000 HC RX 637 (ALT 250 FOR IP): Performed by: NURSE PRACTITIONER

## 2024-08-10 PROCEDURE — 2700000000 HC OXYGEN THERAPY PER DAY

## 2024-08-10 PROCEDURE — 83880 ASSAY OF NATRIURETIC PEPTIDE: CPT

## 2024-08-10 PROCEDURE — 2060000000 HC ICU INTERMEDIATE R&B

## 2024-08-10 PROCEDURE — 36415 COLL VENOUS BLD VENIPUNCTURE: CPT

## 2024-08-10 PROCEDURE — 84443 ASSAY THYROID STIM HORMONE: CPT

## 2024-08-10 PROCEDURE — 97116 GAIT TRAINING THERAPY: CPT

## 2024-08-10 PROCEDURE — 97162 PT EVAL MOD COMPLEX 30 MIN: CPT

## 2024-08-10 PROCEDURE — 2580000003 HC RX 258: Performed by: NURSE PRACTITIONER

## 2024-08-10 PROCEDURE — 6360000002 HC RX W HCPCS: Performed by: NURSE PRACTITIONER

## 2024-08-10 PROCEDURE — 99232 SBSQ HOSP IP/OBS MODERATE 35: CPT | Performed by: NURSE PRACTITIONER

## 2024-08-10 PROCEDURE — 82947 ASSAY GLUCOSE BLOOD QUANT: CPT

## 2024-08-10 PROCEDURE — 80061 LIPID PANEL: CPT

## 2024-08-10 PROCEDURE — 80048 BASIC METABOLIC PNL TOTAL CA: CPT

## 2024-08-10 PROCEDURE — 6370000000 HC RX 637 (ALT 250 FOR IP): Performed by: INTERNAL MEDICINE

## 2024-08-10 RX ORDER — ASPIRIN 81 MG/1
81 TABLET ORAL DAILY
Status: DISCONTINUED | OUTPATIENT
Start: 2024-08-10 | End: 2024-08-16 | Stop reason: HOSPADM

## 2024-08-10 RX ORDER — CARVEDILOL 6.25 MG/1
6.25 TABLET ORAL 2 TIMES DAILY WITH MEALS
Status: DISCONTINUED | OUTPATIENT
Start: 2024-08-10 | End: 2024-08-14

## 2024-08-10 RX ADMIN — ENOXAPARIN SODIUM 30 MG: 100 INJECTION SUBCUTANEOUS at 08:06

## 2024-08-10 RX ADMIN — ASPIRIN 81 MG: 81 TABLET, COATED ORAL at 11:31

## 2024-08-10 RX ADMIN — EMPAGLIFLOZIN 10 MG: 10 TABLET, FILM COATED ORAL at 08:05

## 2024-08-10 RX ADMIN — FUROSEMIDE 40 MG: 10 INJECTION, SOLUTION INTRAMUSCULAR; INTRAVENOUS at 17:07

## 2024-08-10 RX ADMIN — CARVEDILOL 3.12 MG: 3.12 TABLET, FILM COATED ORAL at 08:06

## 2024-08-10 RX ADMIN — FUROSEMIDE 40 MG: 10 INJECTION, SOLUTION INTRAMUSCULAR; INTRAVENOUS at 08:06

## 2024-08-10 RX ADMIN — ATORVASTATIN CALCIUM 20 MG: 20 TABLET, FILM COATED ORAL at 08:06

## 2024-08-10 RX ADMIN — ENOXAPARIN SODIUM 30 MG: 100 INJECTION SUBCUTANEOUS at 20:36

## 2024-08-10 RX ADMIN — SODIUM CHLORIDE, PRESERVATIVE FREE 10 ML: 5 INJECTION INTRAVENOUS at 08:06

## 2024-08-10 RX ADMIN — GLIPIZIDE 10 MG: 10 TABLET ORAL at 08:05

## 2024-08-10 RX ADMIN — SPIRONOLACTONE 25 MG: 25 TABLET ORAL at 08:06

## 2024-08-10 RX ADMIN — SODIUM CHLORIDE, PRESERVATIVE FREE 10 ML: 5 INJECTION INTRAVENOUS at 20:37

## 2024-08-10 RX ADMIN — LOSARTAN POTASSIUM 50 MG: 50 TABLET, FILM COATED ORAL at 08:05

## 2024-08-10 NOTE — ACP (ADVANCE CARE PLANNING)
Advance Care Planning     Advance Care Planning Activator (Inpatient)  Conversation Note      Date of ACP Conversation: 8/10/2024     Conversation Conducted with: Patient with Decision Making Capacity    ACP Activator: MARY TALAVERA RN          Health Care Decision Maker:     Current Designated Health Care Decision Maker:     Primary Decision Maker: Kavita Gallardo - Spouse - [phone]  Click here to complete Healthcare Decision Makers including section of the Healthcare Decision Maker Relationship (ie \"Primary\")  Today we documented Decision Maker(s) consistent with Legal Next of Kin hierarchy.    Care Preferences    Ventilation:  \"If you were in your present state of health and suddenly became very ill and were unable to breathe on your own, what would your preference be about the use of a ventilator (breathing machine) if it were available to you?\"      Would the patient desire the use of ventilator (breathing machine)?: yes    \"If your health worsens and it becomes clear that your chance of recovery is unlikely, what would your preference be about the use of a ventilator (breathing machine) if it were available to you?\"     Would the patient desire the use of ventilator (breathing machine)?: No      Resuscitation  \"CPR works best to restart the heart when there is a sudden event, like a heart attack, in someone who is otherwise healthy. Unfortunately, CPR does not typically restart the heart for people who have serious health conditions or who are very sick.\"    \"In the event your heart stopped as a result of an underlying serious health condition, would you want attempts to be made to restart your heart (answer \"yes\" for attempt to resuscitate) or would you prefer a natural death (answer \"no\" for do not attempt to resuscitate)?\" yes       [] Yes   [] No   Educated Patient / Decision Maker regarding differences between Advance Directives and portable DNR orders.    Length of ACP Conversation in

## 2024-08-10 NOTE — PROGRESS NOTES
Physical Therapy  Facility/Department: [facility] CARE  Physical Therapy Initial Assessment    Name: Corbin Gallardo  : 1950  MRN: [id number]  Date of Service: 8/10/2024    Discharge Recommendations:  Continue to assess pending progress   PT Equipment Recommendations  Equipment Needed: No      Patient Diagnosis(es): The encounter diagnosis was Congestive heart failure, unspecified HF chronicity, unspecified heart failure type (HCC).    HPI copied from chart:Corbin Gallardo is a 74 y.o. Non- / non  male who presents with Abnormal Test Results and Shortness of Breath   and is admitted to the hospital for the management of Acute systolic heart failure (HCC).     Reported to primary care office for complaint of exertional fatigue worsening in intensity for the last 6 weeks and a phlegmy cough.  PCP workup included lab work and an echo.  Echo was found to have significantly reduced systolic heart failure with an EF of 15-20%.  Request admission for cardiology evaluation and stabilization.  Past Medical History:  has a past medical history of Cataracts, bilateral, Cerebral artery occlusion with cerebral infarction (HCC), Diabetes mellitus (HCC), Eye disorder, Glaucoma, Headache(784.0), Hearing loss, Heartburn, Hyperlipidemia, Hypertension, Kidney stones, Osteoarthritis, Snores, Tendonitis of shoulder, left, and Tremor.  Past Surgical History:  has a past surgical history that includes Cystoscopy; Kidney stone surgery; Lithotripsy; Refractive surgery (Right, ); Cataract removal (Right, 2015); Thumb amputation; Hand surgery (Left); Tonsillectomy; Total knee arthroplasty (Left, 2021); joint replacement; eye surgery; Total knee arthroplasty (Right, 02/15/2022); and Total knee arthroplasty (Right, 2/15/2022).    Assessment   Body Structures, Functions, Activity Limitations Requiring Skilled Therapeutic Intervention: Decreased endurance  Assessment: Pt is very pleasant and

## 2024-08-10 NOTE — PROGRESS NOTES
[facility]  Office: [phone]  Austin Galaviz DO, Bertin Wood DO, Epifanio Finley DO, Jonny Luna DO, Charito Hernandez MD, Bernie Lagunas MD, Vesna Baker MD, Renate Lyn MD,  Antony Rashid MD, Jhon Guthrie MD, Angie Headley MD,  Kathy Kenney DO, Ana Holt MD, Baldo Velarde MD, Denis Galaviz DO, Marquita Goncalves MD,  Alexis dOom DO, Eladia Reveles MD, Louisa Jones MD, Jerilyn Mast MD, Sampson Graves MD,  Juwan Bah MD, Negro Gore MD, Gabe Roberts MD, Obie York MD, Tony Coelho MD, Yung Buckner MD, Konrad Reyes DO, Cristino Long DO, Tg Lozano MD,  Pankaj Jules MD, Shirley Waterhouse, CNP,  Lyric Liu CNP, Hemant Ruiz, CNP,  Edilma Zhou, DNP, Jocy Codrero, CNP, Margarita Graves, CNP, Socorro Flores CNP, Yanely Rapp CNP, Lexy Cronin, PA-C, Chasity Pedro PA-C, Awa Christine, CNP, Renee Najera, CNP, Laura Beckwith, CNP, Laurie Avila, CNP, Ashley Chandler, CNS, Ayana Madrigal, CNP, Usha Parra CNP, Tracy Schwab, CNP         [facility]   IN-PATIENT SERVICE   [facility]    Progress Note    8/10/2024    1:53 PM    Name:   Corbin Gallardo  MRN:     [id number]     Acct:      [id number]   Room:   1019/1019-02   Day:  1  Admit Date:  8/9/2024  9:25 AM    PCP:   Everett Marin APRN - CNP  Code Status:  Full Code    Subjective:     C/C:   Chief Complaint   Patient presents with    Abnormal Test Results    Shortness of Breath     Interval History Status: improved.     Minimal improvement with medical management.  Cardiology on board and plan for catheterization on 8/12.     Brief History:     8/9 - Reported to primary care office for complaint of exertional fatigue worsening in intensity for the last 6 weeks and a phlegmy cough. PCP workup included lab work and an echo. Echo was found to have significantly reduced systolic heart failure with an EF of 15-20%. Request admission for cardiology  8/9/2024  Increased left basilar opacity may in part be due to patient rotation versus pleural effusion with adjacent airspace disease.     XR CHEST (2 VW)    Result Date: 8/9/2024  Findings consistent with decompensated CHF with mild-moderate pulmonary edema       Physical Examination:        General appearance:  alert, cooperative and no distress  Mental Status:  oriented to person, place and time and normal affect  Lungs:  clear to auscultation bilaterally, normal effort  Heart:  regular rate and rhythm, no murmur  Abdomen:  soft, nontender, nondistended, normal bowel sounds, no masses, hepatomegaly, splenomegaly  Extremities:  no edema, redness, tenderness in the calves  Skin:  no gross lesions, rashes, induration    Assessment:        Hospital Problems             Last Modified POA    * (Principal) Acute systolic heart failure (HCC) 8/9/2024 Yes    Hypercholesteremia 8/9/2024 Yes    Essential hypertension 8/9/2024 Yes    Type 2 diabetes mellitus with diabetic autonomic neuropathy, without long-term current use of insulin (HCC) 8/9/2024 Yes       Plan:        Acute systolic heart failure  Start goal-directed therapy  Start Jardiance and spironolactone  Lasix 40 mg twice a day  Further manage as per cardiology  Consult cardiology  Will likely need LifeVest  Hypertension hypercholesteremia  Medication adjustments per cardiology  Type 2 diabetes  On glipizide  May benefit from Jardiance for dual coverage for diabetes and heart failure and this will be initiated inpatient    AMERICA Salinas NP  8/10/2024  1:53 PM

## 2024-08-10 NOTE — CARE COORDINATION
Case Management Assessment  Initial Evaluation    Date/Time of Evaluation: 8/10/2024 4:57 PM  Assessment Completed by: MARY TALAVERA RN    If patient is discharged prior to next notation, then this note serves as note for discharge by case management.    Patient Name: Corbin Gallardo                   YOB: 1950  Diagnosis: Acute heart failure, unspecified heart failure type (HCC) [I50.9]  Congestive heart failure, unspecified HF chronicity, unspecified heart failure type (HCC) [I50.9]                   Date / Time: 8/9/2024  9:25 AM    Patient Admission Status: Inpatient   Readmission Risk (Low < 19, Mod (19-27), High > 27): Readmission Risk Score: 9    Current PCP: Everett Marin APRN - CNP  PCP verified by CM? Yes    Chart Reviewed: Yes      History Provided by: Patient  Patient Orientation: Alert and Oriented    Patient Cognition: Alert    Hospitalization in the last 30 days (Readmission):  No    If yes, Readmission Assessment in CM Navigator will be completed.    Advance Directives:      Code Status: Full Code   Patient's Primary Decision Maker is: Legal Next of Kin    Primary Decision Maker: Kavita Gallardo - Spouse - [phone]    Discharge Planning:    Patient lives with: Spouse/Significant Other Type of Home: House  Primary Care Giver: Self  Patient Support Systems include: Spouse/Significant Other   Current Financial resources: Medicare  Current community resources: ECF/Home Care  Current services prior to admission: Durable Medical Equipment            Current DME: Cane, Walker, Shower Chair            Type of Home Care services:  OT, PT, Nursing Services    ADLS  Prior functional level: Independent in ADLs/IADLs  Current functional level: Assistance with the following:, Cooking, Housework, Shopping    PT AM-PAC: 23 /24  OT AM-PAC:   /24    Family can provide assistance at DC: Yes  Would you like Case Management to discuss the discharge plan with any other family

## 2024-08-10 NOTE — CONSULTS
drugs.    Family History:    family history includes Cancer in his father; Heart Disease in his mother; Migraines in his brother and sister.    Review of Systems:     Constitutional: No fever/chills.  Positive for fatigue.  HENT: No headache, neck pain or neck stiffnes. No sore throat or dysphagia. Eyes: No blurred vision.  Ears: Significant hearing loss.  Respiratory: As above. Cardiovascular: As above. Gastrointestinal: Negative. Genitourinary: Negative  Endocrine: Negative.   Musculoskeletal: Negative. Skin: Negative. Allergic/Immunologic: Negative.   Neurologic: Negative. Hematological: Negative. Psychiatric: Negative.   All other systems are are noted to be otherwise negative.      Physical Exam:   /78   Pulse 85   Temp 97.7 °F (36.5 °C) (Oral)   Resp 17   Ht 1.829 m (6')   Wt 103.6 kg (228 lb 6.4 oz)   SpO2 94%   BMI 30.98 kg/m²     Intake/Output Summary (Last 24 hours) at 8/10/2024 1107  Last data filed at 8/10/2024 0247  Gross per 24 hour   Intake 240 ml   Output 2180 ml   Net -1940 ml       GENERAL:  Alert, appropriate, oriented, in NAD.  HEENT:  Head is atraumatic and normocephalic. No Pallor.  No icterus.  NECK: Supple without any thyromegaly.  LUNGS: Generally decreased breath sounds.  CARDIAC: JVD positive.  S1, S2, RRR.  Positive for MR murmur.  ABD:  Soft non-tender .  EXT: No edema.  MS: No obvious deformities. SKIN: No obvious skin rashes.  NEURO: No focal neurologic deficits    Labs/ Ancillary data:     CBC:   Recent Labs     08/09/24  0940 08/10/24  0551   WBC 6.7 8.0   HGB 12.8* 14.1    186     BMP:    Recent Labs     08/09/24  0940 08/10/24  0551    145*   K 3.8 3.8    105   CO2 26 27   BUN 20 17   CREATININE 0.9 0.8   GLUCOSE 139* 113*     Lipids:   Recent Labs     08/10/24  0551   CHOL 154   HDL 37*       Imaging:    CXR: Cardiomegaly with pulmonary vascular congestion.    Echo: His echocardiogram from this admission is reviewed.  It shows significant left

## 2024-08-10 NOTE — PLAN OF CARE
Pt remains free of falls as well as acute events throughout the shift.  Pt diuresing appropriately, urinal at bedside, see MAR.   Vital signs stable.  Pt wears 2L of oxygen with exertion and at night.   Pt alert and oriented X4, on room air throughout the day.  Safety measures in place, call light within reach, all concerns addressed at this time.   Problem: Discharge Planning  Goal: Discharge to home or other facility with appropriate resources  8/10/2024 1018 by Jemal Fortune RN  Outcome: Progressing     Problem: Safety - Adult  Goal: Free from fall injury  8/10/2024 1018 by Jemal Fortune RN  Outcome: Progressing     Problem: ABCDS Injury Assessment  Goal: Absence of physical injury  Outcome: Progressing     Problem: Chronic Conditions and Co-morbidities  Goal: Patient's chronic conditions and co-morbidity symptoms are monitored and maintained or improved  Outcome: Progressing     Problem: Respiratory - Adult  Goal: Achieves optimal ventilation and oxygenation  8/10/2024 1018 by Jemal Fortune RN  Outcome: Progressing     Problem: Cardiovascular - Adult  Goal: Maintains optimal cardiac output and hemodynamic stability  8/10/2024 1018 by Jemal Fortune RN  Outcome: Progressing     Problem: Cardiovascular - Adult  Goal: Absence of cardiac dysrhythmias or at baseline  Outcome: Progressing     Problem: Skin/Tissue Integrity - Adult  Goal: Skin integrity remains intact  Outcome: Progressing     Problem: Skin/Tissue Integrity - Adult  Goal: Incisions, wounds, or drain sites healing without S/S of infection  Outcome: Progressing     Problem: Musculoskeletal - Adult  Goal: Return mobility to safest level of function  Outcome: Progressing     Problem: Musculoskeletal - Adult  Goal: Maintain proper alignment of affected body part  Outcome: Progressing     Problem: Musculoskeletal - Adult  Goal: Return ADL status to a safe level of function  Outcome: Progressing     Problem: Genitourinary - Adult  Goal: Absence of

## 2024-08-10 NOTE — PLAN OF CARE
Pt diuresing. Urinal bedside. See flowsheet for output overnight.   Pt blood sugar check. ACHS. 196. No coverage needed. VSS. Cardio to see pt today.   Pt on 2 L NC. Desats to 88% when asleep. Can remove when awake.   Problem: Safety - Adult  Goal: Free from fall injury  8/10/2024 0615 by Traci Gil RN  Outcome: Progressing     Problem: Respiratory - Adult  Goal: Achieves optimal ventilation and oxygenation  8/10/2024 0615 by Traci Gil RN  Outcome: Progressing     Problem: Cardiovascular - Adult  Goal: Maintains optimal cardiac output and hemodynamic stability  8/10/2024 0615 by Traci Gil RN  Outcome: Progressing     Problem: Genitourinary - Adult  Goal: Absence of urinary retention  8/10/2024 0615 by Traci Gil RN  Outcome: Progressing     Problem: Discharge Planning  Goal: Discharge to home or other facility with appropriate resources  8/10/2024 0615 by Traci Gil RN  Outcome: Progressing

## 2024-08-11 LAB
ANION GAP SERPL CALCULATED.3IONS-SCNC: 13 MMOL/L (ref 9–17)
BUN SERPL-MCNC: 27 MG/DL (ref 8–23)
BUN/CREAT SERPL: 30 (ref 9–20)
CALCIUM SERPL-MCNC: 9.1 MG/DL (ref 8.6–10.4)
CHLORIDE SERPL-SCNC: 103 MMOL/L (ref 98–107)
CO2 SERPL-SCNC: 25 MMOL/L (ref 20–31)
CREAT SERPL-MCNC: 0.9 MG/DL (ref 0.7–1.2)
GFR, ESTIMATED: 89 ML/MIN/1.73M2
GLUCOSE BLD-MCNC: 103 MG/DL (ref 75–110)
GLUCOSE BLD-MCNC: 119 MG/DL (ref 75–110)
GLUCOSE BLD-MCNC: 143 MG/DL (ref 75–110)
GLUCOSE SERPL-MCNC: 107 MG/DL (ref 70–99)
MAGNESIUM SERPL-MCNC: 2.2 MG/DL (ref 1.6–2.6)
POTASSIUM SERPL-SCNC: 3.4 MMOL/L (ref 3.7–5.3)
SODIUM SERPL-SCNC: 141 MMOL/L (ref 135–144)

## 2024-08-11 PROCEDURE — 2580000003 HC RX 258: Performed by: NURSE PRACTITIONER

## 2024-08-11 PROCEDURE — 80048 BASIC METABOLIC PNL TOTAL CA: CPT

## 2024-08-11 PROCEDURE — 83735 ASSAY OF MAGNESIUM: CPT

## 2024-08-11 PROCEDURE — 99232 SBSQ HOSP IP/OBS MODERATE 35: CPT | Performed by: NURSE PRACTITIONER

## 2024-08-11 PROCEDURE — 6360000002 HC RX W HCPCS: Performed by: NURSE PRACTITIONER

## 2024-08-11 PROCEDURE — 6360000002 HC RX W HCPCS: Performed by: INTERNAL MEDICINE

## 2024-08-11 PROCEDURE — 82947 ASSAY GLUCOSE BLOOD QUANT: CPT

## 2024-08-11 PROCEDURE — 6370000000 HC RX 637 (ALT 250 FOR IP): Performed by: NURSE PRACTITIONER

## 2024-08-11 PROCEDURE — 2060000000 HC ICU INTERMEDIATE R&B

## 2024-08-11 PROCEDURE — 6370000000 HC RX 637 (ALT 250 FOR IP): Performed by: INTERNAL MEDICINE

## 2024-08-11 PROCEDURE — 36415 COLL VENOUS BLD VENIPUNCTURE: CPT

## 2024-08-11 PROCEDURE — 94761 N-INVAS EAR/PLS OXIMETRY MLT: CPT

## 2024-08-11 RX ORDER — MAGNESIUM SULFATE IN WATER 40 MG/ML
2000 INJECTION, SOLUTION INTRAVENOUS ONCE
Status: COMPLETED | OUTPATIENT
Start: 2024-08-11 | End: 2024-08-11

## 2024-08-11 RX ORDER — POTASSIUM CHLORIDE 20 MEQ/1
40 TABLET, EXTENDED RELEASE ORAL ONCE
Status: COMPLETED | OUTPATIENT
Start: 2024-08-11 | End: 2024-08-11

## 2024-08-11 RX ADMIN — EMPAGLIFLOZIN 10 MG: 10 TABLET, FILM COATED ORAL at 07:58

## 2024-08-11 RX ADMIN — FUROSEMIDE 40 MG: 10 INJECTION, SOLUTION INTRAMUSCULAR; INTRAVENOUS at 07:58

## 2024-08-11 RX ADMIN — FUROSEMIDE 40 MG: 10 INJECTION, SOLUTION INTRAMUSCULAR; INTRAVENOUS at 16:57

## 2024-08-11 RX ADMIN — ASPIRIN 81 MG: 81 TABLET, COATED ORAL at 07:58

## 2024-08-11 RX ADMIN — SPIRONOLACTONE 25 MG: 25 TABLET ORAL at 07:58

## 2024-08-11 RX ADMIN — SODIUM CHLORIDE, PRESERVATIVE FREE 10 ML: 5 INJECTION INTRAVENOUS at 22:11

## 2024-08-11 RX ADMIN — GLIPIZIDE 10 MG: 10 TABLET ORAL at 07:58

## 2024-08-11 RX ADMIN — POTASSIUM CHLORIDE 40 MEQ: 1500 TABLET, EXTENDED RELEASE ORAL at 12:46

## 2024-08-11 RX ADMIN — POTASSIUM CHLORIDE 40 MEQ: 1500 TABLET, EXTENDED RELEASE ORAL at 07:58

## 2024-08-11 RX ADMIN — CARVEDILOL 6.25 MG: 6.25 TABLET, FILM COATED ORAL at 07:58

## 2024-08-11 RX ADMIN — SODIUM CHLORIDE, PRESERVATIVE FREE 10 ML: 5 INJECTION INTRAVENOUS at 07:59

## 2024-08-11 RX ADMIN — SODIUM CHLORIDE: 9 INJECTION, SOLUTION INTRAVENOUS at 12:48

## 2024-08-11 RX ADMIN — ATORVASTATIN CALCIUM 20 MG: 20 TABLET, FILM COATED ORAL at 07:58

## 2024-08-11 RX ADMIN — MAGNESIUM SULFATE HEPTAHYDRATE 2000 MG: 40 INJECTION, SOLUTION INTRAVENOUS at 12:49

## 2024-08-11 NOTE — PLAN OF CARE
Pt remains free of falls as well as acute events throughout the shift.  Pt alert and oriented X4.  Vital signs stable.  Pt up 1 assist.  Pt received both magnesium and potassium replacement.   Plan for cardiac cath tomorrow, Pt NPO at midnight, hold Lovenox in the am.   Safety measures in place, call light within reach, all concerns addressed at this time.   Problem: Discharge Planning  Goal: Discharge to home or other facility with appropriate resources  Outcome: Progressing     Problem: Safety - Adult  Goal: Free from fall injury  8/11/2024 1148 by Jemal Fortune RN  Outcome: Progressing     Problem: ABCDS Injury Assessment  Goal: Absence of physical injury  Outcome: Progressing     Problem: Chronic Conditions and Co-morbidities  Goal: Patient's chronic conditions and co-morbidity symptoms are monitored and maintained or improved  Outcome: Progressing     Problem: Respiratory - Adult  Goal: Achieves optimal ventilation and oxygenation  8/11/2024 1148 by Jemal Fortune RN  Outcome: Progressing     Problem: Cardiovascular - Adult  Goal: Maintains optimal cardiac output and hemodynamic stability  8/11/2024 1148 by Jemal Fortune RN  Outcome: Progressing     Problem: Cardiovascular - Adult  Goal: Absence of cardiac dysrhythmias or at baseline  Outcome: Progressing     Problem: Skin/Tissue Integrity - Adult  Goal: Skin integrity remains intact  Outcome: Progressing     Problem: Skin/Tissue Integrity - Adult  Goal: Incisions, wounds, or drain sites healing without S/S of infection  Outcome: Progressing     Problem: Musculoskeletal - Adult  Goal: Return mobility to safest level of function  Outcome: Progressing     Problem: Musculoskeletal - Adult  Goal: Maintain proper alignment of affected body part  Outcome: Progressing     Problem: Musculoskeletal - Adult  Goal: Return ADL status to a safe level of function  Outcome: Progressing     Problem: Genitourinary - Adult  Goal: Absence of urinary retention  8/11/2024 1148

## 2024-08-11 NOTE — PROGRESS NOTES
Cardiovascular progress Note          Patient name: Corbin Gallardo    YOB: 1950  Date of admission:  8/9/2024       Patient seen, examined. Previous clinical entries reviewed.  All available laboratory, imaging and ancillary data reviewed.    Subjective:      He is breathing better.  His orthopnea is improving.  Diuresing well.  No new complaints of chest pain.    Systems review:  Constitutional: No fever/chills.   HENT: No headache, neck pain or neck stiffness. No sore throat or dysphagia.   Gastrointestinal: No abdominal pain, nausea or vomiting.   Cardiac: As Above  Respiratory: As above  Neurologic: No new focal weakness or numbness  Psychiatric: Normal mood and mentation       Examination:   Vitals: BP 98/85   Pulse 84   Temp 97.3 °F (36.3 °C) (Oral)   Resp 21   Ht 1.829 m (6')   Wt 102.6 kg (226 lb 1.6 oz)   SpO2 92%   BMI 30.66 kg/m²     Intake/Output Summary (Last 24 hours) at 8/11/2024 1240  Last data filed at 8/11/2024 0916  Gross per 24 hour   Intake 795 ml   Output 2450 ml   Net -1655 ml       General appearance: Comfortable in no apparent distress.  HEENT: No pallor. No icterus  Neck: Supple.  Lungs:Generally decreased breath sounds  Heart: S1,S2  Abdomen: Soft  Extremities: No peripheral edema  Skin: No obvious rashes.  Musculoskeletal: No obvious deformities.  Neurologic: No focal deficits.     Labs/ Ancillary data:     CBC:   Recent Labs     08/09/24  0940 08/10/24  0551   WBC 6.7 8.0   HGB 12.8* 14.1    186     BMP:    Recent Labs     08/09/24  0940 08/10/24  0551 08/11/24  0541    145* 141   K 3.8 3.8 3.4*    105 103   CO2 26 27 25   BUN 20 17 27*   CREATININE 0.9 0.8 0.9   GLUCOSE 139* 113* 107*       Lipids:   Recent Labs     08/10/24  0551   CHOL 154   HDL 37*       Medications:   Scheduled Meds:   magnesium sulfate  2,000 mg IntraVENous Once    potassium chloride  40 mEq Oral Once    aspirin  81 mg Oral Daily    carvedilol  6.25 mg Oral BID WC

## 2024-08-11 NOTE — PLAN OF CARE
Bob is resting well with no s/s or c/o pain this shift. He reports he has SOB with activity and at rest, but it has improved since receiving lasix. He is sinus rhythm on telemetry and voiding with no difficulty using urinal. Glucose levels WNL and did not require sliding scale coverage at HS. No s/s of hyper or hypoglycemia. Pt has call light in reach and is using appropriately; safety maintained.    Problem: Pain  Goal: Verbalizes/displays adequate comfort level or baseline comfort level  Outcome: Progressing  Flowsheets (Taken 8/10/2024 1950)  Verbalizes/displays adequate comfort level or baseline comfort level:   Encourage patient to monitor pain and request assistance   Assess pain using appropriate pain scale   Patient not having pain and rates it a  0 . Pain interventions include follow up pain assessments . Patients goal for pain relief is  0 . The need for pain and symptom management will be considered in the discharge planning process to ensure patients comfort.     Problem: Respiratory - Adult  Goal: Achieves optimal ventilation and oxygenation  Outcome: Progressing  Flowsheets (Taken 8/10/2024 2037)  Achieves optimal ventilation and oxygenation:   Assess for changes in respiratory status   Assess for changes in mentation and behavior   Position to facilitate oxygenation and minimize respiratory effort   Oxygen supplementation based on oxygen saturation or arterial blood gases     Problem: Cardiovascular - Adult  Goal: Maintains optimal cardiac output and hemodynamic stability  Outcome: Progressing  Flowsheets (Taken 8/10/2024 2037)  Maintains optimal cardiac output and hemodynamic stability:   Monitor blood pressure and heart rate   Monitor urine output and notify Licensed Independent Practitioner for values outside of normal range   Assess for signs of decreased cardiac output     Problem: Genitourinary - Adult  Goal: Absence of urinary retention  Outcome: Progressing  Flowsheets (Taken 8/10/2024  2037)  Absence of urinary retention:   Monitor intake/output and perform bladder scan as needed   Assess patient’s ability to void and empty bladder     Problem: Metabolic/Fluid and Electrolytes - Adult  Goal: Glucose maintained within prescribed range  Outcome: Progressing  Flowsheets (Taken 8/10/2024 2037)  Glucose maintained within prescribed range:   Monitor blood glucose as ordered   Assess for signs and symptoms of hyperglycemia and hypoglycemia    Problem: Safety - Adult  Goal: Free from fall injury  Outcome: Progressing

## 2024-08-11 NOTE — PROGRESS NOTES
[facility]  Office: [phone]  Austin Galaviz DO, Bertin Wood DO, Epifanio Finley DO, Jonny Luna DO, Charito Hernandez MD, Bernie Lagunas MD, Vesna Baker MD, Renate Lyn MD,  Antony Rashid MD, John Guthrie MD, Angie Headley MD,  Kathy Kenney DO, Ana Holt MD, Baldo Velarde MD, Denis Galaviz DO, Marquita Goncalves MD,  Alexis Odom DO, Eladia Reveles MD, Louisa Jones MD, Jerilyn Mast MD, Sampson Graves MD,  Juwan Bah MD, Negro Gore MD, Gabe Roberts MD, Obie York MD, Tony Coelho MD, Yung Buckner MD, Konrad Reyes DO, Cristino Long DO, Tg Lozano MD,  Pankaj Jules MD, Shirley Waterhouse, CNP,  Lyric Liu CNP, Hemant Ruiz, CNP,  Edilma Zhou, DNP, Jocy Cordero, CNP, Margarita Graves, CNP, Socorro Flores CNP, Yanely Rapp CNP, Lexy Cronin, PA-C, Chasity Pedro PA-C, Awa Christine, CNP, Renee Najera, CNP, Laura Beckwith, CNP, Laurie Avila, CNP, Ashley Chandler, CNS, Ayana Madrigal, CNP, Usha Parra CNP, Tracy Schwab, CNP         [facility]   IN-PATIENT SERVICE   [facility]    Progress Note    8/11/2024    10:52 AM    Name:   Corbin Gallardo  MRN:     [id number]     Acct:      [id number]   Room:   1019/1019-02   Day:  2  Admit Date:  8/9/2024  9:25 AM    PCP:   Everett Mrain APRN - CNP  Code Status:  Full Code    Subjective:     C/C:   Chief Complaint   Patient presents with    Abnormal Test Results    Shortness of Breath     Interval History Status: improved.     Minimal improvement with medical management.  Cardiology on board and plan for catheterization on 8/12.     Brief History:     8/9 - Reported to primary care office for complaint of exertional fatigue worsening in intensity for the last 6 weeks and a phlegmy cough. PCP workup included lab work and an echo. Echo was found to have significantly reduced systolic heart failure with an EF of 15-20%. Request admission for cardiology    Family History   Problem Relation Age of Onset    Heart Disease Mother     Cancer Father     Migraines Sister     Migraines Brother        Vitals:  /83   Pulse 91   Temp 98.8 °F (37.1 °C) (Oral)   Resp 21   Ht 1.829 m (6')   Wt 102.6 kg (226 lb 1.6 oz)   SpO2 96%   BMI 30.66 kg/m²   Temp (24hrs), Av.2 °F (36.8 °C), Min:97.9 °F (36.6 °C), Max:98.8 °F (37.1 °C)    Recent Labs     08/10/24  1134 08/10/24  1707 08/10/24  1952 08/11/24  0749   POCGLU 115* 87 187* 119*       I/O (24Hr):    Intake/Output Summary (Last 24 hours) at 2024 1052  Last data filed at 2024 0916  Gross per 24 hour   Intake 1035 ml   Output 2450 ml   Net -1415 ml       Labs:  Hematology:  Recent Labs     24  0940 08/10/24  0551   WBC 6.7 8.0   RBC 4.15* 4.56   HGB 12.8* 14.1   HCT 39.3* 43.8   MCV 94.7 96.1   MCH 30.8 30.9   MCHC 32.6 32.2   RDW 14.4 14.3    186   MPV 10.0 10.1     Chemistry:  Recent Labs     24  0940 08/10/24  0551 24  0541    145* 141   K 3.8 3.8 3.4*    105 103   CO2 26 27 25   GLUCOSE 139* 113* 107*   BUN 20 17 27*   CREATININE 0.9 0.8 0.9   MG 2.0 2.2 2.2   ANIONGAP 11 13 13   LABGLOM 89 >90 89   CALCIUM 8.9 9.4 9.1   PROBNP 1,322* 1,506*  --    TROPHS 20  --   --      Recent Labs     08/09/24  1938 08/10/24  0551 08/10/24  0736 08/10/24  1134 08/10/24  1707 08/10/24  1952 08/11/24  0749   TSH  --  1.75  --   --   --   --   --    CHOL  --  154  --   --   --   --   --    HDL  --  37*  --   --   --   --   --    CHOLHDLRATIO  --  4.0  --   --   --   --   --    TRIG  --  126  --   --   --   --   --    VLDL  --  25  --   --   --   --   --    POCGLU 196*  --  124* 115* 87 187* 119*     ABG:No results found for: \"POCPH\", \"PHART\", \"PH\", \"POCPCO2\", \"KIF9HEB\", \"PCO2\", \"POCPO2\", \"PO2ART\", \"PO2\", \"POCHCO3\", \"MUV0ZLU\", \"HCO3\", \"NBEA\", \"PBEA\", \"BEART\", \"BE\", \"THGBART\", \"THB\", \"KNU6PTN\", \"TCJA0LWV\", \"M3ZSMNDP\", \"O2SAT\", \"FIO2\"  Lab Results   Component Value Date/Time    SPECIAL

## 2024-08-12 PROBLEM — E66.9 CLASS 1 OBESITY WITH SERIOUS COMORBIDITY AND BODY MASS INDEX (BMI) OF 30.0 TO 30.9 IN ADULT: Status: ACTIVE | Noted: 2019-01-29

## 2024-08-12 LAB
ANION GAP SERPL CALCULATED.3IONS-SCNC: 14 MMOL/L (ref 9–17)
BNP SERPL-MCNC: 1332 PG/ML
BUN SERPL-MCNC: 34 MG/DL (ref 8–23)
BUN/CREAT SERPL: 34 (ref 9–20)
CALCIUM SERPL-MCNC: 9.6 MG/DL (ref 8.6–10.4)
CHLORIDE SERPL-SCNC: 103 MMOL/L (ref 98–107)
CO2 SERPL-SCNC: 26 MMOL/L (ref 20–31)
CREAT SERPL-MCNC: 1 MG/DL (ref 0.7–1.2)
ECHO BSA: 2.35 M2
GFR, ESTIMATED: 78 ML/MIN/1.73M2
GLUCOSE BLD-MCNC: 110 MG/DL (ref 75–110)
GLUCOSE BLD-MCNC: 130 MG/DL (ref 75–110)
GLUCOSE BLD-MCNC: 196 MG/DL (ref 75–110)
GLUCOSE SERPL-MCNC: 116 MG/DL (ref 70–99)
MAGNESIUM SERPL-MCNC: 2.4 MG/DL (ref 1.6–2.6)
POTASSIUM SERPL-SCNC: 3.7 MMOL/L (ref 3.7–5.3)
SODIUM SERPL-SCNC: 143 MMOL/L (ref 135–144)

## 2024-08-12 PROCEDURE — 4A023N7 MEASUREMENT OF CARDIAC SAMPLING AND PRESSURE, LEFT HEART, PERCUTANEOUS APPROACH: ICD-10-PCS | Performed by: INTERNAL MEDICINE

## 2024-08-12 PROCEDURE — C1894 INTRO/SHEATH, NON-LASER: HCPCS | Performed by: INTERNAL MEDICINE

## 2024-08-12 PROCEDURE — 6370000000 HC RX 637 (ALT 250 FOR IP): Performed by: NURSE PRACTITIONER

## 2024-08-12 PROCEDURE — 97530 THERAPEUTIC ACTIVITIES: CPT

## 2024-08-12 PROCEDURE — 2709999900 HC NON-CHARGEABLE SUPPLY: Performed by: INTERNAL MEDICINE

## 2024-08-12 PROCEDURE — 6370000000 HC RX 637 (ALT 250 FOR IP): Performed by: INTERNAL MEDICINE

## 2024-08-12 PROCEDURE — 80048 BASIC METABOLIC PNL TOTAL CA: CPT

## 2024-08-12 PROCEDURE — 6360000002 HC RX W HCPCS: Performed by: INTERNAL MEDICINE

## 2024-08-12 PROCEDURE — 83880 ASSAY OF NATRIURETIC PEPTIDE: CPT

## 2024-08-12 PROCEDURE — 6360000002 HC RX W HCPCS: Performed by: NURSE PRACTITIONER

## 2024-08-12 PROCEDURE — 2060000000 HC ICU INTERMEDIATE R&B

## 2024-08-12 PROCEDURE — B2111ZZ FLUOROSCOPY OF MULTIPLE CORONARY ARTERIES USING LOW OSMOLAR CONTRAST: ICD-10-PCS | Performed by: INTERNAL MEDICINE

## 2024-08-12 PROCEDURE — B2151ZZ FLUOROSCOPY OF LEFT HEART USING LOW OSMOLAR CONTRAST: ICD-10-PCS | Performed by: INTERNAL MEDICINE

## 2024-08-12 PROCEDURE — 93458 L HRT ARTERY/VENTRICLE ANGIO: CPT | Performed by: INTERNAL MEDICINE

## 2024-08-12 PROCEDURE — 97535 SELF CARE MNGMENT TRAINING: CPT

## 2024-08-12 PROCEDURE — 99152 MOD SED SAME PHYS/QHP 5/>YRS: CPT | Performed by: INTERNAL MEDICINE

## 2024-08-12 PROCEDURE — 99232 SBSQ HOSP IP/OBS MODERATE 35: CPT | Performed by: NURSE PRACTITIONER

## 2024-08-12 PROCEDURE — 97166 OT EVAL MOD COMPLEX 45 MIN: CPT

## 2024-08-12 PROCEDURE — 83735 ASSAY OF MAGNESIUM: CPT

## 2024-08-12 PROCEDURE — 82947 ASSAY GLUCOSE BLOOD QUANT: CPT

## 2024-08-12 PROCEDURE — 2580000003 HC RX 258: Performed by: NURSE PRACTITIONER

## 2024-08-12 PROCEDURE — 36415 COLL VENOUS BLD VENIPUNCTURE: CPT

## 2024-08-12 PROCEDURE — 2500000003 HC RX 250 WO HCPCS: Performed by: INTERNAL MEDICINE

## 2024-08-12 RX ORDER — CARVEDILOL 6.25 MG/1
6.25 TABLET ORAL 2 TIMES DAILY WITH MEALS
Qty: 60 TABLET | Refills: 3 | Status: SHIPPED | OUTPATIENT
Start: 2024-08-12 | End: 2024-08-16 | Stop reason: HOSPADM

## 2024-08-12 RX ORDER — FENTANYL CITRATE 50 UG/ML
INJECTION, SOLUTION INTRAMUSCULAR; INTRAVENOUS PRN
Status: DISCONTINUED | OUTPATIENT
Start: 2024-08-12 | End: 2024-08-12 | Stop reason: HOSPADM

## 2024-08-12 RX ORDER — MIDAZOLAM HYDROCHLORIDE 1 MG/ML
INJECTION INTRAMUSCULAR; INTRAVENOUS PRN
Status: DISCONTINUED | OUTPATIENT
Start: 2024-08-12 | End: 2024-08-12 | Stop reason: HOSPADM

## 2024-08-12 RX ORDER — ATORVASTATIN CALCIUM 20 MG/1
20 TABLET, FILM COATED ORAL DAILY
Qty: 30 TABLET | Refills: 3 | Status: SHIPPED | OUTPATIENT
Start: 2024-08-13

## 2024-08-12 RX ORDER — SPIRONOLACTONE 25 MG/1
25 TABLET ORAL DAILY
Qty: 30 TABLET | Refills: 3 | Status: SHIPPED | OUTPATIENT
Start: 2024-08-13

## 2024-08-12 RX ADMIN — ACETAMINOPHEN 650 MG: 325 TABLET ORAL at 20:28

## 2024-08-12 RX ADMIN — CARVEDILOL 6.25 MG: 6.25 TABLET, FILM COATED ORAL at 22:07

## 2024-08-12 RX ADMIN — CARVEDILOL 6.25 MG: 6.25 TABLET, FILM COATED ORAL at 08:34

## 2024-08-12 RX ADMIN — ATORVASTATIN CALCIUM 20 MG: 20 TABLET, FILM COATED ORAL at 08:34

## 2024-08-12 RX ADMIN — EMPAGLIFLOZIN 10 MG: 10 TABLET, FILM COATED ORAL at 08:34

## 2024-08-12 RX ADMIN — SODIUM CHLORIDE, PRESERVATIVE FREE 10 ML: 5 INJECTION INTRAVENOUS at 08:34

## 2024-08-12 RX ADMIN — FUROSEMIDE 40 MG: 10 INJECTION, SOLUTION INTRAMUSCULAR; INTRAVENOUS at 22:08

## 2024-08-12 RX ADMIN — ASPIRIN 81 MG: 81 TABLET, COATED ORAL at 09:06

## 2024-08-12 RX ADMIN — GLIPIZIDE 10 MG: 10 TABLET ORAL at 08:34

## 2024-08-12 RX ADMIN — FUROSEMIDE 40 MG: 10 INJECTION, SOLUTION INTRAMUSCULAR; INTRAVENOUS at 08:33

## 2024-08-12 RX ADMIN — SPIRONOLACTONE 25 MG: 25 TABLET ORAL at 08:33

## 2024-08-12 ASSESSMENT — ENCOUNTER SYMPTOMS
RESPIRATORY NEGATIVE: 1
GASTROINTESTINAL NEGATIVE: 1
EYES NEGATIVE: 1

## 2024-08-12 ASSESSMENT — PAIN SCALES - GENERAL
PAINLEVEL_OUTOF10: 3
PAINLEVEL_OUTOF10: 0

## 2024-08-12 ASSESSMENT — PAIN DESCRIPTION - LOCATION: LOCATION: BACK

## 2024-08-12 ASSESSMENT — PAIN DESCRIPTION - ORIENTATION: ORIENTATION: RIGHT

## 2024-08-12 NOTE — PROGRESS NOTES
Occupational Therapy  Facility/Department: [facility] PROGRESSIVE CARE  Occupational Therapy Initial Assessment    Name: Corbin Gallardo  : 1950  MRN: [id number]  Date of Service: 2024    SARAH BEE reports patient is medically stable for therapy treatment this date.    Chart reviewed prior to treatment and patient is agreeable for therapy.  All lines intact and patient positioned comfortably at end of treatment.  All patient needs addressed prior to ending therapy session.      Due to recent hospitalization and medical condition, pt would benefit from additional intermittent skilled therapy at time of discharge.  Please refer to the AM-PAC score for current functional status.         Discharge Recommendations:  Patient would benefit from continued therapy after discharge  OT Equipment Recommendations  Equipment Needed: Yes  Mobility Devices: ADL Assistive Devices  ADL Assistive Devices: Long-handled Shoe Horn;Long-handled Sponge;Emergency Alert System       Patient Diagnosis(es): The primary encounter diagnosis was Congestive heart failure, unspecified HF chronicity, unspecified heart failure type (HCC). A diagnosis of Heart failure (HCC) was also pertinent to this visit.  Past Medical History:  has a past medical history of Cataracts, bilateral, Cerebral artery occlusion with cerebral infarction (HCC), Diabetes mellitus (HCC), Eye disorder, Glaucoma, Headache(784.0), Hearing loss, Heartburn, Hyperlipidemia, Hypertension, Kidney stones, Osteoarthritis, Snores, Tendonitis of shoulder, left, and Tremor.  Past Surgical History:  has a past surgical history that includes Cystoscopy; Kidney stone surgery; Lithotripsy; Refractive surgery (Right, ); Cataract removal (Right, 2015); Thumb amputation; Hand surgery (Left); Tonsillectomy; Total knee arthroplasty (Left, 2021); joint replacement; eye surgery; Total knee arthroplasty (Right, 02/15/2022); and Total knee arthroplasty (Right, 2/15/2022).      PER  assistance  Supine to Sit: Stand-by assistance  Sit to Supine:  (N/T and pt was agreeable to sitting up in chair after benefits of being up OOB)  Scooting: Stand-by assistance      Balance  Sitting:  (SUP)  Standing:  (CGA no AD)  Transfer Training  Transfer Training: Yes  Overall Level of Assistance: Contact-guard assistance (no AD)  Interventions: Safety awareness training;Verbal cues (MIN cues needed for pacing, pursed lip breathing, hand placement reaching back to increase overall safety)  Sit to Stand: Stand-by assistance  Stand to Sit: Stand-by assistance  Bed to Chair: Contact-guard assistance  Toilet Transfer:  (N/T and was offered however pt with no needs)     AROM: Within functional limits  Strength: Within functional limits (BUE strength grossly 4 plus/5)  Coordination: Generally decreased, functional  Tone: Normal  Sensation: Intact      ADL  Feeding: Setup  Grooming: Setup;Supervision  Grooming Skilled Clinical Factors: for washing face and hair care seated in chair  UE Bathing: Setup;Stand by assistance  LE Bathing: Setup;Contact guard assistance  UE Dressing: Setup;Minimal assistance  UE Dressing Skilled Clinical Factors: tying hosp gown to increase modesty  LE Dressing: Setup;Minimal assistance  LE Dressing Skilled Clinical Factors: Pt was able to cross and donned B socks long sitting in bed following set up  Toileting: Setup;Minimal assistance  Toileting Skilled Clinical Factors: with gown mgt  Functional Mobility: Contact guard assistance (no AD bed to door and back to chair on L side of room)  Functional Mobility Skilled Clinical Factors: MIN cues needed for pacing, scanning, upright posture, pursed lip breathing all to increase overall safety/reduce falls              Vision  Vision: Impaired  Vision Exceptions: Wears glasses at all times (Pt states his R eye has glaucoma/WFL's for L; previous R eye cataract sx)  Hearing  Hearing: Exceptions to WFL  Hearing Exceptions: Hard of hearing/hearing

## 2024-08-12 NOTE — PLAN OF CARE
Pt has been resting comfortably for majority of the day. Pt has been NPO for heart cath this evening. Pt is A&Ox4 and gets up standby assist. Pt did work with therapy this morning and got up into the chair. Pt is on RA. BS has been stable. VSS. Pt has not complained of any pain. All questions and concerns have been addressed. Safety maintained. Bed is locked and in the lowest position with the call light in reach.    Problem: Discharge Planning  Goal: Discharge to home or other facility with appropriate resources  8/12/2024 1130 by Tawanna Mcgregor RN  Outcome: Progressing     Problem: Safety - Adult  Goal: Free from fall injury  8/12/2024 1130 by Tawanna Mcgregor RN  Outcome: Progressing     Problem: ABCDS Injury Assessment  Goal: Absence of physical injury  8/12/2024 1130 by Tawanna Mcgregor RN  Outcome: Progressing     Problem: Chronic Conditions and Co-morbidities  Goal: Patient's chronic conditions and co-morbidity symptoms are monitored and maintained or improved  8/12/2024 1130 by Tawanna Mcgregor RN  Outcome: Progressing     Problem: Respiratory - Adult  Goal: Achieves optimal ventilation and oxygenation  8/12/2024 1130 by Tawanna Mcgregor RN  Outcome: Progressing     Problem: Cardiovascular - Adult  Goal: Maintains optimal cardiac output and hemodynamic stability  8/12/2024 1130 by Tawanna Mcgregor RN  Outcome: Progressing     Problem: Cardiovascular - Adult  Goal: Absence of cardiac dysrhythmias or at baseline  8/12/2024 1130 by Tawanna Mcgregor RN  Outcome: Progressing     Problem: Skin/Tissue Integrity - Adult  Goal: Skin integrity remains intact  8/12/2024 1130 by Tawanna Mcgregor RN  Outcome: Progressing     Problem: Skin/Tissue Integrity - Adult  Goal: Incisions, wounds, or drain sites healing without S/S of infection  8/12/2024 1130 by Tawanna Mcgregor RN  Outcome: Progressing     Problem: Musculoskeletal - Adult  Goal: Return mobility to safest level of function  8/12/2024 1130 by Tawanna Mcgregor RN  Outcome:  Progressing     Problem: Musculoskeletal - Adult  Goal: Maintain proper alignment of affected body part  8/12/2024 1130 by Tawanna Mcgregor RN  Outcome: Progressing     Problem: Musculoskeletal - Adult  Goal: Return ADL status to a safe level of function  8/12/2024 1130 by Tawanna Mcgregor RN  Outcome: Progressing     Problem: Genitourinary - Adult  Goal: Absence of urinary retention  8/12/2024 1130 by Tawanna Mcgregor RN  Outcome: Progressing     Problem: Metabolic/Fluid and Electrolytes - Adult  Goal: Electrolytes maintained within normal limits  8/12/2024 1130 by Tawanna Mcgregor RN  Outcome: Progressing     Problem: Metabolic/Fluid and Electrolytes - Adult  Goal: Hemodynamic stability and optimal renal function maintained  8/12/2024 1130 by Tawanna Mcgregor RN  Outcome: Progressing     Problem: Metabolic/Fluid and Electrolytes - Adult  Goal: Glucose maintained within prescribed range  8/12/2024 1130 by Tawanna Mcgregor RN  Outcome: Progressing     Problem: Pain  Goal: Verbalizes/displays adequate comfort level or baseline comfort level  8/12/2024 1130 by Tawanna Mcgregor RN  Outcome: Progressing

## 2024-08-12 NOTE — PROGRESS NOTES
[facility]  Office: [phone]  Austin Galaviz DO, Bertin Wood, DO, Epifanio Finley DO, Jonny Luna, DO, Charito Hernandez MD, Bernie Lagunas MD, Vesna Baker MD, Renate Lyn MD,  Antony Rashid MD, John Guthrie MD, Angie Headley MD,  Kathy Kenney DO, Ana Holt MD, Baldo Velarde MD, Denis Galaviz DO, Marquita Goncalves MD,  Alexis Odom DO, Eladia Reveles MD, Louisa Jones MD, Jerilyn Mast MD, Sampson Graves MD,  Juwan Bah MD, Negro Gore MD, Gabe Roberts MD, Obie York MD, Tony Coelho MD, Yung Buckner MD, Konrad Reyes DO, Cristino Long DO, Tg Lozano MD,  Pankaj Jules MD, Shirley Waterhouse, CNP,  Lyric Liu CNP, Hemant Ruiz, CNP,  Edilma Zhou, DNP, Jocy Cordero, CNP, Margarita Graves, CNP, Socorro Flores CNP, Yanely Rapp CNP, Lexy Cronin, PA-C, Chasity Pedro PA-C, Awa Christine, CNP, Renee Najera, CNP, Laura Beckwith, CNP, Laurie Avila, CNP, Ashley Chandler, CNS, Ayana Madrigal, CNP, Usha Parra CNP, Tracy Schwab, CNP         [facility]   IN-PATIENT SERVICE   [facility]    Progress Note    8/12/2024    11:29 AM    Name:   Corbin Gallardo  MRN:     [id number]     Acct:      [id number]   Room:   1019/1019-02   Day:  3  Admit Date:  8/9/2024  9:25 AM    PCP:   Everett Marin APRN - CNP  Code Status:  Full Code    Subjective:     C/C: feeling better  Chief Complaint   Patient presents with    Abnormal Test Results    Shortness of Breath     Interval History Status: not changed.     Patient resting in bed. No complaints of chest pain, abdominal pain, nausea, vomiting, shortness of breath, headache dizziness.     Plan for cardiac cath today.     Brief History:     Per my partner's documentation:     \" 8/9 - Reported to primary care office for complaint of exertional fatigue worsening in intensity for the last 6 weeks and a phlegmy cough. PCP workup included lab work and an echo. Echo was

## 2024-08-12 NOTE — PLAN OF CARE
Pt is currently resting in bed A&OX4 with no complaints of pain. Pt refused lovenox d/t frequent ambulation. VSS. NPO at midnight cardiac cath tomorrow. All questions and concerns have been addressed. Uneventful night.    Standard safety measures in place. Call light within reach. Bed in locked and lowest position. Care ongoing.     Problem: Discharge Planning  Goal: Discharge to home or other facility with appropriate resources  8/11/2024 2225 by Carlitos Short RN  Outcome: Progressing     Problem: Safety - Adult  Goal: Free from fall injury  8/11/2024 2225 by Carlitos Short RN  Outcome: Progressing     Problem: ABCDS Injury Assessment  Goal: Absence of physical injury  8/11/2024 2225 by Carlitos Short RN  Outcome: Progressing     Problem: Chronic Conditions and Co-morbidities  Goal: Patient's chronic conditions and co-morbidity symptoms are monitored and maintained or improved  8/11/2024 2225 by Carlitos Short RN  Outcome: Progressing     Problem: Respiratory - Adult  Goal: Achieves optimal ventilation and oxygenation  8/11/2024 2225 by Carlitos Short RN  Outcome: Progressing     Problem: Cardiovascular - Adult  Goal: Maintains optimal cardiac output and hemodynamic stability  8/11/2024 2225 by Carlitos Short RN  Outcome: Progressing     Problem: Cardiovascular - Adult  Goal: Absence of cardiac dysrhythmias or at baseline  8/11/2024 2225 by Carlitos Short RN  Outcome: Progressing     Problem: Skin/Tissue Integrity - Adult  Goal: Skin integrity remains intact  8/11/2024 2225 by Carlitos Short RN  Outcome: Progressing  Flowsheets (Taken 8/11/2024 2224)  Skin Integrity Remains Intact: Monitor for areas of redness and/or skin breakdown     Problem: Skin/Tissue Integrity - Adult  Goal: Incisions, wounds, or drain sites healing without S/S of infection  8/11/2024 2225 by Carlitos Short RN  Outcome: Progressing     Problem: Musculoskeletal - Adult  Goal: Return mobility to safest level of function  8/11/2024 2225 by

## 2024-08-12 NOTE — PROGRESS NOTES
Cardiovascular progress Note          Patient name: Corbin Gallardo    YOB: 1950  Date of admission:  8/9/2024       Patient seen, examined. Previous clinical entries reviewed.  All available laboratory, imaging and ancillary data reviewed.    Subjective:      Cardiac cath with complex LAD and distal RCA stenoses.    Systems review:  Constitutional: No fever/chills.   HENT: No headache, neck pain or neck stiffness. No sore throat or dysphagia.   Gastrointestinal: No abdominal pain, nausea or vomiting.   Cardiac: As Above  Respiratory: As above  Neurologic: No new focal weakness or numbness  Psychiatric: Normal mood and mentation       Examination:   Vitals: BP (!) 114/90   Pulse 76   Temp 98.1 °F (36.7 °C) (Oral)   Resp 18   Ht 1.829 m (6')   Wt 102.5 kg (225 lb 14.4 oz)   SpO2 98%   BMI 30.64 kg/m²     Intake/Output Summary (Last 24 hours) at 8/12/2024 1610  Last data filed at 8/12/2024 1558  Gross per 24 hour   Intake --   Output 360 ml   Net -360 ml       General appearance: Comfortable in no apparent distress.  HEENT: No pallor. No icterus  Neck: Supple.  Lungs:Generally decreased breath sounds  Heart: S1,S2  Abdomen: Soft  Extremities: No peripheral edema  Skin: No obvious rashes.  Musculoskeletal: No obvious deformities.  Neurologic: No focal deficits.     Labs/ Ancillary data:     CBC:   Recent Labs     08/10/24  0551   WBC 8.0   HGB 14.1        BMP:    Recent Labs     08/10/24  0551 08/11/24  0541 08/12/24  0613   * 141 143   K 3.8 3.4* 3.7    103 103   CO2 27 25 26   BUN 17 27* 34*   CREATININE 0.8 0.9 1.0   GLUCOSE 113* 107* 116*       Lipids:   Recent Labs     08/10/24  0551   CHOL 154   HDL 37*       Medications:   Scheduled Meds:   aspirin  81 mg Oral Daily    carvedilol  6.25 mg Oral BID WC    atorvastatin  20 mg Oral Daily    sodium chloride flush  5-40 mL IntraVENous 2 times per day    enoxaparin  30 mg SubCUTAneous BID    furosemide  40 mg IntraVENous

## 2024-08-13 PROBLEM — I50.9 CONGESTIVE HEART FAILURE (HCC): Status: ACTIVE | Noted: 2024-08-13

## 2024-08-13 PROBLEM — I25.84 CORONARY ARTERY DISEASE DUE TO CALCIFIED CORONARY LESION: Status: ACTIVE | Noted: 2024-08-13

## 2024-08-13 PROBLEM — I25.10 CORONARY ARTERY DISEASE DUE TO CALCIFIED CORONARY LESION: Status: ACTIVE | Noted: 2024-08-13

## 2024-08-13 LAB
ANION GAP SERPL CALCULATED.3IONS-SCNC: 15 MMOL/L (ref 9–17)
BUN SERPL-MCNC: 36 MG/DL (ref 8–23)
BUN/CREAT SERPL: 36 (ref 9–20)
CALCIUM SERPL-MCNC: 9.2 MG/DL (ref 8.6–10.4)
CHLORIDE SERPL-SCNC: 103 MMOL/L (ref 98–107)
CO2 SERPL-SCNC: 24 MMOL/L (ref 20–31)
CREAT SERPL-MCNC: 1 MG/DL (ref 0.7–1.2)
ECHO BSA: 2.35 M2
GFR, ESTIMATED: 78 ML/MIN/1.73M2
GLUCOSE BLD-MCNC: 122 MG/DL (ref 75–110)
GLUCOSE BLD-MCNC: 187 MG/DL (ref 75–110)
GLUCOSE BLD-MCNC: 91 MG/DL (ref 75–110)
GLUCOSE SERPL-MCNC: 86 MG/DL (ref 70–99)
MAGNESIUM SERPL-MCNC: 2.5 MG/DL (ref 1.6–2.6)
POTASSIUM SERPL-SCNC: 3.6 MMOL/L (ref 3.7–5.3)
SODIUM SERPL-SCNC: 142 MMOL/L (ref 135–144)

## 2024-08-13 PROCEDURE — 99152 MOD SED SAME PHYS/QHP 5/>YRS: CPT | Performed by: INTERNAL MEDICINE

## 2024-08-13 PROCEDURE — 2060000000 HC ICU INTERMEDIATE R&B

## 2024-08-13 PROCEDURE — C9600 PERC DRUG-EL COR STENT SING: HCPCS | Performed by: INTERNAL MEDICINE

## 2024-08-13 PROCEDURE — 2500000003 HC RX 250 WO HCPCS: Performed by: INTERNAL MEDICINE

## 2024-08-13 PROCEDURE — 6360000002 HC RX W HCPCS: Performed by: INTERNAL MEDICINE

## 2024-08-13 PROCEDURE — 92928 PRQ TCAT PLMT NTRAC ST 1 LES: CPT | Performed by: INTERNAL MEDICINE

## 2024-08-13 PROCEDURE — C9601 PERC DRUG-EL COR STENT BRAN: HCPCS | Performed by: INTERNAL MEDICINE

## 2024-08-13 PROCEDURE — 80048 BASIC METABOLIC PNL TOTAL CA: CPT

## 2024-08-13 PROCEDURE — 2580000003 HC RX 258: Performed by: NURSE PRACTITIONER

## 2024-08-13 PROCEDURE — C1725 CATH, TRANSLUMIN NON-LASER: HCPCS | Performed by: INTERNAL MEDICINE

## 2024-08-13 PROCEDURE — C1874 STENT, COATED/COV W/DEL SYS: HCPCS | Performed by: INTERNAL MEDICINE

## 2024-08-13 PROCEDURE — 82947 ASSAY GLUCOSE BLOOD QUANT: CPT

## 2024-08-13 PROCEDURE — 2580000003 HC RX 258: Performed by: INTERNAL MEDICINE

## 2024-08-13 PROCEDURE — 92972 PERQ TRLUML CORONRY LITHOTRP: CPT | Performed by: INTERNAL MEDICINE

## 2024-08-13 PROCEDURE — 2709999900 HC NON-CHARGEABLE SUPPLY: Performed by: INTERNAL MEDICINE

## 2024-08-13 PROCEDURE — 99232 SBSQ HOSP IP/OBS MODERATE 35: CPT | Performed by: NURSE PRACTITIONER

## 2024-08-13 PROCEDURE — C1761 HC CATH TRANSLUM INTRAVASCULAR LITHOTRIPSY CORONARY: HCPCS | Performed by: INTERNAL MEDICINE

## 2024-08-13 PROCEDURE — C1769 GUIDE WIRE: HCPCS | Performed by: INTERNAL MEDICINE

## 2024-08-13 PROCEDURE — 027234Z DILATION OF CORONARY ARTERY, THREE ARTERIES WITH DRUG-ELUTING INTRALUMINAL DEVICE, PERCUTANEOUS APPROACH: ICD-10-PCS | Performed by: INTERNAL MEDICINE

## 2024-08-13 PROCEDURE — 99153 MOD SED SAME PHYS/QHP EA: CPT | Performed by: INTERNAL MEDICINE

## 2024-08-13 PROCEDURE — 6360000004 HC RX CONTRAST MEDICATION: Performed by: INTERNAL MEDICINE

## 2024-08-13 PROCEDURE — 6370000000 HC RX 637 (ALT 250 FOR IP): Performed by: NURSE PRACTITIONER

## 2024-08-13 PROCEDURE — 83735 ASSAY OF MAGNESIUM: CPT

## 2024-08-13 PROCEDURE — 02F03ZZ FRAGMENTATION IN CORONARY ARTERY, ONE ARTERY, PERCUTANEOUS APPROACH: ICD-10-PCS | Performed by: INTERNAL MEDICINE

## 2024-08-13 PROCEDURE — 4A023N7 MEASUREMENT OF CARDIAC SAMPLING AND PRESSURE, LEFT HEART, PERCUTANEOUS APPROACH: ICD-10-PCS | Performed by: INTERNAL MEDICINE

## 2024-08-13 PROCEDURE — 6360000002 HC RX W HCPCS: Performed by: NURSE PRACTITIONER

## 2024-08-13 PROCEDURE — 6370000000 HC RX 637 (ALT 250 FOR IP): Performed by: INTERNAL MEDICINE

## 2024-08-13 PROCEDURE — C1887 CATHETER, GUIDING: HCPCS | Performed by: INTERNAL MEDICINE

## 2024-08-13 PROCEDURE — C1894 INTRO/SHEATH, NON-LASER: HCPCS | Performed by: INTERNAL MEDICINE

## 2024-08-13 PROCEDURE — 36415 COLL VENOUS BLD VENIPUNCTURE: CPT

## 2024-08-13 DEVICE — STENT ONYXNG35038UX ONYX 3.50X38RX
Type: IMPLANTABLE DEVICE | Status: FUNCTIONAL
Brand: ONYX FRONTIER™

## 2024-08-13 DEVICE — STENT RONYX27538UX RESOLUTE ONYX 2.75X38
Type: IMPLANTABLE DEVICE | Status: FUNCTIONAL
Brand: RESOLUTE ONYX™

## 2024-08-13 DEVICE — STENT ONYXNG30018UX ONYX 3.00X18RX
Type: IMPLANTABLE DEVICE | Status: FUNCTIONAL
Brand: ONYX FRONTIER™

## 2024-08-13 RX ORDER — SODIUM CHLORIDE 0.9 % (FLUSH) 0.9 %
5-40 SYRINGE (ML) INJECTION PRN
Status: DISCONTINUED | OUTPATIENT
Start: 2024-08-13 | End: 2024-08-16 | Stop reason: HOSPADM

## 2024-08-13 RX ORDER — BIVALIRUDIN 250 MG/5ML
INJECTION, POWDER, LYOPHILIZED, FOR SOLUTION INTRAVENOUS PRN
Status: DISCONTINUED | OUTPATIENT
Start: 2024-08-13 | End: 2024-08-13 | Stop reason: HOSPADM

## 2024-08-13 RX ORDER — SODIUM CHLORIDE 9 MG/ML
INJECTION, SOLUTION INTRAVENOUS PRN
Status: DISCONTINUED | OUTPATIENT
Start: 2024-08-13 | End: 2024-08-16 | Stop reason: HOSPADM

## 2024-08-13 RX ORDER — PHENTOLAMINE MESYLATE 5 MG/1
5 INJECTION INTRAMUSCULAR; INTRAVENOUS ONCE
Status: COMPLETED | OUTPATIENT
Start: 2024-08-13 | End: 2024-08-13

## 2024-08-13 RX ORDER — SODIUM CHLORIDE 9 MG/ML
INJECTION, SOLUTION INTRAVENOUS CONTINUOUS
Status: ACTIVE | OUTPATIENT
Start: 2024-08-13 | End: 2024-08-14

## 2024-08-13 RX ORDER — SODIUM CHLORIDE 0.9 % (FLUSH) 0.9 %
5-40 SYRINGE (ML) INJECTION EVERY 12 HOURS SCHEDULED
Status: DISCONTINUED | OUTPATIENT
Start: 2024-08-13 | End: 2024-08-16 | Stop reason: HOSPADM

## 2024-08-13 RX ORDER — NOREPINEPHRINE BITARTRATE 0.06 MG/ML
INJECTION, SOLUTION INTRAVENOUS CONTINUOUS PRN
Status: COMPLETED | OUTPATIENT
Start: 2024-08-13 | End: 2024-08-13

## 2024-08-13 RX ORDER — NITROGLYCERIN 20 MG/100ML
INJECTION INTRAVENOUS CONTINUOUS PRN
Status: COMPLETED | OUTPATIENT
Start: 2024-08-13 | End: 2024-08-13

## 2024-08-13 RX ORDER — MIDAZOLAM HYDROCHLORIDE 1 MG/ML
INJECTION INTRAMUSCULAR; INTRAVENOUS PRN
Status: DISCONTINUED | OUTPATIENT
Start: 2024-08-13 | End: 2024-08-13 | Stop reason: HOSPADM

## 2024-08-13 RX ORDER — SODIUM CHLORIDE 9 MG/ML
INJECTION, SOLUTION INTRAVENOUS CONTINUOUS
Status: DISCONTINUED | OUTPATIENT
Start: 2024-08-13 | End: 2024-08-14

## 2024-08-13 RX ORDER — ACETAMINOPHEN 325 MG/1
650 TABLET ORAL EVERY 4 HOURS PRN
Status: DISCONTINUED | OUTPATIENT
Start: 2024-08-13 | End: 2024-08-16 | Stop reason: HOSPADM

## 2024-08-13 RX ORDER — ATROPINE SULFATE 0.1 MG/ML
INJECTION INTRAVENOUS PRN
Status: DISCONTINUED | OUTPATIENT
Start: 2024-08-13 | End: 2024-08-13 | Stop reason: HOSPADM

## 2024-08-13 RX ORDER — 0.9 % SODIUM CHLORIDE 0.9 %
INTRAVENOUS SOLUTION INTRAVENOUS CONTINUOUS PRN
Status: COMPLETED | OUTPATIENT
Start: 2024-08-13 | End: 2024-08-13

## 2024-08-13 RX ORDER — NOREPINEPHRINE BITARTRATE 0.06 MG/ML
1-100 INJECTION, SOLUTION INTRAVENOUS CONTINUOUS
Status: DISCONTINUED | OUTPATIENT
Start: 2024-08-13 | End: 2024-08-14

## 2024-08-13 RX ORDER — FENTANYL CITRATE 50 UG/ML
INJECTION, SOLUTION INTRAMUSCULAR; INTRAVENOUS PRN
Status: DISCONTINUED | OUTPATIENT
Start: 2024-08-13 | End: 2024-08-13 | Stop reason: HOSPADM

## 2024-08-13 RX ADMIN — SODIUM CHLORIDE, PRESERVATIVE FREE 10 ML: 5 INJECTION INTRAVENOUS at 08:01

## 2024-08-13 RX ADMIN — CARVEDILOL 6.25 MG: 6.25 TABLET, FILM COATED ORAL at 08:01

## 2024-08-13 RX ADMIN — EMPAGLIFLOZIN 10 MG: 10 TABLET, FILM COATED ORAL at 08:01

## 2024-08-13 RX ADMIN — GLIPIZIDE 10 MG: 10 TABLET ORAL at 08:01

## 2024-08-13 RX ADMIN — FUROSEMIDE 40 MG: 10 INJECTION, SOLUTION INTRAMUSCULAR; INTRAVENOUS at 08:02

## 2024-08-13 RX ADMIN — FUROSEMIDE 40 MG: 10 INJECTION, SOLUTION INTRAMUSCULAR; INTRAVENOUS at 20:25

## 2024-08-13 RX ADMIN — ENOXAPARIN SODIUM 30 MG: 100 INJECTION SUBCUTANEOUS at 08:01

## 2024-08-13 RX ADMIN — ATORVASTATIN CALCIUM 20 MG: 20 TABLET, FILM COATED ORAL at 08:02

## 2024-08-13 RX ADMIN — SPIRONOLACTONE 25 MG: 25 TABLET ORAL at 08:01

## 2024-08-13 RX ADMIN — PHENTOLAMINE MESYLATE 5 MG: 5 INJECTION, POWDER, FOR SOLUTION INTRAMUSCULAR; INTRAVENOUS at 18:09

## 2024-08-13 RX ADMIN — ENOXAPARIN SODIUM 30 MG: 100 INJECTION SUBCUTANEOUS at 20:25

## 2024-08-13 RX ADMIN — ACETAMINOPHEN 650 MG: 325 TABLET ORAL at 23:34

## 2024-08-13 RX ADMIN — TICAGRELOR 90 MG: 90 TABLET ORAL at 20:25

## 2024-08-13 RX ADMIN — SODIUM CHLORIDE: 9 INJECTION, SOLUTION INTRAVENOUS at 20:38

## 2024-08-13 RX ADMIN — ASPIRIN 81 MG: 81 TABLET, COATED ORAL at 08:01

## 2024-08-13 RX ADMIN — SODIUM CHLORIDE, PRESERVATIVE FREE 10 ML: 5 INJECTION INTRAVENOUS at 20:25

## 2024-08-13 ASSESSMENT — PAIN DESCRIPTION - PAIN TYPE: TYPE: ACUTE PAIN

## 2024-08-13 ASSESSMENT — PAIN DESCRIPTION - LOCATION: LOCATION: BACK

## 2024-08-13 ASSESSMENT — PAIN - FUNCTIONAL ASSESSMENT: PAIN_FUNCTIONAL_ASSESSMENT: ACTIVITIES ARE NOT PREVENTED

## 2024-08-13 ASSESSMENT — PAIN DESCRIPTION - DESCRIPTORS: DESCRIPTORS: ACHING;DISCOMFORT

## 2024-08-13 ASSESSMENT — PAIN DESCRIPTION - ORIENTATION: ORIENTATION: RIGHT;LOWER

## 2024-08-13 ASSESSMENT — ENCOUNTER SYMPTOMS
GASTROINTESTINAL NEGATIVE: 1
RESPIRATORY NEGATIVE: 1
EYES NEGATIVE: 1

## 2024-08-13 ASSESSMENT — PAIN SCALES - GENERAL: PAINLEVEL_OUTOF10: 7

## 2024-08-13 ASSESSMENT — PAIN DESCRIPTION - FREQUENCY: FREQUENCY: INTERMITTENT

## 2024-08-13 ASSESSMENT — PAIN DESCRIPTION - ONSET: ONSET: GRADUAL

## 2024-08-13 NOTE — PROGRESS NOTES
[facility]  Office: [phone]  Austin Galaviz DO, Bertin Wood, DO, Epifanio Finley DO, Jonny Luna, DO, Charito Hernandez MD, Bernie Lagunas MD, Vesna Baker MD, Renate Lyn MD,  Antony Rashid MD, John Guthrie MD, Angie Headley MD,  Kathy Kenney DO, Ana Holt MD, Baldo Velarde MD, Denis Galaviz DO, Marquita Goncalves MD,  Alexis Odom DO, Eladia Reveles MD, Louisa Jones MD, Jerilyn Mast MD, Sampson Graves MD,  Juwan Bah MD, Negro Gore MD, Gabe Roberts MD, Obie York MD, Tony Coelho MD, Yung Buckner MD, Konrad Reyes DO, Cristino Long DO, Tg Lozano MD,  Pankaj Jules MD, Shirley Waterhouse, CNP,  Lyric Liu CNP, Hemant Ruiz, CNP,  Edilma Zhou, DNP, Jocy Cordero, CNP, Margarita Graves, CNP, Socorro Flores CNP, Yanely Rapp CNP, Lexy Cronin, PA-C, Chasity Pedro PA-C, Awa Christine, CNP, Renee Najera, CNP, Laura Beckwith, CNP, Laurie Avila, CNP, Ashley Chandler, CNS, Ayana Madrigal, CNP, Usha Parra CNP, Tracy Schwab, CNP         [facility]   IN-PATIENT SERVICE   [facility]    Progress Note    8/13/2024    9:00 AM    Name:   Corbin Gallardo  MRN:     [id number]     Acct:      [id number]   Room:   2032/2032-01  IP Day:  4  Admit Date:  8/9/2024  9:25 AM    PCP:   Everett Marin APRN - CNP  Code Status:  Full Code    Subjective:     C/C: Breathing better  Chief Complaint   Patient presents with    Abnormal Test Results    Shortness of Breath     Interval History Status: not changed.     Patient sitting up in chair.  He says he had to wear oxygen overnight due to drop in his oxygen level while sleeping.  Nursing confirmed this.  He has no complaints of chest pain, shortness of breath, abdominal pain, nausea, vomiting.  No complaints of headache or dizziness.    Discussed with primary RN    Plan for cardiac cath this afternoon    Brief History:     Per my partner's documentation:      \" 8/9 -  Reported to primary care office for complaint of exertional fatigue worsening in intensity for the last 6 weeks and a phlegmy cough. PCP workup included lab work and an echo. Echo was found to have significantly reduced systolic heart failure with an EF of 15-20%. Request admission for cardiology evaluation and stabilization.      8/10 - 8/11 - Minimal improvement with medical management.  Cardiology on board and plan for catheterization on 8/12.\"     No further complaints of shortness of breath or chests pain.  Patient did require supplemental O2 while sleeping evening of 8/12.  Will recommend the patient has outpatient sleep study.  Plan for cardiac cath 8/13.    Review of Systems:     Review of Systems   Constitutional: Negative.    HENT: Negative.     Eyes: Negative.    Respiratory: Negative.     Cardiovascular: Negative.    Gastrointestinal: Negative.    Genitourinary: Negative.    Musculoskeletal: Negative.    Skin: Negative.    Neurological: Negative.    Psychiatric/Behavioral: Negative.         Medications:     Allergies:  No Known Allergies    Current Meds:   Scheduled Meds:    aspirin  81 mg Oral Daily    carvedilol  6.25 mg Oral BID WC    atorvastatin  20 mg Oral Daily    sodium chloride flush  5-40 mL IntraVENous 2 times per day    enoxaparin  30 mg SubCUTAneous BID    furosemide  40 mg IntraVENous BID    glipiZIDE  10 mg Oral QAM    empagliflozin  10 mg Oral Daily    spironolactone  25 mg Oral Daily     Continuous Infusions:    sodium chloride Stopped (08/11/24 1450)    dextrose       PRN Meds: sodium chloride flush, sodium chloride, ondansetron **OR** ondansetron, polyethylene glycol, acetaminophen **OR** acetaminophen, potassium chloride **OR** potassium alternative oral replacement **OR** potassium chloride, magnesium sulfate, glucose, dextrose bolus **OR** dextrose bolus, glucagon (rDNA), dextrose    Data:     Past Medical History:   has a past medical history of Cataracts, bilateral, Cerebral

## 2024-08-13 NOTE — PROGRESS NOTES
End Of Shift Note  [facility]    Summary of shift: Pt currently NPO for upcoming procedure. A&Ox4. No concerns regarding it. On 2L NC, SATs dropping to mid 80's when sleeping. Uneventful shift.    Vitals:    Vitals:    08/13/24 0000 08/13/24 0200 08/13/24 0300 08/13/24 0400   BP: 107/72 110/89  (!) 145/75   Pulse: 80 80 85 89   Resp: 16      Temp: 97.1 °F (36.2 °C)   97.1 °F (36.2 °C)   TempSrc: Temporal   Temporal   SpO2: 92% (!) 89% (!) 89% 95%   Weight:    102 kg (224 lb 12.8 oz)   Height:            I&O:   Intake/Output Summary (Last 24 hours) at 8/13/2024 0633  Last data filed at 8/12/2024 2237  Gross per 24 hour   Intake --   Output 610 ml   Net -610 ml       Resp Status: 2L NC HS    Ventilator Settings:     / / /     Critical Care IV infusions:   sodium chloride Stopped (08/11/24 1450)    dextrose          LDA:   Peripheral IV 08/09/24 Right Antecubital (Active)   Number of days: 3       Incision Knee Anterior;Right (Active)   Number of days:

## 2024-08-13 NOTE — PROCEDURES
[facility]        Date:   8/13/2024  Patient name: Corbin Gallardo  Date of admission:  8/9/2024  9:25 AM  MRN:   [id number]  YOB: 1950    PCI to the LAD    Operators:  Primary: Divine Tapia MD.  Assistant:     Indications for PCI: High-grade proximal and mid LAD stenosis    Procedure performed: PCI to proximal and mid LAD    Access: Right Radial artery      Procedure: After informed consent was obtained with explanation of the risks and benefits, patient was brought to the cath lab. The right wrist was prepped and draped in sterile fashion. 1% lidocaine was used for local block. The Radial artery was cannulated with 6  Fr sheath with brisk arterial blood return. The side port was frequently flushed and aspirated with normal saline.    EBL is 25 mL    Dominance is right    Findings:    LAD: 75% long proximal heavily calcified stenosis, length is 55 mm, JOSE LUIS-3 flow, underwent PTCA with shockwave treatment followed by stenting using 3.5 x 38 mm and 3.0 x 18 mm Valente stents with 0% residual stenosis and JOSE LUIS-3 flow.    Mid LAD: Had 80% stenosis, length is 34 mm, JOSE LUIS-3 flow, underwent PCI with CHON using 2.75 x 38 mm Valente stent with 0% residual stenosis and JOSE LUIS-3 flow.      Conclusions:  Successful PCI with shockwave and CHON to proximal and mid LAD  Second diagonal with 50% ostial stenosis jailed by the stent with JOSE LUIS-3 flow    Recommendation:  Routine post PCI orders  Medical treatments.  Risk factors modifications.  Staged PCI to the RCA in 4 to 6 weeks      Electronically signed by Divine Tapia MD on 8/13/2024 at 5:33 PM      [facility]  [phone]

## 2024-08-13 NOTE — PROGRESS NOTES
Patient back from cath lab with infiltrated piv to rt ac with levophed running through it.  Site cold and edematous.

## 2024-08-13 NOTE — PROGRESS NOTES
Patient arrived to room 2032 via bed post cardiac catheterization. Patient is A&Ox4 and denies pain. Bed locked and placed in reverse [place]. Side rails up x2. Pt oriented to staff, room, and call light.  Report received over the phone prior to patient's arrival. Right femoral groin assessed. No redness, drainage, swelling, or hematoma present. Dressing remains clean, dry and intact. Pulses palpable and equal bilaterally. Vital signs obtained (see flowsheet). RN educated the patient on position orders and activity restrictions for cath recovery. Patient verbalizes understanding.     Pt to return to cath lab tomorrow with Dr. Tapia at 1600 for two staged PCIs.

## 2024-08-13 NOTE — PROGRESS NOTES
Spiritual Health Assessment/Progress Note  [facility]    (P) Initial Encounter,  ,  ,      Name: Corbin Gallardo MRN: [id number]    Age: 74 y.o.     Sex: male   Language: English   Faith: Other   Acute systolic heart failure (HCC)     Date: 8/13/2024            Total Time Calculated: (P) 12 min              Spiritual Assessment began in [facility]        Referral/Consult From: (P) Rounding   Encounter Overview/Reason: (P) Initial Encounter  Service Provided For: (P) Patient and family together    Ofelia, Belief, Meaning:   Patient identifies as spiritual and is connected with a ofeila tradition or spiritual practice  Family/Friends identify as spiritual      Importance and Influence:  Patient has spiritual/personal beliefs that influence decisions regarding their health  Family/Friends have spiritual/personal beliefs that influence decisions regarding the patient's health    Community:  Patient is connected with a spiritual community  Family/Friends are connected with a spiritual community:    Assessment and Plan of Care:     Patient Interventions include: Facilitated expression of thoughts and feelings  Family/Friends Interventions include: Family/Friends Interventions    Patient Plan of Care: No spiritual needs identified for follow-up  Family/Friends Plan of Care: No spiritual needs identified for follow-up    Electronically signed by Chaplain Sarah on 8/13/2024 at 7:21 PM

## 2024-08-13 NOTE — PLAN OF CARE
Problem: Safety - Adult  Goal: Free from fall injury  8/13/2024 0050 by Melissa Farris, RN  Outcome: Progressing     Problem: Respiratory - Adult  Goal: Achieves optimal ventilation and oxygenation  8/13/2024 0050 by Melissa Farris RN  Outcome: Progressing  Flowsheets (Taken 8/12/2024 2000)  Achieves optimal ventilation and oxygenation:   Assess for changes in mentation and behavior   Assess for changes in respiratory status   Assess and instruct to report shortness of breath or any respiratory difficulty     Problem: Chronic Conditions and Co-morbidities  Goal: Patient's chronic conditions and co-morbidity symptoms are monitored and maintained or improved  8/13/2024 0050 by Melissa Farris, RN  Outcome: Progressing  Flowsheets (Taken 8/12/2024 2000)  Care Plan - Patient's Chronic Conditions and Co-Morbidity Symptoms are Monitored and Maintained or Improved:   Monitor and assess patient's chronic conditions and comorbid symptoms for stability, deterioration, or improvement   Collaborate with multidisciplinary team to address chronic and comorbid conditions and prevent exacerbation or deterioration   Update acute care plan with appropriate goals if chronic or comorbid symptoms are exacerbated and prevent overall improvement and discharge     Problem: Skin/Tissue Integrity - Adult  Goal: Incisions, wounds, or drain sites healing without S/S of infection  8/13/2024 0050 by Melissa Farris, RN  Outcome: Progressing  Flowsheets (Taken 8/12/2024 2000)  Incisions, Wounds, or Drain Sites Healing Without Sign and Symptoms of Infection:   ADMISSION and DAILY: Assess and document risk factors for pressure ulcer development   TWICE DAILY: Assess and document skin integrity   TWICE DAILY: Assess and document dressing/incision, wound bed, drain sites and surrounding tissue     Problem: Musculoskeletal - Adult  Goal: Maintain proper alignment of affected body part  8/13/2024 0050 by Melissa Farris,

## 2024-08-13 NOTE — PROGRESS NOTES
Notified cardiology of incorrectly placed levophed orders and asked to correct.  Notified IM of infiltrated piv, orders received.  Notified pharmacy of orders and requested meds as ordered.   Placement of US PIV at the bedside ongoing.

## 2024-08-13 NOTE — PROGRESS NOTES
Occupational Therapy  [facility]  Occupational Therapy Not Seen Note    Patient not available for Occupational Therapy due to:    [] Testing:    [] Hemodialysis    [] Cancelled by RN:    [x]Refusal by Patient:8/13; pt declined tx and requested to rest in prep for procedure at 4/stated he's getting stents placed and RN was informed     [] Surgery:     [] Intubation:     [] Pain Medication:    [] Sedation:     [] Spine Precautions :    [] Medical Instability:    [] Other:

## 2024-08-13 NOTE — PROGRESS NOTES
Physical Therapy  DATE: 2024    NAME: Corbin Gallardo  MRN: [id number]   : 1950    Patient not seen this date for Physical Therapy due to:      [] Cancel by RN or physician due to:    [] Hemodialysis    [] Critical Lab Value Level     [] Blood transfusion in progress    [] Acute or unstable cardiovascular status   _MAP < 55 or more than >115  _HR < 40 or > 130    [] Acute or unstable pulmonary status   -FiO2 > 60%   _RR < 5 or >40    _O2 sats < 85%    [] Strict Bedrest    [] Off Unit for surgery or procedure    [] Off Unit for testing       [] Pending imaging to R/O fracture    [] Refusal by Patient      [x] Other Patient declined tx and requested to rest in prep for procedure at 4.  Per chart, \"return to cath lab with Dr. Tapia at 1600 for two staged PCIs\".  PT continue to follow.     [] PT being discontinued at this time. Patient independent. No further needs.     [] PT being discontinued at this time as the patient has been transferred to hospice care. No further needs.      Gladys Macedo, PT

## 2024-08-13 NOTE — PROGRESS NOTES
Nutrition Education    Educated on CHF  Learners: Patient  Readiness: Acceptance  Method: Explanation and Handout (Low Sodium Nutrition Therapy)  Response: Verbalizes Understanding  Contact name and number provided.      Laurie MORANN, RDN, LDN  Lead Clinical Dietitian  RD Office Phone [phone]

## 2024-08-14 PROBLEM — Z98.890 S/P CARDIAC CATHETERIZATION: Status: ACTIVE | Noted: 2024-08-14

## 2024-08-14 PROBLEM — Z95.5 S/P PRIMARY ANGIOPLASTY WITH CORONARY STENT: Status: ACTIVE | Noted: 2024-08-14

## 2024-08-14 PROBLEM — E87.6 HYPOKALEMIA: Status: ACTIVE | Noted: 2024-08-14

## 2024-08-14 PROBLEM — I25.10 CORONARY ARTERY DISEASE INVOLVING NATIVE HEART WITHOUT ANGINA PECTORIS: Status: ACTIVE | Noted: 2024-08-14

## 2024-08-14 PROBLEM — I49.3 FREQUENT PVCS: Status: ACTIVE | Noted: 2024-08-14

## 2024-08-14 PROBLEM — Z98.890 S/P CARDIAC CATHETERIZATION: Status: RESOLVED | Noted: 2024-08-14 | Resolved: 2024-08-14

## 2024-08-14 LAB
ANION GAP SERPL CALCULATED.3IONS-SCNC: 16 MMOL/L (ref 9–17)
BNP SERPL-MCNC: 1677 PG/ML
BUN SERPL-MCNC: 41 MG/DL (ref 8–23)
BUN/CREAT SERPL: 41 (ref 9–20)
CALCIUM SERPL-MCNC: 9.3 MG/DL (ref 8.6–10.4)
CHLORIDE SERPL-SCNC: 103 MMOL/L (ref 98–107)
CO2 SERPL-SCNC: 21 MMOL/L (ref 20–31)
CREAT SERPL-MCNC: 1 MG/DL (ref 0.7–1.2)
ERYTHROCYTE [DISTWIDTH] IN BLOOD BY AUTOMATED COUNT: 14.3 % (ref 11.8–14.4)
GFR, ESTIMATED: 78 ML/MIN/1.73M2
GLUCOSE BLD-MCNC: 62 MG/DL (ref 75–110)
GLUCOSE BLD-MCNC: 86 MG/DL (ref 75–110)
GLUCOSE BLD-MCNC: 91 MG/DL (ref 75–110)
GLUCOSE BLD-MCNC: 99 MG/DL (ref 75–110)
GLUCOSE BLD-MCNC: 99 MG/DL (ref 75–110)
GLUCOSE SERPL-MCNC: 92 MG/DL (ref 70–99)
HCT VFR BLD AUTO: 40.8 % (ref 40.7–50.3)
HGB BLD-MCNC: 13.3 G/DL (ref 13–17)
MAGNESIUM SERPL-MCNC: 2.5 MG/DL (ref 1.6–2.6)
MCH RBC QN AUTO: 30.5 PG (ref 25.2–33.5)
MCHC RBC AUTO-ENTMCNC: 32.6 G/DL (ref 28.4–34.8)
MCV RBC AUTO: 93.6 FL (ref 82.6–102.9)
NRBC BLD-RTO: 0 PER 100 WBC
PLATELET # BLD AUTO: 195 K/UL (ref 138–453)
PMV BLD AUTO: 10.3 FL (ref 8.1–13.5)
POTASSIUM SERPL-SCNC: 3.6 MMOL/L (ref 3.7–5.3)
RBC # BLD AUTO: 4.36 M/UL (ref 4.21–5.77)
SODIUM SERPL-SCNC: 140 MMOL/L (ref 135–144)
WBC OTHER # BLD: 9.3 K/UL (ref 3.5–11.3)

## 2024-08-14 PROCEDURE — 6370000000 HC RX 637 (ALT 250 FOR IP): Performed by: INTERNAL MEDICINE

## 2024-08-14 PROCEDURE — 97116 GAIT TRAINING THERAPY: CPT

## 2024-08-14 PROCEDURE — 6360000002 HC RX W HCPCS: Performed by: INTERNAL MEDICINE

## 2024-08-14 PROCEDURE — 85027 COMPLETE CBC AUTOMATED: CPT

## 2024-08-14 PROCEDURE — 83880 ASSAY OF NATRIURETIC PEPTIDE: CPT

## 2024-08-14 PROCEDURE — 82947 ASSAY GLUCOSE BLOOD QUANT: CPT

## 2024-08-14 PROCEDURE — 36415 COLL VENOUS BLD VENIPUNCTURE: CPT

## 2024-08-14 PROCEDURE — 2060000000 HC ICU INTERMEDIATE R&B

## 2024-08-14 PROCEDURE — 97530 THERAPEUTIC ACTIVITIES: CPT

## 2024-08-14 PROCEDURE — 97110 THERAPEUTIC EXERCISES: CPT

## 2024-08-14 PROCEDURE — 99232 SBSQ HOSP IP/OBS MODERATE 35: CPT | Performed by: NURSE PRACTITIONER

## 2024-08-14 PROCEDURE — 83735 ASSAY OF MAGNESIUM: CPT

## 2024-08-14 PROCEDURE — 94761 N-INVAS EAR/PLS OXIMETRY MLT: CPT

## 2024-08-14 PROCEDURE — 80048 BASIC METABOLIC PNL TOTAL CA: CPT

## 2024-08-14 PROCEDURE — 99233 SBSQ HOSP IP/OBS HIGH 50: CPT | Performed by: INTERNAL MEDICINE

## 2024-08-14 RX ORDER — CARVEDILOL 12.5 MG/1
12.5 TABLET ORAL 2 TIMES DAILY WITH MEALS
Status: DISCONTINUED | OUTPATIENT
Start: 2024-08-14 | End: 2024-08-15

## 2024-08-14 RX ORDER — FUROSEMIDE 40 MG/1
40 TABLET ORAL 2 TIMES DAILY
Status: DISCONTINUED | OUTPATIENT
Start: 2024-08-14 | End: 2024-08-16 | Stop reason: HOSPADM

## 2024-08-14 RX ORDER — POTASSIUM CHLORIDE 20 MEQ/1
40 TABLET, EXTENDED RELEASE ORAL ONCE
Status: DISCONTINUED | OUTPATIENT
Start: 2024-08-14 | End: 2024-08-16 | Stop reason: HOSPADM

## 2024-08-14 RX ADMIN — ENOXAPARIN SODIUM 30 MG: 100 INJECTION SUBCUTANEOUS at 20:24

## 2024-08-14 RX ADMIN — GLIPIZIDE 10 MG: 10 TABLET ORAL at 08:52

## 2024-08-14 RX ADMIN — TICAGRELOR 90 MG: 90 TABLET ORAL at 20:25

## 2024-08-14 RX ADMIN — EMPAGLIFLOZIN 10 MG: 10 TABLET, FILM COATED ORAL at 08:52

## 2024-08-14 RX ADMIN — FUROSEMIDE 40 MG: 40 TABLET ORAL at 08:52

## 2024-08-14 RX ADMIN — TICAGRELOR 90 MG: 90 TABLET ORAL at 08:52

## 2024-08-14 RX ADMIN — SACUBITRIL AND VALSARTAN 1 TABLET: 24; 26 TABLET, FILM COATED ORAL at 08:52

## 2024-08-14 RX ADMIN — ACETAMINOPHEN 650 MG: 325 TABLET ORAL at 15:53

## 2024-08-14 RX ADMIN — ENOXAPARIN SODIUM 30 MG: 100 INJECTION SUBCUTANEOUS at 08:53

## 2024-08-14 RX ADMIN — SPIRONOLACTONE 25 MG: 25 TABLET ORAL at 08:52

## 2024-08-14 RX ADMIN — POTASSIUM BICARBONATE 40 MEQ: 782 TABLET, EFFERVESCENT ORAL at 13:41

## 2024-08-14 RX ADMIN — ATORVASTATIN CALCIUM 20 MG: 20 TABLET, FILM COATED ORAL at 08:52

## 2024-08-14 RX ADMIN — ASPIRIN 81 MG: 81 TABLET, COATED ORAL at 08:52

## 2024-08-14 RX ADMIN — SACUBITRIL AND VALSARTAN 1 TABLET: 24; 26 TABLET, FILM COATED ORAL at 20:25

## 2024-08-14 RX ADMIN — FUROSEMIDE 40 MG: 40 TABLET ORAL at 18:44

## 2024-08-14 ASSESSMENT — ENCOUNTER SYMPTOMS
SORE THROAT: 0
GASTROINTESTINAL NEGATIVE: 1
TROUBLE SWALLOWING: 0
SHORTNESS OF BREATH: 0
RHINORRHEA: 0
EYES NEGATIVE: 1
CHEST TIGHTNESS: 0
WHEEZING: 0
COUGH: 1

## 2024-08-14 ASSESSMENT — PAIN DESCRIPTION - PAIN TYPE: TYPE: ACUTE PAIN

## 2024-08-14 ASSESSMENT — PAIN SCALES - GENERAL
PAINLEVEL_OUTOF10: 3
PAINLEVEL_OUTOF10: 0
PAINLEVEL_OUTOF10: 1
PAINLEVEL_OUTOF10: 3

## 2024-08-14 ASSESSMENT — PAIN DESCRIPTION - FREQUENCY: FREQUENCY: INTERMITTENT

## 2024-08-14 ASSESSMENT — PAIN DESCRIPTION - DESCRIPTORS: DESCRIPTORS: PRESSURE;POUNDING

## 2024-08-14 ASSESSMENT — PAIN DESCRIPTION - ONSET: ONSET: GRADUAL

## 2024-08-14 ASSESSMENT — PAIN - FUNCTIONAL ASSESSMENT: PAIN_FUNCTIONAL_ASSESSMENT: PREVENTS OR INTERFERES SOME ACTIVE ACTIVITIES AND ADLS

## 2024-08-14 ASSESSMENT — PAIN DESCRIPTION - LOCATION: LOCATION: HEAD

## 2024-08-14 ASSESSMENT — PAIN DESCRIPTION - ORIENTATION: ORIENTATION: MID

## 2024-08-14 NOTE — PROGRESS NOTES
[facility]  Office: [phone]  Austin Galaviz DO, Bertin Wood DO, Epifanio Finley DO, Jonny Luna DO, Charito Hernandez MD, Bernie Lagunas MD, Vesna Baker MD, Renate Lyn MD,  Antony Rashid MD, John Guthrie MD, Angie Headley MD,  Kathy Kenney DO, Ana Holt MD, Baldo Velarde MD, Denis Galaviz DO, Marquita Goncalves MD,  Alexis Odom DO, Eladia Reveles MD, Louisa Jones MD, Jerilyn Mast MD, Sampson Graves MD,  Juwan Bah MD, Negro Gore MD, Gabe Roberts MD, Obie York MD, Tony Coelho MD, Yung Buckner MD, Konrad Reyes DO, Cristino Long DO, Tg Lozano MD,  Pankaj Jules MD, Shirley Waterhouse, CNP,  Lyric Liu CNP, Hemant Ruiz, CNP,  Edilma Zhou, HARINDER, Jocy Cordero, CNP, Margarita Graves, CNP, Socorro Flores CNP, Yanely Rapp CNP, Lexy Cronin, PA-C, Chasity Pedro PA-C, Awa Christine, CNP, Renee Najera, CNP, Laura Beckwith, CNP, Laurie Avila, CNP, Ashley Chandler, CNS, Ayana Madrigal, CNP, Usha Parra CNP, Tracy Schwab, CNP         [facility]   IN-PATIENT SERVICE   [facility]    Progress Note    8/14/2024    10:44 AM    Name:   Corbin Gallardo  MRN:     [id number]     Acct:      [id number]   Room:   2032/2032-01  IP Day:  5  Admit Date:  8/9/2024  9:25 AM    PCP:   Everett Marin APRN - CNP  Code Status:  Full Code    Subjective:     C/C: nausea/ vomiting x1 early this AM  Chief Complaint   Patient presents with    Abnormal Test Results    Shortness of Breath     Interval History Status: not changed.     Patient sitting up and out of bed in room. He says he had a sudden bout of nausea and vomiting this AM around 4 AM. He attributes it to not eating much since yesterday. He ate breakfast this morning and has no further abdominal issues. He is having regular BMs, no abdominal pain. He denies shortness of breath, chest pain, dizziness, headache. No urinary complaints. He is taking PO well.     He

## 2024-08-14 NOTE — PROGRESS NOTES
End Of Shift Note  [facility]    Summary of shift: TR band removed at start of shift with no complications, no bruising/redness/drainage or hematoma, transparent dressing in place. Ambulated to bathroom, tolerated fairly well. C/o SOB/difficulty with exertion. Patient's request to remove NC, sating low-mid 90's. Diuresing with lasix. No c/o of chest pain. BP's/VS stable throughout night, see flowsheet. Plan to potentially d/c today home with wife.     Vitals:    Vitals:    08/13/24 2334 08/14/24 0000 08/14/24 0400 08/14/24 0407   BP: (!) 124/58 112/86 (!) 146/91    Pulse: 82 86 88 79   Resp: 18 16 16    Temp:  97.7 °F (36.5 °C) 98 °F (36.7 °C)    TempSrc:  Oral Oral    SpO2: 91% 94% 95% 95%   Weight:   104.3 kg (229 lb 14.4 oz)    Height:            I&O:   Intake/Output Summary (Last 24 hours) at 8/14/2024 0521  Last data filed at 8/14/2024 0521  Gross per 24 hour   Intake 1864.1 ml   Output 1155 ml   Net 709.1 ml       Resp Status: RA    Ventilator Settings:     / / /     Critical Care IV infusions:   sodium chloride      sodium chloride 50 mL/hr at 08/14/24 0521    sodium chloride      norepinephrine Stopped (08/13/24 1901)    sodium chloride Stopped (08/11/24 1450)    dextrose          LDA:   Peripheral IV 08/13/24 Left;Proximal;Anterior Forearm (Active)   Number of days: 0       Incision Knee Anterior;Right (Active)   Number of days:

## 2024-08-14 NOTE — CARE COORDINATION
DC Planning    Spoke with pt and wife at bedside, declines hhc-ref for cardiac rehab at [facility] in [facility]-agree if rec by Dr. Juárez. A referral to cardiac rehab has been sent in [facility] to [facility]. Patient informed about the cardiac rehab program, and that they will be contacted by the referred program.

## 2024-08-14 NOTE — PROGRESS NOTES
Physical Therapy  Facility/Department: [facility]  Daily Treatment Note  NAME: Corbin Gallardo  : 1950  MRN: [id number]    Date of Service: 2024    SARAH Holland reports patient is medically stable for therapy treatment this date.    Chart reviewed prior to treatment and patient is agreeable for therapy.  All lines intact and patient positioned comfortably at end of treatment.  All patient needs addressed prior to ending therapy session.     Discharge Recommendations:  Patient would benefit from continued therapy after discharge        Patient Diagnosis(es): The primary encounter diagnosis was Congestive heart failure, unspecified HF chronicity, unspecified heart failure type (HCC). Diagnoses of Heart failure (HCC), Coronary artery disease, and Acute systolic heart failure (HCC) were also pertinent to this visit.    Assessment   Assessment: 73 y/o male referred to PT. Flat affect throughout session. Patient presenting with generalized weakness; decreased endurance/activity tolerance; and requires increased assist with functional mobility. Patient increased ambulation distance this date. Patient is functioning below baseline and would benefit from continued PT  Activity Tolerance: Patient limited by fatigue     Plan    Physical Therapy Plan  General Plan: 5-7 times per week  Current Treatment Recommendations: Strengthening;Functional mobility training;Transfer training;Endurance training;Gait training;Stair training;Home exercise program;Safety education & training;Therapeutic activities     Restrictions  Restrictions/Precautions  Restrictions/Precautions: General Precautions, Up as Tolerated  Required Braces or Orthoses?: No  Position Activity Restriction  Other position/activity restrictions: Up with assist, EF 15-20 % per chart, ALARMS; L UE IV     Subjective    Subjective  Subjective: Patient sitting on bedside chair on therapist arrival. RN okayed patient for PT.  At rest HR varying from  80-87bpm.  Pain: denies pain  Orientation  Overall Orientation Status: Within Functional Limits  Orientation Level: Oriented X4    Objective      Bed Mobility Training  Bed Mobility Training: Yes  Overall Level of Assistance: Stand-by assistance  Interventions: Safety awareness training;Verbal cues  Supine to Sit: Other (comment) (patient sitting on bedside chair on therapist arrival; RN okayed patient for PT)  Sit to Supine: Stand-by assistance  Comments: HOB elevated; VC for sequencing; no c/o dizziness.  Balance  Sitting: Intact  Transfer Training  Transfer Training: Yes  Overall Level of Assistance: Contact-guard assistance;Stand-by assistance  Interventions: Safety awareness training;Verbal cues  Sit to Stand: Contact-guard assistance;Stand-by assistance  Stand to Sit: Contact-guard assistance;Stand-by assistance  Bed to Chair: Contact-guard assistance;Stand-by assistance  Comments: STS from EOB without AD; no c/o dizziness; no c/o chest pain  Gait Training  Right Side Weight Bearing: As tolerated  Left Side Weight Bearing: As tolerated  Gait  Gait Training: Yes  Left Side Weight Bearing: As tolerated  Right Side Weight Bearing: As tolerated  Overall Level of Assistance: Contact-guard assistance  Assistive Device: Gait belt  Distance: 50'  Interventions: Safety awareness training  Comments: no c/o dizziness; no c/o chest pain; HR increased to ~95bpm with mobility.  Assist with IV pole management           Safety Devices  Type of Devices: Call light within reach;Gait belt;Nurse notified;Patient at risk for falls;Left in bed;All fall risk precautions in place;Bed alarm in place  Restraints  Restraints Initially in Place: No       Goals  Short Term Goals  Time Frame for Short Term Goals: 12 visits  Short Term Goal 1: to be independent with all bed mobilty  Short Term Goal 2: to be independent with all transfers  Short Term Goal 3: to be able to ambulate without device for 100' independently  Short Term Goal 4: to

## 2024-08-14 NOTE — PROGRESS NOTES
Occupational Therapy  Facility/Department: [facility]  Rehabilitation Occupational Therapy Daily Treatment Note    Date: 24  Patient Name: Corbin Gallardo       Room:   MRN: [id number]  Account: [id number]   : 1950  (74 y.o.) Gender: male   Past Medical History:  has a past medical history of Cataracts, bilateral, Cerebral artery occlusion with cerebral infarction (HCC), Diabetes mellitus (HCC), Eye disorder, Glaucoma, Headache(784.0), Hearing loss, Heartburn, Hyperlipidemia, Hypertension, Kidney stones, Osteoarthritis, Snores, Tendonitis of shoulder, left, and Tremor.  Past Surgical History:   has a past surgical history that includes Cystoscopy; Kidney stone surgery; Lithotripsy; Refractive surgery (Right, ); Cataract removal (Right, 2015); Thumb amputation; Hand surgery (Left); Tonsillectomy; Total knee arthroplasty (Left, 2021); joint replacement; eye surgery; Total knee arthroplasty (Right, 02/15/2022); Total knee arthroplasty (Right, 2/15/2022); and Cardiac procedure (N/A, 2024).    Restrictions  Restrictions/Precautions: General Precautions, Up as Tolerated  Other position/activity restrictions: Up with assist, EF 15-20 % per chart, ALARMS; L UE IV  Required Braces or Orthoses?: No    Subjective  Subjective: Pt. sitting upright in bed and willing to participate in treatment.  Restrictions/Precautions: General Precautions;Up as Tolerated  Objective     Cognition  Overall Cognitive Status: Exceptions  Arousal/Alertness: Appears intact  Following Commands: Appears intact  Attention Span: Appears intact  Memory: Decreased short term memory  Safety Judgement: Decreased awareness of need for safety;Decreased awareness of need for assistance  Problem Solving: Assistance required to correct errors made;Decreased awareness of errors;Assistance required to identify errors made  Insights: Decreased awareness of deficits  Initiation: Does not require cues  Sequencing: Does  receptive to treatment.  Education Method: Demonstration;Verbal  Barriers to Learning: Hearing  Education Outcome: Verbalized understanding;Demonstrated understanding    Plan  Occupational Therapy Plan  Times Per Week: 4-5x/week 1x/day as sosa  Current Treatment Recommendations: Balance training;Functional mobility training;Endurance training;Safety education & training;Neuromuscular re-education;Patient/Caregiver education & training;Equipment evaluation, education, & procurement;Self-Care / ADL;Cognitive/Perceptual training;Home management training;Coordination training;Positioning  Additional Comments: Cont with stated POC    Goals  Patient Goals   Patient goals : Pt states he wants to be able to work on his stamina and walking farther distances!  Short Term Goals  Time Frame for Short Term Goals: by discharge, pt to demo  Short Term Goal 1: bed mob tasks with use of rail as needed to MOD I-I.  Short Term Goal 2: I with BUE HEP with use of handouts to maintain functional use.  Short Term Goal 3: total body ADL tasks with use of grab bar/AE and AD as needed to MOD I-I.  Short Term Goal 4: ADL transfers and functional mob with AD as needed to MOD I-I.  Short Term Goal 5: standing to SUP and AD as needed sosa > 6 mins as able to reduce falls with self care.  Additional Goals?: No  Long Term Goals  Long Term Goal 1: Pt/caregivers to be I with pressure relief, edema mgt tech, EC/WS and fall prevention and DME/AE recommendations with use of handouts.    AM-PAC Score        AM-PAC Inpatient Daily Activity Raw Score: 19 (08/14/24 1203)  AM-PAC Inpatient ADL T-Scale Score : 40.22 (08/14/24 1203)  ADL Inpatient CMS 0-100% Score: 42.8 (08/14/24 1203)  ADL Inpatient CMS G-Code Modifier : CK (08/14/24 1203)      Therapy Time   Individual Concurrent Group Co-treatment   Time In 1048         Time Out 1115         Minutes 27             RN reports patient is medically stable for therapy treatment this date.    Chart reviewed prior

## 2024-08-14 NOTE — CARE COORDINATION
DC Planning    Spoke with pt at bedside, discussed HHC-pt declines at this time. EF 15-20% watch for Life Vest. Order for [facility] cardiac rehab is in Epic. Plan for dc home tomorrow. Pt has been to OPPT in past and declines HHPT-feels confident in exercises.

## 2024-08-14 NOTE — PLAN OF CARE
Problem: Discharge Planning  Goal: Discharge to home or other facility with appropriate resources  8/14/2024 0956 by Amalia Lindsay RN  Outcome: Progressing  8/13/2024 2132 by Dilma Asif RN  Outcome: Progressing  Flowsheets (Taken 8/13/2024 2000)  Discharge to home or other facility with appropriate resources:   Identify barriers to discharge with patient and caregiver   Arrange for needed discharge resources and transportation as appropriate   Identify discharge learning needs (meds, wound care, etc)   Arrange for interpreters to assist at discharge as needed   Refer to discharge planning if patient needs post-hospital services based on physician order or complex needs related to functional status, cognitive ability or social support system     Problem: Safety - Adult  Goal: Free from fall injury  8/14/2024 0956 by Amalia Lindsay RN  Outcome: Progressing  8/13/2024 2132 by Dilma Asif RN  Outcome: Progressing  Flowsheets (Taken 8/13/2024 2000)  Free From Fall Injury:   Instruct family/caregiver on patient safety   Based on caregiver fall risk screen, instruct family/caregiver to ask for assistance with transferring infant if caregiver noted to have fall risk factors     Problem: ABCDS Injury Assessment  Goal: Absence of physical injury  8/14/2024 0956 by Amalia Lindsay RN  Outcome: Progressing  8/13/2024 2132 by Dilma Asif RN  Outcome: Progressing     Problem: Chronic Conditions and Co-morbidities  Goal: Patient's chronic conditions and co-morbidity symptoms are monitored and maintained or improved  8/14/2024 0956 by Amalia Lindsay RN  Outcome: Progressing  8/13/2024 2132 by Dilma Asif RN  Outcome: Progressing  Flowsheets (Taken 8/13/2024 2000)  Care Plan - Patient's Chronic Conditions and Co-Morbidity Symptoms are Monitored and Maintained or Improved:   Monitor and assess patient's chronic conditions and comorbid symptoms for stability, deterioration, or improvement   Collaborate with  Ensure adequate protection for wounds/incisions during mobilization   Obtain physical therapy/occupational therapy consults as needed   Apply continuous passive motion per provider or physical therapy orders to increase flexion toward goal   Instruct patient/family in ordered activity level  Goal: Maintain proper alignment of affected body part  8/14/2024 0956 by Amalia Lindsay RN  Outcome: Progressing  8/13/2024 2132 by Dilma Asif RN  Outcome: Progressing  Flowsheets (Taken 8/13/2024 2000)  Maintain proper alignment of affected body part:   Support and protect limb and body alignment per provider's orders   Instruct and reinforce with patient and family use of appropriate assistive device and precautions (e.g. spinal or hip dislocation precautions)  Goal: Return ADL status to a safe level of function  8/14/2024 0956 by Amalia Lindsay RN  Outcome: Progressing  8/13/2024 2132 by Dilma Asif RN  Outcome: Progressing  Flowsheets (Taken 8/13/2024 2000)  Return ADL Status to a Safe Level of Function:   Administer medication as ordered   Assess activities of daily living deficits and provide assistive devices as needed   Obtain physical therapy/occupational therapy consults as needed   Assist and instruct patient to increase activity and self care as tolerated     Problem: Genitourinary - Adult  Goal: Absence of urinary retention  8/14/2024 0956 by Amalia Lindsay RN  Outcome: Progressing  8/13/2024 2132 by Dilma Asif RN  Outcome: Progressing  Flowsheets (Taken 8/13/2024 2000)  Absence of urinary retention:   Assess patient’s ability to void and empty bladder   Monitor intake/output and perform bladder scan as needed   Place urinary catheter per Licensed Independent Practitioner order if needed   Discuss with Licensed Independent Practitioner  medications to alleviate retention as needed   Discuss catheterization for long term situations as appropriate     Problem: Metabolic/Fluid and Electrolytes -

## 2024-08-14 NOTE — PROGRESS NOTES
Cardiovascular progress Note          Patient name: Corbin Gallardo    YOB: 1950  Date of admission:  8/9/2024       Patient seen, examined. Previous clinical entries reviewed.  All available laboratory, imaging and ancillary data reviewed.    Subjective:      Tolerated PCI of LAD well.  Difficulty with sleep.  Shortness of breath about the same.  No clear orthopnea.    Systems review:  Constitutional: No fever/chills.   HENT: No headache, neck pain or neck stiffness. No sore throat or dysphagia.   Gastrointestinal: No abdominal pain, nausea or vomiting.   Cardiac: As Above  Respiratory: As above  Neurologic: No new focal weakness or numbness  Psychiatric: Normal mood and mentation       Examination:   Vitals: BP (!) 146/91   Pulse 79   Temp 98 °F (36.7 °C) (Oral)   Resp 16   Ht 1.829 m (6')   Wt 104.3 kg (229 lb 14.4 oz)   SpO2 95%   BMI 31.18 kg/m²     Intake/Output Summary (Last 24 hours) at 8/14/2024 0806  Last data filed at 8/14/2024 0705  Gross per 24 hour   Intake 1864.1 ml   Output 1955 ml   Net -90.9 ml       General appearance: Comfortable in no apparent distress.  HEENT: No pallor. No icterus  Neck: Supple.  Lungs:Generally decreased breath sounds  Heart: S1,S2  Abdomen: Soft  Extremities: No peripheral edema  Skin: No obvious rashes.  Musculoskeletal: No obvious deformities.  Neurologic: No focal deficits.     Labs/ Ancillary data:     CBC:   Recent Labs     08/14/24  0351   WBC 9.3   HGB 13.3        BMP:    Recent Labs     08/12/24  0613 08/13/24  0304 08/14/24  0351    142 140   K 3.7 3.6* 3.6*    103 103   CO2 26 24 21   BUN 34* 36* 41*   CREATININE 1.0 1.0 1.0   GLUCOSE 116* 86 92       Lipids:   No results for input(s): \"CHOL\", \"HDL\" in the last 72 hours.    Invalid input(s): \"LDLCALCU\"      Medications:   Scheduled Meds:   sodium chloride flush  5-40 mL IntraVENous 2 times per day    sodium chloride flush  5-40 mL IntraVENous 2 times per day

## 2024-08-14 NOTE — PROGRESS NOTES
[facility]        Date:   8/14/2024  Patient name: Corbin Gallardo  Date of admission:  8/9/2024  9:25 AM  MRN:   [id number]  YOB: 1950  PCP: Everett Marin APRN - CNP    Reason for Consult:       Subjective: No new cardiac issues. No overnight events. Chart reviewed. S/p PCI        Physical Exam:   Vitals: /76   Pulse 61   Temp 98 °F (36.7 °C) (Oral)   Resp 18   Ht 1.829 m (6')   Wt 104.3 kg (229 lb 14.4 oz)   SpO2 91%   BMI 31.18 kg/m²   General appearance: alert and cooperative with exam  HEENT: Head: Normocephalic, no lesions, without obvious abnormality.  Neck: no adenopathy, no carotid bruit, no JVD, supple, symmetrical, trachea midline and thyroid not enlarged, symmetric, no tenderness/mass/nodules  Lungs: clear to auscultation bilaterally  Heart: regular rate and rhythm, S1, S2 normal, no murmur, click, rub or gallop  Abdomen: soft, non-tender; bowel sounds normal; no masses,  no organomegaly  Extremities: extremities normal, atraumatic, no cyanosis or edema  Neurologic: Mental status: Alert, oriented, thought content appropriate      Lab work, imaging, nursing, and chart reviewed extensively.    Medications:   Scheduled Meds:   sacubitril-valsartan  1 tablet Oral BID    carvedilol  12.5 mg Oral BID WC    furosemide  40 mg Oral BID    sodium chloride flush  5-40 mL IntraVENous 2 times per day    sodium chloride flush  5-40 mL IntraVENous 2 times per day    ticagrelor  90 mg Oral BID    aspirin  81 mg Oral Daily    atorvastatin  20 mg Oral Daily    sodium chloride flush  5-40 mL IntraVENous 2 times per day    enoxaparin  30 mg SubCUTAneous BID    glipiZIDE  10 mg Oral QAM    empagliflozin  10 mg Oral Daily    spironolactone  25 mg Oral Daily     Continuous Infusions:   sodium chloride      sodium chloride 50 mL/hr at 08/14/24 0521    sodium chloride      norepinephrine Stopped (08/13/24 1901)    sodium chloride Stopped (08/11/24 1450)    dextrose

## 2024-08-14 NOTE — PLAN OF CARE
Problem: Discharge Planning  Goal: Discharge to home or other facility with appropriate resources  8/14/2024 1822 by Pina Chance RN  Outcome: Progressing  8/14/2024 0956 by Amalia Lindsay RN  Outcome: Progressing     Problem: Safety - Adult  Goal: Free from fall injury  8/14/2024 1822 by Pina Chance RN  Outcome: Progressing  8/14/2024 0956 by Amalia Lindsay RN  Outcome: Progressing     Problem: ABCDS Injury Assessment  Goal: Absence of physical injury  8/14/2024 1822 by Pina Chance RN  Outcome: Progressing  8/14/2024 0956 by Amalia Lindsay RN  Outcome: Progressing     Problem: Chronic Conditions and Co-morbidities  Goal: Patient's chronic conditions and co-morbidity symptoms are monitored and maintained or improved  8/14/2024 1822 by Pina Chance RN  Outcome: Progressing  8/14/2024 0956 by Amalia Lindsay RN  Outcome: Progressing     Problem: Respiratory - Adult  Goal: Achieves optimal ventilation and oxygenation  8/14/2024 1822 by Pina Chance RN  Outcome: Progressing  8/14/2024 0956 by Amalia Lindsay RN  Outcome: Progressing     Problem: Cardiovascular - Adult  Goal: Maintains optimal cardiac output and hemodynamic stability  8/14/2024 1822 by Pina Chance RN  Outcome: Progressing  8/14/2024 0956 by Amalia Lindsay RN  Outcome: Progressing  Goal: Absence of cardiac dysrhythmias or at baseline  8/14/2024 1822 by Pina Chance RN  Outcome: Progressing  8/14/2024 0956 by Amalia Lindsay RN  Outcome: Progressing     Problem: Skin/Tissue Integrity - Adult  Goal: Skin integrity remains intact  8/14/2024 1822 by Pina Chance RN  Outcome: Progressing  8/14/2024 0956 by Amalia Lindsay RN  Outcome: Progressing  Goal: Incisions, wounds, or drain sites healing without S/S of infection  8/14/2024 1822 by Pina Chance RN  Outcome: Progressing  8/14/2024 0956 by Amalia Lindsay RN  Outcome: Progressing     Problem: Musculoskeletal - Adult  Goal: Return mobility to safest level of  function  8/14/2024 1822 by Pina Chance RN  Outcome: Progressing  8/14/2024 0956 by Amalia Lindsay RN  Outcome: Progressing  Goal: Maintain proper alignment of affected body part  8/14/2024 1822 by Pina Chance RN  Outcome: Progressing  8/14/2024 0956 by Amalia Lindsay RN  Outcome: Progressing  Goal: Return ADL status to a safe level of function  8/14/2024 1822 by Pina Chance RN  Outcome: Progressing  8/14/2024 0956 by Amalia Lindsay RN  Outcome: Progressing     Problem: Genitourinary - Adult  Goal: Absence of urinary retention  8/14/2024 1822 by Pina Chance RN  Outcome: Progressing  8/14/2024 0956 by Amalia Lindsay RN  Outcome: Progressing     Problem: Metabolic/Fluid and Electrolytes - Adult  Goal: Electrolytes maintained within normal limits  8/14/2024 1822 by Pina Chance RN  Outcome: Progressing  8/14/2024 0956 by Amalia Lindsay RN  Outcome: Progressing  Goal: Hemodynamic stability and optimal renal function maintained  8/14/2024 1822 by Pina Chance RN  Outcome: Progressing  8/14/2024 0956 by Amalia Lindsay RN  Outcome: Progressing  Goal: Glucose maintained within prescribed range  8/14/2024 1822 by Pina Chance RN  Outcome: Progressing  8/14/2024 0956 by Amalia Lindsay RN  Outcome: Progressing     Problem: Pain  Goal: Verbalizes/displays adequate comfort level or baseline comfort level  8/14/2024 1822 by Pina Chance RN  Outcome: Progressing  8/14/2024 0956 by Amalia Lindsay RN  Outcome: Progressing

## 2024-08-14 NOTE — PROGRESS NOTES
End Of Shift Note  [facility]  Summary of shift: Patient had 2 stents placed in the LAD on 8/13/24. Pt tolerated placement of stents however was experiencing PVCs and remained on the unit for monitoring. VSS, though SBP has been noted to be less than 110.  Coreg was held according to parameters.    Morning labs indicated a K level of 3.6- 40 mEq PO Potassium was given.    EF estimated at 15-20%. Pt. May require LifeVest upon DC.    Cardiology adjusted medications. Started PO Brilinta, Coreg, Lasix & Entresto. However morning & evening dose of coreg was held according to parameters SBP <110.    Pt. Experienced low Blood Sugar (62) before dinner. Pt. Was offered apple juice and ate a full meal tray. When Blood Sugar was rechecked it was 99.      Vitals:    Vitals:    08/14/24 1338 08/14/24 1600 08/14/24 1800 08/14/24 1844   BP: 101/75 119/80 107/78 110/68   Pulse: 92 77 87    Resp:       Temp:  98.1 °F (36.7 °C)     TempSrc:  Oral     SpO2: 94%      Weight:       Height:            I&O: Pt. Has been up with assistance to the restroom.   Intake/Output Summary (Last 24 hours) at 8/14/2024 1928  Last data filed at 8/14/2024 0705  Gross per 24 hour   Intake 1864.1 ml   Output 1950 ml   Net -85.9 ml       Resp Status: Pt. Currently on RA; clear lung sounds. Pt. Offered 2LNC for support but denied.       LDA:   Peripheral IV 08/13/24 Left;Proximal;Anterior Forearm (Active)   Number of days: 0       Incision Knee Anterior;Right (Active)   Number of days:

## 2024-08-14 NOTE — PLAN OF CARE
Problem: Discharge Planning  Goal: Discharge to home or other facility with appropriate resources  Outcome: Progressing  Flowsheets (Taken 8/13/2024 2000)  Discharge to home or other facility with appropriate resources:   Identify barriers to discharge with patient and caregiver   Arrange for needed discharge resources and transportation as appropriate   Identify discharge learning needs (meds, wound care, etc)   Arrange for interpreters to assist at discharge as needed   Refer to discharge planning if patient needs post-hospital services based on physician order or complex needs related to functional status, cognitive ability or social support system     Problem: Safety - Adult  Goal: Free from fall injury  Outcome: Progressing  Flowsheets (Taken 8/13/2024 2000)  Free From Fall Injury:   Instruct family/caregiver on patient safety   Based on caregiver fall risk screen, instruct family/caregiver to ask for assistance with transferring infant if caregiver noted to have fall risk factors     Problem: ABCDS Injury Assessment  Goal: Absence of physical injury  Outcome: Progressing     Problem: Chronic Conditions and Co-morbidities  Goal: Patient's chronic conditions and co-morbidity symptoms are monitored and maintained or improved  Outcome: Progressing  Flowsheets (Taken 8/13/2024 2000)  Care Plan - Patient's Chronic Conditions and Co-Morbidity Symptoms are Monitored and Maintained or Improved:   Monitor and assess patient's chronic conditions and comorbid symptoms for stability, deterioration, or improvement   Collaborate with multidisciplinary team to address chronic and comorbid conditions and prevent exacerbation or deterioration   Update acute care plan with appropriate goals if chronic or comorbid symptoms are exacerbated and prevent overall improvement and discharge     Problem: Respiratory - Adult  Goal: Achieves optimal ventilation and oxygenation  Outcome: Progressing  Flowsheets (Taken 8/13/2024  2000)  Achieves optimal ventilation and oxygenation:   Assess for changes in respiratory status   Assess for changes in mentation and behavior   Position to facilitate oxygenation and minimize respiratory effort   Oxygen supplementation based on oxygen saturation or arterial blood gases   Initiate smoking cessation protocol as indicated   Encourage broncho-pulmonary hygiene including cough, deep breathe, incentive spirometry   Assess the need for suctioning and aspirate as needed   Assess and instruct to report shortness of breath or any respiratory difficulty   Respiratory therapy support as indicated     Problem: Cardiovascular - Adult  Goal: Maintains optimal cardiac output and hemodynamic stability  Outcome: Progressing  Flowsheets (Taken 8/13/2024 2000)  Maintains optimal cardiac output and hemodynamic stability:   Monitor blood pressure and heart rate   Monitor urine output and notify Licensed Independent Practitioner for values outside of normal range   Assess for signs of decreased cardiac output   Administer fluid and/or volume expanders as ordered   Administer vasoactive medications as ordered  Goal: Absence of cardiac dysrhythmias or at baseline  Outcome: Progressing  Flowsheets (Taken 8/13/2024 2000)  Absence of cardiac dysrhythmias or at baseline:   Monitor cardiac rate and rhythm   Assess for signs of decreased cardiac output   Administer antiarrhythmia medication and electrolyte replacement as ordered     Problem: Cardiovascular - Adult  Goal: Absence of cardiac dysrhythmias or at baseline  Outcome: Progressing  Flowsheets (Taken 8/13/2024 2000)  Absence of cardiac dysrhythmias or at baseline:   Monitor cardiac rate and rhythm   Assess for signs of decreased cardiac output   Administer antiarrhythmia medication and electrolyte replacement as ordered     Problem: Skin/Tissue Integrity - Adult  Goal: Skin integrity remains intact  Outcome: Progressing  Flowsheets (Taken 8/13/2024 2000)  Skin Integrity

## 2024-08-15 LAB
ANION GAP SERPL CALCULATED.3IONS-SCNC: 15 MMOL/L
BUN SERPL-MCNC: 32 MG/DL (ref 8–23)
BUN/CREAT SERPL: 40 (ref 9–20)
CALCIUM SERPL-MCNC: 9.2 MG/DL (ref 8.6–10.4)
CHLORIDE SERPL-SCNC: 102 MMOL/L (ref 98–107)
CO2 SERPL-SCNC: 24 MMOL/L (ref 20–31)
CREAT SERPL-MCNC: 0.8 MG/DL (ref 0.7–1.2)
ERYTHROCYTE [DISTWIDTH] IN BLOOD BY AUTOMATED COUNT: 14.5 % (ref 11.8–14.4)
GFR, ESTIMATED: >90 ML/MIN/1.73M2
GLUCOSE BLD-MCNC: 103 MG/DL (ref 75–110)
GLUCOSE BLD-MCNC: 126 MG/DL (ref 75–110)
GLUCOSE BLD-MCNC: 212 MG/DL (ref 75–110)
GLUCOSE BLD-MCNC: 97 MG/DL (ref 75–110)
GLUCOSE SERPL-MCNC: 107 MG/DL (ref 70–99)
HCT VFR BLD AUTO: 44 % (ref 40.7–50.3)
HGB BLD-MCNC: 14.3 G/DL (ref 13–17)
MAGNESIUM SERPL-MCNC: 2.1 MG/DL (ref 1.6–2.6)
MCH RBC QN AUTO: 30.5 PG (ref 25.2–33.5)
MCHC RBC AUTO-ENTMCNC: 32.5 G/DL (ref 28.4–34.8)
MCV RBC AUTO: 93.8 FL (ref 82.6–102.9)
NRBC BLD-RTO: 0 PER 100 WBC
PLATELET # BLD AUTO: 218 K/UL (ref 138–453)
PMV BLD AUTO: 10.2 FL (ref 8.1–13.5)
POTASSIUM SERPL-SCNC: 3.5 MMOL/L (ref 3.7–5.3)
RBC # BLD AUTO: 4.69 M/UL (ref 4.21–5.77)
SODIUM SERPL-SCNC: 141 MMOL/L (ref 135–144)
WBC OTHER # BLD: 9.2 K/UL (ref 3.5–11.3)

## 2024-08-15 PROCEDURE — 6360000002 HC RX W HCPCS: Performed by: INTERNAL MEDICINE

## 2024-08-15 PROCEDURE — 83735 ASSAY OF MAGNESIUM: CPT

## 2024-08-15 PROCEDURE — 82947 ASSAY GLUCOSE BLOOD QUANT: CPT

## 2024-08-15 PROCEDURE — 6370000000 HC RX 637 (ALT 250 FOR IP): Performed by: INTERNAL MEDICINE

## 2024-08-15 PROCEDURE — 97535 SELF CARE MNGMENT TRAINING: CPT

## 2024-08-15 PROCEDURE — 36415 COLL VENOUS BLD VENIPUNCTURE: CPT

## 2024-08-15 PROCEDURE — 2580000003 HC RX 258: Performed by: INTERNAL MEDICINE

## 2024-08-15 PROCEDURE — 99232 SBSQ HOSP IP/OBS MODERATE 35: CPT | Performed by: NURSE PRACTITIONER

## 2024-08-15 PROCEDURE — 80048 BASIC METABOLIC PNL TOTAL CA: CPT

## 2024-08-15 PROCEDURE — 2060000000 HC ICU INTERMEDIATE R&B

## 2024-08-15 PROCEDURE — 85027 COMPLETE CBC AUTOMATED: CPT

## 2024-08-15 PROCEDURE — 94761 N-INVAS EAR/PLS OXIMETRY MLT: CPT

## 2024-08-15 PROCEDURE — 97110 THERAPEUTIC EXERCISES: CPT

## 2024-08-15 PROCEDURE — 6370000000 HC RX 637 (ALT 250 FOR IP): Performed by: NURSE PRACTITIONER

## 2024-08-15 RX ORDER — CARVEDILOL 6.25 MG/1
6.25 TABLET ORAL 2 TIMES DAILY WITH MEALS
Status: DISCONTINUED | OUTPATIENT
Start: 2024-08-15 | End: 2024-08-16 | Stop reason: HOSPADM

## 2024-08-15 RX ORDER — GLIPIZIDE 10 MG/1
10 TABLET ORAL EVERY MORNING
Status: DISCONTINUED | OUTPATIENT
Start: 2024-08-16 | End: 2024-08-16 | Stop reason: HOSPADM

## 2024-08-15 RX ORDER — GLIPIZIDE 10 MG/1
20 TABLET ORAL EVERY MORNING
Status: DISCONTINUED | OUTPATIENT
Start: 2024-08-16 | End: 2024-08-15

## 2024-08-15 RX ORDER — AMIODARONE HYDROCHLORIDE 200 MG/1
200 TABLET ORAL DAILY
Status: DISCONTINUED | OUTPATIENT
Start: 2024-08-15 | End: 2024-08-16 | Stop reason: HOSPADM

## 2024-08-15 RX ADMIN — AMIODARONE HYDROCHLORIDE 200 MG: 200 TABLET ORAL at 10:34

## 2024-08-15 RX ADMIN — ENOXAPARIN SODIUM 30 MG: 100 INJECTION SUBCUTANEOUS at 08:50

## 2024-08-15 RX ADMIN — FUROSEMIDE 40 MG: 40 TABLET ORAL at 16:48

## 2024-08-15 RX ADMIN — SODIUM CHLORIDE, PRESERVATIVE FREE 10 ML: 5 INJECTION INTRAVENOUS at 07:26

## 2024-08-15 RX ADMIN — SACUBITRIL AND VALSARTAN 0.5 TABLET: 24; 26 TABLET, FILM COATED ORAL at 20:51

## 2024-08-15 RX ADMIN — EMPAGLIFLOZIN 10 MG: 10 TABLET, FILM COATED ORAL at 08:48

## 2024-08-15 RX ADMIN — GLIPIZIDE 10 MG: 10 TABLET ORAL at 08:47

## 2024-08-15 RX ADMIN — FUROSEMIDE 40 MG: 40 TABLET ORAL at 08:48

## 2024-08-15 RX ADMIN — SODIUM CHLORIDE, PRESERVATIVE FREE 10 ML: 5 INJECTION INTRAVENOUS at 20:37

## 2024-08-15 RX ADMIN — ASPIRIN 81 MG: 81 TABLET, COATED ORAL at 08:47

## 2024-08-15 RX ADMIN — CARVEDILOL 12.5 MG: 12.5 TABLET, FILM COATED ORAL at 08:48

## 2024-08-15 RX ADMIN — POTASSIUM CHLORIDE 40 MEQ: 1500 TABLET, EXTENDED RELEASE ORAL at 09:03

## 2024-08-15 RX ADMIN — ENOXAPARIN SODIUM 30 MG: 100 INJECTION SUBCUTANEOUS at 20:37

## 2024-08-15 RX ADMIN — TICAGRELOR 90 MG: 90 TABLET ORAL at 08:48

## 2024-08-15 RX ADMIN — SPIRONOLACTONE 25 MG: 25 TABLET ORAL at 08:50

## 2024-08-15 RX ADMIN — TICAGRELOR 90 MG: 90 TABLET ORAL at 20:37

## 2024-08-15 RX ADMIN — CARVEDILOL 6.25 MG: 6.25 TABLET, FILM COATED ORAL at 16:48

## 2024-08-15 RX ADMIN — ATORVASTATIN CALCIUM 20 MG: 20 TABLET, FILM COATED ORAL at 08:48

## 2024-08-15 ASSESSMENT — ENCOUNTER SYMPTOMS
RHINORRHEA: 0
GASTROINTESTINAL NEGATIVE: 1
SORE THROAT: 0
EYES NEGATIVE: 1
RESPIRATORY NEGATIVE: 1

## 2024-08-15 ASSESSMENT — PAIN SCALES - GENERAL: PAINLEVEL_OUTOF10: 0

## 2024-08-15 NOTE — PROGRESS NOTES
[facility]  Office: [phone]  Austin Galaviz DO, Bertin Wood, DO, Epifanio Finley DO, Jonny Luna, DO, Charito Hernandez MD, Bernie Lagunas MD, Vesna Baker MD, Renate Lyn MD,  Antony Rashid MD, John Guthrie MD, Angie Headley MD,  Kathy Kenney DO, Ana Holt MD, Baldo Velarde MD, Denis Galaviz DO, Marquita Goncalves MD,  Alexis Odom DO, Eladia Reveles MD, Louisa Jones MD, Jerilyn Mast MD, Sampson Graves MD,  Juwan Bah MD, Negro Gore MD, Gabe Roberts MD, Obie York MD, Tony Coelho MD, Yung Buckner MD, Konrad Reyes DO, Cristino Long DO, Tg Lozano MD,  Pankaj Jules MD, Shirley Waterhouse, CNP,  Lyric Liu CNP, Hemant Ruiz, CNP,  Edilma Zhou, DNP, Jocy Cordero, CNP, Margarita Graves, CNP, Socorro Flores CNP, Yanely Rapp CNP, Lexy Cronin, PA-C, Chasity Pedro PA-C, Awa Christine, CNP, Renee Najera, CNP, Laura Beckwith, CNP, Laurie Avila, CNP, Ashley Chandler, CNS, Ayana Madrigal, CNP, Usha Parra CNP, Tracy Schwab, CNP         [facility]   IN-PATIENT SERVICE   [facility]    Progress Note    8/15/2024    6:52 AM    Name:   Corbin Gallardo  MRN:     [id number]     Acct:      [id number]   Room:   2032/2032-01  IP Day:  6  Admit Date:  8/9/2024  9:25 AM    PCP:   Everett Marin APRN - CNP  Code Status:  Full Code    Subjective:     C/C: Wants to go home  Chief Complaint   Patient presents with    Abnormal Test Results    Shortness of Breath     Interval History Status: Worsened    Patient had hypoglycemia before dinner last evening that resolved after being fed.  After he ate, his blood glucose returned to normal.  Patient denies chest pain, shortness of breath, abdominal pain, nausea, vomiting.  No fever or chills.    Per nursing, patient has had increased ectopy throughout yesterday and last night.  Cardiology rounded and added amiodarone daily.  Cardiology would like patient to stay  (24hrs), Av °F (36.7 °C), Min:97.3 °F (36.3 °C), Max:98.6 °F (37 °C)    Recent Labs     24  1222 24  1711 24  17424  2046   POCGLU 86 62* 99 91       I/O (24Hr):    Intake/Output Summary (Last 24 hours) at 8/15/2024 0652  Last data filed at 8/15/2024 0500  Gross per 24 hour   Intake --   Output 2150 ml   Net -2150 ml       Labs:  Hematology:  Recent Labs     24  0351   WBC 9.3   RBC 4.36   HGB 13.3   HCT 40.8   MCV 93.6   MCH 30.5   MCHC 32.6   RDW 14.3      MPV 10.3     Chemistry:  Recent Labs     24  0304 24  0351    140   K 3.6* 3.6*    103   CO2 24 21   GLUCOSE 86 92   BUN 36* 41*   CREATININE 1.0 1.0   MG 2.5 2.5   ANIONGAP 15 16   LABGLOM 78 78   CALCIUM 9.2 9.3   PROBNP  --  1,677*     Recent Labs     24  2016 24  0832 24  1222 24  17124  2046   POCGLU 187* 99 86 62* 99 91     ABG:No results found for: \"POCPH\", \"PHART\", \"PH\", \"POCPCO2\", \"ERB5IUQ\", \"PCO2\", \"POCPO2\", \"PO2ART\", \"PO2\", \"POCHCO3\", \"WIC2TXR\", \"HCO3\", \"NBEA\", \"PBEA\", \"BEART\", \"BE\", \"THGBART\", \"THB\", \"SEP9WSC\", \"TQYU1ZCJ\", \"G6XELDVI\", \"O2SAT\", \"FIO2\"  Lab Results   Component Value Date/Time    SPECIAL RT HAND 2021 08:47 PM     Lab Results   Component Value Date/Time    CULTURE NO GROWTH 6 DAYS 2021 08:47 PM       Radiology:  XR CHEST PORTABLE    Result Date: 2024  Increased left basilar opacity may in part be due to patient rotation versus pleural effusion with adjacent airspace disease.     XR CHEST (2 VW)    Result Date: 2024  Findings consistent with decompensated CHF with mild-moderate pulmonary edema       Physical Examination:        Physical Exam  Vitals and nursing note reviewed.   Constitutional:       General: He is not in acute distress.     Appearance: He is ill-appearing. He is not toxic-appearing or diaphoretic.   HENT:      Head: Normocephalic and atraumatic.      Right Ear: External ear normal.

## 2024-08-15 NOTE — CARE COORDINATION
DC Planning    Spoke with [facility] Jardiance rx cost is $350-pt is not sure if he has a medication deductible he will check with wife, only coupon for Jardiance is a free 2 week fill, other than that he is not eligible for $10 coupon card.     Will need Brilinta called in or sent to [facility] for cost by ordering physician for cost.     Spoke with [facility] they cannot tell if pt has a medication deductible or not.     Spoke with primary nurse plan for dc tomorrow. Meds are being adjusted no Life Vest at this time per cardio per primary nurse.

## 2024-08-15 NOTE — PROGRESS NOTES
End Of Shift Note  [facility]  Summary of shift: Slept most of the night. No c/o pain. SR w/BBB & frequent MF PVCs, BMP normal this am. Planning to go home today. Up ambulating in the room w/o difficulty.  Rita Alfred RN      Vitals:    Vitals:    08/14/24 1844 08/14/24 2000 08/15/24 0000 08/15/24 0400   BP: 110/68 115/85 129/65 (!) 126/91   Pulse:  75 74 81   Resp:  18     Temp:  98 °F (36.7 °C) 98.6 °F (37 °C) 97.3 °F (36.3 °C)   TempSrc:  Oral Oral Temporal   SpO2:  95% 96%    Weight:    102.2 kg (225 lb 3.2 oz)   Height:            I&O:   Intake/Output Summary (Last 24 hours) at 8/15/2024 0642  Last data filed at 8/15/2024 0500  Gross per 24 hour   Intake --   Output 2150 ml   Net -2150 ml       Resp Status: RA    Ventilator Settings:     / / /     Critical Care IV infusions:   sodium chloride      sodium chloride      sodium chloride Stopped (08/11/24 1450)    dextrose          LDA:   Peripheral IV 08/13/24 Left;Proximal;Anterior Forearm (Active)   Number of days: 1       Incision Knee Anterior;Right (Active)   Number of days:

## 2024-08-15 NOTE — CARE COORDINATION
Social work; called by RN as pt asked for possible help with expensive medications. Pt may be over income for Medicaid Ayana from help program [phone] will investigate and try financial assiist if possible also. Edilma kingston

## 2024-08-15 NOTE — PROGRESS NOTES
Cardiovascular progress Note          Patient name: Corbin Gallardo    YOB: 1950  Date of admission:  8/9/2024       Patient seen, examined. Previous clinical entries reviewed.  All available laboratory, imaging and ancillary data reviewed.    Subjective:      Did reasonably well.  B-blocker held apparently due to low blood pressure; but Entresto was given.  More ectopics noted on monitor.    Systems review:  Constitutional: No fever/chills.   HENT: No headache, neck pain or neck stiffness. No sore throat or dysphagia.   Gastrointestinal: No abdominal pain, nausea or vomiting.   Cardiac: As Above  Respiratory: As above  Neurologic: No new focal weakness or numbness  Psychiatric: Normal mood and mentation       Examination:   Vitals: /63   Pulse 63   Temp 97.5 °F (36.4 °C) (Temporal)   Resp 16   Ht 1.829 m (6')   Wt 102.2 kg (225 lb 3.2 oz)   SpO2 97%   BMI 30.54 kg/m²     Intake/Output Summary (Last 24 hours) at 8/15/2024 1656  Last data filed at 8/15/2024 1319  Gross per 24 hour   Intake 475 ml   Output 1350 ml   Net -875 ml       General appearance: Comfortable in no apparent distress.  HEENT: No pallor. No icterus  Neck: Supple.  Lungs:Generally decreased breath sounds  Heart: S1,S2  Abdomen: Soft  Extremities: No peripheral edema  Skin: No obvious rashes.  Musculoskeletal: No obvious deformities.  Neurologic: No focal deficits.     Labs/ Ancillary data:     CBC:   Recent Labs     08/14/24  0351 08/15/24  0712   WBC 9.3 9.2   HGB 13.3 14.3    218     BMP:    Recent Labs     08/13/24  0304 08/14/24  0351 08/15/24  0744    140 141   K 3.6* 3.6* 3.5*    103 102   CO2 24 21 24   BUN 36* 41* 32*   CREATININE 1.0 1.0 0.8   GLUCOSE 86 92 107*       Medications:   Scheduled Meds:   amiodarone  200 mg Oral Daily    carvedilol  6.25 mg Oral BID WC    sacubitril-valsartan  0.5 tablet Oral BID    furosemide  40 mg Oral BID    potassium chloride  40 mEq Oral Once    sodium  chloride flush  5-40 mL IntraVENous 2 times per day    sodium chloride flush  5-40 mL IntraVENous 2 times per day    ticagrelor  90 mg Oral BID    aspirin  81 mg Oral Daily    atorvastatin  20 mg Oral Daily    sodium chloride flush  5-40 mL IntraVENous 2 times per day    enoxaparin  30 mg SubCUTAneous BID    glipiZIDE  10 mg Oral QAM    empagliflozin  10 mg Oral Daily    spironolactone  25 mg Oral Daily     Continuous Infusions:   sodium chloride      sodium chloride      sodium chloride Stopped (08/11/24 1450)    dextrose           Assessment/ Plan :     CAD with severe LAD and RCA stenoses -s/p LAD stent.  Acute on chronic systolic heart failure  Cardiomyopathy with severe left ventricular systolic dysfunction  Non-insulin-dependent diabetes mellitus.  Hyperlipidemia.  Hypertension.  Obesity.    Decreased carvedilol back to 6.25 mg BID.  Will add amiodarone 200 mg daily.  Entresto changed to half dose.  Will monitor for 24 hours for blood pressure and ectopics.  Advised patient to ambulate as tolerated.  Discussed care and holding of BP meds with RN.  Hopeful discharge tomorrow.    Thank you very much for allowing us to participate in the care of this patient.  Please call us with any questions.    Electronically signed by Igor Juárez MD on 8/15/2024 at 4:56 PM

## 2024-08-15 NOTE — PROGRESS NOTES
Occupational Therapy  Facility/Department: [facility]  Rehabilitation Occupational Therapy Daily Treatment Note    Date: 8/15/24  Patient Name: Corbin Gallardo       Room:   MRN: [id number]  Account: [id number]   : 1950  (74 y.o.) Gender: male   Past Medical History:  has a past medical history of Cataracts, bilateral, Cerebral artery occlusion with cerebral infarction (HCC), Diabetes mellitus (HCC), Eye disorder, Glaucoma, Headache(784.0), Hearing loss, Heartburn, Hyperlipidemia, Hypertension, Kidney stones, Osteoarthritis, Snores, Tendonitis of shoulder, left, and Tremor.  Past Surgical History:   has a past surgical history that includes Cystoscopy; Kidney stone surgery; Lithotripsy; Refractive surgery (Right, ); Cataract removal (Right, 2015); Thumb amputation; Hand surgery (Left); Tonsillectomy; Total knee arthroplasty (Left, 2021); joint replacement; eye surgery; Total knee arthroplasty (Right, 02/15/2022); Total knee arthroplasty (Right, 2/15/2022); and Cardiac procedure (N/A, 2024).    Restrictions  Restrictions/Precautions: General Precautions, Up as Tolerated  Other position/activity restrictions: Up with assist, EF 15-20 % per chart, ALARMS; L UE IV  Required Braces or Orthoses?: No    Subjective  Subjective: Pt. sitting upright in bedside chair and willing to participate in treatment.  Restrictions/Precautions: General Precautions;Up as Tolerated  Objective     Cognition  Overall Cognitive Status: Exceptions  Arousal/Alertness: Appears intact  Following Commands: Appears intact  Attention Span: Appears intact  Memory: Decreased short term memory  Safety Judgement: Decreased awareness of need for safety;Decreased awareness of need for assistance  Problem Solving: Assistance required to correct errors made;Decreased awareness of errors;Assistance required to identify errors made  Insights: Decreased awareness of deficits  Initiation: Does not require  Inpatient CMS G-Code Modifier : CJ (08/15/24 1129)      Therapy Time   Individual Concurrent Group Co-treatment   Time In 1008         Time Out 1032         Minutes 24            Upon Co-Signature of this note, appropriate supervision of YVETTE has been delivered with regards to plan of care, evaluation/re-evaluation findings, any appropriate modifications to treatment plan, and relevant discharge planning. Instructions and training unique to this patient and this practitioner have been considered and implemented.     RN reports patient is medically stable for therapy treatment this date.    Chart reviewed prior to treatment and patient is agreeable for therapy.  All lines intact and patient positioned comfortably at end of treatment.  All patient needs addressed prior to ending therapy session.       YVETTE PICHARDO

## 2024-08-15 NOTE — PLAN OF CARE
Problem: Discharge Planning  Goal: Discharge to home or other facility with appropriate resources  8/14/2024 2244 by Rita Alfred RN  Outcome: Progressing  Flowsheets (Taken 8/14/2024 2030)  Discharge to home or other facility with appropriate resources:   Identify barriers to discharge with patient and caregiver   Arrange for needed discharge resources and transportation as appropriate   Identify discharge learning needs (meds, wound care, etc)   Refer to discharge planning if patient needs post-hospital services based on physician order or complex needs related to functional status, cognitive ability or social support system  Note: Ongoing assessment, coordinated with DC planner.  8/14/2024 1822 by Pina Chance RN  Outcome: Progressing  8/14/2024 0956 by Amalia Lindsay RN  Outcome: Progressing     Problem: Safety - Adult  Goal: Free from fall injury  8/14/2024 2244 by Rita Alfred RN  Outcome: Progressing  Flowsheets (Taken 8/13/2024 2000 by Dilma Asif RN)  Free From Fall Injury:   Instruct family/caregiver on patient safety   Based on caregiver fall risk screen, instruct family/caregiver to ask for assistance with transferring infant if caregiver noted to have fall risk factors  Note: Preventative measures in place.  8/14/2024 1822 by Pina Chance RN  Outcome: Progressing  8/14/2024 0956 by Amalia Lindsay RN  Outcome: Progressing     Problem: ABCDS Injury Assessment  Goal: Absence of physical injury  8/14/2024 2244 by Rita Alfred RN  Outcome: Progressing  Flowsheets (Taken 8/14/2024 2244)  Absence of Physical Injury: Implement safety measures based on patient assessment  Note: Safety measures in place.  8/14/2024 1822 by Pina Chance RN  Outcome: Progressing  8/14/2024 0956 by Amalia Lindsay RN  Outcome: Progressing     Problem: Chronic Conditions and Co-morbidities  Goal: Patient's chronic conditions and co-morbidity symptoms are monitored and maintained or  Notify Licensed Independent Practitioner if interventions unsuccessful or patient reports new pain  Note: Ongoing assessment. No reported pain.   8/14/2024 1822 by Pina Chance, RN  Outcome: Progressing  8/14/2024 0956 by Amalia Lindsay, RN  Outcome: Progressing

## 2024-08-15 NOTE — PLAN OF CARE
Problem: Discharge Planning  Goal: Discharge to home or other facility with appropriate resources  Outcome: Progressing  Flowsheets (Taken 8/15/2024 0800)  Discharge to home or other facility with appropriate resources: Identify barriers to discharge with patient and caregiver     Problem: Safety - Adult  Goal: Free from fall injury  Outcome: Progressing     Problem: ABCDS Injury Assessment  Goal: Absence of physical injury  Outcome: Progressing     Problem: Chronic Conditions and Co-morbidities  Goal: Patient's chronic conditions and co-morbidity symptoms are monitored and maintained or improved  Outcome: Progressing  Flowsheets (Taken 8/15/2024 0800)  Care Plan - Patient's Chronic Conditions and Co-Morbidity Symptoms are Monitored and Maintained or Improved: Monitor and assess patient's chronic conditions and comorbid symptoms for stability, deterioration, or improvement     Problem: Respiratory - Adult  Goal: Achieves optimal ventilation and oxygenation  Outcome: Progressing  Flowsheets (Taken 8/15/2024 0800)  Achieves optimal ventilation and oxygenation:   Assess for changes in respiratory status   Assess for changes in mentation and behavior     Problem: Cardiovascular - Adult  Goal: Maintains optimal cardiac output and hemodynamic stability  Outcome: Progressing  Flowsheets (Taken 8/15/2024 0800)  Maintains optimal cardiac output and hemodynamic stability:   Monitor blood pressure and heart rate   Monitor urine output and notify Licensed Independent Practitioner for values outside of normal range   Assess for signs of decreased cardiac output  Goal: Absence of cardiac dysrhythmias or at baseline  Outcome: Progressing  Flowsheets (Taken 8/15/2024 0800)  Absence of cardiac dysrhythmias or at baseline:   Monitor cardiac rate and rhythm   Assess for signs of decreased cardiac output     Problem: Skin/Tissue Integrity - Adult  Goal: Skin integrity remains intact  Outcome: Progressing  Flowsheets (Taken 8/15/2024  maintained  Outcome: Progressing  Flowsheets (Taken 8/15/2024 0800)  Hemodynamic stability and optimal renal function maintained: Monitor labs and assess for signs and symptoms of volume excess or deficit  Goal: Glucose maintained within prescribed range  Outcome: Progressing  Flowsheets (Taken 8/15/2024 0800)  Glucose maintained within prescribed range: Monitor blood glucose as ordered     Problem: Pain  Goal: Verbalizes/displays adequate comfort level or baseline comfort level  Outcome: Progressing

## 2024-08-16 VITALS
OXYGEN SATURATION: 97 % | BODY MASS INDEX: 29.62 KG/M2 | TEMPERATURE: 98.4 F | RESPIRATION RATE: 16 BRPM | HEART RATE: 67 BPM | WEIGHT: 218.7 LBS | HEIGHT: 72 IN | SYSTOLIC BLOOD PRESSURE: 88 MMHG | DIASTOLIC BLOOD PRESSURE: 65 MMHG

## 2024-08-16 PROBLEM — I50.21 ACUTE SYSTOLIC HEART FAILURE (HCC): Status: RESOLVED | Noted: 2024-08-09 | Resolved: 2024-08-16

## 2024-08-16 PROBLEM — E87.6 HYPOKALEMIA: Status: RESOLVED | Noted: 2024-08-14 | Resolved: 2024-08-16

## 2024-08-16 LAB
ANION GAP SERPL CALCULATED.3IONS-SCNC: 13 MMOL/L (ref 9–17)
BUN SERPL-MCNC: 35 MG/DL (ref 8–23)
BUN/CREAT SERPL: 39 (ref 9–20)
CALCIUM SERPL-MCNC: 9.2 MG/DL (ref 8.6–10.4)
CHLORIDE SERPL-SCNC: 104 MMOL/L (ref 98–107)
CO2 SERPL-SCNC: 24 MMOL/L (ref 20–31)
CREAT SERPL-MCNC: 0.9 MG/DL (ref 0.7–1.2)
GFR, ESTIMATED: 89 ML/MIN/1.73M2
GLUCOSE BLD-MCNC: 121 MG/DL (ref 75–110)
GLUCOSE SERPL-MCNC: 102 MG/DL (ref 70–99)
POTASSIUM SERPL-SCNC: 3.8 MMOL/L (ref 3.7–5.3)
SODIUM SERPL-SCNC: 141 MMOL/L (ref 135–144)

## 2024-08-16 PROCEDURE — 6370000000 HC RX 637 (ALT 250 FOR IP): Performed by: INTERNAL MEDICINE

## 2024-08-16 PROCEDURE — 82947 ASSAY GLUCOSE BLOOD QUANT: CPT

## 2024-08-16 PROCEDURE — 6370000000 HC RX 637 (ALT 250 FOR IP): Performed by: NURSE PRACTITIONER

## 2024-08-16 PROCEDURE — 99232 SBSQ HOSP IP/OBS MODERATE 35: CPT | Performed by: NURSE PRACTITIONER

## 2024-08-16 PROCEDURE — 36415 COLL VENOUS BLD VENIPUNCTURE: CPT

## 2024-08-16 PROCEDURE — 2580000003 HC RX 258: Performed by: INTERNAL MEDICINE

## 2024-08-16 PROCEDURE — 80048 BASIC METABOLIC PNL TOTAL CA: CPT

## 2024-08-16 PROCEDURE — 94761 N-INVAS EAR/PLS OXIMETRY MLT: CPT

## 2024-08-16 PROCEDURE — 6360000002 HC RX W HCPCS: Performed by: INTERNAL MEDICINE

## 2024-08-16 RX ORDER — ASPIRIN 81 MG/1
81 TABLET ORAL DAILY
Qty: 30 TABLET | Refills: 3 | Status: SHIPPED | OUTPATIENT
Start: 2024-08-16

## 2024-08-16 RX ORDER — FUROSEMIDE 40 MG/1
40 TABLET ORAL 2 TIMES DAILY
Qty: 60 TABLET | Refills: 0 | Status: SHIPPED | OUTPATIENT
Start: 2024-08-16

## 2024-08-16 RX ORDER — AMIODARONE HYDROCHLORIDE 200 MG/1
200 TABLET ORAL DAILY
Qty: 30 TABLET | Refills: 0 | Status: SHIPPED | OUTPATIENT
Start: 2024-08-16

## 2024-08-16 RX ORDER — CARVEDILOL 6.25 MG/1
6.25 TABLET ORAL 2 TIMES DAILY WITH MEALS
Qty: 60 TABLET | Refills: 3 | Status: SHIPPED | OUTPATIENT
Start: 2024-08-16

## 2024-08-16 RX ORDER — ENOXAPARIN SODIUM 100 MG/ML
40 INJECTION SUBCUTANEOUS DAILY
Status: DISCONTINUED | OUTPATIENT
Start: 2024-08-17 | End: 2024-08-16 | Stop reason: HOSPADM

## 2024-08-16 RX ADMIN — SPIRONOLACTONE 25 MG: 25 TABLET ORAL at 09:16

## 2024-08-16 RX ADMIN — CARVEDILOL 6.25 MG: 6.25 TABLET, FILM COATED ORAL at 09:17

## 2024-08-16 RX ADMIN — AMIODARONE HYDROCHLORIDE 200 MG: 200 TABLET ORAL at 09:17

## 2024-08-16 RX ADMIN — SODIUM CHLORIDE, PRESERVATIVE FREE 10 ML: 5 INJECTION INTRAVENOUS at 09:18

## 2024-08-16 RX ADMIN — ATORVASTATIN CALCIUM 20 MG: 20 TABLET, FILM COATED ORAL at 09:17

## 2024-08-16 RX ADMIN — ENOXAPARIN SODIUM 30 MG: 100 INJECTION SUBCUTANEOUS at 09:17

## 2024-08-16 RX ADMIN — TICAGRELOR 90 MG: 90 TABLET ORAL at 09:16

## 2024-08-16 RX ADMIN — SACUBITRIL AND VALSARTAN 0.5 TABLET: 24; 26 TABLET, FILM COATED ORAL at 09:17

## 2024-08-16 RX ADMIN — GLIPIZIDE 10 MG: 10 TABLET ORAL at 09:16

## 2024-08-16 RX ADMIN — FUROSEMIDE 40 MG: 40 TABLET ORAL at 09:17

## 2024-08-16 RX ADMIN — EMPAGLIFLOZIN 10 MG: 10 TABLET, FILM COATED ORAL at 09:17

## 2024-08-16 RX ADMIN — ASPIRIN 81 MG: 81 TABLET, COATED ORAL at 09:16

## 2024-08-16 ASSESSMENT — PAIN SCALES - GENERAL: PAINLEVEL_OUTOF10: 0

## 2024-08-16 ASSESSMENT — ENCOUNTER SYMPTOMS
GASTROINTESTINAL NEGATIVE: 1
RESPIRATORY NEGATIVE: 1
EYES NEGATIVE: 1

## 2024-08-16 NOTE — DISCHARGE INSTR - COC
Continuity of Care Form    Patient Name: Corbin Gallardo   :  1950  MRN:  [id number]    Admit date:  2024  Discharge date:  2024    Code Status Order: Full Code   Advance Directives:   Advance Care Flowsheet Documentation             Admitting Physician:  Charito Hernandez MD  PCP: Everett Marin, APRN - CNP    Discharging Nurse: DULCE Bah RN  Discharging Hospital Unit/Room#: 8/-01  Discharging Unit Phone Number: [phone]    Emergency Contact:   Extended Emergency Contact Information  Primary Emergency Contact: Kavita Gallardo  Address: [address] [place] [address]  Home Phone: [phone]  Mobile Phone: [phone]  Relation: Spouse  Secondary Emergency Contact: ASHELY GALLARDO  Home Phone: [phone]  Relation: Child   needed? No    Past Surgical History:  Past Surgical History:   Procedure Laterality Date    CARDIAC PROCEDURE N/A 2024    Left heart cath / coronary angiography performed by Igor Juárez MD at [facility] CARDIAC CATH LAB    CATARACT REMOVAL Right 2015    right eye    CYSTOSCOPY      EYE SURGERY      HAND SURGERY Left     thumb    JOINT REPLACEMENT      KIDNEY STONE SURGERY      LITHOTRIPSY      REFRACTIVE SURGERY Right     THUMB AMPUTATION      TONSILLECTOMY      TOTAL KNEE ARTHROPLASTY Left 2021    LEFT  KNEE TOTAL ARTHROPLASTY - LISA    CELLSAVER performed by Raymond Reina DO at [facility] OR    TOTAL KNEE ARTHROPLASTY Right 02/15/2022    By Dr. Latham    TOTAL KNEE ARTHROPLASTY Right 2/15/2022    RIGHT KNEE TOTAL ARTHROPLASTY - LISA performed by Benson Latham DO at [facility] OR       Immunization History:   Immunization History   Administered Date(s) Administered    COVID-19, MODERNA BLUE border, Primary or Immunocompromised, (age 12y+), IM, 100 mcg/0.5mL 2021, 2021, 10/26/2021    COVID-19, MODERNA, ( formula), (age 12y+), IM, 50mcg/0.5mL 11/15/2023    COVID-19,

## 2024-08-16 NOTE — PROGRESS NOTES
CLINICAL PHARMACY NOTE: MEDS TO BEDS    Total # of Prescriptions Filled: 8   The following medications were delivered to the patient:  Spironolactone 25mg  Atorvastain 20mg  Jardiance 10mg  Aspirin 81mg  Amiodaraone 200mg  Brilinta 90mg  Entreato 24-26mg  Furosemide 40mg    Additional Documentation:

## 2024-08-16 NOTE — PROGRESS NOTES
End Of Shift Note  [facility]  Summary of shift: Patient had an uneventful night.  Patient took first dose of Entresto with no side effects noted.  Patient's blood pressure has been stable.  Still showing irregular heart rhythm with PVCs.    Vitals:    Vitals:    08/15/24 1648 08/15/24 2000 08/16/24 0000 08/16/24 0400   BP: 103/63 (!) 103/58 121/74 104/60   Pulse: 63 79 80    Resp:  16 16 18   Temp:  97.7 °F (36.5 °C) 97.3 °F (36.3 °C) 97.3 °F (36.3 °C)   TempSrc:  Temporal Temporal Temporal   SpO2:  97% 97% 98%   Weight:       Height:            I&O:   Intake/Output Summary (Last 24 hours) at 8/16/2024 0552  Last data filed at 8/15/2024 1915  Gross per 24 hour   Intake 715 ml   Output --   Net 715 ml       Resp Status: Room Air    Ventilator Settings:     / / /     Critical Care IV infusions:   sodium chloride      sodium chloride      sodium chloride Stopped (08/11/24 1450)    dextrose          LDA:   Peripheral IV 08/13/24 Left;Proximal;Anterior Forearm (Active)   Number of days: 2       Incision Knee Anterior;Right (Active)   Number of days:

## 2024-08-16 NOTE — PROGRESS NOTES
Cardiovascular progress Note          Patient name: Corbin Gallardo    YOB: 1950  Date of admission:  8/9/2024       Patient seen, examined. Previous clinical entries reviewed.  All available laboratory, imaging and ancillary data reviewed.    Subjective:      Did did well overnight.  Tolerated lower dose of Entresto and lower dose of carvedilol.  Ectopics improved on monitor    Systems review:  Constitutional: No fever/chills.   HENT: No headache, neck pain or neck stiffness. No sore throat or dysphagia.   Gastrointestinal: No abdominal pain, nausea or vomiting.   Cardiac: As Above  Respiratory: As above  Neurologic: No new focal weakness or numbness  Psychiatric: Normal mood and mentation       Examination:   Vitals: BP (!) 146/76   Pulse 87   Temp 97.4 °F (36.3 °C) (Oral)   Resp 16   Ht 1.829 m (6')   Wt 99.2 kg (218 lb 11.2 oz)   SpO2 95%   BMI 29.66 kg/m²     Intake/Output Summary (Last 24 hours) at 8/16/2024 0834  Last data filed at 8/15/2024 1915  Gross per 24 hour   Intake 715 ml   Output --   Net 715 ml       General appearance: Comfortable in no apparent distress.  HEENT: No pallor. No icterus  Neck: Supple.  Lungs:Generally decreased breath sounds  Heart: S1,S2  Abdomen: Soft  Extremities: No peripheral edema  Skin: No obvious rashes.  Musculoskeletal: No obvious deformities.  Neurologic: No focal deficits.     Labs/ Ancillary data:     CBC:   Recent Labs     08/14/24  0351 08/15/24  0712   WBC 9.3 9.2   HGB 13.3 14.3    218     BMP:    Recent Labs     08/14/24  0351 08/15/24  0744 08/16/24  0340    141 141   K 3.6* 3.5* 3.8    102 104   CO2 21 24 24   BUN 41* 32* 35*   CREATININE 1.0 0.8 0.9   GLUCOSE 92 107* 102*       Medications:   Scheduled Meds:   amiodarone  200 mg Oral Daily    carvedilol  6.25 mg Oral BID WC    sacubitril-valsartan  0.5 tablet Oral BID    glipiZIDE  10 mg Oral QAM    furosemide  40 mg Oral BID    potassium chloride  40 mEq Oral Once

## 2024-08-16 NOTE — DISCHARGE SUMMARY
tablet  Commonly known as: CORDARONE  Take 1 tablet by mouth daily     aspirin 81 MG EC tablet  Take 1 tablet by mouth daily  Replaces: aspirin 325 MG tablet     atorvastatin 20 MG tablet  Commonly known as: LIPITOR  Take 1 tablet by mouth daily  Replaces: simvastatin 20 MG tablet     * carvedilol 6.25 MG tablet  Commonly known as: COREG  Take 1 tablet by mouth 2 times daily (with meals)     * carvedilol 6.25 MG tablet  Commonly known as: COREG  Take 1 tablet by mouth 2 times daily (with meals)     empagliflozin 10 MG tablet  Commonly known as: JARDIANCE  Take 1 tablet by mouth daily     sacubitril-valsartan 24-26 MG per tablet  Commonly known as: ENTRESTO  Take 0.5 tablets by mouth 2 times daily     spironolactone 25 MG tablet  Commonly known as: ALDACTONE  Take 1 tablet by mouth daily     ticagrelor 90 MG Tabs tablet  Commonly known as: BRILINTA  Take 1 tablet by mouth 2 times daily           * This list has 2 medication(s) that are the same as other medications prescribed for you. Read the directions carefully, and ask your doctor or other care provider to review them with you.                CHANGE how you take these medications      furosemide 40 MG tablet  Commonly known as: LASIX  Take 1 tablet by mouth in the morning and 1 tablet in the evening.  What changed:   medication strength  how much to take  when to take this            CONTINUE taking these medications      amitriptyline 50 MG tablet  Commonly known as: ELAVIL  Take 1 tablet by mouth nightly     blood glucose test strips  Provide insurance covered test strips compatible with glucometer, test 1 times a day     D-Care Glucometer w/Device Kit  Provide insurance covered glucometer, check blood sugars every morning     glipiZIDE 10 MG tablet  Commonly known as: GLUCOTROL  TAKE 1 TABLET EVERY MORNING     Handicap Placard Misc  by Does not apply route EXP: 04/18/2028     latanoprost 0.005 % ophthalmic solution  Commonly known as: XALATAN     Similasan Dry  tablet  Commonly known as: COZAAR     simvastatin 20 MG tablet  Commonly known as: ZOCOR  Replaced by: atorvastatin 20 MG tablet               Where to Get Your Medications        These medications were sent to [id number] - [place], [place] - [address] - P [phone] - F [phone]  [address] [address]      Phone: [phone]   aspirin 81 MG EC tablet  atorvastatin 20 MG tablet  empagliflozin 10 MG tablet  spironolactone 25 MG tablet       You can get these medications from any pharmacy    Bring a paper prescription for each of these medications  amiodarone 200 MG tablet  carvedilol 6.25 MG tablet  furosemide 40 MG tablet  sacubitril-valsartan 24-26 MG per tablet  ticagrelor 90 MG Tabs tablet         Discharge Procedure Orders   [facility]   Referral Priority: Routine Referral Type: Eval and Treat   Referral Reason: Specialty Services Required   Requested Specialty: Cardiac Rehabilitation   Number of Visits Requested: 1     [facility] RN       Time Spent on discharge is  17 mins in patient examination, evaluation, counseling as well as medication reconciliation, prescriptions for required medications, discharge plan and follow up.    Electronically signed by   AMERICA Temple NP  8/19/2024  9:20 AM      Thank you Everett Enrique, [facility] LOC[facility] for the opportunity to be involved in this patient's care.

## 2024-08-16 NOTE — FLOWSHEET NOTE
08/15/24 2050   Treatment Team Notification   Reason for Communication Review case  (Advise about giving Entresto, as /58.)   Name of Team Member Notified Dr. Juárez   Treatment Team Role Consulting Provider   Method of Communication Call   Response No new orders  (Per Dr. Juárez, ok to give Entresto.)   Notification Time 2050

## 2024-08-16 NOTE — PLAN OF CARE
SARAH Reyes  Outcome: Progressing  8/16/2024 0103 by Amy Medina RN  Outcome: Progressing     Problem: Metabolic/Fluid and Electrolytes - Adult  Goal: Electrolytes maintained within normal limits  8/16/2024 1016 by Cindy Bah RN  Outcome: Progressing  Flowsheets (Taken 8/16/2024 0800)  Electrolytes maintained within normal limits:   Monitor labs and assess patient for signs and symptoms of electrolyte imbalances   Administer electrolyte replacement as ordered  8/16/2024 0108 by Amy Medina RN  Outcome: Progressing  8/16/2024 0103 by Amy Medina RN  Outcome: Progressing  Goal: Hemodynamic stability and optimal renal function maintained  8/16/2024 1016 by Cnidy Bah RN  Outcome: Progressing  Flowsheets (Taken 8/16/2024 0800)  Hemodynamic stability and optimal renal function maintained:   Monitor labs and assess for signs and symptoms of volume excess or deficit   Monitor intake, output and patient weight  8/16/2024 0108 by Amy Medina RN  Outcome: Progressing  8/16/2024 0103 by Amy Medina RN  Outcome: Progressing  Goal: Glucose maintained within prescribed range  8/16/2024 1016 by Cindy Bah RN  Outcome: Progressing  Flowsheets (Taken 8/16/2024 0800)  Glucose maintained within prescribed range:   Monitor blood glucose as ordered   Assess for signs and symptoms of hyperglycemia and hypoglycemia  8/16/2024 0108 by Amy Medina RN  Outcome: Progressing  8/16/2024 0103 by Amy Medina RN  Outcome: Progressing     Problem: Pain  Goal: Verbalizes/displays adequate comfort level or baseline comfort level  8/16/2024 1016 by Cindy Bah RN  Outcome: Progressing  8/16/2024 0108 by Amy Medina RN  Outcome: Progressing  8/16/2024 0103 by Amy Medina RN  Outcome: Progressing  Flowsheets (Taken 8/15/2024 2000)  Verbalizes/displays adequate comfort level or baseline comfort level:   Encourage patient to monitor pain and request assistance   Assess pain

## 2024-08-16 NOTE — CARE COORDINATION
DC Planning    Called [facility] pr has been accepted. Informed of pt dc home today. Home care agency will pull data electronically.      DAMON NEEDS SIGNED.

## 2024-08-16 NOTE — PROGRESS NOTES
in intensity for the last 6 weeks and a phlegmy cough. PCP workup included lab work and an echo. Echo was found to have significantly reduced systolic heart failure with an EF of 15-20%. Request admission for cardiology evaluation and stabilization.      8/10 - 8/11 - Minimal improvement with medical management.  Cardiology on board and plan for catheterization on 8/12.\"     No further complaints of shortness of breath or chests pain.  Patient did require supplemental O2 while sleeping evening of 8/12.  Will recommend the patient has outpatient sleep study.       Cardiac cath completed 8/13. LAD was stented x2 to the prox and mid LAD, and second diagonal jailed by the stent. Plan for staged PCI to the RCA in 4-6 weeks. Patient having frequent PVCs- suspected to be due to reperfusion, he is asymptomatic. Continuing close monitoring of heart rhythm. Hypokalemia noted and oral potassium given.      Patient continues to have frequent PVCs.  Carvedilol was reportedly held due to hypotension.  Cardiology adjusted to carvedilol dose and added amiodarone for better rate and rhythm control.  Entresto continues.  Patient has no complaints of palpitations and is asymptomatic.     With medication adjustments and amio addition, patient rhythm appears to have fewer PVCs/ PACs. Patient to go home with home care for RN monitoring and follow up with cardiology and interventional cardiology as outpatient.    Review of Systems:     Review of Systems   Constitutional: Negative.    HENT: Negative.     Eyes: Negative.    Respiratory: Negative.     Cardiovascular: Negative.    Gastrointestinal: Negative.    Genitourinary: Negative.    Musculoskeletal: Negative.    Skin: Negative.    Neurological: Negative.  Headaches: ..   Psychiatric/Behavioral: Negative.         Medications:     Allergies:  No Known Allergies    Current Meds:   Scheduled Meds:    amiodarone  200 mg Oral Daily    carvedilol  6.25 mg Oral BID     sacubitril-valsartan   ill-appearing, toxic-appearing or diaphoretic.   HENT:      Head: Normocephalic and atraumatic.      Right Ear: External ear normal.      Left Ear: External ear normal.      Nose: Nose normal. No rhinorrhea.      Mouth/Throat:      Mouth: Mucous membranes are moist.   Eyes:      General: No scleral icterus.        Right eye: No discharge.         Left eye: No discharge.      Extraocular Movements: Extraocular movements intact.      Conjunctiva/sclera: Conjunctivae normal.      Pupils: Pupils are equal, round, and reactive to light.   Cardiovascular:      Rate and Rhythm: Normal rate. Rhythm irregular.      Pulses: Normal pulses.      Heart sounds: Normal heart sounds. No murmur heard.     No friction rub. No gallop.      Comments: Sinus arrhythmia on monitor  Pulmonary:      Effort: Pulmonary effort is normal. No respiratory distress.      Breath sounds: Normal breath sounds. No wheezing, rhonchi or rales.   Abdominal:      General: Bowel sounds are normal. There is no distension.      Palpations: Abdomen is soft.      Tenderness: There is no abdominal tenderness. There is no guarding.      Hernia: No hernia is present.   Musculoskeletal:         General: Normal range of motion.      Cervical back: Normal range of motion and neck supple.      Right lower leg: No edema.      Left lower leg: No edema.   Skin:     General: Skin is warm.      Coloration: Skin is not jaundiced.      Findings: No bruising, erythema or lesion.   Neurological:      General: No focal deficit present.      Mental Status: He is alert and oriented to person, place, and time.   Psychiatric:         Mood and Affect: Mood normal.         Behavior: Behavior normal.         Thought Content: Thought content normal.         Judgment: Judgment normal.         Assessment:        Hospital Problems             Last Modified POA    * (Principal) Acute systolic heart failure (HCC) 8/9/2024 Yes    Coronary artery disease due to calcified coronary lesion

## 2024-08-16 NOTE — PLAN OF CARE
Problem: Discharge Planning  Goal: Discharge to home or other facility with appropriate resources  8/16/2024 0103 by Amy Medina RN  Outcome: Progressing  Flowsheets (Taken 8/15/2024 2000)  Discharge to home or other facility with appropriate resources:   Identify barriers to discharge with patient and caregiver   Arrange for needed discharge resources and transportation as appropriate   Identify discharge learning needs (meds, wound care, etc)   Refer to discharge planning if patient needs post-hospital services based on physician order or complex needs related to functional status, cognitive ability or social support system     Problem: Safety - Adult  Goal: Free from fall injury  8/16/2024 0103 by Amy Medina RN  Outcome: Progressing     Problem: ABCDS Injury Assessment  Goal: Absence of physical injury  8/16/2024 0103 by Amy Medina RN  Outcome: Progressing     Problem: Chronic Conditions and Co-morbidities  Goal: Patient's chronic conditions and co-morbidity symptoms are monitored and maintained or improved  8/16/2024 0103 by Amy Medina RN  Outcome: Progressing  Flowsheets (Taken 8/15/2024 2000)  Care Plan - Patient's Chronic Conditions and Co-Morbidity Symptoms are Monitored and Maintained or Improved:   Monitor and assess patient's chronic conditions and comorbid symptoms for stability, deterioration, or improvement   Collaborate with multidisciplinary team to address chronic and comorbid conditions and prevent exacerbation or deterioration   Update acute care plan with appropriate goals if chronic or comorbid symptoms are exacerbated and prevent overall improvement and discharge     Problem: Respiratory - Adult  Goal: Achieves optimal ventilation and oxygenation  8/16/2024 0103 by Amy Medina RN  Outcome: Progressing  Flowsheets (Taken 8/15/2024 2000)  Achieves optimal ventilation and oxygenation:   Assess for changes in respiratory status   Assess for changes in

## 2024-08-16 NOTE — CARE COORDINATION
DC Planning    Spoke with pt and pt nurse, now in agreement to [facility]-ref [facility] sent had in past and prefer to use again.

## 2024-08-16 NOTE — PROGRESS NOTES
Discharge Note:      All discharge instructions given at this time as well as all patient belongings returned to patient. Pt denies any further questions regarding discharge at this time. Pt given discharge packet with unit letter, discharge instructions/restrictions and medication handouts regarding all discharge medications and side effects. Pt denies any further issues at this time.    Pt wheeled out to front discharge doors at this time.     Pt left premises without any issues in private vehicle at this time.

## 2024-08-27 ENCOUNTER — HOSPITAL ENCOUNTER (OUTPATIENT)
Facility: CLINIC | Age: 74
Discharge: HOME | End: 2024-08-29
Payer: MEDICARE

## 2024-08-27 ENCOUNTER — OFFICE VISIT (OUTPATIENT)
Dept: FAMILY MEDICINE CLINIC | Age: 74
End: 2024-08-27
Payer: MEDICARE

## 2024-08-27 ENCOUNTER — HOSPITAL ENCOUNTER (OUTPATIENT)
Dept: GENERAL RADIOLOGY | Facility: CLINIC | Age: 74
Discharge: HOME | End: 2024-08-29
Payer: MEDICARE

## 2024-08-27 VITALS
DIASTOLIC BLOOD PRESSURE: 60 MMHG | WEIGHT: 225 LBS | SYSTOLIC BLOOD PRESSURE: 110 MMHG | HEART RATE: 61 BPM | OXYGEN SATURATION: 98 % | BODY MASS INDEX: 30.52 KG/M2

## 2024-08-27 DIAGNOSIS — Z86.73 HISTORY OF STROKE: ICD-10-CM

## 2024-08-27 DIAGNOSIS — M79.641 RIGHT HAND PAIN: Primary | ICD-10-CM

## 2024-08-27 DIAGNOSIS — Z09 HOSPITAL DISCHARGE FOLLOW-UP: ICD-10-CM

## 2024-08-27 DIAGNOSIS — M79.641 RIGHT HAND PAIN: ICD-10-CM

## 2024-08-27 PROCEDURE — 1036F TOBACCO NON-USER: CPT | Performed by: NURSE PRACTITIONER

## 2024-08-27 PROCEDURE — 3078F DIAST BP <80 MM HG: CPT | Performed by: NURSE PRACTITIONER

## 2024-08-27 PROCEDURE — 73130 X-RAY EXAM OF HAND: CPT

## 2024-08-27 PROCEDURE — G8417 CALC BMI ABV UP PARAM F/U: HCPCS | Performed by: NURSE PRACTITIONER

## 2024-08-27 PROCEDURE — 99214 OFFICE O/P EST MOD 30 MIN: CPT | Performed by: NURSE PRACTITIONER

## 2024-08-27 PROCEDURE — G8427 DOCREV CUR MEDS BY ELIG CLIN: HCPCS | Performed by: NURSE PRACTITIONER

## 2024-08-27 PROCEDURE — 1111F DSCHRG MED/CURRENT MED MERGE: CPT | Performed by: NURSE PRACTITIONER

## 2024-08-27 PROCEDURE — 3074F SYST BP LT 130 MM HG: CPT | Performed by: NURSE PRACTITIONER

## 2024-08-27 PROCEDURE — 3017F COLORECTAL CA SCREEN DOC REV: CPT | Performed by: NURSE PRACTITIONER

## 2024-08-27 PROCEDURE — 1123F ACP DISCUSS/DSCN MKR DOCD: CPT | Performed by: NURSE PRACTITIONER

## 2024-08-27 RX ORDER — ATORVASTATIN CALCIUM 40 MG/1
40 TABLET, FILM COATED ORAL DAILY
COMMUNITY
Start: 2024-08-26

## 2024-08-27 NOTE — PROGRESS NOTES
Tabs tablet  Commonly known as: BRILINTA  Take 1 tablet by mouth 2 times daily     timolol 0.5 % ophthalmic solution  Commonly known as: TIMOPTIC               Where to Get Your Medications        Information about where to get these medications is not yet available    Ask your nurse or doctor about these medications  empagliflozin 10 MG tablet           Medications marked \"taking\" at this time  Outpatient Medications Marked as Taking for the 8/27/24 encounter (Office Visit) with Everett Marin APRN - CNP   Medication Sig Dispense Refill    atorvastatin (LIPITOR) 40 MG tablet Take 1 tablet by mouth daily      empagliflozin (JARDIANCE) 10 MG tablet Take 1 tablet by mouth daily 14 tablet 0    aspirin 81 MG EC tablet Take 1 tablet by mouth daily 30 tablet 3    carvedilol (COREG) 6.25 MG tablet Take 1 tablet by mouth 2 times daily (with meals) 60 tablet 3    sacubitril-valsartan (ENTRESTO) 24-26 MG per tablet Take 0.5 tablets by mouth 2 times daily 60 tablet 1    furosemide (LASIX) 40 MG tablet Take 1 tablet by mouth in the morning and 1 tablet in the evening. 60 tablet 0    ticagrelor (BRILINTA) 90 MG TABS tablet Take 1 tablet by mouth 2 times daily 60 tablet 12    spironolactone (ALDACTONE) 25 MG tablet Take 1 tablet by mouth daily 30 tablet 3    empagliflozin (JARDIANCE) 10 MG tablet Take 1 tablet by mouth daily 30 tablet 3    Multiple Vitamins-Minerals (THERAPEUTIC MULTIVITAMIN-MINERALS) tablet Take 1 tablet by mouth daily      glipiZIDE (GLUCOTROL) 10 MG tablet TAKE 1 TABLET EVERY MORNING 90 tablet 1    amitriptyline (ELAVIL) 50 MG tablet Take 1 tablet by mouth nightly 90 tablet 3    Handicap Placard MISC by Does not apply route EXP: 04/18/2028 1 each 0    Homeopathic Products (SIMILASAN DRY EYE RELIEF) SOLN Place 2 drops into the left eye in the morning and 2 drops in the evening.      blood glucose monitor strips Provide insurance covered test strips compatible with glucometer, test 1 times a day 100

## 2024-08-29 ENCOUNTER — HOSPITAL ENCOUNTER (OUTPATIENT)
Age: 74
Setting detail: SPECIMEN
Discharge: HOME | End: 2024-08-29

## 2024-08-29 LAB
ANION GAP SERPL CALCULATED.3IONS-SCNC: 9 MMOL/L (ref 9–16)
BUN SERPL-MCNC: 21 MG/DL (ref 8–23)
CALCIUM SERPL-MCNC: 9.3 MG/DL (ref 8.6–10.4)
CHLORIDE SERPL-SCNC: 106 MMOL/L (ref 98–107)
CO2 SERPL-SCNC: 23 MMOL/L (ref 20–31)
CREAT SERPL-MCNC: 1 MG/DL (ref 0.7–1.2)
GFR, ESTIMATED: 79 ML/MIN/1.73M2
GLUCOSE SERPL-MCNC: 124 MG/DL (ref 74–99)
POTASSIUM SERPL-SCNC: 4.4 MMOL/L (ref 3.7–5.3)
SODIUM SERPL-SCNC: 138 MMOL/L (ref 136–145)

## 2024-09-13 ENCOUNTER — HOSPITAL ENCOUNTER (OUTPATIENT)
Age: 74
Discharge: HOME | End: 2024-09-13
Attending: INTERNAL MEDICINE | Admitting: INTERNAL MEDICINE
Payer: MEDICARE

## 2024-09-13 VITALS
OXYGEN SATURATION: 93 % | RESPIRATION RATE: 24 BRPM | BODY MASS INDEX: 30.75 KG/M2 | DIASTOLIC BLOOD PRESSURE: 96 MMHG | WEIGHT: 227 LBS | TEMPERATURE: 97.1 F | HEART RATE: 71 BPM | SYSTOLIC BLOOD PRESSURE: 120 MMHG | HEIGHT: 72 IN

## 2024-09-13 DIAGNOSIS — I25.10 CORONARY ARTERY DISEASE INVOLVING NATIVE HEART, UNSPECIFIED VESSEL OR LESION TYPE, UNSPECIFIED WHETHER ANGINA PRESENT: ICD-10-CM

## 2024-09-13 LAB
ECHO BSA: 2.29 M2
GLUCOSE BLD-MCNC: 86 MG/DL (ref 75–110)

## 2024-09-13 PROCEDURE — 82947 ASSAY GLUCOSE BLOOD QUANT: CPT

## 2024-09-13 PROCEDURE — 99153 MOD SED SAME PHYS/QHP EA: CPT | Performed by: INTERNAL MEDICINE

## 2024-09-13 PROCEDURE — C1894 INTRO/SHEATH, NON-LASER: HCPCS | Performed by: INTERNAL MEDICINE

## 2024-09-13 PROCEDURE — 2500000003 HC RX 250 WO HCPCS: Performed by: INTERNAL MEDICINE

## 2024-09-13 PROCEDURE — C1769 GUIDE WIRE: HCPCS | Performed by: INTERNAL MEDICINE

## 2024-09-13 PROCEDURE — 2580000003 HC RX 258: Performed by: INTERNAL MEDICINE

## 2024-09-13 PROCEDURE — 99152 MOD SED SAME PHYS/QHP 5/>YRS: CPT | Performed by: INTERNAL MEDICINE

## 2024-09-13 PROCEDURE — 93454 CORONARY ARTERY ANGIO S&I: CPT | Performed by: INTERNAL MEDICINE

## 2024-09-13 PROCEDURE — C1725 CATH, TRANSLUMIN NON-LASER: HCPCS | Performed by: INTERNAL MEDICINE

## 2024-09-13 PROCEDURE — 6360000004 HC RX CONTRAST MEDICATION: Performed by: INTERNAL MEDICINE

## 2024-09-13 PROCEDURE — 2709999900 HC NON-CHARGEABLE SUPPLY: Performed by: INTERNAL MEDICINE

## 2024-09-13 PROCEDURE — 6360000002 HC RX W HCPCS: Performed by: INTERNAL MEDICINE

## 2024-09-13 PROCEDURE — 76937 US GUIDE VASCULAR ACCESS: CPT | Performed by: INTERNAL MEDICINE

## 2024-09-13 PROCEDURE — 94761 N-INVAS EAR/PLS OXIMETRY MLT: CPT

## 2024-09-13 PROCEDURE — C1887 CATHETER, GUIDING: HCPCS | Performed by: INTERNAL MEDICINE

## 2024-09-13 PROCEDURE — 93458 L HRT ARTERY/VENTRICLE ANGIO: CPT | Performed by: INTERNAL MEDICINE

## 2024-09-13 RX ORDER — MIDAZOLAM HYDROCHLORIDE 1 MG/ML
INJECTION INTRAMUSCULAR; INTRAVENOUS PRN
Status: DISCONTINUED | OUTPATIENT
Start: 2024-09-13 | End: 2024-09-13 | Stop reason: HOSPADM

## 2024-09-13 RX ORDER — SODIUM CHLORIDE 9 MG/ML
INJECTION, SOLUTION INTRAVENOUS CONTINUOUS
Status: DISCONTINUED | OUTPATIENT
Start: 2024-09-13 | End: 2024-09-14 | Stop reason: HOSPADM

## 2024-09-13 RX ORDER — IOPAMIDOL 755 MG/ML
INJECTION, SOLUTION INTRAVASCULAR PRN
Status: DISCONTINUED | OUTPATIENT
Start: 2024-09-13 | End: 2024-09-13 | Stop reason: HOSPADM

## 2024-09-13 RX ORDER — BIVALIRUDIN 250 MG/5ML
INJECTION, POWDER, LYOPHILIZED, FOR SOLUTION INTRAVENOUS PRN
Status: DISCONTINUED | OUTPATIENT
Start: 2024-09-13 | End: 2024-09-13 | Stop reason: HOSPADM

## 2024-09-13 RX ADMIN — SODIUM CHLORIDE: 9 INJECTION, SOLUTION INTRAVENOUS at 12:19

## 2024-09-13 ASSESSMENT — PAIN - FUNCTIONAL ASSESSMENT: PAIN_FUNCTIONAL_ASSESSMENT: 0-10

## 2024-10-28 ENCOUNTER — OFFICE VISIT (OUTPATIENT)
Dept: FAMILY MEDICINE CLINIC | Age: 74
End: 2024-10-28

## 2024-10-28 VITALS
WEIGHT: 228 LBS | BODY MASS INDEX: 30.88 KG/M2 | DIASTOLIC BLOOD PRESSURE: 70 MMHG | OXYGEN SATURATION: 98 % | SYSTOLIC BLOOD PRESSURE: 120 MMHG | HEART RATE: 63 BPM | HEIGHT: 72 IN

## 2024-10-28 DIAGNOSIS — Z71.89 ACP (ADVANCE CARE PLANNING): ICD-10-CM

## 2024-10-28 DIAGNOSIS — Z00.00 MEDICARE ANNUAL WELLNESS VISIT, SUBSEQUENT: Primary | ICD-10-CM

## 2024-10-28 ASSESSMENT — PATIENT HEALTH QUESTIONNAIRE - PHQ9
7. TROUBLE CONCENTRATING ON THINGS, SUCH AS READING THE NEWSPAPER OR WATCHING TELEVISION: NOT AT ALL
10. IF YOU CHECKED OFF ANY PROBLEMS, HOW DIFFICULT HAVE THESE PROBLEMS MADE IT FOR YOU TO DO YOUR WORK, TAKE CARE OF THINGS AT HOME, OR GET ALONG WITH OTHER PEOPLE: NOT DIFFICULT AT ALL
SUM OF ALL RESPONSES TO PHQ QUESTIONS 1-9: 0
9. THOUGHTS THAT YOU WOULD BE BETTER OFF DEAD, OR OF HURTING YOURSELF: NOT AT ALL
1. LITTLE INTEREST OR PLEASURE IN DOING THINGS: NOT AT ALL
SUM OF ALL RESPONSES TO PHQ9 QUESTIONS 1 & 2: 0
3. TROUBLE FALLING OR STAYING ASLEEP: NOT AT ALL
SUM OF ALL RESPONSES TO PHQ QUESTIONS 1-9: 0
6. FEELING BAD ABOUT YOURSELF - OR THAT YOU ARE A FAILURE OR HAVE LET YOURSELF OR YOUR FAMILY DOWN: NOT AT ALL
SUM OF ALL RESPONSES TO PHQ QUESTIONS 1-9: 0
5. POOR APPETITE OR OVEREATING: NOT AT ALL
SUM OF ALL RESPONSES TO PHQ QUESTIONS 1-9: 0
8. MOVING OR SPEAKING SO SLOWLY THAT OTHER PEOPLE COULD HAVE NOTICED. OR THE OPPOSITE, BEING SO FIGETY OR RESTLESS THAT YOU HAVE BEEN MOVING AROUND A LOT MORE THAN USUAL: NOT AT ALL
2. FEELING DOWN, DEPRESSED OR HOPELESS: NOT AT ALL
4. FEELING TIRED OR HAVING LITTLE ENERGY: NOT AT ALL

## 2024-10-28 NOTE — PATIENT INSTRUCTIONS
is recommended every 1-2 years to screen for glaucoma; cataracts, macular degeneration, and other eye disorders.  A preventive dental visit is recommended every 6 months.  Try to get at least 150 minutes of exercise per week or 10,000 steps per day on a pedometer .  Order or download the FREE \"Exercise & Physical Activity: Your Everyday Guide\" from [employer] [place] [employer]. Call 1-447.985.8794 or search [employer] [place] [employer] online.  You need 8124-2134 mg of calcium and 8428-8390 IU of vitamin D per day. It is possible to meet your calcium requirement with diet alone, but a vitamin D supplement is usually necessary to meet this goal.  When exposed to the sun, use a sunscreen that protects against both UVA and UVB radiation with an SPF of 30 or greater. Reapply every 2 to 3 hours or after sweating, drying off with a towel, or swimming.  Always wear a seat belt when traveling in a car. Always wear a helmet when riding a bicycle or motorcycle.

## 2024-10-28 NOTE — PROGRESS NOTES
Everett Marin APRN - CNP   Homeopathic Products (SIMILASAN DRY EYE RELIEF) SOLN Place 2 drops into the left eye in the morning and 2 drops in the evening. Yes Sheldon Ha MD   blood glucose monitor strips Provide insurance covered test strips compatible with glucometer, test 1 times a day Yes Konrad Saleh PA-C   timolol (TIMOPTIC) 0.5 % ophthalmic solution instill 1 drop into right eye twice a day Yes Sheldon Ha MD   latanoprost (XALATAN) 0.005 % ophthalmic solution Place 1 drop into the right eye nightly Yes Sheldon Ha MD   Blood Glucose Monitoring Suppl (D-CARE GLUCOMETER) w/Device KIT Provide insurance covered glucometer, check blood sugars every morning  Konrad Saleh PA-C       CareTeam (Including outside providers/suppliers regularly involved in providing care):   Patient Care Team:  Everett Marin APRN - CNP as PCP - General (Family Nurse Practitioner)  Everett Marin APRN - CNP as PCP - Empaneled Provider  Elie Robins MD as Consulting Physician (Pain Management)  Epifanio Frederick MD as Consulting Physician (Ophthalmology)      Reviewed and updated this visit:  Allergies  Meds

## 2024-11-08 ENCOUNTER — HOSPITAL ENCOUNTER (INPATIENT)
Age: 74
LOS: 1 days | Discharge: HOME | DRG: 322 | End: 2024-11-09
Attending: INTERNAL MEDICINE | Admitting: INTERNAL MEDICINE
Payer: MEDICARE

## 2024-11-08 DIAGNOSIS — Z95.5 S/P PRIMARY ANGIOPLASTY WITH CORONARY STENT: ICD-10-CM

## 2024-11-08 DIAGNOSIS — I25.119 CORONARY ARTERY DISEASE WITH ANGINA PECTORIS, UNSPECIFIED VESSEL OR LESION TYPE, UNSPECIFIED WHETHER NATIVE OR TRANSPLANTED HEART (HCC): Primary | ICD-10-CM

## 2024-11-08 PROBLEM — I25.10 CAD S/P PERCUTANEOUS CORONARY ANGIOPLASTY: Status: ACTIVE | Noted: 2024-11-08

## 2024-11-08 PROBLEM — Z98.61 CAD S/P PERCUTANEOUS CORONARY ANGIOPLASTY: Status: ACTIVE | Noted: 2024-11-08

## 2024-11-08 LAB
BUN BLD-MCNC: 29 MG/DL (ref 8–26)
CA-I BLD-SCNC: 1.25 MMOL/L (ref 1.15–1.33)
CHLORIDE BLD-SCNC: 108 MMOL/L (ref 98–107)
ECHO BSA: 2.26 M2
EGFR, POC: >90 ML/MIN/1.73M2
GLUCOSE BLD-MCNC: 99 MG/DL (ref 74–100)
HCT VFR BLD AUTO: 43 % (ref 41–53)
PLATELET # BLD AUTO: 178 K/UL (ref 138–453)
POC CREATININE: 0.8 MG/DL (ref 0.51–1.19)
POC HEMOGLOBIN (CALC): 14.8 G/DL (ref 13.5–17.5)
POTASSIUM BLD-SCNC: 4.2 MMOL/L (ref 3.5–4.5)
SODIUM BLD-SCNC: 142 MMOL/L (ref 138–146)

## 2024-11-08 PROCEDURE — 027135Z DILATION OF CORONARY ARTERY, TWO ARTERIES WITH TWO DRUG-ELUTING INTRALUMINAL DEVICES, PERCUTANEOUS APPROACH: ICD-10-PCS | Performed by: INTERNAL MEDICINE

## 2024-11-08 PROCEDURE — 84295 ASSAY OF SERUM SODIUM: CPT

## 2024-11-08 PROCEDURE — 7100000011 HC PHASE II RECOVERY - ADDTL 15 MIN: Performed by: INTERNAL MEDICINE

## 2024-11-08 PROCEDURE — 76937 US GUIDE VASCULAR ACCESS: CPT | Performed by: INTERNAL MEDICINE

## 2024-11-08 PROCEDURE — 4A023N7 MEASUREMENT OF CARDIAC SAMPLING AND PRESSURE, LEFT HEART, PERCUTANEOUS APPROACH: ICD-10-PCS | Performed by: INTERNAL MEDICINE

## 2024-11-08 PROCEDURE — 99152 MOD SED SAME PHYS/QHP 5/>YRS: CPT | Performed by: INTERNAL MEDICINE

## 2024-11-08 PROCEDURE — C1769 GUIDE WIRE: HCPCS | Performed by: INTERNAL MEDICINE

## 2024-11-08 PROCEDURE — B2111ZZ FLUOROSCOPY OF MULTIPLE CORONARY ARTERIES USING LOW OSMOLAR CONTRAST: ICD-10-PCS | Performed by: INTERNAL MEDICINE

## 2024-11-08 PROCEDURE — C1760 CLOSURE DEV, VASC: HCPCS | Performed by: INTERNAL MEDICINE

## 2024-11-08 PROCEDURE — C1892 INTRO/SHEATH,FIXED,PEEL-AWAY: HCPCS | Performed by: INTERNAL MEDICINE

## 2024-11-08 PROCEDURE — C1874 STENT, COATED/COV W/DEL SYS: HCPCS | Performed by: INTERNAL MEDICINE

## 2024-11-08 PROCEDURE — C1725 CATH, TRANSLUMIN NON-LASER: HCPCS | Performed by: INTERNAL MEDICINE

## 2024-11-08 PROCEDURE — 2580000003 HC RX 258: Performed by: STUDENT IN AN ORGANIZED HEALTH CARE EDUCATION/TRAINING PROGRAM

## 2024-11-08 PROCEDURE — 82330 ASSAY OF CALCIUM: CPT

## 2024-11-08 PROCEDURE — 85049 AUTOMATED PLATELET COUNT: CPT

## 2024-11-08 PROCEDURE — 2100000001 HC CVICU R&B

## 2024-11-08 PROCEDURE — 2060000000 HC ICU INTERMEDIATE R&B

## 2024-11-08 PROCEDURE — 6370000000 HC RX 637 (ALT 250 FOR IP): Performed by: INTERNAL MEDICINE

## 2024-11-08 PROCEDURE — 2580000003 HC RX 258: Performed by: INTERNAL MEDICINE

## 2024-11-08 PROCEDURE — 99153 MOD SED SAME PHYS/QHP EA: CPT | Performed by: INTERNAL MEDICINE

## 2024-11-08 PROCEDURE — 6360000004 HC RX CONTRAST MEDICATION: Performed by: INTERNAL MEDICINE

## 2024-11-08 PROCEDURE — 82435 ASSAY OF BLOOD CHLORIDE: CPT

## 2024-11-08 PROCEDURE — C1887 CATHETER, GUIDING: HCPCS | Performed by: INTERNAL MEDICINE

## 2024-11-08 PROCEDURE — 82947 ASSAY GLUCOSE BLOOD QUANT: CPT

## 2024-11-08 PROCEDURE — C9600 PERC DRUG-EL COR STENT SING: HCPCS | Performed by: INTERNAL MEDICINE

## 2024-11-08 PROCEDURE — C1894 INTRO/SHEATH, NON-LASER: HCPCS | Performed by: INTERNAL MEDICINE

## 2024-11-08 PROCEDURE — 92928 PRQ TCAT PLMT NTRAC ST 1 LES: CPT | Performed by: INTERNAL MEDICINE

## 2024-11-08 PROCEDURE — 6360000002 HC RX W HCPCS: Performed by: INTERNAL MEDICINE

## 2024-11-08 PROCEDURE — 85014 HEMATOCRIT: CPT

## 2024-11-08 PROCEDURE — 82565 ASSAY OF CREATININE: CPT

## 2024-11-08 PROCEDURE — 2500000003 HC RX 250 WO HCPCS: Performed by: INTERNAL MEDICINE

## 2024-11-08 PROCEDURE — 7100000010 HC PHASE II RECOVERY - FIRST 15 MIN: Performed by: INTERNAL MEDICINE

## 2024-11-08 PROCEDURE — 84520 ASSAY OF UREA NITROGEN: CPT

## 2024-11-08 PROCEDURE — 2709999900 HC NON-CHARGEABLE SUPPLY: Performed by: INTERNAL MEDICINE

## 2024-11-08 PROCEDURE — 99221 1ST HOSP IP/OBS SF/LOW 40: CPT | Performed by: INTERNAL MEDICINE

## 2024-11-08 PROCEDURE — 84132 ASSAY OF SERUM POTASSIUM: CPT

## 2024-11-08 DEVICE — STENT CORONARY ONYX FRONTIER RX 2.5X38 MM ZOTAROLIMUS ELUT: Type: IMPLANTABLE DEVICE | Status: FUNCTIONAL

## 2024-11-08 DEVICE — STENT CORONARY ONYX FRONTIER RX 4X12 MM ZOTAROLIMUS ELUT: Type: IMPLANTABLE DEVICE | Status: FUNCTIONAL

## 2024-11-08 DEVICE — STENT CORONARY ONYX FRONTIER RX 4X38 MM ZOTAROLIMUS ELUTE: Type: IMPLANTABLE DEVICE | Status: FUNCTIONAL

## 2024-11-08 RX ORDER — ATORVASTATIN CALCIUM 80 MG/1
80 TABLET, FILM COATED ORAL NIGHTLY
Status: DISCONTINUED | OUTPATIENT
Start: 2024-11-08 | End: 2024-11-09 | Stop reason: HOSPADM

## 2024-11-08 RX ORDER — NITROGLYCERIN 20 MG/100ML
INJECTION INTRAVENOUS PRN
Status: DISCONTINUED | OUTPATIENT
Start: 2024-11-08 | End: 2024-11-08 | Stop reason: HOSPADM

## 2024-11-08 RX ORDER — IOPAMIDOL 755 MG/ML
INJECTION, SOLUTION INTRAVASCULAR PRN
Status: DISCONTINUED | OUTPATIENT
Start: 2024-11-08 | End: 2024-11-08 | Stop reason: HOSPADM

## 2024-11-08 RX ORDER — SODIUM CHLORIDE 9 MG/ML
INJECTION, SOLUTION INTRAVENOUS PRN
Status: DISCONTINUED | OUTPATIENT
Start: 2024-11-08 | End: 2024-11-08 | Stop reason: HOSPADM

## 2024-11-08 RX ORDER — ASPIRIN 81 MG/1
TABLET, CHEWABLE ORAL PRN
Status: DISCONTINUED | OUTPATIENT
Start: 2024-11-08 | End: 2024-11-08 | Stop reason: HOSPADM

## 2024-11-08 RX ORDER — SODIUM CHLORIDE 0.9 % (FLUSH) 0.9 %
5-40 SYRINGE (ML) INJECTION PRN
Status: DISCONTINUED | OUTPATIENT
Start: 2024-11-08 | End: 2024-11-08 | Stop reason: HOSPADM

## 2024-11-08 RX ORDER — HYDRALAZINE HYDROCHLORIDE 20 MG/ML
10 INJECTION INTRAMUSCULAR; INTRAVENOUS EVERY 10 MIN PRN
Status: DISCONTINUED | OUTPATIENT
Start: 2024-11-08 | End: 2024-11-09 | Stop reason: HOSPADM

## 2024-11-08 RX ORDER — SODIUM CHLORIDE 9 MG/ML
INJECTION, SOLUTION INTRAVENOUS CONTINUOUS
Status: DISCONTINUED | OUTPATIENT
Start: 2024-11-08 | End: 2024-11-08

## 2024-11-08 RX ORDER — SODIUM CHLORIDE 9 MG/ML
INJECTION, SOLUTION INTRAVENOUS CONTINUOUS PRN
Status: COMPLETED | OUTPATIENT
Start: 2024-11-08 | End: 2024-11-08

## 2024-11-08 RX ORDER — ASPIRIN 81 MG/1
81 TABLET, CHEWABLE ORAL DAILY
Status: DISCONTINUED | OUTPATIENT
Start: 2024-11-09 | End: 2024-11-09 | Stop reason: HOSPADM

## 2024-11-08 RX ORDER — SODIUM CHLORIDE 0.9 % (FLUSH) 0.9 %
5-40 SYRINGE (ML) INJECTION EVERY 12 HOURS SCHEDULED
Status: DISCONTINUED | OUTPATIENT
Start: 2024-11-08 | End: 2024-11-08 | Stop reason: HOSPADM

## 2024-11-08 RX ORDER — SODIUM CHLORIDE 9 MG/ML
INJECTION, SOLUTION INTRAVENOUS PRN
Status: DISCONTINUED | OUTPATIENT
Start: 2024-11-08 | End: 2024-11-09 | Stop reason: HOSPADM

## 2024-11-08 RX ORDER — BIVALIRUDIN 250 MG/5ML
INJECTION, POWDER, LYOPHILIZED, FOR SOLUTION INTRAVENOUS PRN
Status: DISCONTINUED | OUTPATIENT
Start: 2024-11-08 | End: 2024-11-08 | Stop reason: HOSPADM

## 2024-11-08 RX ORDER — SODIUM CHLORIDE 0.9 % (FLUSH) 0.9 %
5-40 SYRINGE (ML) INJECTION EVERY 12 HOURS SCHEDULED
Status: DISCONTINUED | OUTPATIENT
Start: 2024-11-08 | End: 2024-11-09 | Stop reason: HOSPADM

## 2024-11-08 RX ORDER — SODIUM CHLORIDE 0.9 % (FLUSH) 0.9 %
5-40 SYRINGE (ML) INJECTION PRN
Status: DISCONTINUED | OUTPATIENT
Start: 2024-11-08 | End: 2024-11-09 | Stop reason: HOSPADM

## 2024-11-08 RX ORDER — SODIUM CHLORIDE 9 MG/ML
INJECTION, SOLUTION INTRAVENOUS CONTINUOUS
Status: ACTIVE | OUTPATIENT
Start: 2024-11-08 | End: 2024-11-09

## 2024-11-08 RX ORDER — ACETAMINOPHEN 325 MG/1
650 TABLET ORAL EVERY 4 HOURS PRN
Status: DISCONTINUED | OUTPATIENT
Start: 2024-11-08 | End: 2024-11-09 | Stop reason: HOSPADM

## 2024-11-08 RX ORDER — MIDAZOLAM HYDROCHLORIDE 1 MG/ML
INJECTION, SOLUTION INTRAMUSCULAR; INTRAVENOUS PRN
Status: DISCONTINUED | OUTPATIENT
Start: 2024-11-08 | End: 2024-11-08 | Stop reason: HOSPADM

## 2024-11-08 RX ORDER — METOPROLOL SUCCINATE 25 MG/1
12.5 TABLET, EXTENDED RELEASE ORAL DAILY
Status: DISCONTINUED | OUTPATIENT
Start: 2024-11-08 | End: 2024-11-09 | Stop reason: HOSPADM

## 2024-11-08 RX ORDER — 0.9 % SODIUM CHLORIDE 0.9 %
INTRAVENOUS SOLUTION INTRAVENOUS CONTINUOUS PRN
Status: COMPLETED | OUTPATIENT
Start: 2024-11-08 | End: 2024-11-08

## 2024-11-08 RX ADMIN — SODIUM CHLORIDE, PRESERVATIVE FREE 10 ML: 5 INJECTION INTRAVENOUS at 21:16

## 2024-11-08 NOTE — H&P
[facility]   History & Physical               Today's Date: 11/8/2024  Patient Name: Corbin Gallardo  Date of admission: No admission date for patient encounter.  Patient's age: 74 y.o., 1950  Admission Dx: Coronary artery disease with angina pectoris, unspecified vessel or lesion type, unspecified whether native or transplanted heart (HCC) [I25.119]    Reason for Admission: Left heart cath    CHIEF COMPLAINT: Staged PCI    History Obtained From:  patient, electronic medical record    HISTORY OF PRESENT ILLNESS:      The patient is a 74 y.o.  male who is admitted to the hospital for staged PCI.  Patient was seen in the hospital September 13, 2024.  He underwent left heart cath in August with PCI to LAD x 2 and had PCI to RCA in September.  Patient planned for staged PCI.    Past Medical History:   has a past medical history of Cataracts, bilateral, Cerebral artery occlusion with cerebral infarction (HCC), Diabetes mellitus (HCC), Eye disorder, Glaucoma, Headache(784.0), Hearing loss, Heartburn, Hyperlipidemia, Hypertension, Kidney stones, Osteoarthritis, Snores, Tendonitis of shoulder, left, and Tremor.    Past Surgical History:   has a past surgical history that includes Cystoscopy; Kidney stone surgery; Lithotripsy; Refractive surgery (Right, 2014); Cataract removal (Right, 08/2015); Thumb amputation; Hand surgery (Left); Tonsillectomy; Total knee arthroplasty (Left, 06/23/2021); joint replacement; eye surgery; Total knee arthroplasty (Right, 02/15/2022); Total knee arthroplasty (Right, 2/15/2022); Cardiac procedure (N/A, 8/12/2024); Cardiac procedure (N/A, 8/13/2024); invasive vascular (N/A, 8/13/2024); and Cardiac procedure (N/A, 9/13/2024).     Home Medications:    Prior to Admission medications    Medication Sig Start Date End Date Taking? Authorizing Provider   atorvastatin (LIPITOR) 40 MG tablet Take 1 tablet by mouth daily 8/26/24   Provider, MD Sheldon   empagliflozin  \"CO2\", \"BUN\", \"CREATININE\", \"LABGLOM\", \"GLUCOSE\" in the last 72 hours.  Pro-BNP:  No results for input(s): \"PROBNP\" in the last 72 hours.  BNP: No results for input(s): \"BNP\" in the last 72 hours.  PT/INR: No results for input(s): \"PROTIME\", \"INR\" in the last 72 hours.  APTT:No results for input(s): \"APTT\" in the last 72 hours.  CARDIAC ENZYMES:No results for input(s): \"CKTOTAL\", \"CKMB\", \"CKMBINDEX\", \"TROPONINI\", \"TROPONINT\" in the last 72 hours.  No results for input(s): \"TROPONINT\" in the last 72 hours.    FASTING LIPID PANEL:  Lab Results   Component Value Date/Time    HDL 37 08/10/2024 05:51 AM    TRIG 126 08/10/2024 05:51 AM     LIVER PROFILE:No results for input(s): \"AST\", \"ALT\", \"LABALBU\" in the last 72 hours.      Data:  Cath  09/13/2024    Ultrasound-guided 6 Urdu right radial access.    Significant tortuosity of right subclavian proximal lesion.  Unable to engage RCA guide properly.  Destination sheath was placed which keeps slipping also.  Subsequently right femoral approach was pursued with ultrasound-guided 6 Urdu common femoral arterial approach.    Unable to pass wire through mid RPDA lesion due to extremely acute angle.  Long wires are not available which could potentially help using microcatheter.  Procedure aborted     08/13/2024  Conclusions:  Successful PCI with shockwave and CHON to proximal and mid LAD  Second diagonal with 50% ostial stenosis jailed by the stent with JOSE LUIS-3 flow     Recommendation:  Routine post PCI orders  Medical treatments.  Risk factors modifications.  Staged PCI to the RCA in 4 to 6 weeks    08/12/2024    Severe LAD stenosis involving diagonal branch.    Severe mid and distal RCA stenoses.    Global left ventricular systolic function is severely reduced.    Mild to moderately elevated LVEDP      Echo  8/9/2024 TTE    Left Ventricle: Severely reduced left ventricular systolic function with a visually estimated EF of 15 - 20%. Left ventricle is moderately dilated. Mildly

## 2024-11-09 VITALS
DIASTOLIC BLOOD PRESSURE: 92 MMHG | SYSTOLIC BLOOD PRESSURE: 143 MMHG | RESPIRATION RATE: 20 BRPM | HEART RATE: 91 BPM | TEMPERATURE: 98.6 F | WEIGHT: 227 LBS | HEIGHT: 72 IN | BODY MASS INDEX: 30.75 KG/M2 | OXYGEN SATURATION: 96 %

## 2024-11-09 LAB
ANION GAP SERPL CALCULATED.3IONS-SCNC: 12 MMOL/L (ref 9–16)
BUN SERPL-MCNC: 19 MG/DL (ref 8–23)
CALCIUM SERPL-MCNC: 9 MG/DL (ref 8.6–10.4)
CHLORIDE SERPL-SCNC: 105 MMOL/L (ref 98–107)
CHOLEST SERPL-MCNC: 142 MG/DL (ref 0–199)
CHOLESTEROL/HDL RATIO: 4.4
CO2 SERPL-SCNC: 21 MMOL/L (ref 20–31)
CREAT SERPL-MCNC: 0.8 MG/DL (ref 0.7–1.2)
ERYTHROCYTE [DISTWIDTH] IN BLOOD BY AUTOMATED COUNT: 15.6 % (ref 11.8–14.4)
GFR, ESTIMATED: >90 ML/MIN/1.73M2
GLUCOSE SERPL-MCNC: 86 MG/DL (ref 74–99)
HCT VFR BLD AUTO: 44.1 % (ref 40.7–50.3)
HDLC SERPL-MCNC: 32 MG/DL
HGB BLD-MCNC: 14 G/DL (ref 13–17)
LDLC SERPL CALC-MCNC: 88 MG/DL (ref 0–100)
MCH RBC QN AUTO: 30.9 PG (ref 25.2–33.5)
MCHC RBC AUTO-ENTMCNC: 31.7 G/DL (ref 28.4–34.8)
MCV RBC AUTO: 97.4 FL (ref 82.6–102.9)
NRBC BLD-RTO: 0 PER 100 WBC
PLATELET # BLD AUTO: 182 K/UL (ref 138–453)
PMV BLD AUTO: 9.9 FL (ref 8.1–13.5)
POTASSIUM SERPL-SCNC: 4.1 MMOL/L (ref 3.7–5.3)
RBC # BLD AUTO: 4.53 M/UL (ref 4.21–5.77)
SODIUM SERPL-SCNC: 138 MMOL/L (ref 136–145)
TRIGL SERPL-MCNC: 111 MG/DL
TROPONIN I SERPL HS-MCNC: 116 NG/L (ref 0–22)
VLDLC SERPL CALC-MCNC: 22 MG/DL (ref 1–30)
WBC OTHER # BLD: 9.9 K/UL (ref 3.5–11.3)

## 2024-11-09 PROCEDURE — 85027 COMPLETE CBC AUTOMATED: CPT

## 2024-11-09 PROCEDURE — 99239 HOSP IP/OBS DSCHRG MGMT >30: CPT | Performed by: NURSE PRACTITIONER

## 2024-11-09 PROCEDURE — 2580000003 HC RX 258: Performed by: STUDENT IN AN ORGANIZED HEALTH CARE EDUCATION/TRAINING PROGRAM

## 2024-11-09 PROCEDURE — 6370000000 HC RX 637 (ALT 250 FOR IP): Performed by: STUDENT IN AN ORGANIZED HEALTH CARE EDUCATION/TRAINING PROGRAM

## 2024-11-09 PROCEDURE — 84484 ASSAY OF TROPONIN QUANT: CPT

## 2024-11-09 PROCEDURE — 80061 LIPID PANEL: CPT

## 2024-11-09 PROCEDURE — 36415 COLL VENOUS BLD VENIPUNCTURE: CPT

## 2024-11-09 PROCEDURE — 80048 BASIC METABOLIC PNL TOTAL CA: CPT

## 2024-11-09 RX ORDER — ATORVASTATIN CALCIUM 80 MG/1
80 TABLET, FILM COATED ORAL NIGHTLY
Qty: 30 TABLET | Refills: 3 | Status: SHIPPED | OUTPATIENT
Start: 2024-11-09

## 2024-11-09 RX ORDER — METOPROLOL SUCCINATE 25 MG/1
12.5 TABLET, EXTENDED RELEASE ORAL DAILY
Qty: 30 TABLET | Refills: 3 | Status: SHIPPED | OUTPATIENT
Start: 2024-11-10

## 2024-11-09 RX ADMIN — TICAGRELOR 90 MG: 90 TABLET ORAL at 07:55

## 2024-11-09 RX ADMIN — SODIUM CHLORIDE, PRESERVATIVE FREE 10 ML: 5 INJECTION INTRAVENOUS at 07:55

## 2024-11-09 RX ADMIN — METOPROLOL SUCCINATE 12.5 MG: 25 TABLET, EXTENDED RELEASE ORAL at 07:52

## 2024-11-09 RX ADMIN — ASPIRIN 81 MG: 81 TABLET, CHEWABLE ORAL at 07:52

## 2024-11-09 NOTE — PROGRESS NOTES
During shift assessment RN went to give ordered medications- brilinta and lipitor. Patient stated he took all of his own home medications this evening at 6pm. Writer notified on call fellow Dr. Patel.

## 2024-11-09 NOTE — PROGRESS NOTES
Discharge order noted. AVS printed and reviewed with patient; all questions answered. Patient advised to  meds from preferred pharmacy. Care plan and education completed. IV removed. All belongings sent with patient. Tele removed. Patient discharged home with no needs.

## 2024-11-09 NOTE — PLAN OF CARE
Problem: Chronic Conditions and Co-morbidities  Goal: Patient's chronic conditions and co-morbidity symptoms are monitored and maintained or improved  11/9/2024 0835 by Key Abel RN  Outcome: Progressing     Problem: Discharge Planning  Goal: Discharge to home or other facility with appropriate resources  11/9/2024 0835 by Key Abel, RN  Outcome: Progressing     Problem: ABCDS Injury Assessment  Goal: Absence of physical injury  11/9/2024 0835 by Key Abel RN  Outcome: Progressing

## 2024-11-09 NOTE — CARE COORDINATION
Case Management Assessment  Initial Evaluation    Date/Time of Evaluation: 11/9/2024 11:16 AM  Assessment Completed by: Tamia Allen RN    If patient is discharged prior to next notation, then this note serves as note for discharge by case management.    Patient Name: Corbin Gallardo                   YOB: 1950  Diagnosis: Coronary artery disease with angina pectoris, unspecified vessel or lesion type, unspecified whether native or transplanted heart (HCC) [I25.119]  CAD S/P percutaneous coronary angioplasty [I25.10, Z98.61]                   Date / Time: 11/8/2024 11:11 AM    Patient Admission Status: Inpatient   Readmission Risk (Low < 19, Mod (19-27), High > 27): Readmission Risk Score: 9.5    Current PCP: Everett Marin APRN - CNP  PCP verified by CM? (P) Yes    Chart Reviewed: Yes      History Provided by: (P) Patient  Patient Orientation: (P) Alert and Oriented    Patient Cognition: (P) Alert    Hospitalization in the last 30 days (Readmission):  No    If yes, Readmission Assessment in  Navigator will be completed.    Advance Directives:      Code Status: Full Code   Patient's Primary Decision Maker is: (P) Legal Next of Kin    Primary Decision Maker: Kavita Gallardo - Spouse - [phone]    Discharge Planning:    Patient lives with: (P) Spouse/Significant Other Type of Home: (P) House  Primary Care Giver: (P) Self  Patient Support Systems include: (P) Spouse/Significant Other   Current Financial resources: (P) Medicare  Current community resources:    Current services prior to admission: (P) None            Current DME:              Type of Home Care services:  (P) None    ADLS  Prior functional level: (P) Independent in ADLs/IADLs  Current functional level: (P) Independent in ADLs/IADLs    PT AM-PAC:   /24  OT AM-PAC:   /24    Family can provide assistance at DC: (P) Yes  Would you like Case Management to discuss the discharge plan with any other family

## 2024-11-09 NOTE — DISCHARGE SUMMARY
[facility]  Discharge Note                 Name:  Corbin Gallardo  YOB: 1950  Social Security Number:  [id number]  Medical Record Number:  [id number]    Date of Admission:  11/8/2024  Date of Discharge:  11/9/2024    Admitting physician: Moshe Pleitez MD    Discharge Attending: AMERICA GARCÍA CNP, CNP  Primary Care Physician: Everett Marin APRN - CNP  Consultants: Cardiology  Discharge to Home  Stable Condition   HOSPITAL ADMISSION PROBLEM LIST:  Patient Active Problem List   Diagnosis    Hypercholesteremia    Neuropathic pain    Weakness of both legs    Essential hypertension    Type 2 diabetes mellitus with diabetic autonomic neuropathy, without long-term current use of insulin (HCC)    Eustachian tube dysfunction    Branch retinal vein occlusion    Primary open angle glaucoma    Chronic pain of both knees    Cerebral infarction due to occlusion of right middle cerebral artery (HCC)    Glaucomatous visual field defect    Nuclear sclerotic cataract    Class 1 obesity with serious comorbidity and body mass index (BMI) of 30.0 to 30.9 in adult    Elevated PSA    S/P total knee arthroplasty, left    Major depressive disorder, recurrent, unspecified    S/P total knee arthroplasty, right    Coronary artery disease due to calcified coronary lesion    Congestive heart failure (HCC)    S/P primary angioplasty with coronary stent    Coronary artery disease involving native heart    Frequent PVCs    History of stroke    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)         Procedures:cardiac catheterization    HOSPITAL COURSE :           The patient was admitted for: Cardiac cath   Hospital Procedures if any:  cardiac cath   Medications changes recommendation:  see list    Follow Up Plan:  2 weeks       Discharge exam:   Vitals:    11/09/24 0800   BP: (!) 143/92   Pulse: 91   Resp: 20   Temp: 98.6 °F (37 °C)   SpO2: 96%     Neuro: normal  Chest: Clear to    Similasan Dry Eye Relief Soln     spironolactone 25 MG tablet  Commonly known as: ALDACTONE  Take 1 tablet by mouth daily     therapeutic multivitamin-minerals tablet     ticagrelor 90 MG Tabs tablet  Commonly known as: BRILINTA  Take 1 tablet by mouth 2 times daily     timolol 0.5 % ophthalmic solution  Commonly known as: TIMOPTIC           * This list has 2 medication(s) that are the same as other medications prescribed for you. Read the directions carefully, and ask your doctor or other care provider to review them with you.                STOP taking these medications      carvedilol 6.25 MG tablet  Commonly known as: COREG               Where to Get Your Medications        These medications were sent to [id number] - [place], [place] - [address] - P [phone] - F [phone]  [address] [place] [address]      Phone: [phone]   atorvastatin 80 MG tablet  metoprolol succinate 25 MG extended release tablet        Cardiac cath 11/85/24      Ultrasound-guided right common femoral 7 Pashto access.    Extreme difficulty getting into distal RPDA branch due to calcification and angulation.  Super cross 45 degree 90 degree did not help.  This tortuosity was negotiated with super cross 120 degree.    Successful PTCA/CHON of RPDA.    Successful PTCA/CHON of distal to proximal RCA.    Coronary Discharge Core Measure: Please indicate the medication given by X, and if not the reasons not given:    Not Given Reason  Given      Beta Blockers X     On entresto  ACE-I X      Statins X      ASA X       OAP (Plavix/Effient/Brilinta) X    SL Nitro   X       Pt seen and examined.  Pt resting in bed.  he denies any chest pain or shortness of breath  Discussed with patient and nursing. Medications and discharge instructions reviewed with patient and nursing. Pt verbalizes understanding regarding medications and activity restrictions.  Script for brilinta  given.  Will follow up in 2 weeks.       Electronically

## 2024-11-09 NOTE — PLAN OF CARE
Problem: Chronic Conditions and Co-morbidities  Goal: Patient's chronic conditions and co-morbidity symptoms are monitored and maintained or improved  11/9/2024 1152 by Key Abel RN  Outcome: Completed  11/9/2024 0835 by Key Abel RN  Outcome: Progressing  11/9/2024 0317 by Megan Doyle RN  Outcome: Progressing     Problem: Discharge Planning  Goal: Discharge to home or other facility with appropriate resources  11/9/2024 1152 by Key Abel RN  Outcome: Completed  11/9/2024 0835 by Key Abel RN  Outcome: Progressing  11/9/2024 0317 by Megan Doyle RN  Outcome: Progressing     Problem: ABCDS Injury Assessment  Goal: Absence of physical injury  11/9/2024 1152 by Key Abel RN  Outcome: Completed  11/9/2024 0835 by Key bAel RN  Outcome: Progressing  11/9/2024 0317 by Megan Doyle RN  Outcome: Progressing

## 2024-11-13 ENCOUNTER — TELEPHONE (OUTPATIENT)
Dept: FAMILY MEDICINE CLINIC | Age: 74
End: 2024-11-13

## 2024-11-13 ENCOUNTER — HOSPITAL ENCOUNTER (OUTPATIENT)
Age: 74
Setting detail: SPECIMEN
Discharge: HOME | End: 2024-11-13

## 2024-11-13 LAB
ANION GAP SERPL CALCULATED.3IONS-SCNC: 11 MMOL/L (ref 9–16)
BUN SERPL-MCNC: 23 MG/DL (ref 8–23)
CALCIUM SERPL-MCNC: 9.6 MG/DL (ref 8.6–10.4)
CHLORIDE SERPL-SCNC: 104 MMOL/L (ref 98–107)
CO2 SERPL-SCNC: 28 MMOL/L (ref 20–31)
CREAT SERPL-MCNC: 1 MG/DL (ref 0.7–1.2)
GFR, ESTIMATED: 78 ML/MIN/1.73M2
GLUCOSE SERPL-MCNC: 198 MG/DL (ref 74–99)
MAGNESIUM SERPL-MCNC: 2.1 MG/DL (ref 1.6–2.4)
POTASSIUM SERPL-SCNC: 4.7 MMOL/L (ref 3.7–5.3)
SODIUM SERPL-SCNC: 143 MMOL/L (ref 136–145)

## 2024-11-13 NOTE — TELEPHONE ENCOUNTER
Care Transitions Initial Follow Up Call    Outreach made within 2 business days of discharge: Yes    Patient: Corbin Gallardo Patient : 1950   MRN: [id number]  Reason for Admission: coronary artery disease with angina pectoris  Discharge Date: 24       Spoke with:     Discharge department/facility: [facility] Patient Contact:  Was patient able to fill all prescriptions:   Was patient instructed to bring all medications to the follow-up visit:   Is patient taking all medications as directed in the discharge summary?   Does patient understand their discharge instructions:   Does patient have questions or concerns that need addressed prior to 7-14 day follow up office visit:     Additional needs identified to be addressed with provider  Patient was admitted for a scheduled surgery. Following with cardiology.              Scheduled appointment with PCP within 7-14 days    Follow Up  Future Appointments   Date Time Provider Department Center   2024  1:00 PM [facility] CARDIAC REHAB RM 02 STA [facility]   2025  9:20 AM Norbert Salinas MD Neuro Spec Neurology -   2025 10:00 AM Everett Marin APRN - CNP [facility] ECC DEP   10/31/2025 10:30 AM Everett Marin APRN - CNP [facility] DEP       Lizet Poon MA

## 2024-12-02 ENCOUNTER — HOSPITAL ENCOUNTER (OUTPATIENT)
Dept: CARDIAC REHAB | Age: 74
Setting detail: THERAPIES SERIES
Discharge: HOME | End: 2024-12-02
Attending: INTERNAL MEDICINE
Payer: MEDICARE

## 2024-12-02 VITALS
BODY MASS INDEX: 30.14 KG/M2 | WEIGHT: 222.5 LBS | HEIGHT: 72 IN | DIASTOLIC BLOOD PRESSURE: 74 MMHG | OXYGEN SATURATION: 96 % | HEART RATE: 90 BPM | SYSTOLIC BLOOD PRESSURE: 112 MMHG

## 2024-12-02 PROCEDURE — 93797 PHYS/QHP OP CAR RHAB WO ECG: CPT

## 2024-12-02 PROCEDURE — 93798 PHYS/QHP OP CAR RHAB W/ECG: CPT

## 2024-12-02 ASSESSMENT — PATIENT HEALTH QUESTIONNAIRE - PHQ9
SUM OF ALL RESPONSES TO PHQ QUESTIONS 1-9: 0
SUM OF ALL RESPONSES TO PHQ QUESTIONS 1-9: 0
SUM OF ALL RESPONSES TO PHQ9 QUESTIONS 1 & 2: 0
1. LITTLE INTEREST OR PLEASURE IN DOING THINGS: NOT AT ALL
SUM OF ALL RESPONSES TO PHQ QUESTIONS 1-9: 0
2. FEELING DOWN, DEPRESSED OR HOPELESS: NOT AT ALL
SUM OF ALL RESPONSES TO PHQ QUESTIONS 1-9: 0

## 2024-12-02 ASSESSMENT — EXERCISE STRESS TEST
PEAK_HR: 123
PEAK_BP: 142/80
PEAK_METS: 3.8
PEAK_RPE: 13

## 2024-12-02 ASSESSMENT — NEW YORK HEART ASSOCIATION (NYHA) CLASSIFICATION: NYHA FUNCTIONAL CLASS: NO NYHA CLASS OR UNABLE TO DETERMINE

## 2024-12-02 ASSESSMENT — EJECTION FRACTION: EF_VALUE: 15

## 2024-12-02 NOTE — FLOWSHEET NOTE
by program completion, formulate long-term exercise plan by program completion.   Goal Status Initial   Progress Towards Goal Pt currently not exercising, wife states she doesn't think they qualify for Silver Sneakers. Encouraged to start walking indoors (at store) until they are able to obtain equipment at home/gym membership.   Psychosocial Assessment   Stages of Change Maintenance   PHQ-9 Total Score 0   Psychosocial Intervention   Interventions No intervention indicated   Stress Management Techniques Connects with others   Currently Taking Psychotropic Meds Yes  (Takes Elavil for sleep)   Medication Changes No   Psychosocial Education   Education Coping techniques;Environmental triggers;Impact self care behaviors on health;Relaxation techniques;Signs/symptoms of depression   Psychosocial Goals   Patient Target Goals Assess presence or absence of depression using a valid screening tool;Demonstrates appropriate interaction with others;Engages in self-care behaviors;Maximizes coping skills   Patient Treatment Goal Pt will report stress levels less than 5 at cardiac rehab visits through program discharge. Will attend 1 Healthy Mind class before discharge.   Goal Status Initial   Progress Towards Goal Pt denies stress on daily basis, has strong support system.   Other Core Component - Tobacco Cessation   History of Tobacco Use No   Nutrition Assessment   Stages of Change Contemplate   Nutrition Assessment Tool Rate Your Plate   Rate Your Plate Total Score 52   Current Diet General   Barriers to Heart Healthy Diet Knowledge of healthy diet   Alcohol No   Height  1.829 m (6')   Weight  100.9 kg (222 lb 8 oz)   BMI 30.24   Other Core Component - Weight Management   BMI <18.5 or > 24.9, Managed as Core Component No   Other Core Component - Diabetes   Diabetes Yes   Diabetes Management Assessment   HgbA1c   (Unavailable)   BG at Home Yes   BG in Range Yes  (Pt states \"less than 130\")   Stages of Change Maintenance   BG

## 2024-12-02 NOTE — DISCHARGE INSTRUCTIONS
severe attack of angina. Keep this medicine on hand at all times in case of an angina attack. Get your prescription refilled before you run out of medicine completely.  Store the tablets  in the glass container at room temperature, away from moisture and heat. Keep the bottle tightly closed when not in use.  Keep the spray away from open flame or high heat, such as in a car on a hot day. The canister may explode if it gets too hot.  What happens if I miss a dose?  Since nitroglycerin is taken as needed, you may not be on a dosing schedule. If you are taking the medication regularly, take the missed dose as soon as you remember. Skip the missed dose if your next dose is less than 2 hours away. Do not take extra medicine to make up the missed dose.  What happens if I overdose?  Seek emergency medical attention or call the Poison Help line at 1-353.243.6325. An overdose of nitroglycerin can be fatal.  Overdose symptoms may include a severe throbbing headache, confusion, fever, fast or pounding heartbeats, dizziness, vision problems, nausea, vomiting, bloody diarrhea, trouble breathing, cold or clammy skin, fainting, and seizures.  What should I avoid while taking nitroglycerin?  This medicine may impair your thinking or reactions. Be careful if you drive or do anything that requires you to be alert. Avoid getting up too fast from a sitting or lying position, or you may feel dizzy. Get up slowly and steady yourself to prevent a fall.  Avoid drinking alcohol. Alcohol can lower your blood pressure, and may increase some of the side effects of nitroglycerin (dizziness, drowsiness, feeling light-headed, or fainting).  What are the possible side effects of nitroglycerin?  Get emergency medical help if you have signs of an allergic reaction: hives; difficulty breathing; swelling of your face, lips, tongue, or throat.  Seek emergency medical attention if you have symptoms of a heart attack, such as:  chest pain or

## 2024-12-03 ENCOUNTER — OFFICE VISIT (OUTPATIENT)
Dept: NEUROLOGY | Age: 74
End: 2024-12-03
Payer: MEDICARE

## 2024-12-03 VITALS
HEIGHT: 72 IN | WEIGHT: 223.8 LBS | HEART RATE: 71 BPM | BODY MASS INDEX: 30.31 KG/M2 | DIASTOLIC BLOOD PRESSURE: 77 MMHG | SYSTOLIC BLOOD PRESSURE: 124 MMHG

## 2024-12-03 DIAGNOSIS — I63.9 CEREBRAL INFARCTION, UNSPECIFIED MECHANISM (HCC): Primary | ICD-10-CM

## 2024-12-03 DIAGNOSIS — H90.0 CONDUCTIVE HEARING LOSS, BILATERAL: ICD-10-CM

## 2024-12-03 DIAGNOSIS — G89.0 THALAMIC PAIN SYNDROME: ICD-10-CM

## 2024-12-03 DIAGNOSIS — I63.89 OTHER CEREBRAL INFARCTION (HCC): ICD-10-CM

## 2024-12-03 PROCEDURE — G8482 FLU IMMUNIZE ORDER/ADMIN: HCPCS | Performed by: PSYCHIATRY & NEUROLOGY

## 2024-12-03 PROCEDURE — 3074F SYST BP LT 130 MM HG: CPT | Performed by: PSYCHIATRY & NEUROLOGY

## 2024-12-03 PROCEDURE — 3078F DIAST BP <80 MM HG: CPT | Performed by: PSYCHIATRY & NEUROLOGY

## 2024-12-03 PROCEDURE — 99214 OFFICE O/P EST MOD 30 MIN: CPT | Performed by: PSYCHIATRY & NEUROLOGY

## 2024-12-03 PROCEDURE — 1111F DSCHRG MED/CURRENT MED MERGE: CPT | Performed by: PSYCHIATRY & NEUROLOGY

## 2024-12-03 PROCEDURE — G8417 CALC BMI ABV UP PARAM F/U: HCPCS | Performed by: PSYCHIATRY & NEUROLOGY

## 2024-12-03 PROCEDURE — 1036F TOBACCO NON-USER: CPT | Performed by: PSYCHIATRY & NEUROLOGY

## 2024-12-03 PROCEDURE — 1123F ACP DISCUSS/DSCN MKR DOCD: CPT | Performed by: PSYCHIATRY & NEUROLOGY

## 2024-12-03 PROCEDURE — 3017F COLORECTAL CA SCREEN DOC REV: CPT | Performed by: PSYCHIATRY & NEUROLOGY

## 2024-12-03 PROCEDURE — 1159F MED LIST DOCD IN RCRD: CPT | Performed by: PSYCHIATRY & NEUROLOGY

## 2024-12-03 PROCEDURE — G8427 DOCREV CUR MEDS BY ELIG CLIN: HCPCS | Performed by: PSYCHIATRY & NEUROLOGY

## 2024-12-03 ASSESSMENT — ENCOUNTER SYMPTOMS
GASTROINTESTINAL NEGATIVE: 1
RESPIRATORY NEGATIVE: 1
ALLERGIC/IMMUNOLOGIC NEGATIVE: 1
EYES NEGATIVE: 1

## 2024-12-03 NOTE — PROGRESS NOTES
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 The condition is he underwent hearing testing now having bilateral hearing aides which are very helpful with improved hearing having no ringing when he wears hearing aides . He continues with numbness of left arm and left leg feeling like when skin is going to sleep  . There is no burning or stabbing on elavil 50 mg po qhs tolerating medication well . There is sensitivity to touch with some stinging sensation when pressure is put on skin on left side such as when weaning long pants . Elavil also helps him with sleep . He was havng dyspnea on exertion leading to cardiac evaluation getting six cornary artery stents now on brilinta 90 mg po bid , asirin 81 mg po qd and lipitor 80 mg po qd . There is mild forgetfulness . Significant medications  brilinta 90 mg po bid , asirin 81 mg po qd and lipitor 80 mg po qd , elavil 50 mg po qhs. Testing MRI of Head with chronic right thalamc infarction along with chronic bilateral basal ganglia infarcts with nonspecific areas of subcortical microhemorrhages , March 2014 . Carotid US left ICA with mild plaquing. Right ICA normal,  March 2014  . Cardiac 2 D echo normal LVF EF 58 % . Grade 1 diastolic dysfunction . Mild MR and TR , June 2013 . Carotid US < 50 % stenosis bilaterally , September 2022     Past Medical History:   Diagnosis Date    Cataracts, bilateral     only left eye, had surgery on right    Cerebral artery occlusion with cerebral infarction (HCC) 2014    weakness left leg, sees Dr Salinas    Diabetes mellitus (HCC)     type II, managed by Dr Sousa (PCP)  range , checks daily    Eye disorder     history of a \"stroke\" in right eye    Glaucoma     right eye    Headache(784.0)     no current issues (6/10/21)    Hearing loss     Heartburn     occasionally, takes OTC acid reducer as needed    Hyperlipidemia      I acted within this role throughout the entirety of the procedure performed by the primary surgeon

## 2024-12-04 ENCOUNTER — HOSPITAL ENCOUNTER (OUTPATIENT)
Dept: CARDIAC REHAB | Age: 74
Setting detail: THERAPIES SERIES
Discharge: HOME | End: 2024-12-04
Attending: INTERNAL MEDICINE
Payer: MEDICARE

## 2024-12-04 VITALS — BODY MASS INDEX: 30.46 KG/M2 | WEIGHT: 224.6 LBS

## 2024-12-04 PROCEDURE — 93798 PHYS/QHP OP CAR RHAB W/ECG: CPT

## 2024-12-04 ASSESSMENT — EXERCISE STRESS TEST
PEAK_RPE: 13
PEAK_BP: 128/68
PEAK_HR: 102
PEAK_METS: 3.5

## 2024-12-06 ENCOUNTER — HOSPITAL ENCOUNTER (OUTPATIENT)
Dept: CARDIAC REHAB | Age: 74
Setting detail: THERAPIES SERIES
Discharge: HOME | End: 2024-12-06
Attending: INTERNAL MEDICINE
Payer: MEDICARE

## 2024-12-06 VITALS — WEIGHT: 223.7 LBS | BODY MASS INDEX: 30.34 KG/M2

## 2024-12-06 PROCEDURE — 93798 PHYS/QHP OP CAR RHAB W/ECG: CPT

## 2024-12-06 ASSESSMENT — EXERCISE STRESS TEST
PEAK_HR: 96
PEAK_BP: 118/80
PEAK_METS: 3.5
PEAK_RPE: 11

## 2024-12-09 ENCOUNTER — HOSPITAL ENCOUNTER (OUTPATIENT)
Dept: CARDIAC REHAB | Age: 74
Setting detail: THERAPIES SERIES
Discharge: HOME | End: 2024-12-09
Attending: INTERNAL MEDICINE
Payer: MEDICARE

## 2024-12-09 VITALS — BODY MASS INDEX: 30.33 KG/M2 | WEIGHT: 223.6 LBS

## 2024-12-09 PROCEDURE — 93798 PHYS/QHP OP CAR RHAB W/ECG: CPT

## 2024-12-09 ASSESSMENT — EXERCISE STRESS TEST
PEAK_RPE: 12
PEAK_METS: 3.5
PEAK_BP: 110/72
PEAK_HR: 98

## 2024-12-11 ENCOUNTER — HOSPITAL ENCOUNTER (OUTPATIENT)
Dept: CARDIAC REHAB | Age: 74
Setting detail: THERAPIES SERIES
Discharge: HOME | End: 2024-12-11
Attending: INTERNAL MEDICINE
Payer: MEDICARE

## 2024-12-11 VITALS — BODY MASS INDEX: 29.84 KG/M2 | WEIGHT: 220 LBS

## 2024-12-11 PROCEDURE — 93798 PHYS/QHP OP CAR RHAB W/ECG: CPT

## 2024-12-11 ASSESSMENT — EXERCISE STRESS TEST
PEAK_BP: 124/64
PEAK_METS: 3.5
PEAK_HR: 100
PEAK_RPE: 12

## 2024-12-13 ENCOUNTER — HOSPITAL ENCOUNTER (OUTPATIENT)
Dept: CARDIAC REHAB | Age: 74
Setting detail: THERAPIES SERIES
Discharge: HOME | End: 2024-12-13
Attending: INTERNAL MEDICINE
Payer: MEDICARE

## 2024-12-13 VITALS — BODY MASS INDEX: 30.18 KG/M2 | WEIGHT: 222.5 LBS

## 2024-12-13 PROCEDURE — 93798 PHYS/QHP OP CAR RHAB W/ECG: CPT

## 2024-12-13 ASSESSMENT — EXERCISE STRESS TEST
PEAK_BP: 124/64
PEAK_HR: 104
PEAK_RPE: 12
PEAK_METS: 3.5

## 2024-12-16 ENCOUNTER — HOSPITAL ENCOUNTER (OUTPATIENT)
Dept: CARDIAC REHAB | Age: 74
Setting detail: THERAPIES SERIES
Discharge: HOME | End: 2024-12-16
Attending: INTERNAL MEDICINE
Payer: MEDICARE

## 2024-12-16 VITALS — BODY MASS INDEX: 30.49 KG/M2 | WEIGHT: 224.8 LBS

## 2024-12-16 PROCEDURE — 93798 PHYS/QHP OP CAR RHAB W/ECG: CPT

## 2024-12-16 ASSESSMENT — EXERCISE STRESS TEST
PEAK_RPE: 12
PEAK_BP: 126/78
PEAK_HR: 91
PEAK_METS: 3.5

## 2024-12-18 ENCOUNTER — HOSPITAL ENCOUNTER (OUTPATIENT)
Dept: CARDIAC REHAB | Age: 74
Setting detail: THERAPIES SERIES
Discharge: HOME | End: 2024-12-18
Attending: INTERNAL MEDICINE
Payer: MEDICARE

## 2024-12-18 VITALS — BODY MASS INDEX: 30.45 KG/M2 | WEIGHT: 224.5 LBS

## 2024-12-18 PROCEDURE — 93798 PHYS/QHP OP CAR RHAB W/ECG: CPT

## 2024-12-18 ASSESSMENT — EXERCISE STRESS TEST
PEAK_HR: 96
PEAK_RPE: 12
PEAK_BP: 120/68
PEAK_METS: 3.7

## 2024-12-20 ENCOUNTER — HOSPITAL ENCOUNTER (OUTPATIENT)
Dept: CARDIAC REHAB | Age: 74
Setting detail: THERAPIES SERIES
Discharge: HOME | End: 2024-12-20
Attending: INTERNAL MEDICINE
Payer: MEDICARE

## 2024-12-20 VITALS — BODY MASS INDEX: 30.2 KG/M2 | WEIGHT: 222.7 LBS

## 2024-12-20 PROCEDURE — 93798 PHYS/QHP OP CAR RHAB W/ECG: CPT

## 2024-12-20 ASSESSMENT — EXERCISE STRESS TEST
PEAK_RPE: 11
PEAK_HR: 92
PEAK_METS: 3.7
PEAK_BP: 128/78

## 2024-12-23 ENCOUNTER — HOSPITAL ENCOUNTER (OUTPATIENT)
Dept: CARDIAC REHAB | Age: 74
Setting detail: THERAPIES SERIES
Discharge: HOME | End: 2024-12-23
Attending: INTERNAL MEDICINE
Payer: MEDICARE

## 2024-12-23 NOTE — CARDIO/PULMONARY
Patient called and informed staff he would be unable to make scheduled session. Appointment rescheduled.

## 2024-12-25 ENCOUNTER — APPOINTMENT (OUTPATIENT)
Dept: CARDIAC REHAB | Age: 74
End: 2024-12-25
Attending: INTERNAL MEDICINE
Payer: MEDICARE

## 2024-12-27 ENCOUNTER — HOSPITAL ENCOUNTER (OUTPATIENT)
Dept: CARDIAC REHAB | Age: 74
Setting detail: THERAPIES SERIES
Discharge: HOME | End: 2024-12-27
Attending: INTERNAL MEDICINE
Payer: MEDICARE

## 2024-12-27 VITALS — WEIGHT: 222.3 LBS | BODY MASS INDEX: 30.15 KG/M2

## 2024-12-27 PROCEDURE — 93798 PHYS/QHP OP CAR RHAB W/ECG: CPT

## 2024-12-27 ASSESSMENT — NEW YORK HEART ASSOCIATION (NYHA) CLASSIFICATION: NYHA FUNCTIONAL CLASS: NO NYHA CLASS OR UNABLE TO DETERMINE

## 2024-12-27 ASSESSMENT — EXERCISE STRESS TEST
PEAK_RPE: 12
PEAK_METS: 3.9
PEAK_BP: 128/80
PEAK_HR: 99

## 2024-12-27 NOTE — FLOWSHEET NOTE
12/27/24 1010   Treatment Diagnosis   Treatment Diagnosis 1 PCI   PCI Date 11/08/24   Referral Date 11/09/24   NYHA Heart Failure Classification No NYHA class or unable to determine   Co-morbidities Cerebrovascular disease;Diabetes   Individual Treatment Plan   ITP Visit Type Re-assessment   Visit #/Total Visits 10/36   EF% 15 %   Risk Stratification High   Supplemental Oxygen   Oxygen Use No   Exercise Assessment   Stages of Change Action   Test Exercise tolerance test   Data Measured at Peak   Distance Calculated in  mi   Peak RPE 13   Exercise Intervention/Prescription   Mode Treadmill;Stepper;Ergometer   Frequency per week 3X/WEEK   Duration Per Session 40 minutes   Intensity METS       3.9   RPE 13   Progression Increase time every third visit, increase intensity per RPE scale. Patient at prescribed time of 40 minutes.   Symptoms with Exercise Shortness of breath  (per patient,)   Target Heart Rate 102-123   Resistance Training Yes   Exercise Activity at Home   Type walking outside 2 days a week for 30 minutes outside   Frequency 2 days (weather permitting)   Duration 30 minutes   Resistance Training No   Exercise Education   Education Self pulse;Exercise safety;Signs/symptoms to report;RPE scale;Equipment orientation;Warm up/cool down;Physically active   Exercise Goals   Patient Target Goals Maintain exercise and activity at a moderate intensity;Aerobic activity 30 + minutes/day  5 days/week   Patient Treatment Goal Add 30 min aeorbic activity 2x/week by program completion, formulate long-term exercise plan by program completion.  Discussed other ways to do excercise at home when weather in not good for out door walks.   Goal Status In progress   Progress Towards Goal patient currently exercising 2 days a week at home and 3 days at rehab for total of 5 days.   Psychosocial Assessment   Stages of Change Maintenance   Psychosocial Intervention   Interventions No intervention indicated   Stress Management

## 2024-12-30 ENCOUNTER — HOSPITAL ENCOUNTER (OUTPATIENT)
Dept: CARDIAC REHAB | Age: 74
Setting detail: THERAPIES SERIES
Discharge: HOME | End: 2024-12-30
Attending: INTERNAL MEDICINE
Payer: MEDICARE

## 2024-12-30 VITALS — WEIGHT: 222.3 LBS | BODY MASS INDEX: 30.15 KG/M2

## 2024-12-30 PROCEDURE — 93798 PHYS/QHP OP CAR RHAB W/ECG: CPT

## 2024-12-30 ASSESSMENT — EXERCISE STRESS TEST
PEAK_METS: 4.2
PEAK_BP: 142/80
PEAK_RPE: 12
PEAK_HR: 104

## 2025-01-01 ENCOUNTER — APPOINTMENT (OUTPATIENT)
Dept: CARDIAC REHAB | Age: 75
End: 2025-01-01
Attending: INTERNAL MEDICINE
Payer: MEDICARE

## 2025-01-03 ENCOUNTER — HOSPITAL ENCOUNTER (OUTPATIENT)
Dept: CARDIAC REHAB | Age: 75
Setting detail: THERAPIES SERIES
End: 2025-01-03
Attending: INTERNAL MEDICINE
Payer: MEDICARE

## 2025-01-03 ENCOUNTER — HOSPITAL ENCOUNTER (OUTPATIENT)
Dept: GENERAL RADIOLOGY | Facility: CLINIC | Age: 75
End: 2025-01-03
Payer: MEDICARE

## 2025-01-03 ENCOUNTER — OFFICE VISIT (OUTPATIENT)
Dept: FAMILY MEDICINE CLINIC | Age: 75
End: 2025-01-03

## 2025-01-03 ENCOUNTER — HOSPITAL ENCOUNTER (OUTPATIENT)
Facility: CLINIC | Age: 75
Discharge: HOME | End: 2025-01-03
Payer: MEDICARE

## 2025-01-03 VITALS
HEART RATE: 87 BPM | OXYGEN SATURATION: 95 % | BODY MASS INDEX: 30.24 KG/M2 | WEIGHT: 223 LBS | DIASTOLIC BLOOD PRESSURE: 80 MMHG | SYSTOLIC BLOOD PRESSURE: 134 MMHG

## 2025-01-03 DIAGNOSIS — E11.43 TYPE 2 DIABETES MELLITUS WITH DIABETIC AUTONOMIC NEUROPATHY, WITHOUT LONG-TERM CURRENT USE OF INSULIN (HCC): ICD-10-CM

## 2025-01-03 DIAGNOSIS — I50.9 CHRONIC HEART FAILURE, UNSPECIFIED HEART FAILURE TYPE (HCC): ICD-10-CM

## 2025-01-03 DIAGNOSIS — R05.1 ACUTE COUGH: Primary | ICD-10-CM

## 2025-01-03 DIAGNOSIS — H34.8392 BRANCH RETINAL VEIN OCCLUSION, UNSPECIFIED COMPLICATION STATUS, UNSPECIFIED LATERALITY: ICD-10-CM

## 2025-01-03 DIAGNOSIS — R05.1 ACUTE COUGH: ICD-10-CM

## 2025-01-03 PROCEDURE — 71046 X-RAY EXAM CHEST 2 VIEWS: CPT

## 2025-01-03 ASSESSMENT — ENCOUNTER SYMPTOMS
SHORTNESS OF BREATH: 1
COUGH: 1
WHEEZING: 0

## 2025-01-03 ASSESSMENT — PATIENT HEALTH QUESTIONNAIRE - PHQ9
5. POOR APPETITE OR OVEREATING: NOT AT ALL
6. FEELING BAD ABOUT YOURSELF - OR THAT YOU ARE A FAILURE OR HAVE LET YOURSELF OR YOUR FAMILY DOWN: NOT AT ALL
3. TROUBLE FALLING OR STAYING ASLEEP: NOT AT ALL
10. IF YOU CHECKED OFF ANY PROBLEMS, HOW DIFFICULT HAVE THESE PROBLEMS MADE IT FOR YOU TO DO YOUR WORK, TAKE CARE OF THINGS AT HOME, OR GET ALONG WITH OTHER PEOPLE: NOT DIFFICULT AT ALL
SUM OF ALL RESPONSES TO PHQ QUESTIONS 1-9: 0
8. MOVING OR SPEAKING SO SLOWLY THAT OTHER PEOPLE COULD HAVE NOTICED. OR THE OPPOSITE, BEING SO FIGETY OR RESTLESS THAT YOU HAVE BEEN MOVING AROUND A LOT MORE THAN USUAL: NOT AT ALL
SUM OF ALL RESPONSES TO PHQ QUESTIONS 1-9: 0
4. FEELING TIRED OR HAVING LITTLE ENERGY: NOT AT ALL
2. FEELING DOWN, DEPRESSED OR HOPELESS: NOT AT ALL
7. TROUBLE CONCENTRATING ON THINGS, SUCH AS READING THE NEWSPAPER OR WATCHING TELEVISION: NOT AT ALL
SUM OF ALL RESPONSES TO PHQ QUESTIONS 1-9: 0
1. LITTLE INTEREST OR PLEASURE IN DOING THINGS: NOT AT ALL
9. THOUGHTS THAT YOU WOULD BE BETTER OFF DEAD, OR OF HURTING YOURSELF: NOT AT ALL
SUM OF ALL RESPONSES TO PHQ QUESTIONS 1-9: 0
SUM OF ALL RESPONSES TO PHQ9 QUESTIONS 1 & 2: 0

## 2025-01-03 NOTE — ASSESSMENT & PLAN NOTE
Chronic, at goal (stable), continue current treatment plan  Hemoglobin A1C   Date Value Ref Range Status   07/26/2024 6.4 % Final

## 2025-01-06 ENCOUNTER — HOSPITAL ENCOUNTER (OUTPATIENT)
Dept: CARDIAC REHAB | Age: 75
Setting detail: THERAPIES SERIES
Discharge: HOME | End: 2025-01-06
Attending: INTERNAL MEDICINE
Payer: MEDICARE

## 2025-01-06 DIAGNOSIS — R73.03 PREDIABETES: ICD-10-CM

## 2025-01-06 NOTE — TELEPHONE ENCOUNTER
Maintenance   Topic Date Due    DTaP/Tdap/Td vaccine (1 - Tdap) Never done    Shingles vaccine (1 of 2) Never done    Diabetic retinal exam  01/29/2020    Diabetic foot exam  08/09/2023    Respiratory Syncytial Virus (RSV) Pregnant or age 60 yrs+ (1 - 1-dose 75+ series) Never done    Diabetic Alb to Cr ratio (uACR) test  04/04/2024    A1C test (Diabetic or Prediabetic)  07/26/2025    Prostate Specific Antigen (PSA) Screening or Monitoring  07/26/2025    Annual Wellness Visit (Medicare)  10/29/2025    Lipids  11/09/2025    GFR test (Diabetes, CKD 3-4, OR last GFR 15-59)  11/13/2025    Depression Monitoring  01/03/2026    Colorectal Cancer Screen  09/27/2026    Flu vaccine  Completed    Pneumococcal 65+ years Vaccine  Completed    COVID-19 Vaccine  Completed    Hepatitis C screen  Addressed    Hepatitis A vaccine  Aged Out    Hepatitis B vaccine  Aged Out    Hib vaccine  Aged Out    Polio vaccine  Aged Out    Meningococcal (ACWY) vaccine  Aged Out       Hemoglobin A1C (%)   Date Value   07/26/2024 6.4   04/26/2024 6.5   12/11/2023 6.5             ( goal A1C is < 7)   No components found for: \"LABMICR\"  No components found for: \"LDLCHOLESTEROL\", \"LDLCALC\"    (goal LDL is <100)   AST (U/L)   Date Value   07/26/2024 26     ALT (U/L)   Date Value   07/26/2024 22     BUN (mg/dL)   Date Value   11/13/2024 23     BP Readings from Last 3 Encounters:   01/03/25 134/80   12/03/24 124/77   12/02/24 112/74          (goal 120/80)    All Future Testing planned in [facility]  Lab Frequency Next Occurrence   Initiate PAT Protocol Once 11/22/2024   Vascular duplex carotid bilateral Once 12/03/2024               Patient Active Problem List:     Hypercholesteremia     Neuropathic pain     Weakness of both legs     Essential hypertension     Type 2 diabetes mellitus with diabetic autonomic neuropathy, without long-term current use of insulin (HCC)     Eustachian tube dysfunction     Branch retinal vein occlusion     Primary open angle

## 2025-01-07 RX ORDER — GLIPIZIDE 10 MG/1
10 TABLET ORAL EVERY MORNING
Qty: 90 TABLET | Refills: 1 | Status: SHIPPED | OUTPATIENT
Start: 2025-01-07

## 2025-01-08 ENCOUNTER — HOSPITAL ENCOUNTER (OUTPATIENT)
Dept: CARDIAC REHAB | Age: 75
Setting detail: THERAPIES SERIES
End: 2025-01-08
Attending: INTERNAL MEDICINE
Payer: MEDICARE

## 2025-01-10 ENCOUNTER — HOSPITAL ENCOUNTER (OUTPATIENT)
Dept: CARDIAC REHAB | Age: 75
Setting detail: THERAPIES SERIES
Discharge: HOME | End: 2025-01-10
Attending: INTERNAL MEDICINE
Payer: MEDICARE

## 2025-01-10 VITALS — WEIGHT: 222 LBS | BODY MASS INDEX: 30.11 KG/M2

## 2025-01-10 PROCEDURE — 93798 PHYS/QHP OP CAR RHAB W/ECG: CPT

## 2025-01-10 ASSESSMENT — EXERCISE STRESS TEST
PEAK_HR: 113
PEAK_BP: 122/78
PEAK_RPE: 13
PEAK_METS: 4.1

## 2025-01-13 ENCOUNTER — HOSPITAL ENCOUNTER (OUTPATIENT)
Dept: CARDIAC REHAB | Age: 75
Setting detail: THERAPIES SERIES
Discharge: HOME | End: 2025-01-13
Attending: INTERNAL MEDICINE
Payer: MEDICARE

## 2025-01-13 VITALS — WEIGHT: 221.5 LBS | BODY MASS INDEX: 30.04 KG/M2

## 2025-01-13 PROCEDURE — 93798 PHYS/QHP OP CAR RHAB W/ECG: CPT

## 2025-01-13 ASSESSMENT — EXERCISE STRESS TEST
PEAK_BP: 130/80
PEAK_RPE: 12
PEAK_HR: 107
PEAK_METS: 4.4

## 2025-01-15 ENCOUNTER — HOSPITAL ENCOUNTER (OUTPATIENT)
Dept: CARDIAC REHAB | Age: 75
Setting detail: THERAPIES SERIES
Discharge: HOME | End: 2025-01-15
Attending: INTERNAL MEDICINE
Payer: MEDICARE

## 2025-01-15 VITALS — WEIGHT: 223.1 LBS | BODY MASS INDEX: 30.26 KG/M2

## 2025-01-15 PROCEDURE — 93797 PHYS/QHP OP CAR RHAB WO ECG: CPT

## 2025-01-15 PROCEDURE — 93798 PHYS/QHP OP CAR RHAB W/ECG: CPT

## 2025-01-15 ASSESSMENT — EXERCISE STRESS TEST
PEAK_METS: 4.4
PEAK_RPE: 12
PEAK_BP: 142/90
PEAK_HR: 105

## 2025-01-15 NOTE — FLOWSHEET NOTE
01/15/25 1106   Staff Time   Start Time 0810   Stop Time 0945   Total Time (Minutes) 95 Minutes     Group Heathy mind class

## 2025-01-17 ENCOUNTER — HOSPITAL ENCOUNTER (OUTPATIENT)
Dept: CARDIAC REHAB | Age: 75
Setting detail: THERAPIES SERIES
Discharge: HOME | End: 2025-01-17
Attending: INTERNAL MEDICINE
Payer: MEDICARE

## 2025-01-17 VITALS — BODY MASS INDEX: 30.15 KG/M2 | WEIGHT: 222.3 LBS

## 2025-01-17 PROCEDURE — 93798 PHYS/QHP OP CAR RHAB W/ECG: CPT

## 2025-01-17 ASSESSMENT — EXERCISE STRESS TEST
PEAK_METS: 4.4
PEAK_RPE: 12
PEAK_BP: 140/82
PEAK_HR: 110

## 2025-01-20 ENCOUNTER — HOSPITAL ENCOUNTER (OUTPATIENT)
Dept: CARDIAC REHAB | Age: 75
Setting detail: THERAPIES SERIES
End: 2025-01-20
Attending: INTERNAL MEDICINE
Payer: MEDICARE

## 2025-01-22 ENCOUNTER — HOSPITAL ENCOUNTER (OUTPATIENT)
Dept: CARDIAC REHAB | Age: 75
Setting detail: THERAPIES SERIES
Discharge: HOME | End: 2025-01-22
Attending: INTERNAL MEDICINE
Payer: MEDICARE

## 2025-01-24 ENCOUNTER — HOSPITAL ENCOUNTER (OUTPATIENT)
Dept: CARDIAC REHAB | Age: 75
Setting detail: THERAPIES SERIES
Discharge: HOME | End: 2025-01-24
Attending: INTERNAL MEDICINE
Payer: MEDICARE

## 2025-01-24 ASSESSMENT — NEW YORK HEART ASSOCIATION (NYHA) CLASSIFICATION: NYHA FUNCTIONAL CLASS: NO NYHA CLASS OR UNABLE TO DETERMINE

## 2025-01-24 NOTE — FLOWSHEET NOTE
ABSENT ITP BELOW        01/24/25 1057   Treatment Diagnosis   Treatment Diagnosis 1 PCI   PCI Date 11/08/24   Referral Date 11/09/24   NYHA Heart Failure Classification No NYHA class or unable to determine   Co-morbidities Cerebrovascular disease;Diabetes   Individual Treatment Plan   ITP Visit Type Re-assessment   Visit #/Total Visits ABSENT ITP   EF% 15 %   Risk Stratification High   Supplemental Oxygen   Oxygen Use No   Exercise Assessment   Stages of Change Action   Assisted Devices None   Exercise Intervention/Prescription   Mode Treadmill;Stepper;Ergometer   Frequency per week 3X/WEEK   Duration Per Session 43 MINUTES   Intensity METS       LAST METS 4.4   RPE LAST RPE 12   Progression Increase time every third visit, increase intensity per RPE scale. Patient at prescribed time of 43 minutes.   Symptoms with Exercise   (per patient,)   Target Heart Rate 102-123   Resistance Training Yes   Exercise Activity at Home   Type UNABLE TO ASSESS DUE TO ABSENCE   Exercise Goals   Patient Target Goals Maintain exercise and activity at a moderate intensity;Aerobic activity 30 + minutes/day  5 days/week   Patient Treatment Goal Add 30 min aeorbic activity 2x/week by program completion, formulate long-term exercise plan by program completion.  Discussed other ways to do excercise at home when weather in not good for out door walks.   Goal Status In progress   Progress Towards Goal UNABLE TO ASSESS DUE TO ABSENCE   Psychosocial Assessment   Stages of Change Maintenance   Psychosocial Intervention   Stress Management Techniques   (UNABLE TO ASSESS DUE TO ABSENCE)   Currently Taking Psychotropic Meds Yes  (Takes Elavil for sleep)   Psychosocial Education   Education   (UNABLE TO COMPLETE DUE TO ABSENCE)   Psychosocial Goals   Patient Target Goals Assess presence or absence of depression using a valid screening tool;Demonstrates appropriate interaction with others;Engages in self-care behaviors;Maximizes coping skills  Positive

## 2025-01-27 ENCOUNTER — HOSPITAL ENCOUNTER (OUTPATIENT)
Dept: CARDIAC REHAB | Age: 75
Setting detail: THERAPIES SERIES
Discharge: HOME | End: 2025-01-27
Attending: INTERNAL MEDICINE
Payer: MEDICARE

## 2025-01-27 RX ORDER — AMITRIPTYLINE HYDROCHLORIDE 50 MG/1
50 TABLET ORAL NIGHTLY
Qty: 90 TABLET | Refills: 3 | Status: SHIPPED | OUTPATIENT
Start: 2025-01-27

## 2025-01-27 NOTE — CARDIO/PULMONARY
Pt called and states he can no longer come to cardiac rehab appts and requests remaining visits be canceled.

## 2025-01-27 NOTE — TELEPHONE ENCOUNTER
Pharmacy requesting refill of amitriptyline (ELAVIL) 50 MG tablet      Medication active on med list yes      Date of last Rx: 2/16/2024 with 3 refills          verified by DONALD FRANKLIN      Date of last appointment 12/3/2024    Next Visit Date:  Visit date not found    Please fill for Dr. Salinas as he is currently out of the office. Thank you.      Tami is here byherself for her routine prenatal appt at 36w0d gestation. GBS and Hgb done today.Feeling baby move, no cramping, change in vaginal discharge or concerns about anything. Has pre registered at hospital, planning to check with insurance regarding coverage for breast pump. Reviewed TWG and plan for last weeks of pregnancy. Advised to make appt in 1 weeks. We discussed current recommendations for prenatal appointments with some inperson visits and telephone visits when appropriate. Reviewed changes to hospital policies regarding COVID-19 and the maternity unit. Reviewed how to reach CNM with non-urgent and urgent concerns between visits.

## 2025-01-28 ENCOUNTER — OFFICE VISIT (OUTPATIENT)
Dept: FAMILY MEDICINE CLINIC | Age: 75
End: 2025-01-28
Payer: MEDICARE

## 2025-01-28 VITALS
WEIGHT: 221 LBS | SYSTOLIC BLOOD PRESSURE: 130 MMHG | BODY MASS INDEX: 29.97 KG/M2 | OXYGEN SATURATION: 98 % | HEART RATE: 74 BPM | DIASTOLIC BLOOD PRESSURE: 80 MMHG

## 2025-01-28 DIAGNOSIS — E11.43 TYPE 2 DIABETES MELLITUS WITH DIABETIC AUTONOMIC NEUROPATHY, WITHOUT LONG-TERM CURRENT USE OF INSULIN (HCC): Primary | ICD-10-CM

## 2025-01-28 DIAGNOSIS — R06.2 WHEEZE: ICD-10-CM

## 2025-01-28 DIAGNOSIS — R05.2 SUBACUTE COUGH: ICD-10-CM

## 2025-01-28 LAB — HBA1C MFR BLD: 6.2 %

## 2025-01-28 PROCEDURE — 1123F ACP DISCUSS/DSCN MKR DOCD: CPT | Performed by: NURSE PRACTITIONER

## 2025-01-28 PROCEDURE — G8427 DOCREV CUR MEDS BY ELIG CLIN: HCPCS | Performed by: NURSE PRACTITIONER

## 2025-01-28 PROCEDURE — 99214 OFFICE O/P EST MOD 30 MIN: CPT | Performed by: NURSE PRACTITIONER

## 2025-01-28 PROCEDURE — G8417 CALC BMI ABV UP PARAM F/U: HCPCS | Performed by: NURSE PRACTITIONER

## 2025-01-28 PROCEDURE — 1036F TOBACCO NON-USER: CPT | Performed by: NURSE PRACTITIONER

## 2025-01-28 PROCEDURE — 3044F HG A1C LEVEL LT 7.0%: CPT | Performed by: NURSE PRACTITIONER

## 2025-01-28 PROCEDURE — 83036 HEMOGLOBIN GLYCOSYLATED A1C: CPT | Performed by: NURSE PRACTITIONER

## 2025-01-28 PROCEDURE — 3079F DIAST BP 80-89 MM HG: CPT | Performed by: NURSE PRACTITIONER

## 2025-01-28 PROCEDURE — 1159F MED LIST DOCD IN RCRD: CPT | Performed by: NURSE PRACTITIONER

## 2025-01-28 PROCEDURE — 3075F SYST BP GE 130 - 139MM HG: CPT | Performed by: NURSE PRACTITIONER

## 2025-01-28 PROCEDURE — 3017F COLORECTAL CA SCREEN DOC REV: CPT | Performed by: NURSE PRACTITIONER

## 2025-01-28 PROCEDURE — 2022F DILAT RTA XM EVC RTNOPTHY: CPT | Performed by: NURSE PRACTITIONER

## 2025-01-28 RX ORDER — PREDNISONE 20 MG/1
20 TABLET ORAL DAILY
Qty: 5 TABLET | Refills: 0 | Status: SHIPPED | OUTPATIENT
Start: 2025-01-28 | End: 2025-02-02

## 2025-01-28 ASSESSMENT — ENCOUNTER SYMPTOMS
COUGH: 0
SHORTNESS OF BREATH: 0

## 2025-01-28 NOTE — PROGRESS NOTES
is warm and dry.   Neurological:      Mental Status: He is alert and oriented to person, place, and time.          Assessment/Plan:      1. Type 2 diabetes mellitus with diabetic autonomic neuropathy, without long-term current use of insulin ([facility])  -     POCT glycosylated hemoglobin (Hb A1C)  -     Albumin/Creatinine Ratio, Urine; Future  2. Subacute cough  3. Wheeze  -     predniSONE (DELTASONE) 20 MG tablet; Take 1 tablet by mouth daily for 5 days, Disp-5 tablet, R-0Normal       Assessment & Plan  1. Persistent cough.  The patient reports a persistent cough that has not improved since the last visit 25 days ago. The cough is described as having no color, just white or clear sputum, and is sometimes uncontrollable, especially when lying down. He is not experiencing shortness of breath or wheezing. A chest x-ray performed at the last visit was normal. A prescription for a steroid, to be taken as one tablet daily for 5 days, has been provided to help open up his lungs.    2. Diabetes mellitus.  His last A1c in July was 6.4, and the current A1c is 6.2, indicating good control of his diabetes.    Hemoglobin A1C   Date Value Ref Range Status   01/28/2025 6.2 % Final        No follow-ups on file.  Orders Placed This Encounter   Procedures    Albumin/Creatinine Ratio, Urine     Standing Status:   Future     Standing Expiration Date:   1/28/2026    POCT glycosylated hemoglobin (Hb A1C)     Orders Placed This Encounter   Medications    predniSONE (DELTASONE) 20 MG tablet     Sig: Take 1 tablet by mouth daily for 5 days     Dispense:  5 tablet     Refill:  0       The patient (or guardian, if applicable) and other individuals in attendance with the patient were advised that Artificial Intelligence will be utilized during this visit to record, process the conversation to generate a clinical note, and support improvement of the AI technology. The patient (or guardian, if applicable) and other individuals in attendance at the

## 2025-01-29 ENCOUNTER — APPOINTMENT (OUTPATIENT)
Dept: CARDIAC REHAB | Age: 75
End: 2025-01-29
Attending: INTERNAL MEDICINE
Payer: MEDICARE

## 2025-01-30 ENCOUNTER — HOSPITAL ENCOUNTER (OUTPATIENT)
Age: 75
Setting detail: SPECIMEN
Discharge: HOME | End: 2025-01-30

## 2025-01-30 DIAGNOSIS — R73.03 PREDIABETES: ICD-10-CM

## 2025-01-30 DIAGNOSIS — E11.43 TYPE 2 DIABETES MELLITUS WITH DIABETIC AUTONOMIC NEUROPATHY, WITHOUT LONG-TERM CURRENT USE OF INSULIN (HCC): ICD-10-CM

## 2025-01-30 LAB
CREAT UR-MCNC: 135 MG/DL (ref 39–259)
MICROALBUMIN UR-MCNC: 18 MG/L (ref 0–20)
MICROALBUMIN/CREAT UR-RTO: 13 MCG/MG CREAT (ref 0–17)

## 2025-01-30 RX ORDER — GLIPIZIDE 10 MG/1
10 TABLET ORAL EVERY MORNING
Qty: 90 TABLET | Refills: 1 | Status: SHIPPED | OUTPATIENT
Start: 2025-01-30

## 2025-01-30 NOTE — TELEPHONE ENCOUNTER
PHARMACY CHANGE--USING [facility]    Last visit: 01/28/25  Last Med refill: NEW PHARMACY  Does patient have enough medication for 72 hours: No:  PHARMACY CHANGE    Next Visit Date:  Future Appointments   Date Time Provider Department Center   4/28/2025 10:00 AM Everett Marin APRN - CNP [facility] ECC DEP   10/31/2025 10:30 AM Everett Marin APRN - CNP [facility] DEP       Health Maintenance   Topic Date Due    DTaP/Tdap/Td vaccine (1 - Tdap) Never done    Shingles vaccine (1 of 2) Never done    Diabetic retinal exam  01/29/2020    Diabetic foot exam  08/09/2023    Respiratory Syncytial Virus (RSV) Pregnant or age 60 yrs+ (1 - 1-dose 75+ series) Never done    Diabetic Alb to Cr ratio (uACR) test  04/04/2024    Prostate Specific Antigen (PSA) Screening or Monitoring  07/26/2025    Annual Wellness Visit (Medicare)  10/29/2025    Lipids  11/09/2025    GFR test (Diabetes, CKD 3-4, OR last GFR 15-59)  11/13/2025    Depression Monitoring  01/03/2026    A1C test (Diabetic or Prediabetic)  01/28/2026    Colorectal Cancer Screen  09/27/2026    Flu vaccine  Completed    Pneumococcal 65+ years Vaccine  Completed    COVID-19 Vaccine  Completed    Hepatitis C screen  Addressed    Hepatitis A vaccine  Aged Out    Hepatitis B vaccine  Aged Out    Hib vaccine  Aged Out    Polio vaccine  Aged Out    Meningococcal (ACWY) vaccine  Aged Out       Hemoglobin A1C (%)   Date Value   01/28/2025 6.2   07/26/2024 6.4   04/26/2024 6.5             ( goal A1C is < 7)   No components found for: \"LABMICR\"  No components found for: \"LDLCHOLESTEROL\", \"LDLCALC\"    (goal LDL is <100)   AST (U/L)   Date Value   07/26/2024 26     ALT (U/L)   Date Value   07/26/2024 22     BUN (mg/dL)   Date Value   11/13/2024 23     BP Readings from Last 3 Encounters:   01/28/25 130/80   01/03/25 134/80   12/03/24 124/77          (goal 120/80)    All Future Testing planned in [facility]  Lab Frequency Next Occurrence   Vascular duplex

## 2025-01-31 ENCOUNTER — APPOINTMENT (OUTPATIENT)
Dept: CARDIAC REHAB | Age: 75
End: 2025-01-31
Attending: INTERNAL MEDICINE
Payer: MEDICARE

## 2025-03-18 ENCOUNTER — TELEPHONE (OUTPATIENT)
Dept: NEUROLOGY | Age: 75
End: 2025-03-18

## 2025-04-02 RX ORDER — AMITRIPTYLINE HYDROCHLORIDE 50 MG/1
50 TABLET ORAL NIGHTLY
Qty: 90 TABLET | Refills: 1 | Status: SHIPPED | OUTPATIENT
Start: 2025-04-02

## 2025-04-02 NOTE — TELEPHONE ENCOUNTER
Patient is unable to use [facility]. New script needed.     Pharmacy requesting refill of amitriptyline 50mg.      Medication active on med list yes      Date of last Rx: 1/27/2025 with 3 refills          verified by LOPEZ RODRÍGUEZ      Date of last appointment 12/3/2024    Next Visit Date:  Visit date not found

## 2025-04-28 ENCOUNTER — OFFICE VISIT (OUTPATIENT)
Dept: FAMILY MEDICINE CLINIC | Age: 75
End: 2025-04-28
Payer: MEDICARE

## 2025-04-28 VITALS
BODY MASS INDEX: 29.84 KG/M2 | SYSTOLIC BLOOD PRESSURE: 124 MMHG | WEIGHT: 220 LBS | HEART RATE: 69 BPM | DIASTOLIC BLOOD PRESSURE: 70 MMHG | OXYGEN SATURATION: 95 %

## 2025-04-28 DIAGNOSIS — E11.43 TYPE 2 DIABETES MELLITUS WITH DIABETIC AUTONOMIC NEUROPATHY, WITHOUT LONG-TERM CURRENT USE OF INSULIN (HCC): Primary | ICD-10-CM

## 2025-04-28 DIAGNOSIS — R05.3 CHRONIC COUGH: ICD-10-CM

## 2025-04-28 LAB — HBA1C MFR BLD: 6.1 %

## 2025-04-28 PROCEDURE — 3078F DIAST BP <80 MM HG: CPT | Performed by: NURSE PRACTITIONER

## 2025-04-28 PROCEDURE — 99214 OFFICE O/P EST MOD 30 MIN: CPT | Performed by: NURSE PRACTITIONER

## 2025-04-28 PROCEDURE — G8427 DOCREV CUR MEDS BY ELIG CLIN: HCPCS | Performed by: NURSE PRACTITIONER

## 2025-04-28 PROCEDURE — 83036 HEMOGLOBIN GLYCOSYLATED A1C: CPT | Performed by: NURSE PRACTITIONER

## 2025-04-28 PROCEDURE — 1159F MED LIST DOCD IN RCRD: CPT | Performed by: NURSE PRACTITIONER

## 2025-04-28 PROCEDURE — 3044F HG A1C LEVEL LT 7.0%: CPT | Performed by: NURSE PRACTITIONER

## 2025-04-28 PROCEDURE — 1123F ACP DISCUSS/DSCN MKR DOCD: CPT | Performed by: NURSE PRACTITIONER

## 2025-04-28 PROCEDURE — 1036F TOBACCO NON-USER: CPT | Performed by: NURSE PRACTITIONER

## 2025-04-28 PROCEDURE — G8417 CALC BMI ABV UP PARAM F/U: HCPCS | Performed by: NURSE PRACTITIONER

## 2025-04-28 PROCEDURE — 3074F SYST BP LT 130 MM HG: CPT | Performed by: NURSE PRACTITIONER

## 2025-04-28 ASSESSMENT — ENCOUNTER SYMPTOMS
SHORTNESS OF BREATH: 0
COUGH: 1

## 2025-04-28 NOTE — PROGRESS NOTES
DTaP/Tdap/Td vaccine (1 - Tdap) Never done    Shingles vaccine (1 of 2) Never done    Respiratory Syncytial Virus (RSV) Pregnant or age 60 yrs+ (1 - 1-dose 75+ series) Never done    COVID-19 Vaccine (7 - 2024-25 season) 04/16/2025    Prostate Specific Antigen (PSA) Screening or Monitoring  07/26/2025    Annual Wellness Visit (Medicare)  10/29/2025    Lipids  11/09/2025    GFR test (Diabetes, CKD 3-4, OR last GFR 15-59)  11/13/2025    Depression Monitoring  01/03/2026    Diabetic Alb to Cr ratio (uACR) test  01/30/2026    Flu vaccine  Completed    Pneumococcal 50+ years Vaccine  Completed    Hepatitis C screen  Addressed    Hepatitis A vaccine  Aged Out    Hepatitis B vaccine  Aged Out    Hib vaccine  Aged Out    Polio vaccine  Aged Out    Meningococcal (ACWY) vaccine  Aged Out    Meningococcal B vaccine  Aged Out    Diabetic foot exam  Discontinued    A1C test (Diabetic or Prediabetic)  Discontinued    Diabetic retinal exam  Discontinued    Colorectal Cancer Screen  Discontinued       Subjective:      Review of Systems   Constitutional:  Negative for chills and fever.   Respiratory:  Positive for cough. Negative for shortness of breath.    Cardiovascular:  Negative for chest pain and leg swelling.      Objective:     Physical Exam  Constitutional:       General: He is not in acute distress.     Appearance: He is well-developed.   HENT:      Head: Normocephalic.      Right Ear: External ear normal.      Left Ear: External ear normal.   Cardiovascular:      Rate and Rhythm: Normal rate and regular rhythm.      Heart sounds: Normal heart sounds.   Pulmonary:      Effort: Pulmonary effort is normal.      Breath sounds: Normal breath sounds.   Musculoskeletal:         General: Normal range of motion.   Skin:     General: Skin is warm and dry.   Neurological:      Mental Status: He is alert and oriented to person, place, and time.          Assessment/Plan:      1. 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

## 2025-04-29 ENCOUNTER — TELEPHONE (OUTPATIENT)
Dept: FAMILY MEDICINE CLINIC | Age: 75
End: 2025-04-29

## 2025-05-01 RX ORDER — FLUTICASONE PROPIONATE 50 MCG
1 SPRAY, SUSPENSION (ML) NASAL DAILY
Qty: 32 G | Refills: 1 | Status: SHIPPED | OUTPATIENT
Start: 2025-05-01

## 2025-07-17 ENCOUNTER — TRANSCRIBE ORDERS (OUTPATIENT)
Dept: ADMINISTRATIVE | Age: 75
End: 2025-07-17

## 2025-07-17 DIAGNOSIS — I50.23 ACUTE ON CHRONIC SYSTOLIC HEART FAILURE (HCC): Primary | ICD-10-CM

## 2025-07-30 DIAGNOSIS — R73.03 PREDIABETES: ICD-10-CM

## 2025-07-30 NOTE — TELEPHONE ENCOUNTER
Last visit: 04/28/2025  Last Med refill: 05/01/2025  Does patient have enough medication for 72 hours: No:     Next Visit Date:  Future Appointments   Date Time Provider Department Center   7/31/2025 10:15 AM [facility] PULM FUNCTION RM [facility]  [place]   8/29/2025 10:45 AM Everett Marin APRN - CNP [facility]   9/3/2025 12:30 PM STA CARDIAC REHAB RM 03 [facility] Card Re [facility]   10/31/2025 10:30 AM Everett Marin APRN - CNP [facility]       Health Maintenance   Topic Date Due    DTaP/Tdap/Td vaccine (1 - Tdap) Never done    Shingles vaccine (1 of 2) Never done    Respiratory Syncytial Virus (RSV) Pregnant or age 60 yrs+ (1 - 1-dose 75+ series) Never done    COVID-19 Vaccine (7 - 2024-25 season) 04/16/2025    Prostate Specific Antigen (PSA) Screening or Monitoring  07/26/2025    Flu vaccine (1) 08/01/2025    Annual Wellness Visit (Medicare)  10/29/2025    Lipids  11/09/2025    GFR test (Diabetes, CKD 3-4, OR last GFR 15-59)  11/13/2025    Depression Monitoring  01/03/2026    Diabetic Alb to Cr ratio (uACR) test  01/30/2026    Pneumococcal 50+ years Vaccine  Completed    Hepatitis C screen  Addressed    Hepatitis A vaccine  Aged Out    Hepatitis B vaccine  Aged Out    Hib vaccine  Aged Out    Polio vaccine  Aged Out    Meningococcal (ACWY) vaccine  Aged Out    Meningococcal B vaccine  Aged Out    Diabetic foot exam  Discontinued    A1C test (Diabetic or Prediabetic)  Discontinued    Diabetic retinal exam  Discontinued    Colorectal Cancer Screen  Discontinued       Hemoglobin A1C (%)   Date Value   04/28/2025 6.1   01/28/2025 6.2   07/26/2024 6.4             ( goal A1C is < 7)   No components found for: \"LABMICR\"  No components found for: \"LDLCHOLESTEROL\", \"LDLCALC\"    (goal LDL is <100)   AST (U/L)   Date Value   07/26/2024 26     ALT (U/L)   Date Value   07/26/2024 22     BUN (mg/dL)   Date Value   11/13/2024 23     BP Readings from Last 3 Encounters:   04/28/25 124/70   01/28/25

## 2025-07-31 ENCOUNTER — HOSPITAL ENCOUNTER (OUTPATIENT)
Dept: LAB | Age: 75
Discharge: HOME | End: 2025-07-31
Attending: INTERNAL MEDICINE
Payer: MEDICARE

## 2025-07-31 ENCOUNTER — HOSPITAL ENCOUNTER (OUTPATIENT)
Dept: PULMONOLOGY | Age: 75
Discharge: HOME | End: 2025-07-31
Attending: INTERNAL MEDICINE
Payer: MEDICARE

## 2025-07-31 DIAGNOSIS — I50.23 ACUTE ON CHRONIC SYSTOLIC HEART FAILURE (HCC): ICD-10-CM

## 2025-07-31 LAB
ALBUMIN SERPL-MCNC: 4.2 G/DL (ref 3.5–5.2)
ALBUMIN/GLOB SERPL: 1.6 {RATIO} (ref 1–2.5)
ALP SERPL-CCNC: 64 U/L (ref 40–129)
ALT SERPL-CCNC: 18 U/L (ref 10–50)
AST SERPL-CCNC: 27 U/L (ref 10–50)
BILIRUB DIRECT SERPL-MCNC: 0.2 MG/DL (ref 0–0.2)
BILIRUB INDIRECT SERPL-MCNC: 0.3 MG/DL (ref 0–1)
BILIRUB SERPL-MCNC: 0.5 MG/DL (ref 0–1.2)
GLOBULIN SER CALC-MCNC: 2.7 G/DL
PROT SERPL-MCNC: 6.9 G/DL (ref 6.6–8.7)
TSH SERPL DL<=0.05 MIU/L-ACNC: 0.72 UIU/ML (ref 0.27–4.2)

## 2025-07-31 PROCEDURE — 84443 ASSAY THYROID STIM HORMONE: CPT

## 2025-07-31 PROCEDURE — 6370000000 HC RX 637 (ALT 250 FOR IP): Performed by: INTERNAL MEDICINE

## 2025-07-31 PROCEDURE — 94729 DIFFUSING CAPACITY: CPT

## 2025-07-31 PROCEDURE — 94726 PLETHYSMOGRAPHY LUNG VOLUMES: CPT

## 2025-07-31 PROCEDURE — 80076 HEPATIC FUNCTION PANEL: CPT

## 2025-07-31 PROCEDURE — 36415 COLL VENOUS BLD VENIPUNCTURE: CPT

## 2025-07-31 PROCEDURE — 94060 EVALUATION OF WHEEZING: CPT

## 2025-07-31 RX ORDER — ALBUTEROL SULFATE 90 UG/1
2 INHALANT RESPIRATORY (INHALATION) ONCE
Status: COMPLETED | OUTPATIENT
Start: 2025-07-31 | End: 2025-07-31

## 2025-07-31 RX ORDER — GLIPIZIDE 10 MG/1
10 TABLET ORAL EVERY MORNING
Qty: 90 TABLET | Refills: 1 | Status: SHIPPED | OUTPATIENT
Start: 2025-07-31

## 2025-07-31 RX ADMIN — ALBUTEROL SULFATE 2 PUFF: 90 AEROSOL, METERED RESPIRATORY (INHALATION) at 10:14

## 2025-07-31 NOTE — PROCEDURES
Impression:      Normal study with no significant response to bronchodilators.      Juan Holloway MD  Pulmonary critical care and sleep medicine

## 2025-08-30 PROBLEM — I63.511 CEREBRAL INFARCTION DUE TO OCCLUSION OF RIGHT MIDDLE CEREBRAL ARTERY (HCC): Status: RESOLVED | Noted: 2017-05-23 | Resolved: 2025-08-30

## 2025-09-03 ENCOUNTER — HOSPITAL ENCOUNTER (OUTPATIENT)
Dept: CARDIAC REHAB | Age: 75
Setting detail: THERAPIES SERIES
Discharge: HOME | End: 2025-09-03
Payer: MEDICARE

## 2025-09-03 VITALS
WEIGHT: 218.7 LBS | SYSTOLIC BLOOD PRESSURE: 136 MMHG | OXYGEN SATURATION: 96 % | DIASTOLIC BLOOD PRESSURE: 74 MMHG | HEIGHT: 72 IN | BODY MASS INDEX: 29.62 KG/M2

## 2025-09-03 PROCEDURE — 93797 PHYS/QHP OP CAR RHAB WO ECG: CPT

## 2025-09-03 PROCEDURE — 93798 PHYS/QHP OP CAR RHAB W/ECG: CPT

## 2025-09-03 ASSESSMENT — EXERCISE STRESS TEST
PEAK_RPE: 12
PEAK_METS: 2.4
PEAK_BP: 142/82
PEAK_HR: 99

## 2025-09-03 ASSESSMENT — PATIENT HEALTH QUESTIONNAIRE - PHQ9
SUM OF ALL RESPONSES TO PHQ QUESTIONS 1-9: 0
1. LITTLE INTEREST OR PLEASURE IN DOING THINGS: NOT AT ALL
SUM OF ALL RESPONSES TO PHQ QUESTIONS 1-9: 0
SUM OF ALL RESPONSES TO PHQ QUESTIONS 1-9: 0
2. FEELING DOWN, DEPRESSED OR HOPELESS: NOT AT ALL
SUM OF ALL RESPONSES TO PHQ QUESTIONS 1-9: 0

## 2025-09-03 ASSESSMENT — NEW YORK HEART ASSOCIATION (NYHA) CLASSIFICATION: NYHA FUNCTIONAL CLASS: NO NYHA CLASS OR UNABLE TO DETERMINE

## 2025-09-03 ASSESSMENT — EJECTION FRACTION: EF_VALUE: 20

## 2025-09-05 ENCOUNTER — HOSPITAL ENCOUNTER (OUTPATIENT)
Dept: CARDIAC REHAB | Age: 75
Setting detail: THERAPIES SERIES
Discharge: HOME | End: 2025-09-05
Payer: MEDICARE

## 2025-09-05 VITALS — BODY MASS INDEX: 29.44 KG/M2 | WEIGHT: 217.1 LBS

## 2025-09-05 PROCEDURE — 93798 PHYS/QHP OP CAR RHAB W/ECG: CPT

## 2025-09-05 ASSESSMENT — EXERCISE STRESS TEST
PEAK_RPE: 11
PEAK_BP: 120/70
PEAK_METS: 3.1
PEAK_HR: 93

## (undated) DEVICE — CATHETER IVL C2PLUS SHOCKWAVE 3.0MM X 12MM

## (undated) DEVICE — 6FR ANGLED PIGTAIL CATHETER 110CM

## (undated) DEVICE — GLIDESHEATH SLENDER STAINLESS STEEL KIT: Brand: GLIDESHEATH SLENDER

## (undated) DEVICE — 4-PORT MANIFOLD: Brand: NEPTUNE 2

## (undated) DEVICE — BAND COMPR L24CM REG CLR PLAS HEMSTAT EXT HK AND LOOP RETEN

## (undated) DEVICE — ZIPPERED TOGA, 2X LARGE: Brand: FLYTE, SURGICOOL

## (undated) DEVICE — SOLUTION IRRIG 3000ML 0.9% SOD CHL USP UROMATIC PLAS CONT

## (undated) DEVICE — GUIDEWIRE VASC J 3 MM 0.035 INX210 CM FIX COR INQWIRE

## (undated) DEVICE — OPTIFOAM GENTLE AG,POST-OP STRIP,3.5X14: Brand: MEDLINE

## (undated) DEVICE — HYPODERMIC SAFETY NEEDLE: Brand: MAGELLAN

## (undated) DEVICE — INTRODUCER SHTH 7FR CANN L11CM 0.038IN STD ORNG W/ MINI

## (undated) DEVICE — YANKAUER,FLEXIBLE HANDLE,REGLR CAPACITY: Brand: MEDLINE INDUSTRIES, INC.

## (undated) DEVICE — CATHETER INTVENT OTW 0.014 IN 130 CM COILED SHFT SUPERCROSS

## (undated) DEVICE — OSCILLATING TIP SAW CARTRIDGE: Brand: PRECISION FALCON

## (undated) DEVICE — GUIDEWIRE 35/260/FC/PTFE/3J: Brand: GUIDEWIRE

## (undated) DEVICE — STRAP ARMBRD W1.5XL32IN FOAM STR YET SFT W/ HK AND LOOP

## (undated) DEVICE — BLADE SAGITAL 18X90X1.27MM

## (undated) DEVICE — PAD COOL W10.9XL11.3IN UNIV REG HOSE WRP ON THER CLD

## (undated) DEVICE — ADHESIVE SKIN CLOSURE TOP 36 CC HI VISC DERMBND MINI

## (undated) DEVICE — Device: Brand: PROWATERFLEX

## (undated) DEVICE — TUBING, SUCTION, 1/4" X 12', STRAIGHT: Brand: MEDLINE

## (undated) DEVICE — GLOVE SURG SZ 85 CRM LTX FREE POLYISOPRENE POLYMER BEAD ANTI

## (undated) DEVICE — APPLICATOR MEDICATED 26 CC SOLUTION HI LT ORNG CHLORAPREP

## (undated) DEVICE — GLOVE SURG SZ 85 L12IN FNGR ORTHO 126MIL CRM LTX FREE

## (undated) DEVICE — Device

## (undated) DEVICE — SUTURE VCRL SZ 2-0 L27IN ABSRB UD L36MM CP-1 1/2 CIR REV J266H

## (undated) DEVICE — STOCKINETTE,IMPERVIOUS,12X48,STERILE: Brand: MEDLINE

## (undated) DEVICE — INTRODUCER SHTH 6FR L11CM 0.038IN STD SIDEPRT EXTN 3 W

## (undated) DEVICE — 450 ML BOTTLE OF 0.05% CHLORHEXIDINE GLUCONATE IN 99.95% STERILE WATER FOR IRRIGATION, USP AND APPLICATOR.: Brand: IRRISEPT ANTIMICROBIAL WOUND LAVAGE

## (undated) DEVICE — CATHETER GUID 7FR L100CM L COR S STL PTFE AL0.75 HYBRID

## (undated) DEVICE — ELECTRODE ES L3IN S STL BLDE INSUL DISP VALLEYLAB EDGE

## (undated) DEVICE — Z DISCONTINUED PER MEDLINE USE 2741943 DRESSING AQUACEL 10 IN ALG W9XL25CM SIL CVR WTRPRF VIR BACT BARR ANTIMIC

## (undated) DEVICE — DRAPE,REIN 53X77,STERILE: Brand: MEDLINE

## (undated) DEVICE — GUIDEWIRE VASC STR 3 CM 0.014 INX180 CM SS PROWATERFLEX PTCA

## (undated) DEVICE — NEEDLE SPNL 18GA L3.5IN W/ QNCKE SHARPER BVL DURA CLICK

## (undated) DEVICE — CATHETER INFUS L150CM OD1.07X0.79MM ID0.46X0.43MM DST L80CM

## (undated) DEVICE — DRAPE,U/ SHT,SPLIT,PLAS,STERIL: Brand: MEDLINE

## (undated) DEVICE — CATH BLLN ANGIO 2.50X30MM SC EUPHORA RX

## (undated) DEVICE — TOTAL TRAY, DB, 100% SILI FOLEY, 16FR 10: Brand: MEDLINE

## (undated) DEVICE — CATHETER GUID 6FR L150CM RAP EXCHG L25CM TIP 5.1FR PUSHROD

## (undated) DEVICE — CATH BLLN ANGIO 2X20MM SC EUPHORA RX

## (undated) DEVICE — ANGIO-SEAL VIP VASCULAR CLOSURE DEVICE: Brand: ANGIO-SEAL

## (undated) DEVICE — SOLUTION IV 250ML 0.9% SOD CHL PH 5 INJ USP VIAFLX PLAS

## (undated) DEVICE — BLANKET WRM W29.9XL79.1IN UP BODY FORC AIR MISTRAL-AIR

## (undated) DEVICE — KIT MICRO INTRO 4FR STIFF 40CM NIGHTENALL TUNGSTEN 7SMT

## (undated) DEVICE — R2P DESTINATION SLENDER GUIDING SHEATH: Brand: R2P DESTINATION SLENDER

## (undated) DEVICE — CEMENT MIXING SYSTEM WITH FEMORAL BREAKWAY NOZZLE: Brand: REVOLUTION

## (undated) DEVICE — CATHETER ANGIO INFIN 038IN WLLM GRN

## (undated) DEVICE — HI-TORQUE WHISPER MS GUIDE WIRE .014 J TIP 3.0 CM X 300 CM: Brand: HI-TORQUE WHISPER

## (undated) DEVICE — CATHETER ANGIO JL4 0.038 IN AD 6 FRX100 CM STD SUPER TORQUE

## (undated) DEVICE — SUTURE MCRYL SZ 3-0 L27IN ABSRB UD L24MM PS-1 3/8 CIR PRIM Y936H

## (undated) DEVICE — SCREW BNE L25MM DIA2.5MM KNEE FULL THRD HEX FEM PERSONA
Type: IMPLANTABLE DEVICE | Site: KNEE | Status: NON-FUNCTIONAL
Removed: 2022-02-15

## (undated) DEVICE — STERILE PATIENT PROTECTIVE PAD FOR IMP® KNEE POSITIONERS & COHESIVE WRAP (10 / CASE): Brand: DE MAYO KNEE POSITIONER®

## (undated) DEVICE — CATHETER GUID 7FR 0.081IN COR STD JUDKINS R 4 MID

## (undated) DEVICE — SOLUTION IV IRRIG POUR BRL 0.9% SODIUM CHL 2F7124

## (undated) DEVICE — SUTURE VCRL SZ 1 L36IN ABSRB UD L36MM CT-1 1/2 CIR J947H

## (undated) DEVICE — SUTURE STRATAFIX SPRL SZ 2-0 L9IN ABSRB VLT MH L36MM 1/2 SXPD2B408

## (undated) DEVICE — SUTURE VCRL SZ 0 L27IN ABSRB UD L36MM CP-1 1/2 CIR REV CUT J267H

## (undated) DEVICE — Z INACTIVE USE 2660664 SOLUTION IRRIG 3000ML 0.9% SOD CHL USP UROMATIC PLAS CONT

## (undated) DEVICE — SUTURE VCRL SZ 1 L27IN ABSRB UD L36MM CP-1 1/2 CIR REV CUT J268H

## (undated) DEVICE — PIN DRL DIA1/8IN QUIK REL FOR VANGUARD UNIV INSTR TOT KNEE 32486265] ZIMMER BIOMET ORTHOPEDICS]

## (undated) DEVICE — PROTECTOR PRSS FOR PRSS

## (undated) DEVICE — RUNTHROUGH NS EXTRA FLOPPY PTCA GUIDEWIRE: Brand: RUNTHROUGH

## (undated) DEVICE — HI-TORQUE WHISPER MS GUIDE WIRE .014 J TIP 3.0 CM X 190 CM: Brand: HI-TORQUE WHISPER

## (undated) DEVICE — SURGICAL PROCEDURE TRAY CRD CATH SVMMC

## (undated) DEVICE — GUIDEWIRE WITH ICE™ HYDROPHILIC COATING: Brand: LUGE™

## (undated) DEVICE — CATHETER GUID 6FR L100CM GRN PTFE JR4 TRUELUMEN HYBRID

## (undated) DEVICE — SUTURE VCRL SZ 0 L36IN ABSRB UD L36MM CT-1 1/2 CIR J946H

## (undated) DEVICE — ADHESIVE SKIN CLSR 0.7ML TOP DERMBND ADV

## (undated) DEVICE — SUTURE STRATAFIX SPRL SZ 1 L14IN ABSRB VLT L48CM CTX 1/2 SXPD2B405

## (undated) DEVICE — SUTURE STRATAFIX SPRL SZ 3-0 L5IN ABSRB UD FS L26MM 3/8 CIR SXMP2B411

## (undated) DEVICE — TRAY SURG CUST CRD CATH TOLEDO

## (undated) DEVICE — COVER,TABLE,HEAVY DUTY,50"X90",STRL: Brand: MEDLINE

## (undated) DEVICE — CATH BLLN ANGIO 2.50X12MM NC EUPHORIA RX

## (undated) DEVICE — 6FR XB3 CORDIS GUIDE 100CM

## (undated) DEVICE — PREP-RESISTANT MARKER W/ RULER: Brand: MEDLINE INDUSTRIES, INC.